# Patient Record
Sex: MALE | Race: WHITE | HISPANIC OR LATINO | Employment: UNEMPLOYED | URBAN - METROPOLITAN AREA
[De-identification: names, ages, dates, MRNs, and addresses within clinical notes are randomized per-mention and may not be internally consistent; named-entity substitution may affect disease eponyms.]

---

## 2018-01-29 ENCOUNTER — APPOINTMENT (OUTPATIENT)
Dept: CT IMAGING | Facility: CLINIC | Age: 48
End: 2018-01-29
Attending: EMERGENCY MEDICINE
Payer: COMMERCIAL

## 2018-01-29 ENCOUNTER — HOSPITAL ENCOUNTER (EMERGENCY)
Facility: CLINIC | Age: 48
Discharge: HOME | End: 2018-01-30
Attending: EMERGENCY MEDICINE | Admitting: EMERGENCY MEDICINE
Payer: COMMERCIAL

## 2018-01-29 DIAGNOSIS — J36 PERITONSILLAR ABSCESS: ICD-10-CM

## 2018-01-29 LAB
ANION GAP SERPL CALCULATED.3IONS-SCNC: 6 MMOL/L (ref 3–14)
BASOPHILS # BLD AUTO: 0 10E9/L (ref 0–0.2)
BASOPHILS NFR BLD AUTO: 0.2 %
BUN SERPL-MCNC: 11 MG/DL (ref 7–30)
CALCIUM SERPL-MCNC: 8.6 MG/DL (ref 8.5–10.1)
CHLORIDE SERPL-SCNC: 103 MMOL/L (ref 94–109)
CO2 SERPL-SCNC: 30 MMOL/L (ref 20–32)
CREAT SERPL-MCNC: 0.66 MG/DL (ref 0.66–1.25)
DIFFERENTIAL METHOD BLD: ABNORMAL
EOSINOPHIL # BLD AUTO: 0.2 10E9/L (ref 0–0.7)
EOSINOPHIL NFR BLD AUTO: 1 %
ERYTHROCYTE [DISTWIDTH] IN BLOOD BY AUTOMATED COUNT: 12.3 % (ref 10–15)
GFR SERPL CREATININE-BSD FRML MDRD: >90 ML/MIN/1.7M2
GLUCOSE SERPL-MCNC: 105 MG/DL (ref 70–99)
HCT VFR BLD AUTO: 45.8 % (ref 40–53)
HGB BLD-MCNC: 15.4 G/DL (ref 13.3–17.7)
IMM GRANULOCYTES # BLD: 0.1 10E9/L (ref 0–0.4)
IMM GRANULOCYTES NFR BLD: 0.6 %
LYMPHOCYTES # BLD AUTO: 1.6 10E9/L (ref 0.8–5.3)
LYMPHOCYTES NFR BLD AUTO: 10.3 %
MCH RBC QN AUTO: 30.9 PG (ref 26.5–33)
MCHC RBC AUTO-ENTMCNC: 33.6 G/DL (ref 31.5–36.5)
MCV RBC AUTO: 92 FL (ref 78–100)
MONOCYTES # BLD AUTO: 1.4 10E9/L (ref 0–1.3)
MONOCYTES NFR BLD AUTO: 9.3 %
NEUTROPHILS # BLD AUTO: 11.9 10E9/L (ref 1.6–8.3)
NEUTROPHILS NFR BLD AUTO: 78.6 %
NRBC # BLD AUTO: 0 10*3/UL
NRBC BLD AUTO-RTO: 0 /100
PLATELET # BLD AUTO: 239 10E9/L (ref 150–450)
POTASSIUM SERPL-SCNC: 3.5 MMOL/L (ref 3.4–5.3)
RBC # BLD AUTO: 4.98 10E12/L (ref 4.4–5.9)
SODIUM SERPL-SCNC: 139 MMOL/L (ref 133–144)
WBC # BLD AUTO: 15.1 10E9/L (ref 4–11)

## 2018-01-29 PROCEDURE — 42700 I&D ABSCESS PERITONSILLAR: CPT | Performed by: EMERGENCY MEDICINE

## 2018-01-29 PROCEDURE — 80048 BASIC METABOLIC PNL TOTAL CA: CPT | Performed by: EMERGENCY MEDICINE

## 2018-01-29 PROCEDURE — 99284 EMERGENCY DEPT VISIT MOD MDM: CPT | Mod: Z6 | Performed by: EMERGENCY MEDICINE

## 2018-01-29 PROCEDURE — 85025 COMPLETE CBC W/AUTO DIFF WBC: CPT | Performed by: EMERGENCY MEDICINE

## 2018-01-29 PROCEDURE — 25000128 H RX IP 250 OP 636: Performed by: EMERGENCY MEDICINE

## 2018-01-29 PROCEDURE — 25000125 ZZHC RX 250: Performed by: EMERGENCY MEDICINE

## 2018-01-29 PROCEDURE — 70491 CT SOFT TISSUE NECK W/DYE: CPT

## 2018-01-29 PROCEDURE — 99284 EMERGENCY DEPT VISIT MOD MDM: CPT | Mod: 25 | Performed by: EMERGENCY MEDICINE

## 2018-01-29 RX ORDER — AMPICILLIN AND SULBACTAM 2; 1 G/1; G/1
3 INJECTION, POWDER, FOR SOLUTION INTRAMUSCULAR; INTRAVENOUS ONCE
Status: COMPLETED | OUTPATIENT
Start: 2018-01-29 | End: 2018-01-30

## 2018-01-29 RX ORDER — LIDOCAINE HYDROCHLORIDE 10 MG/ML
INJECTION, SOLUTION EPIDURAL; INFILTRATION; INTRACAUDAL; PERINEURAL
Status: COMPLETED
Start: 2018-01-29 | End: 2018-01-30

## 2018-01-29 RX ORDER — KETOROLAC TROMETHAMINE 15 MG/ML
15 INJECTION, SOLUTION INTRAMUSCULAR; INTRAVENOUS ONCE
Status: COMPLETED | OUTPATIENT
Start: 2018-01-29 | End: 2018-01-29

## 2018-01-29 RX ORDER — IOPAMIDOL 755 MG/ML
100 INJECTION, SOLUTION INTRAVASCULAR ONCE
Status: COMPLETED | OUTPATIENT
Start: 2018-01-29 | End: 2018-01-29

## 2018-01-29 RX ADMIN — KETOROLAC TROMETHAMINE 15 MG: 15 INJECTION, SOLUTION INTRAMUSCULAR; INTRAVENOUS at 23:09

## 2018-01-29 RX ADMIN — IOPAMIDOL 90 ML: 755 INJECTION, SOLUTION INTRAVENOUS at 22:59

## 2018-01-29 RX ADMIN — SODIUM CHLORIDE 50 ML: 9 INJECTION, SOLUTION INTRAVENOUS at 23:00

## 2018-01-29 ASSESSMENT — ENCOUNTER SYMPTOMS
CHILLS: 0
FEVER: 0
ABDOMINAL PAIN: 0
NECK PAIN: 1
TROUBLE SWALLOWING: 1
ADENOPATHY: 1
FACIAL SWELLING: 1
SHORTNESS OF BREATH: 0

## 2018-01-29 NOTE — ED AVS SNAPSHOT
[facility], [place], Emergency Department    [address]    [facility] [address]    Phone:  [phone]    Fax:  [phone]                                       Michael Stewart   MRN: [id number]    Department:  [facility], Emergency Department   Date of Visit:  1/29/2018           After Visit Summary Signature Page     I have received my discharge instructions, and my questions have been answered. I have discussed any challenges I see with this plan with the nurse or doctor.    ..........................................................................................................................................  Patient/Patient Representative Signature      ..........................................................................................................................................  Patient Representative Print Name and Relationship to Patient    ..................................................               ................................................  Date                                            Time    ..........................................................................................................................................  Reviewed by Signature/Title    ...................................................              ..............................................  Date                                                            Time

## 2018-01-29 NOTE — ED AVS SNAPSHOT
[facility], Emergency Department    [address]    [facility] [place] [address]    Phone:  [phone]    Fax:  [phone]                                       Michael Stewart   MRN: [id number]    Department:  [facility], Emergency Department   Date of Visit:  1/29/2018           Patient Information     Date Of Birth          1970        Your diagnoses for this visit were:     Peritonsillar abscess        You were seen by Son Santa MD.      Follow-up Information     Follow up with [facility], Emergency Department.    Specialty:  EMERGENCY MEDICINE    Why:  As needed, If symptoms worsen    Contact information:    [address] [place] [address] [place]  [facility] [place] [address] [phone]    Additional information:    The [facility] is located in the [facility]. lt is easily accessible from virtually any point in the [facility] area, via [address]        Discharge Instructions         Absceso periamigdalino    El absceso periamigdalino es deepak formación de pus cerca de las amígdalas. Es deepak complicación de la infección bacteriana llamada amigdalitis. El absceso hace que deepak o ambas amígdalas se hinchen; la infección y la hinchazón se pueden extender hasta los tejidos cercanos. Si dichos tejidos se hinchan lo suficiente naa para bloquear la garganta, la afección puede resultar mortal. La situación también es peligrosa si el absceso se revienta y la infección se esparce o es inhalada y pasa a los pulmones. El objetivo es tratar el absceso periamigdalino antes de que empeore y se convierta en deepak amenaza para madison jan.  Signos y síntomas del absceso periamigdalino    Beck dolor de garganta (por lo general en un solo lado)    Hinchazón e inflamación de las amígdalas    Fiebre y escalofríos    Dolor al tragar o dificultad para abrir la boca    Cambios en la voz    Babeo    Hinchazón o hipersensibilidad en las ganglios linfáticos del luke  Diagnóstico del absceso  periamigdalino  Madison médico lo examinará y le revisará la boca y la garganta. Además, le hará preguntas sobre phoebe síntomas y madison historia clínica. También es posible que usted sea sometido a pruebas o procedimientos naa los que se nombran a continuación:    Examen de garganta: Sirve para descubrir infecciones. Se frota un copo de algodón estéril en la parte de atrás de la garganta y luego se envía a un laboratorio para que lo analicen.    Análisis de tutu: Se podrían realizar a fin de evaluar cómo está respondiendo madison cuerpo a la infección.    Ecografía o tomografía computarizada (CT, por phoebe siglas en inglés): Permiten captar imágenes del absceso y ayudan a descartar otros problemas.    Aspiración con aguja: [place] procedimiento permite extraer hailee muestra de pus del absceso con hailee aguja. Luego, dicha muestra se envía a un laboratorio para loni si hay infección. En algunos casos, se mamadou todo el pus del absceso.  Tratamiento del absceso periamigdalino  Se puede tratar el absceso propiamente dicho, juan josé también es necesario tratar la infección que lo causa. Estos son los tratamientos más comunes:    Medicamentos: Para tratar la infección causante del absceso deben darse antibióticos, ya sea por vía oral o intravenosa. Si es necesario, también se pueden suministrar analgésicos.    Drenaje del absceso: Kartik vez sea preciso quitar el pus del absceso. [place] se puede realizar con hailee aguja (aspiración con aguja) o mediante hailee pequeña incisión en el absceso, luego de la cual el pus se drena y se extrae de la garganta y la boca. [place] procedimiento se llama incisión y drenaje.    Tonsilectomía: Es hailee cirugía que permite extraer las amígdalas. Se puede llevar a cabo si el absceso no mejora con los medicamentos o si usted tiene infecciones o abscesos frecuentes en las amígdalas.  Recuperación y seguimiento  Por lo general, el tratamiento de la infección bacteriana soluciona el problema. Hailee vez que la infección desaparece,  usted debería recuperarse por completo. Delia el seguimiento con madison médico según lo indicado, y si aparece otra infección en la garganta, véalo de inmediato.  Date Last Reviewed: 5/7/2014 2000-2017 [employer]. [address] [place] [address], [place] [address]. Todos los derechos reservados. Esta información no pretende sustituir la atención médica profesional. Sólo madison médico puede diagnosticar y tratar un problema de jan.          24 Hour Appointment Hotline       To make an appointment at any [facility], call [phone] ([phone]). If you don't have a family doctor or clinic, we will help you find one. [facility] are conveniently located to serve the needs of you and your family.             Review of your medicines      START taking        Dose / Directions Last dose taken    amoxicillin-clavulanate 875-125 MG per tablet   Commonly known as:  AUGMENTIN   Dose:  1 tablet   Quantity:  14 tablet        Take 1 tablet by mouth 2 times daily for 7 days   Refills:  0          Our records show that you are taking the medicines listed below. If these are incorrect, please call your family doctor or clinic.        Dose / Directions Last dose taken    FLANAX PAIN RELIEF PO   Dose:  550 mg        Take 550 mg by mouth   Refills:  0        TETRACYCLINE HCL PO   Dose:  500 mg        Take 500 mg by mouth   Refills:  0                Prescriptions were sent or printed at these locations (1 Prescription)                   Other Prescriptions                Printed at Department/Unit printer (1 of 1)         amoxicillin-clavulanate (AUGMENTIN) 875-125 MG per tablet                Procedures and tests performed during your visit     Basic metabolic panel    CBC with platelets differential    Gram stain    Soft tissue neck CT w contrast    Wound Culture Aerobic Bacterial      Orders Needing Specimen Collection     None      Pending Results     Date and Time Order Name Status Description    1/30/2018  "0129 Gram stain In process     2018 0129 Wound Culture Aerobic Bacterial In process     2018 2208 Soft tissue neck CT w contrast Preliminary             Pending Culture Results     Date and Time Order Name Status Description    2018 0129 Gram stain In process     2018 0129 Wound Culture Aerobic Bacterial In process             Pending Results Instructions     If you had any lab results that were not finalized at the time of your Discharge, you can call the ED Lab Result RN at [phone]. You will be contacted by this team for any positive Lab results or changes in treatment. The nurses are available 7 days a week from 10A to 6:30P.  You can leave a message 24 hours per day and they will return your call.        Thank you for choosing [facility]       Thank you for choosing [facility] for your care. Our goal is always to provide you with excellent care. Hearing back from our patients is one way we can continue to improve our services. Please take a few minutes to complete the written survey that you may receive in the mail after you visit with us. Thank you!        [facility] Information     [facility] lets you send messages to your doctor, view your test results, renew your prescriptions, schedule appointments and more. To sign up, go to [email or web address] . Click on \"Log in\" on the left side of the screen, which will take you to the Welcome page. Then click on \"Sign up Now\" on the right side of the page.     You will be asked to enter the access code listed below, as well as some personal information. Please follow the directions to create your username and password.     Your access code is: [email or web address]  Expires: 2018  1:34 AM     Your access code will  in 90 days. If you need help or a new code, please call your [facility] clinic or [phone].        Care EveryWhere ID     This is your Care EveryWhere ID. This could be used by other organizations to access your [facility] medical " records  [id number]        Equal Access to Services     NAYE BILLY : Harshad Pedraza, emma kirkpatrick, shasta diaz, prosper campos . So [facility] [phone].    ATENCIÓN: Si habla español, tiene a madison disposición servicios gratuitos de asistencia lingüística. Llame al [phone].    We comply with applicable federal civil rights laws and [place] laws. We do not discriminate on the basis of race, color, national origin, age, disability, sex, sexual orientation, or gender identity.            After Visit Summary       This is your record. Keep this with you and show to your community pharmacist(s) and doctor(s) at your next visit.

## 2018-01-29 NOTE — LETTER
[facility], [place], EMERGENCY DEPARTMENT  [address]  [facility] [place] [address]  [phone]      2018    Michael Stewart  [address]  [facility] [address] [phone] (home)     : 1970      To Whom it may concern:    Michael Stewart was seen in our Emergency Department today, 2018 for an illness.  He should be excused from work through 2018 based on anticipated time for resolution of symptoms; he may return to work on an as tolerated basis following this or sooner should symptoms resolve.      Sincerely,    Son Santa MD

## 2018-01-30 VITALS
WEIGHT: 173.06 LBS | HEART RATE: 105 BPM | DIASTOLIC BLOOD PRESSURE: 92 MMHG | RESPIRATION RATE: 16 BRPM | OXYGEN SATURATION: 95 % | TEMPERATURE: 98.6 F | SYSTOLIC BLOOD PRESSURE: 141 MMHG

## 2018-01-30 LAB
BACTERIA SPEC CULT: NORMAL
GRAM STN SPEC: ABNORMAL
Lab: ABNORMAL
SPECIMEN SOURCE: ABNORMAL
SPECIMEN SOURCE: NORMAL

## 2018-01-30 PROCEDURE — 87070 CULTURE OTHR SPECIMN AEROBIC: CPT | Performed by: OTOLARYNGOLOGY

## 2018-01-30 PROCEDURE — 87205 SMEAR GRAM STAIN: CPT | Performed by: OTOLARYNGOLOGY

## 2018-01-30 PROCEDURE — 25000132 ZZH RX MED GY IP 250 OP 250 PS 637: Performed by: EMERGENCY MEDICINE

## 2018-01-30 PROCEDURE — 87147 CULTURE TYPE IMMUNOLOGIC: CPT | Performed by: OTOLARYNGOLOGY

## 2018-01-30 PROCEDURE — 25000128 H RX IP 250 OP 636: Performed by: EMERGENCY MEDICINE

## 2018-01-30 PROCEDURE — 25000125 ZZHC RX 250

## 2018-01-30 RX ORDER — DEXAMETHASONE SODIUM PHOSPHATE 10 MG/ML
10 INJECTION, SOLUTION INTRAMUSCULAR; INTRAVENOUS ONCE
Status: COMPLETED | OUTPATIENT
Start: 2018-01-30 | End: 2018-01-30

## 2018-01-30 RX ADMIN — AMPICILLIN SODIUM AND SULBACTAM SODIUM 3 G: 2; 1 INJECTION, POWDER, FOR SOLUTION INTRAMUSCULAR; INTRAVENOUS at 00:22

## 2018-01-30 RX ADMIN — DEXAMETHASONE SODIUM PHOSPHATE 10 MG: 10 INJECTION, SOLUTION INTRAMUSCULAR; INTRAVENOUS at 01:36

## 2018-01-30 RX ADMIN — Medication 1 ML: at 00:20

## 2018-01-30 RX ADMIN — LIDOCAINE HYDROCHLORIDE: 10 INJECTION, SOLUTION EPIDURAL; INFILTRATION; INTRACAUDAL; PERINEURAL at 01:38

## 2018-01-30 RX ADMIN — SODIUM CHLORIDE 1000 ML: 9 INJECTION, SOLUTION INTRAVENOUS at 00:20

## 2018-01-30 NOTE — CONSULTS
[facility] Otolaryngology Consultation    Michael Stewart MRN# [id number]   Age: 47 year old YOB: 1970     Date of Admission:  1/29/2018    Reason for consult: Peritonsillar abscess       Requesting physician: Son Santa MD         Chief Concern:   Throat pain       History of Present Illness:   Patient is a Syriac speaking individual.   services were used for the entire visit.    Location: Left  Quality/Character/Symptom: Throat pain  Onset/Duration: 2 weeks ago  Timing: Constant  Severity: Worsening  Modifying Factors: Tried tetracycline and naproxen today with no help  Context: Upper respiratory infection around the time of infection began. No prior episodes, no history of tonsillectomy.  Associated Signs/Symptoms: Difficulty swallowing, fluctuating voice changes, fever, left neck lymph nodes enlarged.      The visit today is for consultation procedure if indicated        Review of Systems:   Constitutional: Patient reports having fever but did not check his temperature.    Ears, Nose, Throat: As above, otherwise negative.    Respiratory: No shortness of breath, wheezing or persistent cough.  No difficulty breathing.  Cardiovascular: No chest pain.   Gastrointestinal: No vomiting or heartburn.   Neurological: No headaches.   Skin: No rash.   Musculoskeletal: No muscle aches.   Blood/Lymphatic: Left neck cervical lymphadenopathy.   Allergic: No seasonal or food allergies.        Past Medical History:   Reviewed, snoring.  Otherwise negative.    Past Surgical History:   Reviewed, hemorrhoid procedure 5 years ago    Current Medications:   Reviewed, none    Medication Allergies:   Reviewed, none    Social History:   Reviewed, former tobacco quit 8 years ago.  One alcoholic beverage per week.  No drug use.  Lives in [place].  Reports having ability to come back if needed.    Family History:   Reviewed, negative for bleeding disorders or anesthesia complications       Physical  Exam:    Constitutional:    .BP (!) 141/92  Pulse 105  Temp 98.6  F (37  C) (Oral)  Resp 16  Wt 78.5 kg (173 lb 1 oz)  SpO2 95%  b.  Normal general appearance  c.  Normal voice and ability to communicate  Head and Face  a.  Normal inspection of head and face  b.  Normal palpation of the face and sinuses  c.  No salivary masses  d.  House Brackman 1/6 facial strength  e.  Normal external ears and nose  Eyes  a.  Normal motility and gaze alignment  Ears  a.Normal external auditory canals and tympanic membrane   Nose  a.  Normal nasal mucosa, septum and turbinates  Mouth  a.  Normal lips, teeth and gums  Oropharynx  a.  Mild swelling of left tonsil and peritonsillar region.  Induration of this region to palpation.  Mild trismus.  Thick base of tongue  Neck  a.  Normal appearance.  Left level 2 lymphadenopathy  b.  Normal thyroid size. No masses appreciated.  Respiratory  a.  Normal respiratory effort.  Quiet respirations.  Cardiovascular  a.  Normal peripheral vascular perfusion  Lymphatic  a.  Left cervical lymph nodes enlarged and tender  Neurologic/Psychiatric  a.  Cranial nerves without focal deficits       Procedure:  Drainage of left peritonsillar abscess.  Please see separate procedure note.      Data Reviewed:  CT images reviewed personally and interpreted:  January 29, 2018 contrast CT soft tissue neck: 2 foci of hypodensity within the left tonsillar/peritonsillar region.  One measures about 1 cm the other about 1.5 cm.  Multiple left upper cervical lymph nodes enlarged.    White blood cell count 15    Medical history obtained through     Case discussed with consulting Dr. Santa.      Impressions:  New:  1.  Left tonsillar and peritonsillar abscess work-up planned  2.  Dysphagia no work-up      Recommendations:   -Peritonsillar abscess drained in emergency department  -Culture sent off from wound  -Prescribe antibiotic with good oral pharyngeal coverage such as Augmentin  -Okay to give Decadron  ×1 in ER  -Patient given business card and asked to follow-up if he desires tonsillectomy.  Quoted 10% risk for recurrence.  -Patient also instructed to return if symptoms worsen or return.  He reports ability and willingness to do so.  -Dysphagia improved after drainage in the emergency department

## 2018-01-30 NOTE — DISCHARGE INSTRUCTIONS
Absceso periamigdalino    El absceso periamigdalino es deepak formación de pus cerca de las amígdalas. Es deepak complicación de la infección bacteriana llamada amigdalitis. El absceso hace que deepak o ambas amígdalas se hinchen; la infección y la hinchazón se pueden extender hasta los tejidos cercanos. Si dichos tejidos se hinchan lo suficiente naa para bloquear la garganta, la afección puede resultar mortal. La situación también es peligrosa si el absceso se revienta y la infección se esparce o es inhalada y pasa a los pulmones. El objetivo es tratar el absceso periamigdalino antes de que empeore y se convierta en deepak amenaza para madison jan.  Signos y síntomas del absceso periamigdalino    Beck dolor de garganta (por lo general en un solo lado)    Hinchazón e inflamación de las amígdalas    Fiebre y escalofríos    Dolor al tragar o dificultad para abrir la boca    Cambios en la voz    Babeo    Hinchazón o hipersensibilidad en las ganglios linfáticos del luke  Diagnóstico del absceso periamigdalino  Madison médico lo examinará y le revisará la boca y la garganta. Además, le hará preguntas sobre phoebe síntomas y madison historia clínica. También es posible que usted sea sometido a pruebas o procedimientos naa los que se nombran a continuación:    Examen de garganta: Sirve para descubrir infecciones. Se frota un copo de algodón estéril en la parte de atrás de la garganta y luego se envía a un laboratorio para que lo analicen.    Análisis de tutu: Se podrían realizar a fin de evaluar cómo está respondiendo madison cuerpo a la infección.    Ecografía o tomografía computarizada (CT, por phoebe siglas en inglés): Permiten captar imágenes del absceso y ayudan a descartar otros problemas.    Aspiración con aguja: [place] procedimiento permite extraer deepak muestra de pus del absceso con deepak aguja. Luego, dicha muestra se envía a un laboratorio para loni si hay infección. En algunos casos, se mamadou todo el pus del absceso.  Tratamiento del absceso  periamigdalino  Se puede tratar el absceso propiamente dicho, juan josé también es necesario tratar la infección que lo causa. Estos son los tratamientos más comunes:    Medicamentos: Para tratar la infección causante del absceso deben darse antibióticos, ya sea por vía oral o intravenosa. Si es necesario, también se pueden suministrar analgésicos.    Drenaje del absceso: Kartik vez sea preciso quitar el pus del absceso. [place] se puede realizar con deepak aguja (aspiración con aguja) o mediante deepak pequeña incisión en el absceso, luego de la cual el pus se drena y se extrae de la garganta y la boca. [place] procedimiento se llama incisión y drenaje.    Tonsilectomía: Es deepak cirugía que permite extraer las amígdalas. Se puede llevar a cabo si el absceso no mejora con los medicamentos o si usted tiene infecciones o abscesos frecuentes en las amígdalas.  Recuperación y seguimiento  Por lo general, el tratamiento de la infección bacteriana soluciona el problema. Deepak vez que la infección desaparece, usted debería recuperarse por completo. Delia el seguimiento con madison médico según lo indicado, y si aparece otra infección en la garganta, véalo de inmediato.  Date Last Reviewed: 5/7/2014 2000-2017 [email or web address]. [address] [place] [address], [place] [address]. Todos los derechos reservados. Esta información no pretende sustituir la atención médica profesional. Sólo madison médico puede diagnosticar y tratar un problema de jan.

## 2018-01-30 NOTE — ED PROVIDER NOTES
History     Chief Complaint   Patient presents with     Lymphadenopathy     Onset 2 weeks ago with pain and swelling in lymph nodes of left neck.     HEIDI Stewart is a 47 year old male without significant past medical history who presents for evaluation of lymphadenopathy.  The patient reports approximately 2 weeks ago he noted swelling in the left side of his neck and associated constant, sharp pain that radiates to the back of his head.  He states that the symptoms have been worsening since onset.  The patient states that coincident with that he developed flulike symptoms including sore throat and congestion, as well as mild cough; however, he states that these flulike symptoms have mostly resolved.  The patient has had some mild difficulty swallowing secondary to the pain associated with his neck swelling; thus he has not been eating as much as usual.  He states also that he feels his voice is somewhat hoarse, and he has had difficulty sleeping due to his symptoms.  The patient otherwise denies any recent fevers or chills.  He states that he has not had any recent dental procedure or any trauma.  The patient states that he is otherwise generally rather healthy no history of these symptoms.    Current Facility-Administered Medications   Medication     ketorolac (TORADOL) injection 15 mg     Current Outpatient Prescriptions   Medication     TETRACYCLINE HCL PO     Naproxen Sodium (FLANAX PAIN RELIEF PO)     History reviewed. No pertinent past medical history.    History reviewed. No pertinent surgical history.    No family history on file.    Social History   Substance Use Topics     Smoking status: Not on file     Smokeless tobacco: Not on file     Alcohol use Not on file     Not on File    I have reviewed the Medications, Allergies, Past Medical and Surgical History, and Social History in the Epic system.    Review of Systems   Constitutional: Negative for chills and fever.   HENT: Positive for facial  swelling (left-sided facial swelling) and trouble swallowing.    Respiratory: Negative for shortness of breath.    Cardiovascular: Negative for chest pain.   Gastrointestinal: Negative for abdominal pain.   Musculoskeletal: Positive for neck pain (left-sided w/ associated swelling).   Hematological: Positive for adenopathy (concern for left-sided cervical lymphadenopathy).   All other systems reviewed and are negative.      Physical Exam   BP: (!) 170/103  Pulse: 105  Heart Rate: 105  Temp: 97.7  F (36.5  C)  Resp: 16  Weight: 78.5 kg (173 lb 1 oz)  SpO2: 98 %      Physical Exam   Constitutional: He is oriented to person, place, and time. He appears distressed.   HENT:   Head: Normocephalic and atraumatic.   Mouth/Throat: Oropharynx is clear and moist.   Eyes: Conjunctivae are normal. Pupils are equal, round, and reactive to light.   Neck: No spinous process tenderness present. No rigidity. Normal range of motion present.       Cardiovascular: Regular rhythm and normal heart sounds.  Tachycardia present.    Pulmonary/Chest: Effort normal. No stridor. Tachypnea noted. No respiratory distress.   Musculoskeletal: He exhibits no edema.   Neurological: He is alert and oriented to person, place, and time. No cranial nerve deficit.   Skin: Skin is warm. Rash noted.       ED Course     ED Course     Procedures             Labs Ordered and Resulted from Time of ED Arrival Up to the Time of Departure from the ED   CBC WITH PLATELETS DIFFERENTIAL - Abnormal; Notable for the following:        Result Value    WBC 15.1 (*)     Absolute Neutrophil 11.9 (*)     Absolute Monocytes 1.4 (*)     All other components within normal limits   BASIC METABOLIC PANEL            Assessments & Plan (with Medical Decision Making)     47-year-old otherwise healthy male arriving today to the emergency [facility] for evaluation of left-sided neck discomfort ×2 weeks.  Upon arrival this patient noted to be alert and currently afebrile.  He speaking in  full sentences but does appear slightly uncomfortable.  The patient is noted to be tachycardic with a current rate of 105.  At this time the patient reports localized pain in the left anterior superior neck.  He has no signs of external fluctuance, erythema, or warmth consistent with cellulitis however we did discuss possibility of deep space infection.  There is no evidence of oral abscess tongue elevation, or drooling.  There is no stridor appreciated however the patient does sound to have mild hypophonia consistent with his own description.  There is no meningismus I do not suspect CNS infection such as meningitis or encephalitis warranting emergent LP.  The patient has a GCS of 15 with no appreciable neurologic deficit this is not consistent with dissection.  There is no signs of external trauma to the neck.  At this time I believe the patient would benefit from CBC, CMP, and CT of the neck to evaluate for potential deep space infection and rule out epiglottitis.    Laboratory studies do confirm a leukocytosis with left shift.  CT scan ultimately does confirm suspected abscess with apparent 2 discrete locations of 1 and 1.5 cm.  Ultimately did discuss this case with ENT surgery with plan for bedside evaluation and aspiration.  The patient is received IV fluids, Toradol, and Unasyn in the emergency department.    Mr. Stewart was evaluated by ENT with local I&D.  The patient was noted to be having improvement of symptoms following this tolerating oral fluids.  Recommendations provided by ENT are for Augmentin ×1 week and Decadron ×1 in the emergency department.  The patient understands indications to call or return emergently and was dismissed home in stable condition.    I have reviewed the nursing notes.    I have reviewed the findings, diagnosis, plan and need for follow up with the patient.    New Prescriptions    No medications on file       Final diagnoses:   Peritonsillar abscess     I, Yemi Castillo am  serving as a trained medical scribe to document services personally performed by Son Santa MD, based on the provider's statements to me.      I, Son Santa MD, was physically present and have reviewed and verified the accuracy of this note documented by Yemi Castillo.    1/29/2018   [facility], [place], EMERGENCY DEPARTMENT     Son Santa MD  01/30/18 0150

## 2018-01-30 NOTE — PROCEDURES
Drainage of peritonsillar abscess (CPT 56114)     Surgeon:   Justin Cadena MD      Diagnosis:   Left Peritonsillar abscess (ICD-10: J36)     Indication:   Patient with clinical findings suggestive of peritonsillar abscess.  Nature of procedure, risks, benefits, alternatives discussed and patient elects to proceed with drainage.     Procedure:   The throat was treated with topical numbing spray.  Local anesthetic is injected into the peritonsillar region.  A spinal needle is used to aspirate the peritonsillar area.  A scalpel was used to incise the anterior tonsillar pillar.  A clamp was used to spread into the peritonsillar space.     Findings:   Pus is drained from the inferior aspect of the left tonsillar pole.  This is cultured.  Patient reports improvement in symptoms after drainage.  Multiple aspirations were made more superiorly, however, no pus was encountered.

## 2018-02-01 ENCOUNTER — TELEPHONE (OUTPATIENT)
Dept: EMERGENCY MEDICINE | Facility: CLINIC | Age: 48
End: 2018-02-01

## 2018-02-01 LAB
BACTERIA SPEC CULT: ABNORMAL
Lab: ABNORMAL
SPECIMEN SOURCE: ABNORMAL

## 2018-02-01 NOTE — TELEPHONE ENCOUNTER
[facility] Emergency Department Lab result notification:    [place] ED lab result protocol used  Abscess     Reason for call  Notify of lab results, assess symptoms,  review ED providers recommendations/discharge instructions (if necessary) and advise per ED lab result f/u protocol    Lab Result  Final Abscess culture report on 2/1/18  [place] ED discharge antibiotic: Amoxicillin-Clavulanate (Augmentin) 875-125 mg PO tablet,  1 tablet by mouth 2 times daily for 7 days  #1. Bacteria, Heavy growth Beta hemolytic Streptococcus group A, Susceptibility testing not routinely done.   Incision and Drainage performed in the [place] ED: Yes  Patient to be notified of result, symptoms's assessed and advised per [place] ED lab result protocol.  Information table from ED Provider visit on 1/29/18-1/30/18  Symptoms reported at ED visit (Chief complaint, HPI) Chief Complaint   Patient presents with     Lymphadenopathy       Onset 2 weeks ago with pain and swelling in lymph nodes of left neck.      HPI  Michael Stewart is a 47 year old male without significant past medical history who presents for evaluation of lymphadenopathy.  The patient reports approximately 2 weeks ago he noted swelling in the left side of his neck and associated constant, sharp pain that radiates to the back of his head.  He states that the symptoms have been worsening since onset.  The patient states that coincident with that he developed flulike symptoms including sore throat and congestion, as well as mild cough; however, he states that these flulike symptoms have mostly resolved.  The patient has had some mild difficulty swallowing secondary to the pain associated with his neck swelling; thus he has not been eating as much as usual.  He states also that he feels his voice is somewhat hoarse, and he has had difficulty sleeping due to his symptoms.  The patient otherwise denies any recent fevers or chills.  He states that he has not had any  recent dental procedure or any trauma.  The patient states that he is otherwise generally rather healthy no history of these symptoms.     ED providers Impression and Plan (applicable information)    47-year-old otherwise healthy male arriving today to the emergency [facility] for evaluation of left-sided neck discomfort ×2 weeks.  Upon arrival this patient noted to be alert and currently afebrile.  He speaking in full sentences but does appear slightly uncomfortable.  The patient is noted to be tachycardic with a current rate of 105.  At this time the patient reports localized pain in the left anterior superior neck.  He has no signs of external fluctuance, erythema, or warmth consistent with cellulitis however we did discuss possibility of deep space infection.  There is no evidence of oral abscess tongue elevation, or drooling.  There is no stridor appreciated however the patient does sound to have mild hypophonia consistent with his own description.  There is no meningismus I do not suspect CNS infection such as meningitis or encephalitis warranting emergent LP.  The patient has a GCS of 15 with no appreciable neurologic deficit this is not consistent with dissection.  There is no signs of external trauma to the neck.  At this time I believe the patient would benefit from CBC, CMP, and CT of the neck to evaluate for potential deep space infection and rule out epiglottitis.     Laboratory studies do confirm a leukocytosis with left shift.  CT scan ultimately does confirm suspected abscess with apparent 2 discrete locations of 1 and 1.5 cm.  Ultimately did discuss this case with ENT surgery with plan for bedside evaluation and aspiration.  The patient is received IV fluids, Toradol, and Unasyn in the emergency department.     Mr. Stewart was evaluated by ENT with local I&D.  The patient was noted to be having improvement of symptoms following this tolerating oral fluids.  Recommendations provided by ENT are for  Augmentin ×1 week and Decadron ×1 in the emergency department.  The patient understands indications to call or return emergently and was dismissed home in stable condition.         New Prescriptions     No medications on file         Final diagnoses:   Peritonsillar abscess       Miscellaneous information Able to speak English     RN Assessment (Patient s current Symptoms), include time called.  [Insert Left message here if message left]  12:45 pm Pt said he is good now. Is taking his antibiotic as prescribe and will finish it.   RN Recommendations/Instructions per [place] ED lab result protocol  Advised to finish full course of antibiotics as prescribed and drink plenty of fluids. And follow up with your PCP as the ED provider recommended.     Please Contact your PCP clinic or return to the Emergency department if your:    Symptoms return.    Symptoms do not improve after 3 days on antibiotic.    Symptoms do not resolve after completing antibiotic.    Symptoms worsen or other concerning symptom's.    PCP follow-up Questions asked: YES       [RN Name]  Opal Glass RN  [place] [facility] RN  Lung Nodule and ED Lab Result F/u RN  Epic pool (ED late result f/u RN): [phone] [id number]  # [phone]

## 2021-01-18 ENCOUNTER — HOSPITAL ENCOUNTER (EMERGENCY)
Facility: CLINIC | Age: 51
Discharge: HOME | End: 2021-01-18
Attending: PSYCHIATRY & NEUROLOGY | Admitting: PSYCHIATRY & NEUROLOGY
Payer: COMMERCIAL

## 2021-01-18 VITALS
DIASTOLIC BLOOD PRESSURE: 96 MMHG | RESPIRATION RATE: 18 BRPM | TEMPERATURE: 98 F | HEART RATE: 100 BPM | SYSTOLIC BLOOD PRESSURE: 174 MMHG | OXYGEN SATURATION: 98 %

## 2021-01-18 DIAGNOSIS — F22 PARANOIA (H): ICD-10-CM

## 2021-01-18 DIAGNOSIS — F15.10 METHAMPHETAMINE ABUSE (H): ICD-10-CM

## 2021-01-18 PROCEDURE — 90791 PSYCH DIAGNOSTIC EVALUATION: CPT

## 2021-01-18 PROCEDURE — 99285 EMERGENCY DEPT VISIT HI MDM: CPT | Mod: 25 | Performed by: PSYCHIATRY & NEUROLOGY

## 2021-01-18 PROCEDURE — 250N000013 HC RX MED GY IP 250 OP 250 PS 637: Performed by: PSYCHIATRY & NEUROLOGY

## 2021-01-18 PROCEDURE — 99284 EMERGENCY DEPT VISIT MOD MDM: CPT | Performed by: PSYCHIATRY & NEUROLOGY

## 2021-01-18 RX ORDER — OLANZAPINE 10 MG/1
10 TABLET, ORALLY DISINTEGRATING ORAL ONCE
Status: COMPLETED | OUTPATIENT
Start: 2021-01-18 | End: 2021-01-18

## 2021-01-18 RX ADMIN — OLANZAPINE 10 MG: 10 TABLET, ORALLY DISINTEGRATING ORAL at 20:05

## 2021-01-18 ASSESSMENT — ENCOUNTER SYMPTOMS
NERVOUS/ANXIOUS: 1
EYES NEGATIVE: 1
GASTROINTESTINAL NEGATIVE: 1
MUSCULOSKELETAL NEGATIVE: 1
CARDIOVASCULAR NEGATIVE: 1
DECREASED CONCENTRATION: 1
CONSTITUTIONAL NEGATIVE: 1
HALLUCINATIONS: 0
RESPIRATORY NEGATIVE: 1
NEUROLOGICAL NEGATIVE: 1
SLEEP DISTURBANCE: 1

## 2021-01-19 ENCOUNTER — HOSPITAL ENCOUNTER (EMERGENCY)
Facility: CLINIC | Age: 51
Discharge: HOME | End: 2021-01-19
Attending: PSYCHIATRY & NEUROLOGY | Admitting: PSYCHIATRY & NEUROLOGY
Payer: COMMERCIAL

## 2021-01-19 VITALS
SYSTOLIC BLOOD PRESSURE: 138 MMHG | DIASTOLIC BLOOD PRESSURE: 87 MMHG | TEMPERATURE: 98.2 F | OXYGEN SATURATION: 99 % | RESPIRATION RATE: 16 BRPM | HEART RATE: 98 BPM

## 2021-01-19 DIAGNOSIS — F15.20 METHAMPHETAMINE DEPENDENCE (H): ICD-10-CM

## 2021-01-19 PROCEDURE — 99285 EMERGENCY DEPT VISIT HI MDM: CPT | Mod: 25 | Performed by: PSYCHIATRY & NEUROLOGY

## 2021-01-19 PROCEDURE — 99284 EMERGENCY DEPT VISIT MOD MDM: CPT | Performed by: PSYCHIATRY & NEUROLOGY

## 2021-01-19 PROCEDURE — 90791 PSYCH DIAGNOSTIC EVALUATION: CPT

## 2021-01-19 ASSESSMENT — ENCOUNTER SYMPTOMS
HALLUCINATIONS: 0
MUSCULOSKELETAL NEGATIVE: 1
EYES NEGATIVE: 1
RESPIRATORY NEGATIVE: 1
CONSTITUTIONAL NEGATIVE: 1
CARDIOVASCULAR NEGATIVE: 1
PSYCHIATRIC NEGATIVE: 1
HYPERACTIVE: 0
NEUROLOGICAL NEGATIVE: 1
GASTROINTESTINAL NEGATIVE: 1

## 2021-01-19 NOTE — ED NOTES
Pt would like doctor or  to call his sister, Dali: [phone] to get collateral info and to discuss plan.
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
## 2021-01-19 NOTE — ED NOTES
Pt given PO med for his paranoid thoughts, after talking to the DEC  and the ED provider.  Pt being monitor for effect.

## 2021-01-19 NOTE — ED NOTES
Patient's family would like a phone call if patient discharges so they can safety plan.    Can call wife Becca: [phone]  Or barak العلي: [phone]

## 2021-01-19 NOTE — DISCHARGE INSTRUCTIONS
Please stop using methamphetamines. It is messing up your brain and thoughts.  Follow-up established care and services

## 2021-01-20 NOTE — DISCHARGE INSTRUCTIONS
I am glad you are getting help for your methamphetamine use. Please follow-up with P-referred outpatient CD evaluation for further treatment recommendations.  Follow-up established care and services.

## 2021-01-20 NOTE — ED NOTES
Pt brought over from the Main ED to [facility].  Pt told of the course of care while in [facility] and that we will get an  for him.  Pt stated understanding.

## 2021-01-20 NOTE — ED PROVIDER NOTES
ED Provider Note  [facility]      History     Chief Complaint   Patient presents with     Addiction Problem     seking assistance with meth addiction     HPI  Michael Stewart is a 50 year old male who is here (again) due to family concern for his meth use. He was seen yesterday and was uninsightful regarding his methamphetamine dependence. Patient did not receive any services as he did not feel he had a problem. Today police told him that he needs to take steps to get into treatment otherwise he would not be able to see his kids or likely keep his job. He decided to come back to get a referral for an outpatient CD evaluation.    Patient appears less anxious and paranoid today regarding wife's welfare. He denies feeling suicidal. He is calm and in emotional and behavioral control.    Please see DEC Crisis Assessment on 1/19/21 in Epic for further details.    PERSONAL MEDICAL HISTORY  No past medical history on file.  PAST SURGICAL HISTORY  No past surgical history on file.  FAMILY HISTORY  No family history on file.  SOCIAL HISTORY  Social History     Tobacco Use     Smoking status: Never Smoker     Smokeless tobacco: Never Used   Substance Use Topics     Alcohol use: Not on file     MEDICATIONS  No current facility-administered medications for this encounter.      Current Outpatient Medications   Medication     Naproxen Sodium (FLANAX PAIN RELIEF PO)     TETRACYCLINE HCL PO     ALLERGIES  No Known Allergies       Review of Systems   Constitutional: Negative.    HENT: Negative.    Eyes: Negative.    Respiratory: Negative.    Cardiovascular: Negative.    Gastrointestinal: Negative.    Genitourinary: Negative.    Musculoskeletal: Negative.    Skin: Negative.    Neurological: Negative.    Psychiatric/Behavioral: Negative.  Negative for hallucinations and suicidal ideas. The patient is not hyperactive.    All other systems reviewed and are negative.        Physical Exam   BP: 138/87  Pulse:  98  Temp: 98.2  F (36.8  C)  Resp: 16  SpO2: 99 %  Physical Exam  Vitals signs and nursing note reviewed.   HENT:      Head: Normocephalic.   Eyes:      Pupils: Pupils are equal, round, and reactive to light.   Neck:      Musculoskeletal: Normal range of motion.   Pulmonary:      Effort: Pulmonary effort is normal.   Musculoskeletal: Normal range of motion.   Neurological:      General: No focal deficit present.      Mental Status: He is alert.   Psychiatric:         Attention and Perception: Attention and perception normal. He does not perceive auditory or visual hallucinations.         Mood and Affect: Mood and affect normal.         Speech: Speech normal.         Behavior: Behavior normal. Behavior is not agitated, aggressive, hyperactive or combative. Behavior is cooperative.         Thought Content: Thought content normal. Thought content is not paranoid or delusional. Thought content does not include homicidal or suicidal ideation.         Cognition and Memory: Cognition and memory normal.         Judgment: Judgment normal.         ED Course      Procedures             No results found for any visits on 01/19/21.  Medications - No data to display     Assessments & Plan (with Medical Decision Making)   Patient with methamphetamine dependence who is motivated to get a CD evaluation as he does not want to lose his job or ability to continue seeing his kids. [facility] made a referral for an outpatient CD evaluation for 1 week from today. Patient was satisfied with the offered service. He can be discharged. He is to follow-up established care and services.    I have reviewed the nursing notes. I have reviewed the findings, diagnosis, plan and need for follow up with the patient.    New Prescriptions    No medications on file       Final diagnoses:   Methamphetamine dependence (H)       --  Sam Jasmine MD  [facility] EMERGENCY DEPARTMENT  1/19/2021     Sam Jasmine MD  01/19/21 5110

## 2023-07-05 ENCOUNTER — HOSPITAL ENCOUNTER (EMERGENCY)
Facility: CLINIC | Age: 53
Discharge: HOME | End: 2023-07-05
Attending: EMERGENCY MEDICINE | Admitting: EMERGENCY MEDICINE
Payer: COMMERCIAL

## 2023-07-05 ENCOUNTER — APPOINTMENT (OUTPATIENT)
Dept: CT IMAGING | Facility: CLINIC | Age: 53
End: 2023-07-05
Attending: EMERGENCY MEDICINE
Payer: COMMERCIAL

## 2023-07-05 VITALS
SYSTOLIC BLOOD PRESSURE: 127 MMHG | OXYGEN SATURATION: 94 % | HEART RATE: 77 BPM | DIASTOLIC BLOOD PRESSURE: 76 MMHG | RESPIRATION RATE: 16 BRPM | TEMPERATURE: 98.2 F

## 2023-07-05 DIAGNOSIS — J02.0 STREP PHARYNGITIS: ICD-10-CM

## 2023-07-05 LAB
ANION GAP SERPL CALCULATED.3IONS-SCNC: 10 MMOL/L (ref 7–15)
BASOPHILS # BLD AUTO: 0 10E3/UL (ref 0–0.2)
BASOPHILS NFR BLD AUTO: 0 %
BUN SERPL-MCNC: 13.4 MG/DL (ref 6–20)
CALCIUM SERPL-MCNC: 9.6 MG/DL (ref 8.6–10)
CHLORIDE SERPL-SCNC: 98 MMOL/L (ref 98–107)
CREAT SERPL-MCNC: 1.16 MG/DL (ref 0.67–1.17)
DEPRECATED HCO3 PLAS-SCNC: 27 MMOL/L (ref 22–29)
DEPRECATED S PYO AG THROAT QL EIA: POSITIVE
EOSINOPHIL # BLD AUTO: 0 10E3/UL (ref 0–0.7)
EOSINOPHIL NFR BLD AUTO: 0 %
ERYTHROCYTE [DISTWIDTH] IN BLOOD BY AUTOMATED COUNT: 12.4 % (ref 10–15)
FLUAV RNA SPEC QL NAA+PROBE: NEGATIVE
FLUBV RNA RESP QL NAA+PROBE: NEGATIVE
GFR SERPL CREATININE-BSD FRML MDRD: 76 ML/MIN/1.73M2
GLUCOSE SERPL-MCNC: 157 MG/DL (ref 70–99)
HCT VFR BLD AUTO: 51.1 % (ref 40–53)
HGB BLD-MCNC: 17.3 G/DL (ref 13.3–17.7)
IMM GRANULOCYTES # BLD: 0 10E3/UL
IMM GRANULOCYTES NFR BLD: 0 %
LYMPHOCYTES # BLD AUTO: 1.6 10E3/UL (ref 0.8–5.3)
LYMPHOCYTES NFR BLD AUTO: 18 %
MCH RBC QN AUTO: 31.2 PG (ref 26.5–33)
MCHC RBC AUTO-ENTMCNC: 33.9 G/DL (ref 31.5–36.5)
MCV RBC AUTO: 92 FL (ref 78–100)
MONOCYTES # BLD AUTO: 0.9 10E3/UL (ref 0–1.3)
MONOCYTES NFR BLD AUTO: 10 %
NEUTROPHILS # BLD AUTO: 6.3 10E3/UL (ref 1.6–8.3)
NEUTROPHILS NFR BLD AUTO: 72 %
NRBC # BLD AUTO: 0 10E3/UL
NRBC BLD AUTO-RTO: 0 /100
PLATELET # BLD AUTO: 254 10E3/UL (ref 150–450)
POTASSIUM SERPL-SCNC: 4 MMOL/L (ref 3.4–5.3)
RBC # BLD AUTO: 5.54 10E6/UL (ref 4.4–5.9)
RSV RNA SPEC NAA+PROBE: NEGATIVE
SARS-COV-2 RNA RESP QL NAA+PROBE: NEGATIVE
SODIUM SERPL-SCNC: 135 MMOL/L (ref 136–145)
WBC # BLD AUTO: 8.9 10E3/UL (ref 4–11)

## 2023-07-05 PROCEDURE — 99285 EMERGENCY DEPT VISIT HI MDM: CPT | Mod: 25 | Performed by: EMERGENCY MEDICINE

## 2023-07-05 PROCEDURE — 87086 URINE CULTURE/COLONY COUNT: CPT | Performed by: EMERGENCY MEDICINE

## 2023-07-05 PROCEDURE — 87880 STREP A ASSAY W/OPTIC: CPT | Performed by: EMERGENCY MEDICINE

## 2023-07-05 PROCEDURE — 70450 CT HEAD/BRAIN W/O DYE: CPT

## 2023-07-05 PROCEDURE — 36415 COLL VENOUS BLD VENIPUNCTURE: CPT | Performed by: EMERGENCY MEDICINE

## 2023-07-05 PROCEDURE — 96360 HYDRATION IV INFUSION INIT: CPT | Performed by: EMERGENCY MEDICINE

## 2023-07-05 PROCEDURE — 96361 HYDRATE IV INFUSION ADD-ON: CPT | Performed by: EMERGENCY MEDICINE

## 2023-07-05 PROCEDURE — 99284 EMERGENCY DEPT VISIT MOD MDM: CPT | Performed by: EMERGENCY MEDICINE

## 2023-07-05 PROCEDURE — 96372 THER/PROPH/DIAG INJ SC/IM: CPT | Performed by: EMERGENCY MEDICINE

## 2023-07-05 PROCEDURE — 250N000013 HC RX MED GY IP 250 OP 250 PS 637: Performed by: EMERGENCY MEDICINE

## 2023-07-05 PROCEDURE — 87637 SARSCOV2&INF A&B&RSV AMP PRB: CPT | Mod: 59 | Performed by: EMERGENCY MEDICINE

## 2023-07-05 PROCEDURE — 258N000003 HC RX IP 258 OP 636: Performed by: EMERGENCY MEDICINE

## 2023-07-05 PROCEDURE — 85025 COMPLETE CBC W/AUTO DIFF WBC: CPT | Performed by: EMERGENCY MEDICINE

## 2023-07-05 PROCEDURE — 250N000011 HC RX IP 250 OP 636: Performed by: EMERGENCY MEDICINE

## 2023-07-05 PROCEDURE — 81001 URINALYSIS AUTO W/SCOPE: CPT | Performed by: EMERGENCY MEDICINE

## 2023-07-05 PROCEDURE — 80048 BASIC METABOLIC PNL TOTAL CA: CPT | Performed by: EMERGENCY MEDICINE

## 2023-07-05 RX ORDER — ACETAMINOPHEN 500 MG
1000 TABLET ORAL ONCE
Status: COMPLETED | OUTPATIENT
Start: 2023-07-05 | End: 2023-07-05

## 2023-07-05 RX ORDER — IBUPROFEN 600 MG/1
600 TABLET, FILM COATED ORAL ONCE
Status: COMPLETED | OUTPATIENT
Start: 2023-07-05 | End: 2023-07-05

## 2023-07-05 RX ADMIN — SODIUM CHLORIDE 1000 ML: 9 INJECTION, SOLUTION INTRAVENOUS at 17:35

## 2023-07-05 RX ADMIN — PENICILLIN G BENZATHINE 1.2 MILLION UNITS: 1200000 INJECTION, SUSPENSION INTRAMUSCULAR at 18:11

## 2023-07-05 RX ADMIN — ACETAMINOPHEN 1000 MG: 500 TABLET ORAL at 14:21

## 2023-07-05 RX ADMIN — IBUPROFEN 600 MG: 600 TABLET, FILM COATED ORAL at 14:21

## 2023-07-05 RX ADMIN — SODIUM CHLORIDE 1000 ML: 9 INJECTION, SOLUTION INTRAVENOUS at 16:09

## 2023-07-05 ASSESSMENT — ACTIVITIES OF DAILY LIVING (ADL)
ADLS_ACUITY_SCORE: 35
ADLS_ACUITY_SCORE: 33

## 2023-07-05 NOTE — DISCHARGE INSTRUCTIONS
You have received an antibiotic for your strep throat.  I suspect you will feel better in 24 hours.  If you get new or worsening symptoms return to the ER.  Take both 1000 mg of Tylenol (2 extra strength tablets) and 600 mg of ibuprofen every 6 hours to help with your headaches, your body aches, and keep your fevers down.  Stay well-hydrated with things like Gatorade, or electrolyte solutions.  If you get new or worsening symptoms return to the ER.

## 2023-07-05 NOTE — ED PROVIDER NOTES
"ED Provider Note  [facility]      History     Chief Complaint   Patient presents with     Flu Symptoms     2 days ago started to have HA, feels body is tired, body aches. Feels dizzy, like he is going to pass out when walking. Head pain is worse when pt touches or moves head.     The history is provided by the patient, a relative and medical records. A  was used ([employer] ).     Michael Stewart is a 52 year old male with history of methamphetamine dependence presenting to the ED due to headache.  Patient states that he developed a right-sided headache 3 days ago which has since become more diffuse.  He states that his head hurts when touching it.  He has never had a headache like this before.  He denies recent head trauma.  He has no associated photosensitivity or neck pain or stiffness.  He mentioned to family yesterday that his phone felt heavier when picking it up, but otherwise denies any numbness or weakness.  He also denies confusion or other neuro deficits.  Family does note that he looks a little off balance while walking.  He also reports associated body aches and fatigue.  He has no appetite.  He took 500 mg of ibuprofen once 2 days ago and 1000 mg Tylenol yesterday without relief.  He denies fever, cough, shortness of breath, rhinorrhea, pharyngitis, nausea, vomiting, abdominal pain, urinary symptoms, constipation, diarrhea, or rash.  He denies recent sick contacts.  Significant other notes that they are concerned that he was \"confused\" but describes it as more tired/less energetic his baseline.  Patient denies any confusion.     Past Medical History  History reviewed. No pertinent past medical history.  History reviewed. No pertinent surgical history.  Naproxen Sodium (FLANAX PAIN RELIEF PO)      No Known Allergies  Family History  History reviewed. No pertinent family history.  Social History   Social History     Tobacco Use     Smoking " status: Never     Smokeless tobacco: Never   Substance Use Topics     Alcohol use: Yes     Comment: rarely     Drug use: Not Currently         A medically appropriate review of systems was performed with pertinent positives and negatives noted in the HPI, and all other systems negative.    Physical Exam   BP: (!) 161/95  Pulse: 111  Temp: 100  F (37.8  C)  Resp: 16  SpO2: 94 %  Physical Exam  General: awake, alert, NAD; no photophobia  Head: normal cephalic  HEENT: pupils equal, conjugate gaze intact posterior oropharynx normal-appearing.  Neck: Supple; no meningismus.  CV: regular rate and rhythm without murmur  Lungs: clear to auscultation  Abd: soft, non-tender, no guarding, no peritoneal signs  EXT: lower extremities without swelling or edema  Neuro: awake, answers questions appropriately. No focal deficits noted; cranial nerves II through XII are intact.  Patient has 5 out of 5 strength in bilateral upper and lower extremities.  Patient is awake alert and answering questions normally.  Normal gait.  Patient has difficulty with tandem gait but bilaterally is not falling consistently to 1 side.  Skin: No rashes or lesions.    ED Course, Procedures, & Data      Procedures          Results for orders placed or performed during the hospital encounter of 07/05/23   Head CT w/o contrast     Status: None    Narrative    EXAM: CT HEAD W/O CONTRAST  LOCATION: [facility]  DATE: 7/5/2023    INDICATION: Headache  COMPARISON: None.  TECHNIQUE: Routine CT Head without IV contrast. Multiplanar reformats. Dose reduction techniques were used.    FINDINGS:  INTRACRANIAL CONTENTS: No intracranial hemorrhage, extraaxial collection, or mass effect.  No CT evidence of acute infarct. Incidental right middle cranial fossa arachnoid cyst. Normal parenchymal attenuation. Normal ventricles and sulci.     VISUALIZED ORBITS/SINUSES/MASTOIDS: No intraorbital abnormality. No significant paranasal  sinus mucosal disease. No middle ear or mastoid effusion.    BONES/SOFT TISSUES: No acute abnormality.      Impression    IMPRESSION:  1.  No acute intracranial process.   Asymptomatic Influenza A/B, RSV, & SARS-CoV2 PCR (COVID-19) Nasopharyngeal     Status: Normal    Specimen: Nasopharyngeal; Swab   Result Value Ref Range    Influenza A PCR Negative Negative    Influenza B PCR Negative Negative    RSV PCR Negative Negative    SARS CoV2 PCR Negative Negative    Narrative    Testing was performed using the Xpert Xpress CoV2/Flu/RSV Assay on the [employer] GeneXpert Instrument. This test should be ordered for the detection of SARS-CoV-2, influenza, and RSV viruses in individuals who meet clinical and/or epidemiological criteria. Test performance is unknown in asymptomatic patients. This test is for in vitro diagnostic use under the FDA EUA for laboratories certified under CLIA to perform high or moderate complexity testing. This test has not been FDA cleared or approved. A negative result does not rule out the presence of PCR inhibitors in the specimen or target RNA in concentration below the limit of detection for the assay. If only one viral target is positive but coinfection with multiple targets is suspected, the sample should be re-tested with another FDA cleared, approved, or authorized test, if coinfection would change clinical management. This test was validated by the [facility]. These laboratories are certified under the Clinical Laboratory Improvement Amendments of 1988 (CLIA-88) as qualified to perform high complexity laboratory testing.   Basic metabolic panel     Status: Abnormal   Result Value Ref Range    Sodium 135 (L) 136 - 145 mmol/L    Potassium 4.0 3.4 - 5.3 mmol/L    Chloride 98 98 - 107 mmol/L    Carbon Dioxide (CO2) 27 22 - 29 mmol/L    Anion Gap 10 7 - 15 mmol/L    Urea Nitrogen 13.4 6.0 - 20.0 mg/dL    Creatinine 1.16 0.67 - 1.17 mg/dL    Calcium 9.6 8.6 - 10.0 mg/dL     Glucose 157 (H) 70 - 99 mg/dL    GFR Estimate 76 >60 mL/min/1.73m2   UA with Microscopic reflex to Culture     Status: Abnormal    Specimen: Urine, Midstream   Result Value Ref Range    Color Urine Light Yellow Colorless, Straw, Light Yellow, Yellow    Appearance Urine Clear Clear    Glucose Urine Negative Negative mg/dL    Bilirubin Urine Negative Negative    Ketones Urine Negative Negative mg/dL    Specific Gravity Urine 1.022 1.003 - 1.035    Blood Urine Negative Negative    pH Urine 5.5 5.0 - 7.0    Protein Albumin Urine Negative Negative mg/dL    Urobilinogen Urine Normal Normal, 2.0 mg/dL    Nitrite Urine Negative Negative    Leukocyte Esterase Urine Negative Negative    Mucus Urine Present (A) None Seen /LPF    RBC Urine 0 <=2 /HPF    WBC Urine 0 <=5 /HPF    Narrative    Urine Culture not indicated   CBC with platelets and differential     Status: None   Result Value Ref Range    WBC Count 8.9 4.0 - 11.0 10e3/uL    RBC Count 5.54 4.40 - 5.90 10e6/uL    Hemoglobin 17.3 13.3 - 17.7 g/dL    Hematocrit 51.1 40.0 - 53.0 %    MCV 92 78 - 100 fL    MCH 31.2 26.5 - 33.0 pg    MCHC 33.9 31.5 - 36.5 g/dL    RDW 12.4 10.0 - 15.0 %    Platelet Count 254 150 - 450 10e3/uL    % Neutrophils 72 %    % Lymphocytes 18 %    % Monocytes 10 %    % Eosinophils 0 %    % Basophils 0 %    % Immature Granulocytes 0 %    NRBCs per 100 WBC 0 <1 /100    Absolute Neutrophils 6.3 1.6 - 8.3 10e3/uL    Absolute Lymphocytes 1.6 0.8 - 5.3 10e3/uL    Absolute Monocytes 0.9 0.0 - 1.3 10e3/uL    Absolute Eosinophils 0.0 0.0 - 0.7 10e3/uL    Absolute Basophils 0.0 0.0 - 0.2 10e3/uL    Absolute Immature Granulocytes 0.0 <=0.4 10e3/uL    Absolute NRBCs 0.0 10e3/uL   Streptococcus A Rapid Screen w/Reflex to PCR     Status: Abnormal    Specimen: Throat; Swab   Result Value Ref Range    Group A Strep antigen Positive (A) Negative   CBC with platelets differential     Status: None    Narrative    The following orders were created for panel order CBC  with platelets differential.  Procedure                               Abnormality         Status                     ---------                               -----------         ------                     CBC with platelets and d...[[id number]]                      Final result                 Please view results for these tests on the individual orders.     Medications   acetaminophen (TYLENOL) tablet 1,000 mg (1,000 mg Oral $Given 7/5/23 1421)   ibuprofen (ADVIL/MOTRIN) tablet 600 mg (600 mg Oral $Given 7/5/23 1421)   0.9% sodium chloride BOLUS (0 mLs Intravenous Stopped 7/5/23 1720)   0.9% sodium chloride BOLUS (0 mLs Intravenous Stopped 7/5/23 1836)   penicillin G benzathine (BICILLIN L-A) injection 1.2 Million Units (1.2 Million Units Intramuscular $Given 7/5/23 1811)     Labs Ordered and Resulted from Time of ED Arrival to Time of ED Departure   BASIC METABOLIC PANEL - Abnormal       Result Value    Sodium 135 (*)     Potassium 4.0      Chloride 98      Carbon Dioxide (CO2) 27      Anion Gap 10      Urea Nitrogen 13.4      Creatinine 1.16      Calcium 9.6      Glucose 157 (*)     GFR Estimate 76     STREPTOCOCCUS A RAPID SCREEN W REFELX TO PCR - Abnormal    Group A Strep antigen Positive (*)    INFLUENZA A/B, RSV, & SARS-COV2 PCR - Normal    Influenza A PCR Negative      Influenza B PCR Negative      RSV PCR Negative      SARS CoV2 PCR Negative     CBC WITH PLATELETS AND DIFFERENTIAL    WBC Count 8.9      RBC Count 5.54      Hemoglobin 17.3      Hematocrit 51.1      MCV 92      MCH 31.2      MCHC 33.9      RDW 12.4      Platelet Count 254      % Neutrophils 72      % Lymphocytes 18      % Monocytes 10      % Eosinophils 0      % Basophils 0      % Immature Granulocytes 0      NRBCs per 100 WBC 0      Absolute Neutrophils 6.3      Absolute Lymphocytes 1.6      Absolute Monocytes 0.9      Absolute Eosinophils 0.0      Absolute Basophils 0.0      Absolute Immature Granulocytes 0.0      Absolute NRBCs 0.0     URINE  CULTURE     Head CT w/o contrast   Final Result   IMPRESSION:   1.  No acute intracranial process.             Critical care was not performed.     Medical Decision Making  The patient's presentation was of moderate complexity (an acute illness with systemic symptoms).    The patient's evaluation involved:  an assessment requiring an independent historian (see separate area of note for details)  review of external note(s) from 3+ sources (see separate area of note for details)  ordering and/or review of 3+ test(s) in this encounter (see separate area of note for details)  review of 3+ test result(s) ordered prior to this encounter (see separate area of note for details)    The patient's management necessitated moderate risk (prescription drug management including medications given in the ED).      Assessment & Plan    Lucy is a 52-year-old male who presents to the emergency department headache, myalgias, arthralgias and feeling diffusely weak and tired.      On exam he does not have a lot of other localizing symptoms such as URI symptoms, abdominal symptoms.  Would consider viral etiology such as influenza, COVID, could also consider things like strep pharyngitis.  On exam patient appears as though he feels generally unwell, though nontoxic-appearing.  Mildly tachycardic.  Normal heart and lung exam.  I have low suspicion for a bacterial meningitis given that this is 3 days of symptoms and is general well appearance.  Additionally he has no photophobia or neck stiffness on exam, normal neurologic exam which makes meningitis less likely.      Initial evaluation will include head CT given new atypical headache though again I suspect this is more secondary to viral illness.  Patient already got Tylenol, ibuprofen while in triage.  We will give IV fluids.    On reassessment patient notes that his headache was almost gone after getting IV fluids.  Given the dizziness he reported in triage, tachycardia, low-grade fever  and reported poor appetite I suspect a lot of his symptoms are secondary to dehydration.  After liter fluid he was able to ambulate and noted that he did not feel lightheaded.      Patient strep was positive, his posterior oropharynx is normal-appearing.  We will treat with Bicillin.  I do suspect headache, body aches, poor appetite are all likely related to strep pharyngitis.     Work-up otherwise unremarkable including normal white count, no left shift, negative influenza RSV and COVID.  Normal electrolytes.    After ER evaluation patient reported he was feeling well.  Headache significantly improved and he was ready for discharge.  He was given follow-up PCP follow-up instructions and ER return precautions.        I have reviewed the nursing notes. I have reviewed the findings, diagnosis, plan and need for follow up with the patient.    Discharge Medication List as of 7/5/2023  6:37 PM          Final diagnoses:   Strep pharyngitis   IJanie, am serving as a trained medical scribe to document services personally performed by Dirk Priest MD, based on the provider's statements to me.     IDirk MD, was physically present and have reviewed and verified the accuracy of this note documented by Janie Garza.      Dirk Priest  [facility] EMERGENCY DEPARTMENT  7/5/2023     Dirk Priest MD  07/06/23 0722

## 2023-07-05 NOTE — ED PROVIDER NOTES
6 PM I received signout from Dr. Priest, pending head CT.   CT head was negative.   Patient treated with Bicillin for positive strep test.  Patient reports feeling improved after initial medication and will be discharged to follow-up with primary care doctor     Jese Hill MD  07/05/23 [id number]

## 2023-07-06 ENCOUNTER — APPOINTMENT (OUTPATIENT)
Dept: MRI IMAGING | Facility: CLINIC | Age: 53
End: 2023-07-06
Attending: EMERGENCY MEDICINE
Payer: COMMERCIAL

## 2023-07-06 ENCOUNTER — APPOINTMENT (OUTPATIENT)
Dept: CT IMAGING | Facility: CLINIC | Age: 53
End: 2023-07-06
Attending: EMERGENCY MEDICINE
Payer: COMMERCIAL

## 2023-07-06 ENCOUNTER — HOSPITAL ENCOUNTER (INPATIENT)
Facility: CLINIC | Age: 53
LOS: 20 days | Discharge: NURSING, REHAB OR LONG-TERM CARE | End: 2023-07-26
Attending: EMERGENCY MEDICINE | Admitting: PSYCHIATRY & NEUROLOGY
Payer: COMMERCIAL

## 2023-07-06 ENCOUNTER — APPOINTMENT (OUTPATIENT)
Dept: GENERAL RADIOLOGY | Facility: CLINIC | Age: 53
End: 2023-07-06
Attending: STUDENT IN AN ORGANIZED HEALTH CARE EDUCATION/TRAINING PROGRAM
Payer: COMMERCIAL

## 2023-07-06 DIAGNOSIS — G93.9 BRAIN LESION: Primary | ICD-10-CM

## 2023-07-06 DIAGNOSIS — Z11.52 ENCOUNTER FOR SCREENING LABORATORY TESTING FOR SEVERE ACUTE RESPIRATORY SYNDROME CORONAVIRUS 2 (SARS-COV-2): ICD-10-CM

## 2023-07-06 DIAGNOSIS — I63.81 THALAMIC STROKE (H): ICD-10-CM

## 2023-07-06 LAB
ALBUMIN UR-MCNC: NEGATIVE MG/DL
AMPHETAMINES UR QL SCN: NORMAL
ANION GAP SERPL CALCULATED.3IONS-SCNC: 11 MMOL/L (ref 7–15)
APPEARANCE UR: CLEAR
APTT PPP: 24 SECONDS (ref 22–38)
ATRIAL RATE - MUSE: 95 BPM
BACTERIA SPT CULT: NORMAL
BARBITURATES UR QL SCN: NORMAL
BASE EXCESS BLDV CALC-SCNC: -0.3 MMOL/L (ref -7.7–1.9)
BASOPHILS # BLD AUTO: 0 10E3/UL (ref 0–0.2)
BASOPHILS NFR BLD AUTO: 1 %
BENZODIAZ UR QL SCN: NORMAL
BILIRUB UR QL STRIP: NEGATIVE
BUN SERPL-MCNC: 13.2 MG/DL (ref 6–20)
BZE UR QL SCN: NORMAL
CALCIUM SERPL-MCNC: 9.2 MG/DL (ref 8.6–10)
CANNABINOIDS UR QL SCN: NORMAL
CHLORIDE SERPL-SCNC: 102 MMOL/L (ref 98–107)
COLOR UR AUTO: ABNORMAL
COLOR UR AUTO: ABNORMAL
COLOR UR AUTO: NORMAL
CREAT SERPL-MCNC: 0.87 MG/DL (ref 0.67–1.17)
DEPRECATED HCO3 PLAS-SCNC: 24 MMOL/L (ref 22–29)
DIASTOLIC BLOOD PRESSURE - MUSE: NORMAL MMHG
EOSINOPHIL # BLD AUTO: 0 10E3/UL (ref 0–0.7)
EOSINOPHIL NFR BLD AUTO: 0 %
ERYTHROCYTE [DISTWIDTH] IN BLOOD BY AUTOMATED COUNT: 12.3 % (ref 10–15)
GFR SERPL CREATININE-BSD FRML MDRD: >90 ML/MIN/1.73M2
GLUCOSE BLDC GLUCOMTR-MCNC: 138 MG/DL (ref 70–99)
GLUCOSE BLDC GLUCOMTR-MCNC: 156 MG/DL (ref 70–99)
GLUCOSE SERPL-MCNC: 143 MG/DL (ref 70–99)
GLUCOSE UR STRIP-MCNC: NEGATIVE MG/DL
GRAM STAIN RESULT: NORMAL
GROUP A STREP BY PCR: NOT DETECTED
HCO3 BLDV-SCNC: 25 MMOL/L (ref 21–28)
HCT VFR BLD AUTO: 49.2 % (ref 40–53)
HGB BLD-MCNC: 16.5 G/DL (ref 13.3–17.7)
HGB UR QL STRIP: NEGATIVE
IMM GRANULOCYTES # BLD: 0 10E3/UL
IMM GRANULOCYTES NFR BLD: 0 %
INR PPP: 0.91 (ref 0.85–1.15)
INTERPRETATION ECG - MUSE: NORMAL
KETONES UR STRIP-MCNC: NEGATIVE MG/DL
LEUKOCYTE ESTERASE UR QL STRIP: NEGATIVE
LYMPHOCYTES # BLD AUTO: 1.4 10E3/UL (ref 0.8–5.3)
LYMPHOCYTES NFR BLD AUTO: 19 %
MCH RBC QN AUTO: 31 PG (ref 26.5–33)
MCHC RBC AUTO-ENTMCNC: 33.5 G/DL (ref 31.5–36.5)
MCV RBC AUTO: 92 FL (ref 78–100)
MONOCYTES # BLD AUTO: 0.8 10E3/UL (ref 0–1.3)
MONOCYTES NFR BLD AUTO: 11 %
MUCOUS THREADS #/AREA URNS LPF: PRESENT /LPF
MUCOUS THREADS #/AREA URNS LPF: PRESENT /LPF
NEUTROPHILS # BLD AUTO: 5.2 10E3/UL (ref 1.6–8.3)
NEUTROPHILS NFR BLD AUTO: 69 %
NITRATE UR QL: NEGATIVE
NRBC # BLD AUTO: 0 10E3/UL
NRBC BLD AUTO-RTO: 0 /100
O2/TOTAL GAS SETTING VFR VENT: 2 %
OPIATES UR QL SCN: NORMAL
OXYHGB MFR BLDV: 89 % (ref 70–75)
P AXIS - MUSE: 53 DEGREES
PCO2 BLDV: 44 MM HG (ref 40–50)
PH BLDV: 7.37 [PH] (ref 7.32–7.43)
PH UR STRIP: 5.5 [PH] (ref 5–7)
PH UR STRIP: 6 [PH] (ref 5–7)
PH UR STRIP: 7 [PH] (ref 5–7)
PLATELET # BLD AUTO: 241 10E3/UL (ref 150–450)
PO2 BLDV: 59 MM HG (ref 25–47)
POTASSIUM SERPL-SCNC: 4.2 MMOL/L (ref 3.4–5.3)
PR INTERVAL - MUSE: 148 MS
QRS DURATION - MUSE: 84 MS
QT - MUSE: 330 MS
QTC - MUSE: 414 MS
R AXIS - MUSE: 73 DEGREES
RBC # BLD AUTO: 5.33 10E6/UL (ref 4.4–5.9)
RBC URINE: 0 /HPF
RBC URINE: 0 /HPF
RBC URINE: <1 /HPF
SARS-COV-2 RNA RESP QL NAA+PROBE: NEGATIVE
SODIUM SERPL-SCNC: 137 MMOL/L (ref 136–145)
SP GR UR STRIP: 1 (ref 1–1.03)
SP GR UR STRIP: 1.02 (ref 1–1.03)
SP GR UR STRIP: 1.03 (ref 1–1.03)
SQUAMOUS EPITHELIAL: <1 /HPF
SQUAMOUS EPITHELIAL: <1 /HPF
SYSTOLIC BLOOD PRESSURE - MUSE: NORMAL MMHG
T AXIS - MUSE: 44 DEGREES
TROPONIN T SERPL HS-MCNC: 6 NG/L
TROPONIN T SERPL HS-MCNC: 7 NG/L
TROPONIN T SERPL HS-MCNC: 8 NG/L
UROBILINOGEN UR STRIP-MCNC: NORMAL MG/DL
VENTRICULAR RATE- MUSE: 95 BPM
WBC # BLD AUTO: 7.5 10E3/UL (ref 4–11)
WBC URINE: 0 /HPF
WBC URINE: 0 /HPF
WBC URINE: <1 /HPF

## 2023-07-06 PROCEDURE — 82310 ASSAY OF CALCIUM: CPT | Performed by: EMERGENCY MEDICINE

## 2023-07-06 PROCEDURE — 87651 STREP A DNA AMP PROBE: CPT | Performed by: STUDENT IN AN ORGANIZED HEALTH CARE EDUCATION/TRAINING PROGRAM

## 2023-07-06 PROCEDURE — 70546 MR ANGIOGRAPH HEAD W/O&W/DYE: CPT

## 2023-07-06 PROCEDURE — 70450 CT HEAD/BRAIN W/O DYE: CPT

## 2023-07-06 PROCEDURE — 200N000002 HC R&B ICU UMMC

## 2023-07-06 PROCEDURE — 255N000002 HC RX 255 OP 636: Mod: JZ | Performed by: EMERGENCY MEDICINE

## 2023-07-06 PROCEDURE — 93010 ELECTROCARDIOGRAM REPORT: CPT | Performed by: EMERGENCY MEDICINE

## 2023-07-06 PROCEDURE — 96375 TX/PRO/DX INJ NEW DRUG ADDON: CPT

## 2023-07-06 PROCEDURE — 85610 PROTHROMBIN TIME: CPT | Performed by: EMERGENCY MEDICINE

## 2023-07-06 PROCEDURE — 85004 AUTOMATED DIFF WBC COUNT: CPT | Performed by: EMERGENCY MEDICINE

## 2023-07-06 PROCEDURE — 87635 SARS-COV-2 COVID-19 AMP PRB: CPT | Performed by: EMERGENCY MEDICINE

## 2023-07-06 PROCEDURE — 5A09357 ASSISTANCE WITH RESPIRATORY VENTILATION, LESS THAN 24 CONSECUTIVE HOURS, CONTINUOUS POSITIVE AIRWAY PRESSURE: ICD-10-PCS | Performed by: PSYCHIATRY & NEUROLOGY

## 2023-07-06 PROCEDURE — 81001 URINALYSIS AUTO W/SCOPE: CPT | Performed by: STUDENT IN AN ORGANIZED HEALTH CARE EDUCATION/TRAINING PROGRAM

## 2023-07-06 PROCEDURE — 87040 BLOOD CULTURE FOR BACTERIA: CPT | Performed by: PSYCHIATRY & NEUROLOGY

## 2023-07-06 PROCEDURE — 250N000011 HC RX IP 250 OP 636: Mod: JZ | Performed by: EMERGENCY MEDICINE

## 2023-07-06 PROCEDURE — A9585 GADOBUTROL INJECTION: HCPCS | Mod: JZ | Performed by: EMERGENCY MEDICINE

## 2023-07-06 PROCEDURE — 250N000013 HC RX MED GY IP 250 OP 250 PS 637: Performed by: EMERGENCY MEDICINE

## 2023-07-06 PROCEDURE — 84484 ASSAY OF TROPONIN QUANT: CPT | Performed by: PSYCHIATRY & NEUROLOGY

## 2023-07-06 PROCEDURE — 250N000009 HC RX 250: Performed by: EMERGENCY MEDICINE

## 2023-07-06 PROCEDURE — 70545 MR ANGIOGRAPHY HEAD W/DYE: CPT

## 2023-07-06 PROCEDURE — 82962 GLUCOSE BLOOD TEST: CPT

## 2023-07-06 PROCEDURE — 250N000009 HC RX 250: Performed by: STUDENT IN AN ORGANIZED HEALTH CARE EDUCATION/TRAINING PROGRAM

## 2023-07-06 PROCEDURE — 84484 ASSAY OF TROPONIN QUANT: CPT | Performed by: STUDENT IN AN ORGANIZED HEALTH CARE EDUCATION/TRAINING PROGRAM

## 2023-07-06 PROCEDURE — 85730 THROMBOPLASTIN TIME PARTIAL: CPT | Performed by: EMERGENCY MEDICINE

## 2023-07-06 PROCEDURE — 71045 X-RAY EXAM CHEST 1 VIEW: CPT

## 2023-07-06 PROCEDURE — 36415 COLL VENOUS BLD VENIPUNCTURE: CPT | Performed by: STUDENT IN AN ORGANIZED HEALTH CARE EDUCATION/TRAINING PROGRAM

## 2023-07-06 PROCEDURE — 81001 URINALYSIS AUTO W/SCOPE: CPT | Performed by: EMERGENCY MEDICINE

## 2023-07-06 PROCEDURE — 96361 HYDRATE IV INFUSION ADD-ON: CPT

## 2023-07-06 PROCEDURE — 96374 THER/PROPH/DIAG INJ IV PUSH: CPT

## 2023-07-06 PROCEDURE — 93005 ELECTROCARDIOGRAM TRACING: CPT

## 2023-07-06 PROCEDURE — 70496 CT ANGIOGRAPHY HEAD: CPT

## 2023-07-06 PROCEDURE — 258N000003 HC RX IP 258 OP 636: Performed by: EMERGENCY MEDICINE

## 2023-07-06 PROCEDURE — 70498 CT ANGIOGRAPHY NECK: CPT

## 2023-07-06 PROCEDURE — 999N000157 HC STATISTIC RCP TIME EA 10 MIN

## 2023-07-06 PROCEDURE — 99291 CRITICAL CARE FIRST HOUR: CPT | Mod: 25

## 2023-07-06 PROCEDURE — 258N000003 HC RX IP 258 OP 636: Performed by: STUDENT IN AN ORGANIZED HEALTH CARE EDUCATION/TRAINING PROGRAM

## 2023-07-06 PROCEDURE — 87070 CULTURE OTHR SPECIMN AEROBIC: CPT | Performed by: STUDENT IN AN ORGANIZED HEALTH CARE EDUCATION/TRAINING PROGRAM

## 2023-07-06 PROCEDURE — 70551 MRI BRAIN STEM W/O DYE: CPT

## 2023-07-06 PROCEDURE — 0042T CT HEAD PERFUSION W CONTRAST: CPT

## 2023-07-06 PROCEDURE — 71045 X-RAY EXAM CHEST 1 VIEW: CPT | Mod: 26 | Performed by: RADIOLOGY

## 2023-07-06 PROCEDURE — 99291 CRITICAL CARE FIRST HOUR: CPT | Mod: 25 | Performed by: EMERGENCY MEDICINE

## 2023-07-06 PROCEDURE — 80307 DRUG TEST PRSMV CHEM ANLYZR: CPT | Performed by: EMERGENCY MEDICINE

## 2023-07-06 PROCEDURE — 84484 ASSAY OF TROPONIN QUANT: CPT | Performed by: EMERGENCY MEDICINE

## 2023-07-06 PROCEDURE — 250N000011 HC RX IP 250 OP 636: Performed by: STUDENT IN AN ORGANIZED HEALTH CARE EDUCATION/TRAINING PROGRAM

## 2023-07-06 PROCEDURE — 82805 BLOOD GASES W/O2 SATURATION: CPT | Performed by: STUDENT IN AN ORGANIZED HEALTH CARE EDUCATION/TRAINING PROGRAM

## 2023-07-06 PROCEDURE — 36415 COLL VENOUS BLD VENIPUNCTURE: CPT | Performed by: PSYCHIATRY & NEUROLOGY

## 2023-07-06 PROCEDURE — 36415 COLL VENOUS BLD VENIPUNCTURE: CPT | Performed by: EMERGENCY MEDICINE

## 2023-07-06 RX ORDER — GADOBUTROL 604.72 MG/ML
0.1 INJECTION INTRAVENOUS ONCE
Status: COMPLETED | OUTPATIENT
Start: 2023-07-06 | End: 2023-07-06

## 2023-07-06 RX ORDER — KETOROLAC TROMETHAMINE 30 MG/ML
30 INJECTION, SOLUTION INTRAMUSCULAR; INTRAVENOUS ONCE
Status: COMPLETED | OUTPATIENT
Start: 2023-07-06 | End: 2023-07-06

## 2023-07-06 RX ORDER — LABETALOL HYDROCHLORIDE 5 MG/ML
10-20 INJECTION, SOLUTION INTRAVENOUS EVERY 10 MIN PRN
Status: DISCONTINUED | OUTPATIENT
Start: 2023-07-06 | End: 2023-07-15

## 2023-07-06 RX ORDER — ASPIRIN 325 MG
325 TABLET ORAL ONCE
Status: COMPLETED | OUTPATIENT
Start: 2023-07-06 | End: 2023-07-06

## 2023-07-06 RX ORDER — ONDANSETRON 2 MG/ML
4 INJECTION INTRAMUSCULAR; INTRAVENOUS EVERY 6 HOURS PRN
Status: DISCONTINUED | OUTPATIENT
Start: 2023-07-06 | End: 2023-07-26 | Stop reason: HOSPADM

## 2023-07-06 RX ORDER — LABETALOL HYDROCHLORIDE 5 MG/ML
10-20 INJECTION, SOLUTION INTRAVENOUS EVERY 10 MIN PRN
Status: DISCONTINUED | OUTPATIENT
Start: 2023-07-06 | End: 2023-07-06

## 2023-07-06 RX ORDER — SODIUM CHLORIDE 9 MG/ML
INJECTION, SOLUTION INTRAVENOUS CONTINUOUS PRN
Status: DISCONTINUED | OUTPATIENT
Start: 2023-07-06 | End: 2023-07-14

## 2023-07-06 RX ORDER — IOPAMIDOL 755 MG/ML
100 INJECTION, SOLUTION INTRAVASCULAR ONCE
Status: COMPLETED | OUTPATIENT
Start: 2023-07-06 | End: 2023-07-06

## 2023-07-06 RX ORDER — SODIUM CHLORIDE, SODIUM GLUCONATE, SODIUM ACETATE, POTASSIUM CHLORIDE AND MAGNESIUM CHLORIDE 526; 502; 368; 37; 30 MG/100ML; MG/100ML; MG/100ML; MG/100ML; MG/100ML
INJECTION, SOLUTION INTRAVENOUS CONTINUOUS
Status: DISCONTINUED | OUTPATIENT
Start: 2023-07-06 | End: 2023-07-10

## 2023-07-06 RX ORDER — POLYETHYLENE GLYCOL 3350 17 G/17G
17 POWDER, FOR SOLUTION ORAL DAILY PRN
Status: DISCONTINUED | OUTPATIENT
Start: 2023-07-06 | End: 2023-07-10

## 2023-07-06 RX ORDER — HYDRALAZINE HYDROCHLORIDE 20 MG/ML
10-20 INJECTION INTRAMUSCULAR; INTRAVENOUS
Status: DISCONTINUED | OUTPATIENT
Start: 2023-07-06 | End: 2023-07-06

## 2023-07-06 RX ORDER — LIDOCAINE 40 MG/G
CREAM TOPICAL
Status: DISCONTINUED | OUTPATIENT
Start: 2023-07-06 | End: 2023-07-08

## 2023-07-06 RX ORDER — HYDRALAZINE HYDROCHLORIDE 20 MG/ML
10-20 INJECTION INTRAMUSCULAR; INTRAVENOUS
Status: DISCONTINUED | OUTPATIENT
Start: 2023-07-06 | End: 2023-07-15

## 2023-07-06 RX ORDER — METOCLOPRAMIDE HYDROCHLORIDE 5 MG/ML
5 INJECTION INTRAMUSCULAR; INTRAVENOUS ONCE
Status: COMPLETED | OUTPATIENT
Start: 2023-07-06 | End: 2023-07-06

## 2023-07-06 RX ORDER — IOPAMIDOL 755 MG/ML
50 INJECTION, SOLUTION INTRAVASCULAR ONCE
Status: COMPLETED | OUTPATIENT
Start: 2023-07-06 | End: 2023-07-06

## 2023-07-06 RX ORDER — ACETAMINOPHEN 650 MG/1
650 SUPPOSITORY RECTAL EVERY 4 HOURS PRN
Status: DISCONTINUED | OUTPATIENT
Start: 2023-07-06 | End: 2023-07-12

## 2023-07-06 RX ORDER — AMOXICILLIN 250 MG
1 CAPSULE ORAL 2 TIMES DAILY PRN
Status: DISCONTINUED | OUTPATIENT
Start: 2023-07-06 | End: 2023-07-10

## 2023-07-06 RX ORDER — ONDANSETRON 4 MG/1
4 TABLET, ORALLY DISINTEGRATING ORAL EVERY 6 HOURS PRN
Status: DISCONTINUED | OUTPATIENT
Start: 2023-07-06 | End: 2023-07-26 | Stop reason: HOSPADM

## 2023-07-06 RX ORDER — ENOXAPARIN SODIUM 100 MG/ML
40 INJECTION SUBCUTANEOUS EVERY 24 HOURS
Status: DISCONTINUED | OUTPATIENT
Start: 2023-07-06 | End: 2023-07-26 | Stop reason: HOSPADM

## 2023-07-06 RX ORDER — ASPIRIN 81 MG/1
81 TABLET, CHEWABLE ORAL DAILY
Status: DISCONTINUED | OUTPATIENT
Start: 2023-07-07 | End: 2023-07-24

## 2023-07-06 RX ORDER — PENICILLIN G 3000000 [IU]/50ML
3 INJECTION, SOLUTION INTRAVENOUS EVERY 4 HOURS
Status: DISCONTINUED | OUTPATIENT
Start: 2023-07-06 | End: 2023-07-07

## 2023-07-06 RX ADMIN — LABETALOL HYDROCHLORIDE 20 MG: 5 INJECTION, SOLUTION INTRAVENOUS at 21:43

## 2023-07-06 RX ADMIN — THIAMINE HYDROCHLORIDE 500 MG: 100 INJECTION, SOLUTION INTRAMUSCULAR; INTRAVENOUS at 22:51

## 2023-07-06 RX ADMIN — SODIUM CHLORIDE 100 ML: 9 INJECTION, SOLUTION INTRAVENOUS at 10:51

## 2023-07-06 RX ADMIN — SODIUM CHLORIDE, SODIUM GLUCONATE, SODIUM ACETATE, POTASSIUM CHLORIDE AND MAGNESIUM CHLORIDE: 526; 502; 368; 37; 30 INJECTION, SOLUTION INTRAVENOUS at 22:51

## 2023-07-06 RX ADMIN — METOCLOPRAMIDE HYDROCHLORIDE 5 MG: 5 INJECTION INTRAMUSCULAR; INTRAVENOUS at 11:33

## 2023-07-06 RX ADMIN — GADOBUTROL 7.94 ML: 604.72 INJECTION INTRAVENOUS at 14:43

## 2023-07-06 RX ADMIN — KETOROLAC TROMETHAMINE 30 MG: 30 INJECTION, SOLUTION INTRAMUSCULAR; INTRAVENOUS at 11:33

## 2023-07-06 RX ADMIN — IOPAMIDOL 75 ML: 755 INJECTION, SOLUTION INTRAVENOUS at 10:51

## 2023-07-06 RX ADMIN — LABETALOL HYDROCHLORIDE 20 MG: 5 INJECTION, SOLUTION INTRAVENOUS at 20:34

## 2023-07-06 RX ADMIN — ASPIRIN 325 MG ORAL TABLET 325 MG: 325 PILL ORAL at 13:40

## 2023-07-06 RX ADMIN — SODIUM CHLORIDE 1000 ML: 9 INJECTION, SOLUTION INTRAVENOUS at 11:32

## 2023-07-06 RX ADMIN — IOPAMIDOL 125 ML: 755 INJECTION, SOLUTION INTRAVENOUS at 11:00

## 2023-07-06 ASSESSMENT — ACTIVITIES OF DAILY LIVING (ADL)
ADLS_ACUITY_SCORE: 35
ADLS_ACUITY_SCORE: 39
ADLS_ACUITY_SCORE: 39

## 2023-07-06 NOTE — ED PROVIDER NOTES
"ED Provider Note  [facility]      History     Chief Complaint   Patient presents with     Stroke Symptoms     Weakness, dizziness and headache since last night     HPI  Michael Stewart is a 52 year old male who presents to the emergency department with difficulty ambulating.  The exact time of symptom onset is not clear.  With the assistance of his family members, patient states that at approximately midnight last night, he had difficulty ambulating to the bathroom.  When his family checked on him this morning they felt that he was not able to ambulate well and brought him to the emergency department.  States he has some diplopia as well as weakness of his right arm and leg.  The patient was seen here yesterday for 3-day history of right-sided headache and sore throat.  He was ultimately found to have strep pharyngitis.  He was treated with LA Bicillin.  He had a Noncontrast head CT yesterday which was unremarkable.  His influenza and COVID testing was also unremarkable.  Patient states he was feeling better last evening.  Today, he complains of diffuse myalgias.    Past Medical History  No past medical history on file.  No past surgical history on file.  Naproxen Sodium (FLANAX PAIN RELIEF PO)      No Known Allergies  Family History  No family history on file.  Social History   Social History     Tobacco Use     Smoking status: Never     Smokeless tobacco: Never   Substance Use Topics     Alcohol use: Yes     Comment: rarely     Drug use: Not Currently         A medically appropriate review of systems was performed with pertinent positives and negatives noted in the HPI, and all other systems negative.    Physical Exam   BP: (!) 164/105  Pulse: 95  Temp: 98.1  F (36.7  C)  Resp: 16  Height: 170.2 cm (5' 7\")  Weight: 79.4 kg (175 lb)  SpO2: 95 %  Physical Exam  Vitals and nursing note reviewed.   Constitutional:       General: He is not in acute distress.     Appearance: Normal " appearance. He is not diaphoretic.   HENT:      Head: Normocephalic and atraumatic.      Nose: Nose normal.      Mouth/Throat:      Mouth: Mucous membranes are moist.   Eyes:      General: No scleral icterus.     Pupils: Pupils are equal, round, and reactive to light.   Cardiovascular:      Rate and Rhythm: Normal rate and regular rhythm.      Pulses: Normal pulses.      Heart sounds: Normal heart sounds.   Pulmonary:      Effort: Pulmonary effort is normal. No respiratory distress.      Breath sounds: Normal breath sounds.   Abdominal:      General: Bowel sounds are normal.      Palpations: Abdomen is soft.      Tenderness: There is no abdominal tenderness.   Musculoskeletal:         General: No tenderness. Normal range of motion.   Skin:     General: Skin is warm and dry.      Capillary Refill: Capillary refill takes less than 2 seconds.      Findings: No rash.   Neurological:      Mental Status: He is alert.      Motor: Weakness present.      Coordination: Coordination abnormal.      Gait: Gait abnormal.           ED Course, Procedures, & Data      Procedures             EKG Interpretation:      Interpreted by CARLO BENEDICT MD, MD  Time reviewed:1105   Symptoms at time of EKG: stroke   Rhythm: normal sinus   Rate: normal  Axis: NORMAL  Ectopy: none  Conduction: normal  ST Segments/ T Waves: No ST-T wave changes  Q Waves: none  Comparison to prior: No old EKG available    Clinical Impression: normal EKG  ED Course Selections: The patient has stroke symptoms:         ED Stroke specific documentation           NIHSS PDF     Patient last known well time: Before 0000  ED Provider first to bedside at: 1029  CT Results received at: 1105    Thrombolytics:   Not given due to:   - minor/isolated/quickly resolving symptoms and out of window    If treating with thrombolytics: Ensure SBP<180 and DBP<105 prior to treatment with thrombolytics.  Administering thrombolytics after treatment with IV labetalol, hydralazine, or  nicardipine is reasonable once BP control is established.    Endovascular Retrieval:  Not initiated due to absence of proximal vessel occlusion    National [employer] Stroke Scale (Baseline)  Time Performed: 1030     Score    Level of consciousness: (0)   Alert, keenly responsive    LOC questions: (0)   Answers both questions correctly    LOC commands: (0)   Performs both tasks correctly    Best gaze: (0)   No gaze palsy    Visual: (0)   No visual loss    Facial palsy: (0)   Normal symmetrical movements    Motor arm (left): (0)   No drift    Motor arm (right): (1)   Drift    Motor leg (left): (0)   No drift    Motor leg (right): (1)   Drift    Limb ataxia: (2)   Present in two limbs    Sensory: (0)   Normal- no sensory loss    Best language: (0)   Normal- no aphasia    Dysarthria: (0)   Normal    Extinction and inattention: (0)   No abnormality        Total Score:  4        Stroke Mimics were considered (including migraine headache, seizure disorder, hypoglycemia (or hyperglycemia), head or spinal trauma, CNS infection, Toxin ingestion and shock state (e.g. sepsis) .       Results for orders placed or performed during the hospital encounter of 07/06/23   CT Head w/o Contrast     Status: None    Narrative    CT OF THE HEAD WITHOUT CONTRAST 7/6/2023 10:48 AM     COMPARISON: None.    HISTORY: Altered mental status.    TECHNIQUE: Axial CT images of the head from the skull base to the  vertex were acquired without IV contrast.    FINDINGS: The ventricles and basal cisterns are within normal limits  in configuration. There is no midline shift. There are no extra-axial  fluid collections. Gray-white differentiation is well maintained.    No intracranial hemorrhage, mass or recent infarct.    The visualized paranasal sinuses are well-aerated. There is no  mastoiditis. There are no fractures of the visualized bones.      Impression    IMPRESSION: Normal head CT.      Radiation dose for this scan was reduced using  automated exposure  control, adjustment of the mA and/or kV according to patient size, or  iterative reconstruction technique    AZIZA DENNY MD         SYSTEM ID:  [id number]   CTA Head Neck with Contrast     Status: None    Narrative    CT ANGIOGRAM OF THE HEAD AND NECK WITHOUT AND WITH CONTRAST July 6, 2023 10:52 AM     HISTORY: Altered mental status.    TECHNIQUE: Precontrast localizing scans were followed by CT  angiography with an injection of 75mL Isovue 370 nonionic intravenous  contrast material with scans through the head and neck. Images were  transferred to a separate 3-D workstation where multiplanar  reformations and 3-D images were created. Estimates of carotid  stenoses are made relative to the distal internal carotid artery  diameters except as noted. Radiation dose for this scan was reduced  using automated exposure control, adjustment of the mA and/or kV  according to patient size, or iterative reconstruction technique.     COMPARISON: None.    FINDINGS:   Neck CTA: The bilateral common carotid, bilateral cervical internal  carotid and bilateral vertebral arteries are patent without stenosis.     Head CTA: The basilar, bilateral intracranial distal internal carotid,  bilateral anterior cerebral, bilateral middle cerebral and bilateral  posterior cerebral arteries are patent and unremarkable.      Impression    IMPRESSION: Normal neck and head CTA.      AZIZA DENNY MD         SYSTEM ID:  [id number]   CT Head Perfusion w Contrast - For Tier 2 Stroke     Status: None    Narrative    CT BRAIN PERFUSION July 6, 2023 11:04 AM    HISTORY: Code Stroke to evaluate for potential thrombolysis and  thrombectomy. Evaluate mismatch between penumbra and core infarct.     TECHNIQUE: Time sequential axial CT images of the head were acquired  during the administration of intravenous contrast (50 mL isovue 370).  CTA images of the [place] as well as color perfusion maps of  the brain were created from  this time sequential axial source data.  Radiation dose for this scan was reduced using automated exposure  control, adjustment of the mA and/or kV according to patient size, or  iterative reconstruction technique.    COMPARISON: None.    FINDINGS: There are no focal or regional perfusion defects in the  brain.      Impression    IMPRESSION: Normal CT perfusion of the brain.      AZIZA DENNY MD         SYSTEM ID:  [id number]   MR Brain w/o Contrast     Status: None    Narrative    MRI OF THE BRAIN WITHOUT CONTRAST July 6, 2023 12:49 PM     HISTORY: Acute neuro deficit, stroke suspected.     TECHNIQUE: Sagittal T1-weighted, axial T2-weighted, axial FLAIR, and  axial diffusion-weighted MR images of the brain were acquired without  intravenous contrast.    COMPARISON: Head CT same day.    FINDINGS: There are globular areas of restricted diffusion in the  thalami bilaterally (left greater than right) consistent with recent  ischemic infarcts. The left-sided infarcts also appear to intersect  the posterior limb of the left internal capsule which corresponds with  the patient's recent symptoms of right-sided weakness. Given both  thalami are affected, normal variant artery of Percheron vasculature  may be present. No other recent infarct.    The ventricles and basal cisterns are within normal limits in  configuration. There is no midline shift. There are no extra-axial  fluid collections. There is no evidence for acute intracranial  hemorrhage. Gray-white differentiation is well maintained.    There is no sinusitis or mastoiditis.      Impression    IMPRESSION:  1. Bilateral thalamic infarcts (left greater than right) with likely  involvement of the posterior limb of the left internal capsule which  corresponds with the patient's recent symptoms of right-sided weakness  and incoordination.  2. Otherwise, normal brain MRI.    AZIZA DENNY MD         SYSTEM ID:  [id number]   MRV Brain wo & w Contrast     Status: None  (Preliminary result)    Narrative    MR VENOGRAM OF THE HEAD WITHOUT AND WITH CONTRAST  7/6/2023 3:01 PM     HISTORY: Bilateral thalamic stroke.    TECHNIQUE: 2D TOF and 2D phase contrast MR venogram without contrast  material. 3D TOF MR venogram with 7.9 mL Gadavist.    COMPARISON: None.      Impression    IMPRESSION: Motion-limited exam. No evidence of dural venous sinus  thrombosis. The superior sagittal sinus, internal cerebral veins,  basal veins of Yoavny, vein of Prudencio, straight sinus, torcula,  transverse sinuses, sigmoid sinuses, and jugular bulbs are patent. No  evidence of dural venous sinus or deep vein thrombosis. Right  transverse sinus is dominant. Prominent vein versus development of  venous anomaly associated with the left thalamus considered to be a  normal variant.    IMPRESSION: No evidence of dural venous sinus or deep vein thrombosis.   Basic metabolic panel     Status: Abnormal   Result Value Ref Range    Sodium 137 136 - 145 mmol/L    Potassium 4.2 3.4 - 5.3 mmol/L    Chloride 102 98 - 107 mmol/L    Carbon Dioxide (CO2) 24 22 - 29 mmol/L    Anion Gap 11 7 - 15 mmol/L    Urea Nitrogen 13.2 6.0 - 20.0 mg/dL    Creatinine 0.87 0.67 - 1.17 mg/dL    Calcium 9.2 8.6 - 10.0 mg/dL    Glucose 143 (H) 70 - 99 mg/dL    GFR Estimate >90 >60 mL/min/1.73m2   INR     Status: Normal   Result Value Ref Range    INR 0.91 0.85 - 1.15   Partial thromboplastin time     Status: Normal   Result Value Ref Range    aPTT 24 22 - 38 Seconds   Troponin T, High Sensitivity     Status: Normal   Result Value Ref Range    Troponin T, High Sensitivity 8 <=22 ng/L   CBC with platelets and differential     Status: None   Result Value Ref Range    WBC Count 7.5 4.0 - 11.0 10e3/uL    RBC Count 5.33 4.40 - 5.90 10e6/uL    Hemoglobin 16.5 13.3 - 17.7 g/dL    Hematocrit 49.2 40.0 - 53.0 %    MCV 92 78 - 100 fL    MCH 31.0 26.5 - 33.0 pg    MCHC 33.5 31.5 - 36.5 g/dL    RDW 12.3 10.0 - 15.0 %    Platelet Count 241 150 - 450  10e3/uL    % Neutrophils 69 %    % Lymphocytes 19 %    % Monocytes 11 %    % Eosinophils 0 %    % Basophils 1 %    % Immature Granulocytes 0 %    NRBCs per 100 WBC 0 <1 /100    Absolute Neutrophils 5.2 1.6 - 8.3 10e3/uL    Absolute Lymphocytes 1.4 0.8 - 5.3 10e3/uL    Absolute Monocytes 0.8 0.0 - 1.3 10e3/uL    Absolute Eosinophils 0.0 0.0 - 0.7 10e3/uL    Absolute Basophils 0.0 0.0 - 0.2 10e3/uL    Absolute Immature Granulocytes 0.0 <=0.4 10e3/uL    Absolute NRBCs 0.0 10e3/uL   Glucose by meter     Status: Abnormal   Result Value Ref Range    GLUCOSE BY METER POCT 156 (H) 70 - 99 mg/dL   UA with Microscopic reflex to Culture     Status: Normal    Specimen: Urine, Midstream   Result Value Ref Range    Color Urine Straw Colorless, Straw, Light Yellow, Yellow    Appearance Urine Clear Clear    Glucose Urine Negative Negative mg/dL    Bilirubin Urine Negative Negative    Ketones Urine Negative Negative mg/dL    Specific Gravity Urine 1.005 1.003 - 1.035    Blood Urine Negative Negative    pH Urine 7.0 5.0 - 7.0    Protein Albumin Urine Negative Negative mg/dL    Urobilinogen Urine Normal Normal, 2.0 mg/dL    Nitrite Urine Negative Negative    Leukocyte Esterase Urine Negative Negative    RBC Urine 0 <=2 /HPF    WBC Urine 0 <=5 /HPF    Squamous Epithelials Urine <1 <=1 /HPF    Narrative    Urine Culture not indicated   Drug abuse screen 1 urine (ED)     Status: Normal   Result Value Ref Range    Amphetamines Urine Screen Negative Screen Negative    Barbituates Urine Screen Negative Screen Negative    Benzodiazepine Urine Screen Negative Screen Negative    Cannabinoids Urine Screen Negative Screen Negative    Cocaine Urine Screen Negative Screen Negative    Opiates Urine Screen Negative Screen Negative   CBC with Platelets & Differential     Status: None    Narrative    The following orders were created for panel order CBC with Platelets & Differential.  Procedure                               Abnormality         Status                      ---------                               -----------         ------                     CBC with platelets and d...[[id number]]                      Final result                 Please view results for these tests on the individual orders.   Urine Drugs of Abuse Screen     Status: Normal    Narrative    The following orders were created for panel order Urine Drugs of Abuse Screen.  Procedure                               Abnormality         Status                     ---------                               -----------         ------                     Drug abuse screen 1 urin...[[id number]]  Normal              Final result                 Please view results for these tests on the individual orders.                         Results for orders placed or performed during the hospital encounter of 07/06/23   CT Head w/o Contrast     Status: None    Narrative    CT OF THE HEAD WITHOUT CONTRAST 7/6/2023 10:48 AM     COMPARISON: None.    HISTORY: Altered mental status.    TECHNIQUE: Axial CT images of the head from the skull base to the  vertex were acquired without IV contrast.    FINDINGS: The ventricles and basal cisterns are within normal limits  in configuration. There is no midline shift. There are no extra-axial  fluid collections. Gray-white differentiation is well maintained.    No intracranial hemorrhage, mass or recent infarct.    The visualized paranasal sinuses are well-aerated. There is no  mastoiditis. There are no fractures of the visualized bones.      Impression    IMPRESSION: Normal head CT.      Radiation dose for this scan was reduced using automated exposure  control, adjustment of the mA and/or kV according to patient size, or  iterative reconstruction technique    AZIZA DENNY MD         SYSTEM ID:  [id number]   CTA Head Neck with Contrast     Status: None    Narrative    CT ANGIOGRAM OF THE HEAD AND NECK WITHOUT AND WITH CONTRAST July 6, 2023 10:52 AM     HISTORY: Altered mental  status.    TECHNIQUE: Precontrast localizing scans were followed by CT  angiography with an injection of 75mL Isovue 370 nonionic intravenous  contrast material with scans through the head and neck. Images were  transferred to a separate 3-D workstation where multiplanar  reformations and 3-D images were created. Estimates of carotid  stenoses are made relative to the distal internal carotid artery  diameters except as noted. Radiation dose for this scan was reduced  using automated exposure control, adjustment of the mA and/or kV  according to patient size, or iterative reconstruction technique.     COMPARISON: None.    FINDINGS:   Neck CTA: The bilateral common carotid, bilateral cervical internal  carotid and bilateral vertebral arteries are patent without stenosis.     Head CTA: The basilar, bilateral intracranial distal internal carotid,  bilateral anterior cerebral, bilateral middle cerebral and bilateral  posterior cerebral arteries are patent and unremarkable.      Impression    IMPRESSION: Normal neck and head CTA.      AZIZA DENNY MD         SYSTEM ID:  [id number]   CT Head Perfusion w Contrast - For Tier 2 Stroke     Status: None    Narrative    CT BRAIN PERFUSION July 6, 2023 11:04 AM    HISTORY: Code Stroke to evaluate for potential thrombolysis and  thrombectomy. Evaluate mismatch between penumbra and core infarct.     TECHNIQUE: Time sequential axial CT images of the head were acquired  during the administration of intravenous contrast (50 mL isovue 370).  CTA images of the [place] as well as color perfusion maps of  the brain were created from this time sequential axial source data.  Radiation dose for this scan was reduced using automated exposure  control, adjustment of the mA and/or kV according to patient size, or  iterative reconstruction technique.    COMPARISON: None.    FINDINGS: There are no focal or regional perfusion defects in the  brain.      Impression    IMPRESSION: Normal  CT perfusion of the brain.      AZIZA DENNY MD         SYSTEM ID:  [id number]   MR Brain w/o Contrast     Status: None    Narrative    MRI OF THE BRAIN WITHOUT CONTRAST July 6, 2023 12:49 PM     HISTORY: Acute neuro deficit, stroke suspected.     TECHNIQUE: Sagittal T1-weighted, axial T2-weighted, axial FLAIR, and  axial diffusion-weighted MR images of the brain were acquired without  intravenous contrast.    COMPARISON: Head CT same day.    FINDINGS: There are globular areas of restricted diffusion in the  thalami bilaterally (left greater than right) consistent with recent  ischemic infarcts. The left-sided infarcts also appear to intersect  the posterior limb of the left internal capsule which corresponds with  the patient's recent symptoms of right-sided weakness. Given both  thalami are affected, normal variant artery of [place]on vasculature  may be present. No other recent infarct.    The ventricles and basal cisterns are within normal limits in  configuration. There is no midline shift. There are no extra-axial  fluid collections. There is no evidence for acute intracranial  hemorrhage. Gray-white differentiation is well maintained.    There is no sinusitis or mastoiditis.      Impression    IMPRESSION:  1. Bilateral thalamic infarcts (left greater than right) with likely  involvement of the posterior limb of the left internal capsule which  corresponds with the patient's recent symptoms of right-sided weakness  and incoordination.  2. Otherwise, normal brain MRI.    AZIZA DENNY MD         SYSTEM ID:  [id number]   MRV Brain wo & w Contrast     Status: None (Preliminary result)    Narrative    MR VENOGRAM OF THE HEAD WITHOUT AND WITH CONTRAST  7/6/2023 3:01 PM     HISTORY: Bilateral thalamic stroke.    TECHNIQUE: 2D TOF and 2D phase contrast MR venogram without contrast  material. 3D TOF MR venogram with 7.9 mL Gadavist.    COMPARISON: None.      Impression    IMPRESSION: Motion-limited exam. No evidence  of dural venous sinus  thrombosis. The superior sagittal sinus, internal cerebral veins,  basal veins of [place]ov[place], vein of Prudencio, straight sinus, torcula,  transverse sinuses, sigmoid sinuses, and jugular bulbs are patent. No  evidence of dural venous sinus or deep vein thrombosis. Right  transverse sinus is dominant. Prominent vein versus development of  venous anomaly associated with the left thalamus considered to be a  normal variant.    IMPRESSION: No evidence of dural venous sinus or deep vein thrombosis.   Basic metabolic panel     Status: Abnormal   Result Value Ref Range    Sodium 137 136 - 145 mmol/L    Potassium 4.2 3.4 - 5.3 mmol/L    Chloride 102 98 - 107 mmol/L    Carbon Dioxide (CO2) 24 22 - 29 mmol/L    Anion Gap 11 7 - 15 mmol/L    Urea Nitrogen 13.2 6.0 - 20.0 mg/dL    Creatinine 0.87 0.67 - 1.17 mg/dL    Calcium 9.2 8.6 - 10.0 mg/dL    Glucose 143 (H) 70 - 99 mg/dL    GFR Estimate >90 >60 mL/min/1.73m2   INR     Status: Normal   Result Value Ref Range    INR 0.91 0.85 - 1.15   Partial thromboplastin time     Status: Normal   Result Value Ref Range    aPTT 24 22 - 38 Seconds   Troponin T, High Sensitivity     Status: Normal   Result Value Ref Range    Troponin T, High Sensitivity 8 <=22 ng/L   CBC with platelets and differential     Status: None   Result Value Ref Range    WBC Count 7.5 4.0 - 11.0 10e3/uL    RBC Count 5.33 4.40 - 5.90 10e6/uL    Hemoglobin 16.5 13.3 - 17.7 g/dL    Hematocrit 49.2 40.0 - 53.0 %    MCV 92 78 - 100 fL    MCH 31.0 26.5 - 33.0 pg    MCHC 33.5 31.5 - 36.5 g/dL    RDW 12.3 10.0 - 15.0 %    Platelet Count 241 150 - 450 10e3/uL    % Neutrophils 69 %    % Lymphocytes 19 %    % Monocytes 11 %    % Eosinophils 0 %    % Basophils 1 %    % Immature Granulocytes 0 %    NRBCs per 100 WBC 0 <1 /100    Absolute Neutrophils 5.2 1.6 - 8.3 10e3/uL    Absolute Lymphocytes 1.4 0.8 - 5.3 10e3/uL    Absolute Monocytes 0.8 0.0 - 1.3 10e3/uL    Absolute Eosinophils 0.0 0.0 - 0.7 10e3/uL     Absolute Basophils 0.0 0.0 - 0.2 10e3/uL    Absolute Immature Granulocytes 0.0 <=0.4 10e3/uL    Absolute NRBCs 0.0 10e3/uL   Glucose by meter     Status: Abnormal   Result Value Ref Range    GLUCOSE BY METER POCT 156 (H) 70 - 99 mg/dL   UA with Microscopic reflex to Culture     Status: Normal    Specimen: Urine, Midstream   Result Value Ref Range    Color Urine Straw Colorless, Straw, Light Yellow, Yellow    Appearance Urine Clear Clear    Glucose Urine Negative Negative mg/dL    Bilirubin Urine Negative Negative    Ketones Urine Negative Negative mg/dL    Specific Gravity Urine 1.005 1.003 - 1.035    Blood Urine Negative Negative    pH Urine 7.0 5.0 - 7.0    Protein Albumin Urine Negative Negative mg/dL    Urobilinogen Urine Normal Normal, 2.0 mg/dL    Nitrite Urine Negative Negative    Leukocyte Esterase Urine Negative Negative    RBC Urine 0 <=2 /HPF    WBC Urine 0 <=5 /HPF    Squamous Epithelials Urine <1 <=1 /HPF    Narrative    Urine Culture not indicated   Drug abuse screen 1 urine (ED)     Status: Normal   Result Value Ref Range    Amphetamines Urine Screen Negative Screen Negative    Barbituates Urine Screen Negative Screen Negative    Benzodiazepine Urine Screen Negative Screen Negative    Cannabinoids Urine Screen Negative Screen Negative    Cocaine Urine Screen Negative Screen Negative    Opiates Urine Screen Negative Screen Negative   CBC with Platelets & Differential     Status: None    Narrative    The following orders were created for panel order CBC with Platelets & Differential.  Procedure                               Abnormality         Status                     ---------                               -----------         ------                     CBC with platelets and d...[[id number]]                      Final result                 Please view results for these tests on the individual orders.   Urine Drugs of Abuse Screen     Status: Normal    Narrative    The following orders were created  for panel order Urine Drugs of Abuse Screen.  Procedure                               Abnormality         Status                     ---------                               -----------         ------                     Drug abuse screen 1 urin...[[id number]]  Normal              Final result                 Please view results for these tests on the individual orders.     Medications   sodium chloride 0.9 % bag 500mL for CT scan flush use (100 mLs Intravenous $Given 7/6/23 1051)   iopamidol (ISOVUE-370) solution 100 mL (75 mLs Intravenous $Given 7/6/23 1051)   iopamidol (ISOVUE-370) solution 50 mL (125 mLs Intravenous $Given 7/6/23 1100)   0.9% sodium chloride BOLUS (0 mLs Intravenous Stopped 7/6/23 1238)   ketorolac (TORADOL) injection 30 mg (30 mg Intravenous $Given 7/6/23 1133)   metoclopramide (REGLAN) injection 5 mg (5 mg Intravenous $Given 7/6/23 1133)   aspirin (ASA) tablet 325 mg (325 mg Oral $Given 7/6/23 1340)   gadobutrol (GADAVIST) injection 7.94 mL (7.94 mLs Intravenous $Given 7/6/23 1443)     Labs Ordered and Resulted from Time of ED Arrival to Time of ED Departure   BASIC METABOLIC PANEL - Abnormal       Result Value    Sodium 137      Potassium 4.2      Chloride 102      Carbon Dioxide (CO2) 24      Anion Gap 11      Urea Nitrogen 13.2      Creatinine 0.87      Calcium 9.2      Glucose 143 (*)     GFR Estimate >90     GLUCOSE BY METER - Abnormal    GLUCOSE BY METER POCT 156 (*)    INR - Normal    INR 0.91     PARTIAL THROMBOPLASTIN TIME - Normal    aPTT 24     TROPONIN T, HIGH SENSITIVITY - Normal    Troponin T, High Sensitivity 8     ROUTINE UA WITH MICROSCOPIC REFLEX TO CULTURE - Normal    Color Urine Straw      Appearance Urine Clear      Glucose Urine Negative      Bilirubin Urine Negative      Ketones Urine Negative      Specific Gravity Urine 1.005      Blood Urine Negative      pH Urine 7.0      Protein Albumin Urine Negative      Urobilinogen Urine Normal      Nitrite Urine Negative       Leukocyte Esterase Urine Negative      RBC Urine 0      WBC Urine 0      Squamous Epithelials Urine <1     DRUG ABUSE SCREEN 1 URINE (ED) - Normal    Amphetamines Urine Screen Negative      Barbituates Urine Screen Negative      Benzodiazepine Urine Screen Negative      Cannabinoids Urine Screen Negative      Cocaine Urine Screen Negative      Opiates Urine Screen Negative     CBC WITH PLATELETS AND DIFFERENTIAL    WBC Count 7.5      RBC Count 5.33      Hemoglobin 16.5      Hematocrit 49.2      MCV 92      MCH 31.0      MCHC 33.5      RDW 12.3      Platelet Count 241      % Neutrophils 69      % Lymphocytes 19      % Monocytes 11      % Eosinophils 0      % Basophils 1      % Immature Granulocytes 0      NRBCs per 100 WBC 0      Absolute Neutrophils 5.2      Absolute Lymphocytes 1.4      Absolute Monocytes 0.8      Absolute Eosinophils 0.0      Absolute Basophils 0.0      Absolute Immature Granulocytes 0.0      Absolute NRBCs 0.0     GLUCOSE MONITOR NURSING POCT   COVID-19 VIRUS (CORONAVIRUS) BY PCR   ISTAT CREATININE POCT     MRV Brain wo & w Contrast   Preliminary Result   IMPRESSION: Motion-limited exam. No evidence of dural venous sinus   thrombosis. The superior sagittal sinus, internal cerebral veins,   basal veins of Yovany, vein of Prudencio, straight sinus, torcula,   transverse sinuses, sigmoid sinuses, and jugular bulbs are patent. No   evidence of dural venous sinus or deep vein thrombosis. Right   transverse sinus is dominant. Prominent vein versus development of   venous anomaly associated with the left thalamus considered to be a   normal variant.      IMPRESSION: No evidence of dural venous sinus or deep vein thrombosis.      MR Brain w/o Contrast   Final Result   IMPRESSION:   1. Bilateral thalamic infarcts (left greater than right) with likely   involvement of the posterior limb of the left internal capsule which   corresponds with the patient's recent symptoms of right-sided weakness   and  incoordination.   2. Otherwise, normal brain MRI.      AZIZA DENNY MD            SYSTEM ID:  [id number]      CT Head Perfusion w Contrast - For Tier 2 Stroke   Final Result   IMPRESSION: Normal CT perfusion of the brain.         AZIZA DENNY MD            SYSTEM ID:  [id number]      CTA Head Neck with Contrast   Final Result   IMPRESSION: Normal neck and head CTA.         AZIZA DENNY MD            SYSTEM ID:  [id number]      CT Head w/o Contrast   Final Result   IMPRESSION: Normal head CT.         Radiation dose for this scan was reduced using automated exposure   control, adjustment of the mA and/or kV according to patient size, or   iterative reconstruction technique      AZIZA DENNY MD            SYSTEM ID:  [id number]         Repeat neurologic examination unchanged after MRI of brain performed    Critical care was performed.   Critical Care Addendum  My initial assessment, based on my review of nursing observations, focused history and physical exam, established a high suspicion that Michael Stewart has ischemic or hemorrhagic stroke, which requires immediate intervention, and therefore he is critically ill.     After the initial assessment, the care team initiated multiple lab tests, initiated IV fluid administration, initiated medication therapy with ASA and consulted with stroke Neurology to provide stabilization care. Due to the critical nature of this patient, I reassessed vital signs, mental status and neurologic status multiple times prior to his disposition.     Time also spent performing documentation, discussion with family to obtain medical information for decision making, reviewing test results, discussion with consultants and coordination of care.     Critical care time (excluding teaching time and procedures): 45 minutes.     Assessment & Plan    52 year old male to the emergency department with difficulty ambulating.  The patient has had several days of headache and sore throat.  He was  diagnosed with strep pharyngitis yesterday.  He was feeling better last night but apparently around midnight, had difficulty ambulating to the bathroom.  Feeling members found him with difficulty ambulating this morning and brought him to the emergency department.  Was initially some confusion about symptom duration due to a miscommunication and language barrier so tier 1 stroke code was activated.  The patient underwent CT/CTA which was unremarkable.  Perfusion studies were also performed due to duration of symptoms after discussion with stroke neurology and that 2 was unrevealing.  MRI brain ultimately revealed bilateral thalamic stroke, left greater than right.  MRV brain subsequently requested by stroke neurology.  That does not reveal any venous thrombosis.  The patient was given aspirin.  He will be admitted to stroke neurology for further evaluation and management.  His neurologic examination remained unchanged while in the emergency department.  He did have some mildly elevated blood pressures but no readings that required any kind of intervention.    I have reviewed the nursing notes. I have reviewed the findings, diagnosis, plan and need for follow up with the patient.    New Prescriptions    No medications on file       Final diagnoses:   Thalamic stroke (H)     Chart documentation was completed with Dragon voice-recognition software. Even though reviewed, this chart may still contain some grammatical, spelling, and word errors.     Ty Foley Md  [facility] EMERGENCY DEPARTMENT  7/6/2023     Ty Foley MD  07/06/23 4390

## 2023-07-06 NOTE — LETTER
[facility] [address]  [address] [address]  Phone: [phone]    July 10, 2023        Michael Stewart  [address]  [place] [address]          To whom it may concern:    RE: Michael Stewart is currently admitted at [facility]. He is currently in critical condition and will be unable to return to work for the foreseeable future.        Sincerely,      Raymond Breen MD  Neurology Resident  [facility]

## 2023-07-06 NOTE — CONSULTS
"[facility]    Stroke Telephone Note    I was called by Ty Foley Md on 07/06/23 regarding patient Michael Stewart. The patient is a 52 year old male who presented with right sided drift and gait disturbances since midnight. Time of onset is not very clear but he did notice difficulty ambulating when he woke up around 12 AM. Exam remarkable for RUE/RLE drift and ataxia with difficulty transferring out of wheelchair.    BP (!) 164/105   Pulse 95   Temp 98.1  F (36.7  C) (Oral)   Resp 16   Ht 1.702 m (5' 7\")   Wt 79.4 kg (175 lb)   SpO2 95%   BMI 27.41 kg/m       Stroke Code Data (for stroke code without tele)  Stroke code activated 07/06/23   1038   Stroke provider first response  07/06/23   1041            Last known normal 07/05/23        Unknown   Time of discovery   (or onset of symptoms) 07/06/23   0000   Head CT read by Stroke Neuro Dr/Provider 07/06/23   1049   Was stroke code de-escalated? Yes 07/06/23 1109          Imaging Findings  CT head: No acute findings  CTA head/neck: No LVO, dissection or hemodynamically significant stenosis    Intravenous Thrombolysis  Not given due to:   - unclear or unfavorable risk-benefit profile for extended window thrombolysis beyond the conventional 4.5 hour time window    Endovascular Treatment  Not initiated due to absence of proximal vessel occlusion    Impression  Acute onset right sided weakness - will rule out acute ischemic stroke    Recommendations   STAT MRI Brain wwo contrast    My recommendations are based on the information provided over the phone by Michael Stewart's in-person providers. They are not intended to replace the clinical judgment of his in-person providers. I was not requested to personally see or examine the patient at this time.    Montrell Borrego MD       Department of Neurology       Securely message with the Vocera Web Console (learn more here)   To page me or covering " "stroke neurology team member, click here: AMCOM  Choose \"On Call\" tab at top, then select \"NEUROLOGY/ALL SITES\" from middle drop-down box, press Enter, then look for \"stroke\" or \"telestroke\" for your site.        "

## 2023-07-07 ENCOUNTER — APPOINTMENT (OUTPATIENT)
Dept: CARDIOLOGY | Facility: CLINIC | Age: 53
End: 2023-07-07
Attending: STUDENT IN AN ORGANIZED HEALTH CARE EDUCATION/TRAINING PROGRAM
Payer: COMMERCIAL

## 2023-07-07 ENCOUNTER — APPOINTMENT (OUTPATIENT)
Dept: NEUROLOGY | Facility: CLINIC | Age: 53
End: 2023-07-07
Attending: STUDENT IN AN ORGANIZED HEALTH CARE EDUCATION/TRAINING PROGRAM
Payer: COMMERCIAL

## 2023-07-07 ENCOUNTER — APPOINTMENT (OUTPATIENT)
Dept: CT IMAGING | Facility: CLINIC | Age: 53
End: 2023-07-07
Attending: STUDENT IN AN ORGANIZED HEALTH CARE EDUCATION/TRAINING PROGRAM
Payer: COMMERCIAL

## 2023-07-07 ENCOUNTER — ANESTHESIA EVENT (OUTPATIENT)
Dept: INTENSIVE CARE | Facility: CLINIC | Age: 53
End: 2023-07-07
Payer: COMMERCIAL

## 2023-07-07 ENCOUNTER — APPOINTMENT (OUTPATIENT)
Dept: INTERPRETER SERVICES | Facility: CLINIC | Age: 53
End: 2023-07-07
Payer: COMMERCIAL

## 2023-07-07 ENCOUNTER — APPOINTMENT (OUTPATIENT)
Dept: GENERAL RADIOLOGY | Facility: CLINIC | Age: 53
End: 2023-07-07
Payer: COMMERCIAL

## 2023-07-07 ENCOUNTER — ANESTHESIA (OUTPATIENT)
Dept: INTENSIVE CARE | Facility: CLINIC | Age: 53
End: 2023-07-07
Payer: COMMERCIAL

## 2023-07-07 ENCOUNTER — APPOINTMENT (OUTPATIENT)
Dept: CT IMAGING | Facility: CLINIC | Age: 53
End: 2023-07-07
Attending: NURSE PRACTITIONER
Payer: COMMERCIAL

## 2023-07-07 ENCOUNTER — APPOINTMENT (OUTPATIENT)
Dept: GENERAL RADIOLOGY | Facility: CLINIC | Age: 53
End: 2023-07-07
Attending: NURSE PRACTITIONER
Payer: COMMERCIAL

## 2023-07-07 VITALS — HEART RATE: 82 BPM

## 2023-07-07 LAB
ALBUMIN SERPL BCG-MCNC: 4.6 G/DL (ref 3.5–5.2)
ALLEN'S TEST: ABNORMAL
ALLEN'S TEST: NO
ALP SERPL-CCNC: 68 U/L (ref 40–129)
ALT SERPL W P-5'-P-CCNC: 22 U/L (ref 0–70)
ANION GAP SERPL CALCULATED.3IONS-SCNC: 15 MMOL/L (ref 7–15)
APPEARANCE CSF: CLEAR
AST SERPL W P-5'-P-CCNC: 18 U/L (ref 0–45)
BACTERIA UR CULT: NORMAL
BASE EXCESS BLDA CALC-SCNC: -0.2 MMOL/L (ref -9–1.8)
BASE EXCESS BLDA CALC-SCNC: -1.1 MMOL/L (ref -9–1.8)
BASE EXCESS BLDV CALC-SCNC: -0.7 MMOL/L (ref -7.7–1.9)
BILIRUB DIRECT SERPL-MCNC: <0.2 MG/DL (ref 0–0.3)
BILIRUB SERPL-MCNC: 0.5 MG/DL
BUN SERPL-MCNC: 14.1 MG/DL (ref 6–20)
C GATTII+NEOFOR DNA CSF QL NAA+NON-PROBE: NEGATIVE
CALCIUM SERPL-MCNC: 9.1 MG/DL (ref 8.6–10)
CHLORIDE SERPL-SCNC: 100 MMOL/L (ref 98–107)
CHOLEST SERPL-MCNC: 278 MG/DL
CMV DNA CSF QL NAA+NON-PROBE: NEGATIVE
COLOR CSF: COLORLESS
CREAT SERPL-MCNC: 0.8 MG/DL (ref 0.67–1.17)
CRP SERPL-MCNC: <3 MG/L
DEPRECATED HCO3 PLAS-SCNC: 22 MMOL/L (ref 22–29)
E COLI K1 AG CSF QL: NEGATIVE
ERYTHROCYTE [DISTWIDTH] IN BLOOD BY AUTOMATED COUNT: 12.7 % (ref 10–15)
EV RNA SPEC QL NAA+PROBE: NEGATIVE
GFR SERPL CREATININE-BSD FRML MDRD: >90 ML/MIN/1.73M2
GLUCOSE BLDC GLUCOMTR-MCNC: 121 MG/DL (ref 70–99)
GLUCOSE CSF-MCNC: 80 MG/DL (ref 40–70)
GLUCOSE SERPL-MCNC: 146 MG/DL (ref 70–99)
GP B STREP DNA CSF QL NAA+NON-PROBE: NEGATIVE
HAEM INFLU DNA CSF QL NAA+NON-PROBE: NEGATIVE
HBA1C MFR BLD: 6.1 %
HCO3 BLD-SCNC: 23 MMOL/L (ref 21–28)
HCO3 BLD-SCNC: 27 MMOL/L (ref 21–28)
HCO3 BLDV-SCNC: 26 MMOL/L (ref 21–28)
HCT VFR BLD AUTO: 48.4 % (ref 40–53)
HDLC SERPL-MCNC: 51 MG/DL
HGB BLD-MCNC: 15.5 G/DL (ref 13.3–17.7)
HHV6 DNA CSF QL NAA+NON-PROBE: NEGATIVE
HOLD SPECIMEN: NORMAL
HSV1 DNA CSF QL NAA+NON-PROBE: NEGATIVE
HSV1 DNA CSF QL NAA+PROBE: NOT DETECTED
HSV2 DNA CSF QL NAA+NON-PROBE: NEGATIVE
HSV2 DNA CSF QL NAA+PROBE: NOT DETECTED
L MONOCYTOG DNA CSF QL NAA+NON-PROBE: NEGATIVE
LDLC SERPL CALC-MCNC: 186 MG/DL
LVEF ECHO: NORMAL
LYMPH ABN NFR CSF MANUAL: 84 %
MAGNESIUM SERPL-MCNC: 2.3 MG/DL (ref 1.7–2.3)
MCH RBC QN AUTO: 29.9 PG (ref 26.5–33)
MCHC RBC AUTO-ENTMCNC: 32 G/DL (ref 31.5–36.5)
MCV RBC AUTO: 93 FL (ref 78–100)
MONOS+MACROS NFR CSF MANUAL: 15 %
N MEN DNA CSF QL NAA+NON-PROBE: NEGATIVE
NEUTROPHILS NFR CSF MANUAL: 1 %
NONHDLC SERPL-MCNC: 227 MG/DL
O2/TOTAL GAS SETTING VFR VENT: 35 %
O2/TOTAL GAS SETTING VFR VENT: 40 %
O2/TOTAL GAS SETTING VFR VENT: 50 %
OXYHGB MFR BLD: 98 % (ref 92–100)
OXYHGB MFR BLDV: 97 % (ref 70–75)
PARECHOVIRUS A RNA CSF QL NAA+NON-PROBE: NEGATIVE
PCO2 BLD: 37 MM HG (ref 35–45)
PCO2 BLD: 50 MM HG (ref 35–45)
PCO2 BLDV: 46 MM HG (ref 40–50)
PH BLD: 7.34 [PH] (ref 7.35–7.45)
PH BLD: 7.4 [PH] (ref 7.35–7.45)
PH BLDV: 7.35 [PH] (ref 7.32–7.43)
PHOSPHATE SERPL-MCNC: 4.2 MG/DL (ref 2.5–4.5)
PLATELET # BLD AUTO: 245 10E3/UL (ref 150–450)
PO2 BLD: 134 MM HG (ref 80–105)
PO2 BLD: 154 MM HG (ref 80–105)
PO2 BLDV: 117 MM HG (ref 25–47)
POTASSIUM SERPL-SCNC: 4.2 MMOL/L (ref 3.4–5.3)
PROCALCITONIN SERPL IA-MCNC: 0.08 NG/ML
PROCALCITONIN SERPL IA-MCNC: 0.08 NG/ML
PROT CSF-MCNC: 57 MG/DL (ref 15–45)
PROT SERPL-MCNC: 7.6 G/DL (ref 6.4–8.3)
RBC # BLD AUTO: 5.18 10E6/UL (ref 4.4–5.9)
RBC # CSF MANUAL: 48 /UL (ref 0–2)
S PNEUM DNA CSF QL NAA+NON-PROBE: NEGATIVE
SODIUM SERPL-SCNC: 137 MMOL/L (ref 136–145)
TRIGL SERPL-MCNC: 203 MG/DL
TSH SERPL DL<=0.005 MIU/L-ACNC: 2.13 UIU/ML (ref 0.3–4.2)
TUBE # CSF: 4
VZV DNA CSF QL NAA+NON-PROBE: NEGATIVE
WBC # BLD AUTO: 9.6 10E3/UL (ref 4–11)
WBC # CSF MANUAL: 33 /UL (ref 0–5)

## 2023-07-07 PROCEDURE — 82805 BLOOD GASES W/O2 SATURATION: CPT | Performed by: NURSE PRACTITIONER

## 2023-07-07 PROCEDURE — 250N000009 HC RX 250: Performed by: STUDENT IN AN ORGANIZED HEALTH CARE EDUCATION/TRAINING PROGRAM

## 2023-07-07 PROCEDURE — 83036 HEMOGLOBIN GLYCOSYLATED A1C: CPT | Performed by: STUDENT IN AN ORGANIZED HEALTH CARE EDUCATION/TRAINING PROGRAM

## 2023-07-07 PROCEDURE — 71275 CT ANGIOGRAPHY CHEST: CPT | Mod: 26 | Performed by: RADIOLOGY

## 2023-07-07 PROCEDURE — 99291 CRITICAL CARE FIRST HOUR: CPT | Mod: 25 | Performed by: PSYCHIATRY & NEUROLOGY

## 2023-07-07 PROCEDURE — 74018 RADEX ABDOMEN 1 VIEW: CPT | Mod: 26 | Performed by: RADIOLOGY

## 2023-07-07 PROCEDURE — 272N000010 HC KIT CATH ARTERIAL EXT SUPPLY

## 2023-07-07 PROCEDURE — 82945 GLUCOSE OTHER FLUID: CPT | Performed by: STUDENT IN AN ORGANIZED HEALTH CARE EDUCATION/TRAINING PROGRAM

## 2023-07-07 PROCEDURE — 82805 BLOOD GASES W/O2 SATURATION: CPT | Performed by: STUDENT IN AN ORGANIZED HEALTH CARE EDUCATION/TRAINING PROGRAM

## 2023-07-07 PROCEDURE — 999N000015 HC STATISTIC ARTERIAL MONITORING DAILY

## 2023-07-07 PROCEDURE — 999N000065 XR ABDOMEN PORT 1 VIEW

## 2023-07-07 PROCEDURE — 87483 CNS DNA AMP PROBE TYPE 12-25: CPT | Performed by: STUDENT IN AN ORGANIZED HEALTH CARE EDUCATION/TRAINING PROGRAM

## 2023-07-07 PROCEDURE — 94002 VENT MGMT INPAT INIT DAY: CPT

## 2023-07-07 PROCEDURE — 94660 CPAP INITIATION&MGMT: CPT

## 2023-07-07 PROCEDURE — 99152 MOD SED SAME PHYS/QHP 5/>YRS: CPT | Performed by: PSYCHIATRY & NEUROLOGY

## 2023-07-07 PROCEDURE — 250N000013 HC RX MED GY IP 250 OP 250 PS 637: Performed by: STUDENT IN AN ORGANIZED HEALTH CARE EDUCATION/TRAINING PROGRAM

## 2023-07-07 PROCEDURE — 87070 CULTURE OTHR SPECIMN AEROBIC: CPT | Performed by: STUDENT IN AN ORGANIZED HEALTH CARE EDUCATION/TRAINING PROGRAM

## 2023-07-07 PROCEDURE — 999N000185 HC STATISTIC TRANSPORT TIME EA 15 MIN

## 2023-07-07 PROCEDURE — 250N000011 HC RX IP 250 OP 636

## 2023-07-07 PROCEDURE — 99207 EEG VIDEO 2-12 HRS UNMONITORED: CPT | Performed by: PSYCHIATRY & NEUROLOGY

## 2023-07-07 PROCEDURE — 93306 TTE W/DOPPLER COMPLETE: CPT | Mod: 26 | Performed by: STUDENT IN AN ORGANIZED HEALTH CARE EDUCATION/TRAINING PROGRAM

## 2023-07-07 PROCEDURE — 84443 ASSAY THYROID STIM HORMONE: CPT | Performed by: STUDENT IN AN ORGANIZED HEALTH CARE EDUCATION/TRAINING PROGRAM

## 2023-07-07 PROCEDURE — 999N000248 HC STATISTIC IV INSERT WITH US BY RN

## 2023-07-07 PROCEDURE — 999N000208 ECHOCARDIOGRAM COMPLETE

## 2023-07-07 PROCEDURE — 70450 CT HEAD/BRAIN W/O DYE: CPT

## 2023-07-07 PROCEDURE — 258N000003 HC RX IP 258 OP 636: Performed by: STUDENT IN AN ORGANIZED HEALTH CARE EDUCATION/TRAINING PROGRAM

## 2023-07-07 PROCEDURE — 86140 C-REACTIVE PROTEIN: CPT | Performed by: NURSE PRACTITIONER

## 2023-07-07 PROCEDURE — 36415 COLL VENOUS BLD VENIPUNCTURE: CPT | Performed by: STUDENT IN AN ORGANIZED HEALTH CARE EDUCATION/TRAINING PROGRAM

## 2023-07-07 PROCEDURE — 89050 BODY FLUID CELL COUNT: CPT | Performed by: STUDENT IN AN ORGANIZED HEALTH CARE EDUCATION/TRAINING PROGRAM

## 2023-07-07 PROCEDURE — 87075 CULTR BACTERIA EXCEPT BLOOD: CPT | Performed by: STUDENT IN AN ORGANIZED HEALTH CARE EDUCATION/TRAINING PROGRAM

## 2023-07-07 PROCEDURE — 200N000002 HC R&B ICU UMMC

## 2023-07-07 PROCEDURE — 83735 ASSAY OF MAGNESIUM: CPT | Performed by: STUDENT IN AN ORGANIZED HEALTH CARE EDUCATION/TRAINING PROGRAM

## 2023-07-07 PROCEDURE — 82248 BILIRUBIN DIRECT: CPT | Performed by: STUDENT IN AN ORGANIZED HEALTH CARE EDUCATION/TRAINING PROGRAM

## 2023-07-07 PROCEDURE — 95718 EEG PHYS/QHP 2-12 HR W/VEEG: CPT | Performed by: PSYCHIATRY & NEUROLOGY

## 2023-07-07 PROCEDURE — 71275 CT ANGIOGRAPHY CHEST: CPT

## 2023-07-07 PROCEDURE — 80053 COMPREHEN METABOLIC PANEL: CPT | Performed by: STUDENT IN AN ORGANIZED HEALTH CARE EDUCATION/TRAINING PROGRAM

## 2023-07-07 PROCEDURE — 258N000003 HC RX IP 258 OP 636

## 2023-07-07 PROCEDURE — 84100 ASSAY OF PHOSPHORUS: CPT | Performed by: STUDENT IN AN ORGANIZED HEALTH CARE EDUCATION/TRAINING PROGRAM

## 2023-07-07 PROCEDURE — 71045 X-RAY EXAM CHEST 1 VIEW: CPT | Mod: 26 | Performed by: RADIOLOGY

## 2023-07-07 PROCEDURE — 999N000065 XR CHEST PORT 1 VIEW

## 2023-07-07 PROCEDURE — 999N000127 HC STATISTIC PERIPHERAL IV START W US GUIDANCE

## 2023-07-07 PROCEDURE — 250N000011 HC RX IP 250 OP 636: Performed by: STUDENT IN AN ORGANIZED HEALTH CARE EDUCATION/TRAINING PROGRAM

## 2023-07-07 PROCEDURE — 36620 INSERTION CATHETER ARTERY: CPT | Performed by: NURSE PRACTITIONER

## 2023-07-07 PROCEDURE — 62270 DX LMBR SPI PNXR: CPT | Performed by: PSYCHIATRY & NEUROLOGY

## 2023-07-07 PROCEDURE — 70450 CT HEAD/BRAIN W/O DYE: CPT | Mod: 26 | Performed by: RADIOLOGY

## 2023-07-07 PROCEDURE — 87205 SMEAR GRAM STAIN: CPT | Performed by: STUDENT IN AN ORGANIZED HEALTH CARE EDUCATION/TRAINING PROGRAM

## 2023-07-07 PROCEDURE — 99152 MOD SED SAME PHYS/QHP 5/>YRS: CPT | Performed by: NURSE PRACTITIONER

## 2023-07-07 PROCEDURE — 999N000157 HC STATISTIC RCP TIME EA 10 MIN

## 2023-07-07 PROCEDURE — 250N000011 HC RX IP 250 OP 636: Performed by: PSYCHIATRY & NEUROLOGY

## 2023-07-07 PROCEDURE — 82803 BLOOD GASES ANY COMBINATION: CPT

## 2023-07-07 PROCEDURE — 87899 AGENT NOS ASSAY W/OPTIC: CPT | Performed by: STUDENT IN AN ORGANIZED HEALTH CARE EDUCATION/TRAINING PROGRAM

## 2023-07-07 PROCEDURE — 87102 FUNGUS ISOLATION CULTURE: CPT | Performed by: STUDENT IN AN ORGANIZED HEALTH CARE EDUCATION/TRAINING PROGRAM

## 2023-07-07 PROCEDURE — 80061 LIPID PANEL: CPT | Performed by: STUDENT IN AN ORGANIZED HEALTH CARE EDUCATION/TRAINING PROGRAM

## 2023-07-07 PROCEDURE — 84145 PROCALCITONIN (PCT): CPT | Performed by: NURSE PRACTITIONER

## 2023-07-07 PROCEDURE — 009U3ZX DRAINAGE OF SPINAL CANAL, PERCUTANEOUS APPROACH, DIAGNOSTIC: ICD-10-PCS | Performed by: PSYCHIATRY & NEUROLOGY

## 2023-07-07 PROCEDURE — 99292 CRITICAL CARE ADDL 30 MIN: CPT | Mod: 25 | Performed by: PSYCHIATRY & NEUROLOGY

## 2023-07-07 PROCEDURE — 95711 VEEG 2-12 HR UNMONITORED: CPT

## 2023-07-07 PROCEDURE — 84157 ASSAY OF PROTEIN OTHER: CPT | Performed by: STUDENT IN AN ORGANIZED HEALTH CARE EDUCATION/TRAINING PROGRAM

## 2023-07-07 PROCEDURE — 5A1955Z RESPIRATORY VENTILATION, GREATER THAN 96 CONSECUTIVE HOURS: ICD-10-PCS | Performed by: PSYCHIATRY & NEUROLOGY

## 2023-07-07 PROCEDURE — 93306 TTE W/DOPPLER COMPLETE: CPT

## 2023-07-07 PROCEDURE — 86592 SYPHILIS TEST NON-TREP QUAL: CPT | Performed by: STUDENT IN AN ORGANIZED HEALTH CARE EDUCATION/TRAINING PROGRAM

## 2023-07-07 PROCEDURE — 36600 WITHDRAWAL OF ARTERIAL BLOOD: CPT

## 2023-07-07 PROCEDURE — 85027 COMPLETE CBC AUTOMATED: CPT | Performed by: STUDENT IN AN ORGANIZED HEALTH CARE EDUCATION/TRAINING PROGRAM

## 2023-07-07 PROCEDURE — 82310 ASSAY OF CALCIUM: CPT | Performed by: STUDENT IN AN ORGANIZED HEALTH CARE EDUCATION/TRAINING PROGRAM

## 2023-07-07 PROCEDURE — 250N000009 HC RX 250: Performed by: NURSE PRACTITIONER

## 2023-07-07 PROCEDURE — 370N000003 HC ANESTHESIA WARD SERVICE: Performed by: STUDENT IN AN ORGANIZED HEALTH CARE EDUCATION/TRAINING PROGRAM

## 2023-07-07 PROCEDURE — 87529 HSV DNA AMP PROBE: CPT | Performed by: STUDENT IN AN ORGANIZED HEALTH CARE EDUCATION/TRAINING PROGRAM

## 2023-07-07 PROCEDURE — 250N000013 HC RX MED GY IP 250 OP 250 PS 637: Performed by: NURSE PRACTITIONER

## 2023-07-07 PROCEDURE — 36415 COLL VENOUS BLD VENIPUNCTURE: CPT | Performed by: NURSE PRACTITIONER

## 2023-07-07 RX ORDER — PROPOFOL 10 MG/ML
INJECTION, EMULSION INTRAVENOUS
Status: COMPLETED
Start: 2023-07-07 | End: 2023-07-07

## 2023-07-07 RX ORDER — ACETAMINOPHEN 325 MG/1
650 TABLET ORAL EVERY 6 HOURS PRN
Status: DISCONTINUED | OUTPATIENT
Start: 2023-07-07 | End: 2023-07-12

## 2023-07-07 RX ORDER — IOPAMIDOL 755 MG/ML
70 INJECTION, SOLUTION INTRAVASCULAR ONCE
Status: COMPLETED | OUTPATIENT
Start: 2023-07-07 | End: 2023-07-07

## 2023-07-07 RX ORDER — FENTANYL CITRATE 50 UG/ML
25-50 INJECTION, SOLUTION INTRAMUSCULAR; INTRAVENOUS EVERY 30 MIN PRN
Status: DISCONTINUED | OUTPATIENT
Start: 2023-07-07 | End: 2023-07-09

## 2023-07-07 RX ORDER — FENTANYL CITRATE 50 UG/ML
50 INJECTION, SOLUTION INTRAMUSCULAR; INTRAVENOUS ONCE
Status: COMPLETED | OUTPATIENT
Start: 2023-07-07 | End: 2023-07-07

## 2023-07-07 RX ORDER — FENTANYL CITRATE 50 UG/ML
INJECTION, SOLUTION INTRAMUSCULAR; INTRAVENOUS
Status: COMPLETED
Start: 2023-07-07 | End: 2023-07-07

## 2023-07-07 RX ORDER — NALOXONE HYDROCHLORIDE 0.4 MG/ML
0.2 INJECTION, SOLUTION INTRAMUSCULAR; INTRAVENOUS; SUBCUTANEOUS
Status: DISCONTINUED | OUTPATIENT
Start: 2023-07-07 | End: 2023-07-26 | Stop reason: HOSPADM

## 2023-07-07 RX ORDER — NALOXONE HYDROCHLORIDE 0.4 MG/ML
0.4 INJECTION, SOLUTION INTRAMUSCULAR; INTRAVENOUS; SUBCUTANEOUS
Status: DISCONTINUED | OUTPATIENT
Start: 2023-07-07 | End: 2023-07-26 | Stop reason: HOSPADM

## 2023-07-07 RX ORDER — ROSUVASTATIN CALCIUM 20 MG/1
20 TABLET, COATED ORAL DAILY
Status: DISCONTINUED | OUTPATIENT
Start: 2023-07-07 | End: 2023-07-26 | Stop reason: HOSPADM

## 2023-07-07 RX ORDER — PROPOFOL 10 MG/ML
INJECTION, EMULSION INTRAVENOUS
Status: COMPLETED | OUTPATIENT
Start: 2023-07-07 | End: 2023-07-07

## 2023-07-07 RX ORDER — VANCOMYCIN HYDROCHLORIDE 1 G/200ML
1000 INJECTION, SOLUTION INTRAVENOUS EVERY 12 HOURS
Status: DISCONTINUED | OUTPATIENT
Start: 2023-07-07 | End: 2023-07-07 | Stop reason: DRUGHIGH

## 2023-07-07 RX ORDER — PROPOFOL 10 MG/ML
5-75 INJECTION, EMULSION INTRAVENOUS CONTINUOUS
Status: DISCONTINUED | OUTPATIENT
Start: 2023-07-07 | End: 2023-07-08

## 2023-07-07 RX ORDER — HYDROXYZINE HYDROCHLORIDE 50 MG/ML
50 INJECTION, SOLUTION INTRAMUSCULAR ONCE
Status: COMPLETED | OUTPATIENT
Start: 2023-07-07 | End: 2023-07-07

## 2023-07-07 RX ORDER — DEXMEDETOMIDINE HYDROCHLORIDE 4 UG/ML
.1-1.2 INJECTION, SOLUTION INTRAVENOUS CONTINUOUS
Status: DISCONTINUED | OUTPATIENT
Start: 2023-07-07 | End: 2023-07-08

## 2023-07-07 RX ORDER — SODIUM CHLORIDE, SODIUM GLUCONATE, SODIUM ACETATE, POTASSIUM CHLORIDE AND MAGNESIUM CHLORIDE 526; 502; 368; 37; 30 MG/100ML; MG/100ML; MG/100ML; MG/100ML; MG/100ML
1000 INJECTION, SOLUTION INTRAVENOUS ONCE
Status: COMPLETED | OUTPATIENT
Start: 2023-07-07 | End: 2023-07-07

## 2023-07-07 RX ORDER — FENTANYL CITRATE 50 UG/ML
25 INJECTION, SOLUTION INTRAMUSCULAR; INTRAVENOUS ONCE
Status: COMPLETED | OUTPATIENT
Start: 2023-07-07 | End: 2023-07-07

## 2023-07-07 RX ORDER — AMPICILLIN 2 G/1
2 INJECTION, POWDER, FOR SOLUTION INTRAVENOUS EVERY 4 HOURS
Status: DISCONTINUED | OUTPATIENT
Start: 2023-07-07 | End: 2023-07-07

## 2023-07-07 RX ORDER — CEFTRIAXONE 2 G/1
2 INJECTION, POWDER, FOR SOLUTION INTRAMUSCULAR; INTRAVENOUS EVERY 12 HOURS
Status: DISCONTINUED | OUTPATIENT
Start: 2023-07-07 | End: 2023-07-08

## 2023-07-07 RX ADMIN — CEFTRIAXONE SODIUM 2 G: 2 INJECTION, POWDER, FOR SOLUTION INTRAMUSCULAR; INTRAVENOUS at 13:55

## 2023-07-07 RX ADMIN — PROPOFOL 40 MCG/KG/MIN: 10 INJECTION, EMULSION INTRAVENOUS at 16:58

## 2023-07-07 RX ADMIN — DEXMEDETOMIDINE HYDROCHLORIDE 0.2 MCG/KG/HR: 400 INJECTION INTRAVENOUS at 02:34

## 2023-07-07 RX ADMIN — Medication 100 MG: at 15:20

## 2023-07-07 RX ADMIN — FENTANYL CITRATE 25 MCG: 50 INJECTION, SOLUTION INTRAMUSCULAR; INTRAVENOUS at 16:55

## 2023-07-07 RX ADMIN — PROPOFOL 100 MG: 10 INJECTION, EMULSION INTRAVENOUS at 15:20

## 2023-07-07 RX ADMIN — CEFTRIAXONE SODIUM 2 G: 2 INJECTION, POWDER, FOR SOLUTION INTRAMUSCULAR; INTRAVENOUS at 01:40

## 2023-07-07 RX ADMIN — THIAMINE HYDROCHLORIDE 500 MG: 100 INJECTION, SOLUTION INTRAMUSCULAR; INTRAVENOUS at 21:24

## 2023-07-07 RX ADMIN — ACYCLOVIR SODIUM 800 MG: 50 INJECTION, SOLUTION INTRAVENOUS at 09:41

## 2023-07-07 RX ADMIN — FENTANYL CITRATE 50 MCG: 50 INJECTION, SOLUTION INTRAMUSCULAR; INTRAVENOUS at 15:56

## 2023-07-07 RX ADMIN — FAMOTIDINE 20 MG: 10 INJECTION, SOLUTION INTRAVENOUS at 09:03

## 2023-07-07 RX ADMIN — FAMOTIDINE 20 MG: 10 INJECTION, SOLUTION INTRAVENOUS at 21:25

## 2023-07-07 RX ADMIN — ACYCLOVIR SODIUM 800 MG: 50 INJECTION, SOLUTION INTRAVENOUS at 17:33

## 2023-07-07 RX ADMIN — SODIUM CHLORIDE, SODIUM GLUCONATE, SODIUM ACETATE, POTASSIUM CHLORIDE AND MAGNESIUM CHLORIDE: 526; 502; 368; 37; 30 INJECTION, SOLUTION INTRAVENOUS at 13:54

## 2023-07-07 RX ADMIN — THIAMINE HYDROCHLORIDE 500 MG: 100 INJECTION, SOLUTION INTRAMUSCULAR; INTRAVENOUS at 15:03

## 2023-07-07 RX ADMIN — VANCOMYCIN HYDROCHLORIDE 2000 MG: 1 INJECTION, POWDER, LYOPHILIZED, FOR SOLUTION INTRAVENOUS at 03:00

## 2023-07-07 RX ADMIN — IOPAMIDOL 70 ML: 755 INJECTION, SOLUTION INTRAVENOUS at 14:42

## 2023-07-07 RX ADMIN — ACYCLOVIR SODIUM 800 MG: 50 INJECTION, SOLUTION INTRAVENOUS at 01:43

## 2023-07-07 RX ADMIN — ROSUVASTATIN CALCIUM 20 MG: 20 TABLET, FILM COATED ORAL at 23:08

## 2023-07-07 RX ADMIN — AMPICILLIN 2 G: 2 INJECTION, POWDER, FOR SOLUTION INTRAMUSCULAR; INTRAVENOUS at 05:11

## 2023-07-07 RX ADMIN — AMPICILLIN 2 G: 2 INJECTION, POWDER, FOR SOLUTION INTRAMUSCULAR; INTRAVENOUS at 02:27

## 2023-07-07 RX ADMIN — LABETALOL HYDROCHLORIDE 20 MG: 5 INJECTION, SOLUTION INTRAVENOUS at 00:15

## 2023-07-07 RX ADMIN — ACETAMINOPHEN 650 MG: 325 TABLET, FILM COATED ORAL at 23:08

## 2023-07-07 RX ADMIN — ENOXAPARIN SODIUM 40 MG: 40 INJECTION SUBCUTANEOUS at 21:43

## 2023-07-07 RX ADMIN — SODIUM CHLORIDE, SODIUM GLUCONATE, SODIUM ACETATE, POTASSIUM CHLORIDE AND MAGNESIUM CHLORIDE 1000 ML: 526; 502; 368; 37; 30 INJECTION, SOLUTION INTRAVENOUS at 16:13

## 2023-07-07 RX ADMIN — HYDROXYZINE HYDROCHLORIDE 50 MG: 50 INJECTION, SOLUTION INTRAMUSCULAR at 02:53

## 2023-07-07 RX ADMIN — THIAMINE HYDROCHLORIDE 500 MG: 100 INJECTION, SOLUTION INTRAMUSCULAR; INTRAVENOUS at 09:07

## 2023-07-07 ASSESSMENT — ACTIVITIES OF DAILY LIVING (ADL)
DEPENDENT_IADLS:: INDEPENDENT
ADLS_ACUITY_SCORE: 43

## 2023-07-07 NOTE — PLAN OF CARE
Major Shift Events: Pt arrived to unit around 1920. CT scan completed. LP completed at 0645.    Around 0200, pt became agitated and confused. Mitts and restraints ordered. 50 mg Hydroxyzine given and precedex started. Pt became obtunded around 0400. Precedex stopped. MD notified. Around 0530, pt began waking up.    Neuro:  Neuro upon arrival: Lethargic Ox4. Garbled speech. R sided facial droop and drift. Following commands and moving all extremities purposefully. Pupils dysconjugate.     Current neuro exam: Not following commands. Pupils dysconjugate/reactive. Nystagmus present. R sided facial droop. Withdrawing to pain. Spontaneously moves upper extremities localizing.     CV: SR/ST. SBP goal < 200 DBP goal <110. 20 mg labetalol given x3. Tmax 102.3.   Resp: Irregular breathing. Grunting/snoring/labored. Diminished lung sounds. BiPAP AVAPS 40%.   GI: NPO. No BM.   : Primofit in place w/ adequate UO.    Plan: MRI scheduled for later today. Continue to assess neuro status q2h. Monitor respiratory status, VS, and labs. Notify team of any changes. For vital signs and complete assessments, please see documentation flowsheets.

## 2023-07-07 NOTE — CONSULTS
Care Management Initial Consult    General Information  Assessment completed with: Melina Rosales  Type of CM/SW Visit: Initial Assessment    Primary Care Provider verified and updated as needed:  (pt dtr stated pt has PCP but doesn't know the name.)   Readmission within the last 30 days: no previous admission in last 30 days         Advance Care Planning: Advance Care Planning Reviewed: no concerns identified       Communication Assessment  Patient's communication style: spoken language (non-English)      Cognitive  Cognitive/Neuro/Behavioral: .WDL except  Level of Consciousness: sedated  Arousal Level: arouses to vigorous stimulation  Orientation: other (see comments) (UT A)  Mood/Behavior: other (see comments) (ADAM)  Best Language:  (ADAM)  Speech: ADAM (unable to assess)    Living Environment:   People in home: spouse, child(delfino), adult, child(delfino), dependent     Current living Arrangements: apartment      Able to return to prior arrangements:  (TBD)    Family/Social Support:  Care provided by: self  Provides care for: no one  Marital Status:   Wife, Children          Description of Support System: Supportive, Involved      Current Resources:   Patient receiving home care services: No     Community Resources: None  Equipment currently used at home:    Supplies currently used at home: None    Employment/Financial:  Employment Status: employed full-time        Financial Concerns: No concerns identified   Referral to Financial Worker: No     Lifestyle & Psychosocial Needs:  Social Determinants of Health     Tobacco Use: Low Risk  (7/5/2023)    Patient History      Smoking Tobacco Use: Never      Smokeless Tobacco Use: Never      Passive Exposure: Not on file   Alcohol Use: Not on file   Financial Resource Strain: Not on file   Food Insecurity: Not on file   Transportation Needs: Not on file   Physical Activity: Not on file   Stress: Not on file   Social Connections: Not on file   Intimate Partner Violence:  Not on file   Depression: Not on file   Housing Stability: Not on file     Functional Status:  Prior to admission patient needed assistance:   Dependent ADLs:: Independent  Dependent IADLs:: Independent    Mental Health Status:  Mental Health Status: No Current Concerns       Chemical Dependency Status:  Chemical Dependency Status: No Current Concerns       Additional Information:  RNCC visited pt, spouse and Childrens to introduce RNCC role and complete initial assessment.  Pt was not able to communicate and assessment completed with pt family.  Pt lives with spouse and Childrens.  Pt has 6 kids ( the oldest 27 and youngest 7).  Pt older dtrMelina is the main contact.  Pt was ind. with all cares, mobility, driving and working prior hospitalization.  Pt dtr stated pt has PCP but they don't know the name of the provider.  RNCC informed pt family that RNCC/SW will cont to follow the plan of care and assist with any care coordination assistance need.  Pt family agreed.       Arely Cheatham RN, PHN, BSN  4A and 4E/ ICU  Care Coordinator  Phone: [phone]  Pager: [phone]    To contact the weekend RNCC  [facility] [place] (0800 - 1630) Saturday and Sunday   Units: 4A, 4C, 4E, 5A and 5B- Pager [phone]   Units: 6A, 6B- Pager [phone]   Unit : 6C, 6D- pager [phone]   Units: 7A, 7B, 7C, 7D, and 5C- Pager [phone]

## 2023-07-07 NOTE — H&P
Neurocritical Care History & Physical    Reason for critical care admission: Bilateral Thalamic Strokes  Admitting Team: VISHAL  Date of Service:  07/06/2023  Date of Admission:  7/6/2023  Hospital Day: 1    Assessment/Plan  Michael Stewart is a 52 year old male w/ PMHx past methamphetamine abuse and tobacco use who presents on 7/6/2023 as a transfer from [facility] after the discovery of bilateral thalamic strokes as well as Strep A pharyngitis. No acute interventions were performed. However, upon presentation to [facility], he was noted to have increased somnolence, bilateral ophthalmoplegia w/ nystagmus, and atypical breathing raising concern for alternate etiologies beyond his known strokes    Neuro  #Restricted Diffusion of the Bilateral Thalamic Infarcts with additional involvement of the Posterior Limb of the Left Internal Capsule of unclear etiology  The patient initially presented to the ED on 7/5 with 3d of new-onset HA and body aches that was at the time attributed to Strep A pharyngitis as his CTH had returned unremarkable. However, on 7/6, he then developed difficulty ambulating with R hemibody weakness and diplopia with MRI Brain revealing restricted diffusion of the bilateral thalami (L>R) which were labeled as strokes. However, acute bilateral thalamic strokes is an atypical stroke pattern, linda in a patient who does not appear to be a florid vasculopath from his history and imaging and who had a negative CT Perfusion and MRV Head. Could consider an artery of Percheron in this case, but this would typically affect the pulvinar nuclei which is not seen on imaging. In addition, the shape of the infarcts appears abnormally circular and does not have a very strong DWI signal despite the timing of his symptoms suggesting an acute process. FLAIR signal also does not appear to be present, which would be seen infarcts > 6hrs old. Given these abnormalities and the patient's ongoing fever with the  patient's ocular findings, there is a concern for possible meningitis vs ADEM vs elevated ICP. As such, further work-up is needed  -Neurochecks every 2 hrs  -SBP goal < 200 mmHg and DBP < 110 mmHg   - PRN Hydralazine/Labetalol  -ASA 81mg daily   - May need UT ASA 300mg if fails AM swallow screen and stroke is still suspected  -Statin: Held pending lipid panel and further work-up  -IV Ceftriaxone 2g Q12h + IV Vancomycin + IV Ampicillin 2g Q4h + IV Acyclovir 800mg Q8h  -IV Thiamine Wernicke's Encephalopathy protocol  -MRI Brain w/ and w/o contrast  -Plan for LP, re-attempt bedside vs IR   - Opening Pressure   - Cells, Protein, Glucose, Cx, Meningitis/Encephalitis Panel, HSV 1/2  -TTE  -HgbA1c, Lipid Panel  -HOB > 30   -PT/OT/SLP    #Hx Methamphetamine Use  Family reports he is no longer using this substance    #Analgesics & sedation  RASS goal:  -Precedex gtt prn  -PO/UT Tylenol 650mg Q4h prn    CV  #HTN  -Cardiac monitoring  -SBP goal < 200 mmHg and DBP < 110 mmHg   - PRN Hydralazine/Labetalol    Resp  #Central Sleep Apnea vs Cheyne-Real Breathing  On arrival to [facility], patient was exhibiting an atypical breathing pattern even while awake akin to that seen in sleep apnea or potentially Cheyne-Real. VBG taken on arrival failed to exhibit CO2 retention but given recurrent apnea's and the patient's noted somnolence, placed on CPAP. Potentially could stem from thalamic lesions, though would also be concerned for brainstem dysfunction. Cheyne-Real is also seen in heart failure patients. Of note, per family history, patient also likely has had undiagnosed NELSON in the past, though never this prominent and never while awake  -Oxygen/vent: CPAP  -Continuous pulse ox  -Maintain O2 saturations greater than 92%  -Repeat VBG   -CXR  -TTE    #Hx Tobacco Use  Patient quite a number of years ago    GI  #MARIA T    Diet: NPO   Last BM: PTA  GI prophylaxis: IV Famotidine 20mg BID  Bowel regimen: PRN senna-docusate and  "Miralax    Renal/  #MARIA T  -Daily BMP  -IV fluids: Plasmalyte 125 ml/hr  -Electrolyte replacement protocol    Endo  #MARIA T  -Hgb A1c, TSH  -Monitor glucose levels    Heme  #MARIA T  -Daily CBC  -Hgb goal >7, plt goal >50k  -Transfuse to meet Hgb and plt goals    ID  #Strep A Pharyngitis  Patient's rapid Strep A screen returned positive on 7/5, after which he was treated with 1x dose of IV Penicillin G. However, the patient continues to fever during this admission. Though no leukocytosis was noted. Flu/RSV/COVID testing negative on adission  -Daily CBC  -Follow temperature curve  -Repeat Strep A screen, Resp Cx, UA, Blood Cx, CXR  -CSF Testing as above    ICU Checklist  Lines/tubes/drains: PIV x3  FEN: NPO; Plasmalyte 125ml/hr  PPX: DVT - SCDs, SQ Lovenox 40mg daily; GI - Famotidine.  Code: FULL  Dispo: ICU - NCC    Clinically Significant Risk Factors Present on Admission                       # Overweight: Estimated body mass index is 28.35 kg/m  as calculated from the following:    Height as of this encounter: 1.702 m (5' 7\").    Weight as of this encounter: 82.1 kg (181 lb).          The patient was seen and discussed with the NCC attending, Dr. Iqbal    History of Present Illness:  Michael Stewart is a 52 year old male w/ PMHx past methamphetamine abuse and tobacco use who presents on 7/6/2023 as a transfer from [facility] after the discovery of bilateral thalamic strokes as well as Strep A pharyngitis. No acute interventions were performed. History is taken from his family due to the patient's somnolence and dysarthria    The patient is typically quite a healthy person and is rarely seen by physicians. He was in his normal state of health until about 3d ago when he developed a severe R-sided HA. He does not typically have headaches and so this was abnormal for him. The HA failed to dissipate even with the use of OTC medications and was becoming more global with tenderness even to the touch. As such, he presented " himself to the ED on 7/5/2023. Work-up at that time included basic labs and a CTH. The CTH was unremarkable but his a throat swab was positive for Strep A. No leukocytosis was seen. The patient was diagnosed with Strep A pharyngitis and given a 1x dose of IV Penicillin G before being discharged home    However, the patient returned to the [facility] ED the next day (7/6) with continuation of his prior symptoms and now a new report of difficulty ambulating. Around midnight, the patient attempted to use the restroom, but required help from his family to walk to the bathroom. His family checked on him again in the morning and still felt his gait was off. He was also complaining of some weakness in his R arm/leg and diplopia. While in the ED, basic labs were again performed and revealed no leukocytosis or metabolic derangement. His UA, a repeat COVID screen, and UDS were also unremarkable. The patient also completed a repeat CTH as well as CTA Head/Neck and CTH Perfusion which returned normal. An MRI Brain w/o contrast was then performed, which revealed diffusion restriction in the bilateral thalami (L>R) w/ involvement of the L posterior limb of the internal capsule. Designated as strokes, the patient also completed an MRV Head (negative for thrombosis) and was transferred to [facility] for further cares and stroke work-up    On arrival to [facility], the patient was febrile (102.3) but hemodynamically stable though notably hypertensive (/135) on 2L NC. He appeared somnolent but was able to maintain wakefulness throughout the exam, only requiring a few repeat stimulations and answered questions/followed commands well. However, his breathing pattern was quite abnormal, with recurrent decrescendo to a brief apneic spell and then crescendo breathing concerning for sleep apnea or Cheyne-Real. This occurred even while the patient was awake and performing other aspects of the exam. His family members  "affirmed that while he does have an issue with snoring during sleep, this breathing is new today. The remainder of his exam was also notable for dysconjugate gaze and ophthalmoplegia with difficulty on bilateral abduction (R > L) and a possible CN4 palsy in the L eye alongside rotary nystagmus. The patient had a very subtle R lower facial droop, moderate dysarthria, subtle drift in the RUE/RLE    No Known Allergies    Current Medications:    [START ON 7/7/2023] aspirin  81 mg Oral or Feeding Tube Daily     enoxaparin ANTICOAGULANT  40 mg Subcutaneous Q24H     penicillin G potassium  3 Million Units Intravenous Q4H     sodium chloride (PF)  3 mL Intracatheter Q8H       PRN Medications:  acetaminophen, labetalol **OR** hydrALAZINE, lidocaine 4%, lidocaine (buffered or not buffered), - MEDICATION INSTRUCTIONS -, - MEDICATION INSTRUCTIONS -, ondansetron **OR** ondansetron, polyethylene glycol, senna-docusate, sodium chloride (PF), sodium chloride    Infusions:    - MEDICATION INSTRUCTIONS -       - MEDICATION INSTRUCTIONS -       sodium chloride         Physical Examination:  Vitals: BP (!) 163/113   Pulse 96   Temp (!) 102.3  F (39.1  C) (Axillary)   Resp 20   Ht 1.702 m (5' 7\")   Wt 82.1 kg (181 lb)   SpO2 96%   BMI 28.35 kg/m    General: Adult male patient, lying in bed, critically-ill  HEENT: Normocephalic/atraumatic, no icertus. Full ROM of neck w/o meningismus  Cardiac: Appears well-perfused  Pulm: Exhibits crescendo-decrescendo breathing with brief episodes of apnea even while awake. Overall breathing is also gutteral sounding with snorting from nose  Abdomen: Soft, non-tender. Obese  Extremities: Warm, well-perfused  Skin: No rash or lesion  Neuro:  Mental status: Somnolent but wakes easily to voice and light touch. Requires repeat stimulation to maintain wakefulness throughout exam. Oriented to name, place, and situation. Is able to follow basic directions without issue. Naming is intact. Does not appear " aphasic. No neglect noted  Cranial nerves: PERRL, dysconjugate gaze. At midline, patient's L eye is deviated slightly inward/upward. Concern for bilateral CN6 palsies (R>L) and possible L eye CN4 palsy. Rotary nystagmus noted. Difficulty with horizontal saccades. Facial sensation intact. Very subtle R lower facial droop. Hearing intact to conversation. Moderate dysarthria. Tongue is midline  Motor: Normal bulk and tone. No abnormal movements. Minor drift in RUE/RLE.   Sensory: Intact to light touch in all extremities w/o extinction  Coordination: FNF and HS intact bilaterally  Reflexes: Down-going toes bilaterally, no clonus  Gait: Deferred    NIHSS  Interval     1a. Level of Consciousness 1-->Not alert, but arousable by minor stimulation to obey, answer, or respond   1b. LOC Questions 0-->Answers both questions correctly   1c. LOC Commands 0-->Performs both tasks correctly   2.   Best Gaze 1-->Partial gaze palsy, gaze is abnormal in one or both eyes, but forced deviation or total gaze paresis is not present   3.   Visual 0-->No visual loss   4.   Facial Palsy 0-->Normal symmetrical movements   5a. Motor Arm, Left 0-->No drift, limb holds 90 (or 45) degrees for full 10 secs   5b. Motor Arm, Right 1-->Drift, limb holds 90 (or 45) degrees, but drifts down before full 10 secs, does not hit bed or other support   6a. Motor Leg, Left 0-->No drift, leg holds 30 degree position for full 5 secs   6b. Motor Leg, right 2-->Some effort against gravity, leg falls to bed by 5 secs, but has some effort against gravity   7.   Limb Ataxia 1-->Present in one limb   8.   Sensory 0-->Normal, no sensory loss   9.   Best Language 0-->No aphasia, normal   10. Dysarthria 0-->Normal   11. Extinction and Inattention  0-->No abnormality   Total 6 (07/06/23 1331)       Labs and Imaging:  All relevant imaging and laboratory values personally reviewed.

## 2023-07-07 NOTE — PROGRESS NOTES
Neurocritical Care Progress Note    Reason for critical care admission:   Admitting Team: Neurocritical care  Date of Service:  07/07/2023  Date of Admission:  7/6/2023  Hospital Day: 2    Assessment/Plan  Michael Stewart is a 52 year old male w/ PMHx past methamphetamine abuse and tobacco use who presents on 7/6/2023 as a transfer from [facility] after the discovery of bilateral thalamic strokes as well as Strep A pharyngitis. No acute interventions were performed. However, upon presentation to [facility], he was noted to have increased somnolence, bilateral ophthalmoplegia w/ nystagmus, and atypical breathing raising concern for alternate etiologies beyond stroke. Diagnostic possibilities include post-streptococcal acute demyelinating encephalomyelitis, other autoimmune processes, or viral meningitis.       Neuro  #Restricted Diffusion of the Bilateral Thalami with additional involvement of the Posterior Limb of the Left Internal Capsule of unclear etiology  The patient initially presented to the ED on 7/5 with 3d of new-onset HA and body aches that was at the time attributed to Strep A pharyngitis as his CTH had returned unremarkable. However, on 7/6, he then developed difficulty ambulating with R hemibody weakness and diplopia with MRI Brain revealing restricted diffusion of the bilateral thalami (L>R) which were labeled as strokes. However, acute bilateral thalamic strokes is an atypical stroke pattern, linda in a patient who does not appear to be a florid vasculopath from his history and imaging and who had a negative CT Perfusion and MRV Head. Could consider an artery of Percheron in this case, but this would typically affect the pulvinar nuclei which is not seen on imaging. In addition, the shape of the infarcts appears abnormally circular and does not have a very strong DWI signal despite the timing of his symptoms suggesting an acute process. FLAIR signal also does not appear to be present, which  would be seen infarcts > 6hrs old. Given these abnormalities and the patient's ongoing fever with the patient's ocular findings, there is a concern for possible meningitis vs ADEM vs elevated ICP. As such, further work-up is needed.   -Neurochecks every 2 hrs  -SBP goal < 200 mmHg and DBP < 110 mmHg             - PRN Hydralazine/Labetalol  -ASA 81mg daily via NG tube  -Rosuvastatin 20 mg started 7/7  -IV Ceftriaxone 2g Q12h + IV Acyclovir 800mg Q8h. Ampicillin and vancomycin removed from antibiotic regimen on 7/7 due to low clinical suspicion for bacterial infection.   -IV Thiamine Wernicke's Encephalopathy protocol  -MRI Brain w/ and w/o contrast. Patient intubated on 7/7 due to respiratory compromise and to facilitate MRI.   -LP conducted on 7/7             - Opening Pressure: 36 cm H2O             - 33 nucleated cells, protein of 57, glucose of 80, Meningitis/Encephalitis Panel negative, HSV 1/2 PCR negative  -TTE showed normal LVEF but presence of right-to-left shunt on bubble studfy.   -LDL elevated at 186  -HOB > 30   -PT/OT/SLP     #Hx Methamphetamine Use  Family reports he is no longer using this substance     #Analgesics & sedation  RASS goal:  -Precedex gtt prn  -PO/TN Tylenol 650mg Q4h prn     CV  #HTN  -Cardiac monitoring  -SBP goal < 200 mmHg and DBP < 110 mmHg             - PRN Hydralazine/Labetalol     Resp  #Acute hypoxic respiratory failure requiring mechanical ventilation   -Intubated and mechanically ventilated       #Hx Tobacco Use  Patient quite a number of years ago    GI  #MARIA T     Diet: NPO   Last BM: PTA  GI prophylaxis: IV Famotidine 20mg BID  Bowel regimen: PRN senna-docusate and Miralax     Renal/  #MARIA T  -Daily BMP  -IV fluids: Plasmalyte 125 ml/hr  -Electrolyte replacement protocol     Endo  #MARIA T  -Hgb A1c, TSH  -Monitor glucose levels     Heme  #MARIA T  -Daily CBC  -Hgb goal >7, plt goal >50k  -Transfuse to meet Hgb and plt goals     ID  #Strep A Pharyngitis  Patient's rapid Strep A  "screen returned positive on , after which he was treated with 1x dose of IV Penicillin G. However, the patient continues to fever during this admission. Though no leukocytosis was noted. Flu/RSV/COVID testing negative on adission  -Daily CBC  -Follow temperature curve  -Repeat Strep A screen, Resp Cx, UA, Blood Cx, CXR  -Covering Group A strep with ceftriaxone     ICU Checklist  Lines/tubes/drains: PIV x3  FEN: NPO; Plasmalyte 125ml/hr  PPX: DVT - SCDs, SQ Lovenox 40mg daily; GI - Famotidine.  Code: FULL  Dispo: ICU - NCC    Clinically Significant Risk Factors Present on Admission                    # Non-Invasive mechanical ventilation: current O2 Device: BiPAP/CPAP  # Acute hypoxic respiratory failure: continue supplemental O2 as needed     # Overweight: Estimated body mass index is 28.35 kg/m  as calculated from the following:    Height as of this encounter: 1.702 m (5' 7\").    Weight as of this encounter: 82.1 kg (181 lb).         # Coma: based on GCS score of <8  # Non-Invasive mechanical ventilation: current O2 Device: BiPAP/CPAP  # Acute hypoxic respiratory failure: continue supplemental O2 as needed       The patient was seen and discussed with the NCC attending, Dr. Iqbal.    Raymond Breen MD  PGY-2, Neurology  NCC Service     24 Hour Vital Signs Summary:  Temp: 100.1  F (37.8  C) Temp  Min: 99.5  F (37.5  C)  Max: 102.3  F (39.1  C)  Resp: 26 Resp  Min: 6  Max: 48  SpO2: 98 % SpO2  Min: 89 %  Max: 99 %  Pulse: 86 Pulse  Min: 81  Max: 110    No data recorded  BP: 97/65 Systolic (24hrs), Av , Min:90 , Max:190   Diastolic (24hrs), Av, Min:46, Max:137      Respiratory monitoring:   FiO2 (%): 40 %  Resp: 26      I/O last 3 completed shifts:  In: 1398.65 [I.V.:398.65; IV Piggyback:1000]  Out: 520 [Urine:520]    Current Medications:    sodium chloride 0.9%  1,000 mL Intravenous Once     acyclovir  10 mg/kg (Dosing Weight) Intravenous Q8H     [Held by provider] aspirin  81 mg Oral or Feeding Tube " "Daily     cefTRIAXone  2 g Intravenous Q12H     enoxaparin ANTICOAGULANT  40 mg Subcutaneous Q24H     famotidine  20 mg Intravenous Q12H     rosuvastatin  20 mg Oral or Feeding Tube Daily     sodium chloride (PF)  3 mL Intracatheter Q8H     thiamine  500 mg Intravenous TID    Followed by     [START ON 7/9/2023] thiamine  250 mg Intravenous Daily    Followed by     [START ON 7/14/2023] thiamine  100 mg Oral Daily       PRN Medications:  acetaminophen, labetalol **OR** hydrALAZINE, lidocaine 4%, lidocaine (buffered or not buffered), - MEDICATION INSTRUCTIONS -, - MEDICATION INSTRUCTIONS -, ondansetron **OR** ondansetron, polyethylene glycol, senna-docusate, sodium chloride (PF), sodium chloride    Infusions:    dexmedetomidine 0.1 mcg/kg/hr (07/07/23 1100)     - MEDICATION INSTRUCTIONS -       - MEDICATION INSTRUCTIONS -       Plasma-Lyte A 125 mL/hr at 07/07/23 0500     sodium chloride         No Known Allergies    Physical Examination:  Vitals: BP 97/65   Pulse 86   Temp 100.1  F (37.8  C) (Axillary)   Resp 26   Ht 1.702 m (5' 7\")   Wt 82.1 kg (181 lb)   SpO2 98%   BMI 28.35 kg/m    General: Adult male patient, lying in bed, critically-ill  HEENT: Normocephalic, atraumatic, no icterus, oral cavity/oropharynx pink and moist  Cardiac: RRR, s1/s2 auscultated without murmur  Pulm: CTAB, unlabored, expansion symmetric, no retractions or use of accessory muscles  Abdomen: Soft, non-distended, bowel sounds present  Extremities: Warm, no edema, distal pulses +2, well perfused  Skin: No rash or lesion  Psych: Calm and cooperative  Neuro:  Mental status: Patient not responsive to verbal or noxious stimuli.   Cranial nerves: Face symmetric. Patient's L eye is deviated slightly inward/upward. Concern for bilateral CN6 palsies (R>L) and possible L eye CN4 palsy. Pupils equal and reactive.    Motor: Normal bulk and tone. No abnormal movements. Does not follow commands.  Sensory: Winces in response to noxious stimuli. "   Coordination: Deferred.  Gait: ADAM, deferred.      Labs and Imaging:    All relevant imaging and laboratory values personally reviewed.

## 2023-07-07 NOTE — PROGRESS NOTES
Pt intubated for impending failure.  Pt evelyn intubation without problems.  Pt has 7.5 oral tube 23cm at the lips.  BBS clear and diminished t/o.  ABG and CXR pending.  Pt placed on CMV 18, Vt 500, FiO2 .5, PEEP +5.

## 2023-07-07 NOTE — PHARMACY-CONSULT NOTE
Pharmacy Consult to evaluate for medication related stroke core measures    Michael Stewart, 52 year old male admitted for ischemic stroke on 7/6/2023.    Thrombolytic was not given because of Time from onset contraindications    VTE Prophylaxis SCDs /PCDs placed on 7/7/23, as appropriate prior to end of hospital day 2.    Antithrombotic: aspirin started on 7/7/23, as appropriate by end of hospital day 2. Continue antithrombotic therapy on discharge to meet quality measures, unless contraindicated.    Anticoagulation if history of A-fib/flutter: Patient does not have history of A-fib/flutter - anticoagulation not required for medication related stroke core measures.     LDL Cholesterol Calculated   Date Value Ref Range Status   07/07/2023 186 (H) <=100 mg/dL Final       Patient currently receiving Crestor (rosuvastatin) continue statin on discharge to meet quality measures, unless contraindicated.    Recommendations: None at this time    Clyde Jaquez [facility] 7/7/2023 10:06 AM

## 2023-07-07 NOTE — ANESTHESIA PROCEDURE NOTES
Airway       Patient location during procedure: ICU       Procedure Start/Stop Times: 7/7/2023 3:20 PM and 7/7/2023 3:35 PM  Staff -        Anesthesiologist:  Faviola Martin MD       Resident/Fellow: Howard Chapa MD       Performed By: resident  Consent for Airway        Urgency: emergent       Consent: The procedure was performed in an emergent situation.  Report Obtained from Primary Care Team       History regarding most recent potassium obtained: Yes       History regarding presence/absence of renal failure obtained:Yes       History regarding stroke/CVA obtained:Yes       History regarding presence/absence of NM disorder: YesIndications and Patient Condition       Indications for airway management: airway protection, altered level of consciousness and respiratory insufficiency       Mallampati: Not Assessed     Induction type:RSI       Mask difficulty assessment: 0 - not attempted (Patient on BIPAP)    Final Airway Details       Final airway type: endotracheal airway       Successful airway: ETT - single  Endotracheal Airway Details        ETT size (mm): 7.5       Cuffed: yes       Successful intubation technique: video laryngoscopy       VL Blade Size: MAC 4       Grade View of Cords: 1       Adjucts: stylet       Position: Center       Measured from: gums/teeth       Secured at (cm): 23       Bite block used: None    Post intubation assessment        Placement verified by: capnometry and equal breath sounds        Number of attempts at approach: 1       Number of other approaches attempted: 0       Secured with: commercial tube pink       Ease of procedure: easy       Dentition: Intact and Unchanged    Medication(s) Administered   propofol (DIPRIVAN) injection 10 mg/mL vial - Intravenous   100 mg - 7/7/2023 3:20:00 PM  succinylcholine (ANECTINE) 20 mg/mL injection - Intravenous   100 mg - 7/7/2023 3:20:00 PM  Medication Administration Time: 7/7/2023 3:20 PM

## 2023-07-07 NOTE — PLAN OF CARE
Admitted/transferred from: [facility] [place] [facility]  Reason for admission/transfer: Further evaluation of stroke  2 RN skin assessment: completed by Cassie ALBRIGHT And Juaquin CAMPBELL  Result of skin assessment and interventions/actions: Small scab on upper back  Height, weight, drug calc weight: Done  Patient belongings (see Flowsheet)  MDRO education added to care planYes  ?

## 2023-07-07 NOTE — PLAN OF CARE
SLP: Order received for swallow evaluation per MD. Per discussion with RN/team, pt on continuous BiPAP and not consistently following commands. Will re-schedule swallow evaluation for 7/8 as appropriate.

## 2023-07-07 NOTE — PROCEDURES
[facility]    Lumbar puncture    Date/Time: 7/7/2023 9:00 AM    Performed by: Kavon Iqbal MD  Authorized by: Kavon Iqbal MD  Indications: evaluation for infection and evaluation for altered mental status  Preparation: Patient was prepped and draped in the usual sterile fashion.      UNIVERSAL PROTOCOL   Site Marked: Yes  Prior Images Obtained and Reviewed:  NA  Required items: Required blood products, implants, devices and special equipment available    Patient identity confirmed:  Arm band, provided demographic data and hospital-assigned identification number  Patient was reevaluated immediately before administering moderate or deep sedation or anesthesia  Confirmation Checklist:  Patient's identity using two indicators, relevant allergies, procedure was appropriate and matched the consent or emergent situation and correct equipment/implants were available  Time out: Immediately prior to the procedure a time out was called    Universal Protocol: the Joint Commission Universal Protocol was followed    Preparation: Patient was prepped and draped in usual sterile fashion    ESBL (mL):  1     ANESTHESIA    Anesthesia:  Local infiltration  Local Anesthetic:  Lidocaine 1% without epinephrine      SEDATION  Patient Sedated: Yes    Sedation Type:  Moderate (conscious) sedation  : dexmedtomidine.  Vital signs: Vital signs monitored during sedation      PROCEDURE DETAILS  Lumbar space: L3-L4 interspace  Patient's position: left lateral decubitus  Needle gauge: 20  Needle type: spinal needle - Quincke tip  Needle length: 3.5 in  Number of attempts: 2  Opening pressure: 36 cm H2O  Closing pressure: 17 cm H2O  Fluid appearance: blood-tinged then clearing  Tubes of fluid: 4  Total volume: 25 ml  Post-procedure: site cleaned and adhesive bandage applied      PROCEDURE    Patient Tolerance:  Patient tolerated the procedure well with no immediate  complications  Length of time physician/provider present for 1:1 monitoring during sedation: 20   The procedure was performed under conscious sedation for 20 minutes from 0600 to 0620 AM. Dexmedetomidine 0.5 mcg/min, Intravenous and Not applicable were used. Heart rate, blood pressure, respiration, oxygen saturation, and patient responses were monitored throughout the procedure by staff and remained stable throughout the procedure.   Comments: The procedure was performed under conscious sedation for 20 minutes from 0600 to 0620 AM. Dexmedetomidine 0.5 mcg/min, Intravenous and Not applicable were used. Heart rate, blood pressure, respiration, oxygen saturation, and patient responses were monitored throughout the procedure by staff and remained stable throughout the procedure.

## 2023-07-07 NOTE — PROCEDURES
[facility]    Arterial line placement    Date/Time: 7/7/2023 4:37 PM    Performed by: Gerson Stanley NP  Authorized by: Gerson Stanley NP      UNIVERSAL PROTOCOL   Site Marked: Yes  Prior Images Obtained and Reviewed:  Yes  Required items: Required blood products, implants, devices and special equipment available    Patient identity confirmed:  Arm band and hospital-assigned identification number  Patient was reevaluated immediately before administering moderate or deep sedation or anesthesia  Confirmation Checklist:  Patient's identity using two indicators and procedure was appropriate and matched the consent or emergent situation  Time out: Immediately prior to the procedure a time out was called    Universal Protocol: the Joint Commission Universal Protocol was followed    Preparation: Patient was prepped and draped in usual sterile fashion    ESBL (mL):  1  Indication: multiple ABGs hemodynamic monitoring  Location: right radial      SEDATION  Patient Sedated: Yes    Sedation:  Fentanyl, propofol and see MAR for details  Vital signs: Vital signs monitored during sedation      PROCEDURE DETAILS  Vlad's Test Normal?: Vlad's test not abnormal  Needle Gauge:  20  Seldinger technique: Seldinger technique used    Number of Attempts:  1  Post-procedure:  Line sutured and dressing applied  CMS: normal and unchanged    PROCEDURE  Describe Procedure: The procedure was performed under conscious sedation for 15 mins from 2051-3050. Fentanyl 50 mcg, Intravenous and propofol, Intravenous were used. Heart rate, blood pressure, respiration, oxygen saturation, and patient responses were monitored throughout the procedure by staff and remained stable throughout the procedure.     Patient Tolerance:  Patient tolerated the procedure well with no immediate complications  Length of time physician/provider present for 1:1 monitoring during sedation: 15   Emergent procedure  given hypotension

## 2023-07-08 ENCOUNTER — APPOINTMENT (OUTPATIENT)
Dept: MRI IMAGING | Facility: CLINIC | Age: 53
End: 2023-07-08
Attending: STUDENT IN AN ORGANIZED HEALTH CARE EDUCATION/TRAINING PROGRAM
Payer: COMMERCIAL

## 2023-07-08 ENCOUNTER — APPOINTMENT (OUTPATIENT)
Dept: CT IMAGING | Facility: CLINIC | Age: 53
End: 2023-07-08
Attending: STUDENT IN AN ORGANIZED HEALTH CARE EDUCATION/TRAINING PROGRAM
Payer: COMMERCIAL

## 2023-07-08 ENCOUNTER — APPOINTMENT (OUTPATIENT)
Dept: NEUROLOGY | Facility: CLINIC | Age: 53
End: 2023-07-08
Attending: STUDENT IN AN ORGANIZED HEALTH CARE EDUCATION/TRAINING PROGRAM
Payer: COMMERCIAL

## 2023-07-08 LAB
ANION GAP SERPL CALCULATED.3IONS-SCNC: 12 MMOL/L (ref 7–15)
ANION GAP SERPL CALCULATED.3IONS-SCNC: 14 MMOL/L (ref 7–15)
APPEARANCE CSF: CLEAR
BUN SERPL-MCNC: 25.7 MG/DL (ref 6–20)
BUN SERPL-MCNC: 26.6 MG/DL (ref 6–20)
CALCIUM SERPL-MCNC: 8.4 MG/DL (ref 8.6–10)
CALCIUM SERPL-MCNC: 8.5 MG/DL (ref 8.6–10)
CHLORIDE SERPL-SCNC: 108 MMOL/L (ref 98–107)
CHLORIDE SERPL-SCNC: 113 MMOL/L (ref 98–107)
COLOR CSF: COLORLESS
CREAT SERPL-MCNC: 2.2 MG/DL (ref 0.67–1.17)
CREAT SERPL-MCNC: 2.27 MG/DL (ref 0.67–1.17)
CREAT UR-MCNC: 60.1 MG/DL
CRYPTOC AG SPEC QL: NEGATIVE
DEPRECATED HCO3 PLAS-SCNC: 21 MMOL/L (ref 22–29)
DEPRECATED HCO3 PLAS-SCNC: 22 MMOL/L (ref 22–29)
ERYTHROCYTE [DISTWIDTH] IN BLOOD BY AUTOMATED COUNT: 12.8 % (ref 10–15)
FRACT EXCRET NA UR+SERPL-RTO: 1.5 %
GFR SERPL CREATININE-BSD FRML MDRD: 34 ML/MIN/1.73M2
GFR SERPL CREATININE-BSD FRML MDRD: 35 ML/MIN/1.73M2
GLUCOSE BLDC GLUCOMTR-MCNC: 103 MG/DL (ref 70–99)
GLUCOSE CSF-MCNC: 64 MG/DL (ref 40–70)
GLUCOSE SERPL-MCNC: 111 MG/DL (ref 70–99)
GLUCOSE SERPL-MCNC: 129 MG/DL (ref 70–99)
HCT VFR BLD AUTO: 42.5 % (ref 40–53)
HGB BLD-MCNC: 14.2 G/DL (ref 13.3–17.7)
LYMPH ABN NFR CSF MANUAL: 82 %
MAGNESIUM SERPL-MCNC: 2.7 MG/DL (ref 1.7–2.3)
MCH RBC QN AUTO: 31.1 PG (ref 26.5–33)
MCHC RBC AUTO-ENTMCNC: 33.4 G/DL (ref 31.5–36.5)
MCV RBC AUTO: 93 FL (ref 78–100)
MONOS+MACROS NFR CSF MANUAL: 18 %
NEUTROPHILS NFR CSF MANUAL: NORMAL %
OSMOLALITY SERPL: 307 MMOL/KG (ref 275–295)
OSMOLALITY UR: 396 MMOL/KG (ref 100–1200)
PATH INTERP SPEC-IMP: NORMAL
PHOSPHATE SERPL-MCNC: 4.2 MG/DL (ref 2.5–4.5)
PLATELET # BLD AUTO: 216 10E3/UL (ref 150–450)
POTASSIUM SERPL-SCNC: 3.8 MMOL/L (ref 3.4–5.3)
POTASSIUM SERPL-SCNC: 4 MMOL/L (ref 3.4–5.3)
PROT CSF-MCNC: 42.2 MG/DL (ref 15–45)
RBC # BLD AUTO: 4.57 10E6/UL (ref 4.4–5.9)
RBC # CSF MANUAL: 9 /UL (ref 0–2)
SODIUM SERPL-SCNC: 143 MMOL/L (ref 136–145)
SODIUM SERPL-SCNC: 147 MMOL/L (ref 136–145)
SODIUM UR-SCNC: 55 MMOL/L
SP GR UR STRIP: 1.02 (ref 1–1.03)
TUBE # CSF: 4
VDRL CSF QL: NON REACTIVE
WBC # BLD AUTO: 10.4 10E3/UL (ref 4–11)
WBC # CSF MANUAL: 63 /UL (ref 0–5)

## 2023-07-08 PROCEDURE — 81003 URINALYSIS AUTO W/O SCOPE: CPT | Performed by: NURSE PRACTITIONER

## 2023-07-08 PROCEDURE — 250N000011 HC RX IP 250 OP 636: Mod: JZ | Performed by: PSYCHIATRY & NEUROLOGY

## 2023-07-08 PROCEDURE — 999N000185 HC STATISTIC TRANSPORT TIME EA 15 MIN

## 2023-07-08 PROCEDURE — 255N000002 HC RX 255 OP 636: Mod: JZ | Performed by: PSYCHIATRY & NEUROLOGY

## 2023-07-08 PROCEDURE — 80048 BASIC METABOLIC PNL TOTAL CA: CPT | Performed by: NURSE PRACTITIONER

## 2023-07-08 PROCEDURE — 87798 DETECT AGENT NOS DNA AMP: CPT | Performed by: NURSE PRACTITIONER

## 2023-07-08 PROCEDURE — 70553 MRI BRAIN STEM W/O & W/DYE: CPT | Mod: 26 | Performed by: RADIOLOGY

## 2023-07-08 PROCEDURE — 95714 VEEG EA 12-26 HR UNMNTR: CPT

## 2023-07-08 PROCEDURE — 250N000011 HC RX IP 250 OP 636: Performed by: STUDENT IN AN ORGANIZED HEALTH CARE EDUCATION/TRAINING PROGRAM

## 2023-07-08 PROCEDURE — 82945 GLUCOSE OTHER FLUID: CPT

## 2023-07-08 PROCEDURE — 84300 ASSAY OF URINE SODIUM: CPT | Performed by: STUDENT IN AN ORGANIZED HEALTH CARE EDUCATION/TRAINING PROGRAM

## 2023-07-08 PROCEDURE — 999N000157 HC STATISTIC RCP TIME EA 10 MIN

## 2023-07-08 PROCEDURE — 250N000011 HC RX IP 250 OP 636

## 2023-07-08 PROCEDURE — A9585 GADOBUTROL INJECTION: HCPCS | Mod: JZ | Performed by: PSYCHIATRY & NEUROLOGY

## 2023-07-08 PROCEDURE — 85027 COMPLETE CBC AUTOMATED: CPT | Performed by: STUDENT IN AN ORGANIZED HEALTH CARE EDUCATION/TRAINING PROGRAM

## 2023-07-08 PROCEDURE — 70450 CT HEAD/BRAIN W/O DYE: CPT

## 2023-07-08 PROCEDURE — 88108 CYTOPATH CONCENTRATE TECH: CPT | Mod: TC

## 2023-07-08 PROCEDURE — 95720 EEG PHY/QHP EA INCR W/VEEG: CPT | Performed by: PSYCHIATRY & NEUROLOGY

## 2023-07-08 PROCEDURE — 999N000015 HC STATISTIC ARTERIAL MONITORING DAILY

## 2023-07-08 PROCEDURE — 80048 BASIC METABOLIC PNL TOTAL CA: CPT | Performed by: STUDENT IN AN ORGANIZED HEALTH CARE EDUCATION/TRAINING PROGRAM

## 2023-07-08 PROCEDURE — 86635 COCCIDIOIDES ANTIBODY: CPT | Performed by: NURSE PRACTITIONER

## 2023-07-08 PROCEDURE — 83916 OLIGOCLONAL BANDS: CPT

## 2023-07-08 PROCEDURE — 70553 MRI BRAIN STEM W/O & W/DYE: CPT

## 2023-07-08 PROCEDURE — 83930 ASSAY OF BLOOD OSMOLALITY: CPT | Performed by: NURSE PRACTITIONER

## 2023-07-08 PROCEDURE — 70450 CT HEAD/BRAIN W/O DYE: CPT | Mod: 26 | Performed by: RADIOLOGY

## 2023-07-08 PROCEDURE — 84999 UNLISTED CHEMISTRY PROCEDURE: CPT | Performed by: NURSE PRACTITIONER

## 2023-07-08 PROCEDURE — 87476 LYME DIS DNA AMP PROBE: CPT | Performed by: NURSE PRACTITIONER

## 2023-07-08 PROCEDURE — 87205 SMEAR GRAM STAIN: CPT

## 2023-07-08 PROCEDURE — 83935 ASSAY OF URINE OSMOLALITY: CPT | Performed by: NURSE PRACTITIONER

## 2023-07-08 PROCEDURE — 87015 SPECIMEN INFECT AGNT CONCNTJ: CPT

## 2023-07-08 PROCEDURE — 62270 DX LMBR SPI PNXR: CPT | Performed by: PSYCHIATRY & NEUROLOGY

## 2023-07-08 PROCEDURE — 250N000013 HC RX MED GY IP 250 OP 250 PS 637: Performed by: NURSE PRACTITIONER

## 2023-07-08 PROCEDURE — 86789 WEST NILE VIRUS ANTIBODY: CPT | Performed by: NURSE PRACTITIONER

## 2023-07-08 PROCEDURE — 99291 CRITICAL CARE FIRST HOUR: CPT | Mod: 25 | Performed by: PSYCHIATRY & NEUROLOGY

## 2023-07-08 PROCEDURE — 009U3ZX DRAINAGE OF SPINAL CANAL, PERCUTANEOUS APPROACH, DIAGNOSTIC: ICD-10-PCS | Performed by: PSYCHIATRY & NEUROLOGY

## 2023-07-08 PROCEDURE — 87205 SMEAR GRAM STAIN: CPT | Performed by: NURSE PRACTITIONER

## 2023-07-08 PROCEDURE — 87102 FUNGUS ISOLATION CULTURE: CPT | Performed by: NURSE PRACTITIONER

## 2023-07-08 PROCEDURE — 250N000009 HC RX 250: Performed by: STUDENT IN AN ORGANIZED HEALTH CARE EDUCATION/TRAINING PROGRAM

## 2023-07-08 PROCEDURE — 88108 CYTOPATH CONCENTRATE TECH: CPT | Mod: 26 | Performed by: PATHOLOGY

## 2023-07-08 PROCEDURE — 83735 ASSAY OF MAGNESIUM: CPT | Performed by: STUDENT IN AN ORGANIZED HEALTH CARE EDUCATION/TRAINING PROGRAM

## 2023-07-08 PROCEDURE — 89050 BODY FLUID CELL COUNT: CPT

## 2023-07-08 PROCEDURE — 86788 WEST NILE VIRUS AB IGM: CPT | Performed by: NURSE PRACTITIONER

## 2023-07-08 PROCEDURE — 94003 VENT MGMT INPAT SUBQ DAY: CPT

## 2023-07-08 PROCEDURE — 258N000003 HC RX IP 258 OP 636: Performed by: STUDENT IN AN ORGANIZED HEALTH CARE EDUCATION/TRAINING PROGRAM

## 2023-07-08 PROCEDURE — 86617 LYME DISEASE ANTIBODY: CPT | Performed by: NURSE PRACTITIONER

## 2023-07-08 PROCEDURE — 84157 ASSAY OF PROTEIN OTHER: CPT

## 2023-07-08 PROCEDURE — 200N000002 HC R&B ICU UMMC

## 2023-07-08 PROCEDURE — 84100 ASSAY OF PHOSPHORUS: CPT | Performed by: STUDENT IN AN ORGANIZED HEALTH CARE EDUCATION/TRAINING PROGRAM

## 2023-07-08 PROCEDURE — 250N000013 HC RX MED GY IP 250 OP 250 PS 637: Performed by: STUDENT IN AN ORGANIZED HEALTH CARE EDUCATION/TRAINING PROGRAM

## 2023-07-08 RX ORDER — GADOBUTROL 604.72 MG/ML
0.1 INJECTION INTRAVENOUS ONCE
Status: COMPLETED | OUTPATIENT
Start: 2023-07-08 | End: 2023-07-08

## 2023-07-08 RX ADMIN — GADOBUTROL 8.2 ML: 604.72 INJECTION INTRAVENOUS at 17:08

## 2023-07-08 RX ADMIN — LABETALOL HYDROCHLORIDE 10 MG: 5 INJECTION, SOLUTION INTRAVENOUS at 22:30

## 2023-07-08 RX ADMIN — SODIUM CHLORIDE, SODIUM GLUCONATE, SODIUM ACETATE, POTASSIUM CHLORIDE AND MAGNESIUM CHLORIDE: 526; 502; 368; 37; 30 INJECTION, SOLUTION INTRAVENOUS at 15:39

## 2023-07-08 RX ADMIN — HYDRALAZINE HYDROCHLORIDE 20 MG: 20 INJECTION INTRAMUSCULAR; INTRAVENOUS at 23:09

## 2023-07-08 RX ADMIN — SODIUM CHLORIDE, SODIUM GLUCONATE, SODIUM ACETATE, POTASSIUM CHLORIDE AND MAGNESIUM CHLORIDE: 526; 502; 368; 37; 30 INJECTION, SOLUTION INTRAVENOUS at 05:09

## 2023-07-08 RX ADMIN — ACYCLOVIR SODIUM 800 MG: 50 INJECTION, SOLUTION INTRAVENOUS at 01:36

## 2023-07-08 RX ADMIN — THIAMINE HYDROCHLORIDE 500 MG: 100 INJECTION, SOLUTION INTRAMUSCULAR; INTRAVENOUS at 07:41

## 2023-07-08 RX ADMIN — CEFTRIAXONE SODIUM 2 G: 2 INJECTION, POWDER, FOR SOLUTION INTRAMUSCULAR; INTRAVENOUS at 01:43

## 2023-07-08 RX ADMIN — THIAMINE HYDROCHLORIDE 500 MG: 100 INJECTION, SOLUTION INTRAMUSCULAR; INTRAVENOUS at 15:43

## 2023-07-08 RX ADMIN — SODIUM CHLORIDE, SODIUM GLUCONATE, SODIUM ACETATE, POTASSIUM CHLORIDE AND MAGNESIUM CHLORIDE: 526; 502; 368; 37; 30 INJECTION, SOLUTION INTRAVENOUS at 22:32

## 2023-07-08 RX ADMIN — LABETALOL HYDROCHLORIDE 20 MG: 5 INJECTION, SOLUTION INTRAVENOUS at 22:41

## 2023-07-08 RX ADMIN — ROSUVASTATIN CALCIUM 20 MG: 20 TABLET, FILM COATED ORAL at 07:41

## 2023-07-08 RX ADMIN — ACETAMINOPHEN 650 MG: 325 TABLET, FILM COATED ORAL at 13:28

## 2023-07-08 RX ADMIN — FAMOTIDINE 20 MG: 10 INJECTION, SOLUTION INTRAVENOUS at 20:57

## 2023-07-08 RX ADMIN — NICARDIPINE HYDROCHLORIDE 2.5 MG/HR: 0.2 INJECTION, SOLUTION INTRAVENOUS at 23:30

## 2023-07-08 ASSESSMENT — ACTIVITIES OF DAILY LIVING (ADL)
ADLS_ACUITY_SCORE: 43
ADLS_ACUITY_SCORE: 47
ADLS_ACUITY_SCORE: 43
ADLS_ACUITY_SCORE: 47
ADLS_ACUITY_SCORE: 43
ADLS_ACUITY_SCORE: 47
ADLS_ACUITY_SCORE: 43
ADLS_ACUITY_SCORE: 43
ADLS_ACUITY_SCORE: 47
ADLS_ACUITY_SCORE: 43

## 2023-07-08 NOTE — PLAN OF CARE
Shift Summary: EEG on. Prop turned off - pt does not open eyes, deviated down gaze, PERRL, UE flexing LE weakly flex/withdraw. Moving UE more spontaneously. CTb done to rule out hydro. NSR with SBP <200. ETT 23@ teeth CMV 18/500 p5 40%. Large amount of oral secretions moderate inline. Primafit with good UO. NGT clamped. R rad william. Plasmalyte @125.     Plan: MRI when able

## 2023-07-08 NOTE — PROGRESS NOTES
VEEG monitoring preliminary results:    VEEG has been running for over one hour.  EEG showed severe generalized slowing with superimposed alpha activities under sedation.  Findings are consistent with severe diffuse encephalopathy.  No epileptiform activities, no clinical or electrographic seizures were observed.    Lorie Cates MD  Neurology

## 2023-07-08 NOTE — PROCEDURES
[facility]    Lumbar puncture    Date/Time: 7/8/2023 2:00 PM    Performed by: Raymond Breen MD  Authorized by: Raymond Breen MD  Indications: evaluation for infection and evaluation for altered mental status  Preparation: Patient was prepped and draped in the usual sterile fashion.      UNIVERSAL PROTOCOL   Site Marked: Yes  Prior Images Obtained and Reviewed:  Yes  Required items: Required blood products, implants, devices and special equipment available    Patient identity confirmed:  Arm band  NA - No sedation, light sedation, or local anesthesia  Confirmation Checklist:  Relevant allergies, procedure was appropriate and matched the consent or emergent situation and correct equipment/implants were available  Universal Protocol: the Joint Commission Universal Protocol was followed    Preparation: Patient was prepped and draped in usual sterile fashion    ESBL (mL):  0    SEDATION    Patient Sedated: No      PROCEDURE DETAILS  Lumbar space: L4-L5 interspace  Patient's position: left lateral decubitus  Opening pressure: 16 cm H2O  Closing pressure: 13 cm H2O  Fluid appearance: clear  Tubes of fluid: 4  Total volume: 15 ml  Post-procedure: adhesive bandage applied      PROCEDURE    Patient Tolerance:  Patient tolerated the procedure well with no immediate complications  Length of time physician/provider present for 1:1 monitoring during sedation: 0      Lumbar puncture was supervised by Dr. Kavon Iqbal, who was present for all key portions of the procedure.     Signed,    Raymond Breen MD  PGY-2, Neurology

## 2023-07-08 NOTE — PLAN OF CARE
D/I: Pt only postures with extremities, pupils reactive. Sats stable on current vent settings. Tmax 101.2 ax, blood pressure within parameters. Lumbar puncture done in room. Pt just returned from head MRI. No BM. Mcdaniel placed this am with 1200cc of urine returned.   A: Pt neuro status not improving. Test done. Results pending.   P: Continue with neuro exams, and monitoring VS. Update MD with concerns.

## 2023-07-08 NOTE — CARE PLAN
Speech Language Therapy Discharge Summary    Reason for therapy discharge:    Change in medical status.    Progress towards therapy goal(s). See goals on Care Plan in [facility] electronic health record for goal details.  Goals not met.  Barriers to achieving goals:   Pt intubated before swallow evaluation could be completed.    Therapy recommendation(s):    Please re-consult s/p extubation as appropriate.

## 2023-07-08 NOTE — PROGRESS NOTES
Neurocritical Care Progress Note    Reason for critical care admission:   Admitting Team: Neurocritical care  Date of Service:  07/08/2023  Date of Admission:  7/6/2023  Hospital Day: 3    Assessment/Plan  Michael Stewart is a 52 year old male w/ PMHx past methamphetamine abuse and tobacco use who presents on 7/6/2023 as a transfer from [facility] after the discovery of bilateral thalamic strokes as well as Strep A pharyngitis. No acute interventions were performed. However, upon presentation [facility], he was noted to have increased somnolence, bilateral ophthalmoplegia w/ nystagmus, and atypical breathing raising concern for alternate etiologies beyond stroke. Diagnostic possibilities include post-streptococcal acute demyelinating encephalomyelitis, other autoimmune processes, or viral meningitis.       Neuro  #Restricted Diffusion of the Bilateral Thalami with additional involvement of the Posterior Limb of the Left Internal Capsule of unclear etiology  The patient initially presented to the ED on 7/5 with 3d of new-onset HA and body aches that was at the time attributed to Strep A pharyngitis as his CTH had returned unremarkable. However, on 7/6, he then developed difficulty ambulating with R hemibody weakness and diplopia with MRI Brain revealing restricted diffusion of the bilateral thalami (L>R) which were labeled as strokes. However, acute bilateral thalamic strokes is an atypical stroke pattern, linda in a patient who does not appear to be a florid vasculopath from his history and imaging and who had a negative CT Perfusion and MRV Head. Could consider an artery of Percheron in this case, but this would typically affect the pulvinar nuclei which is not seen on imaging. In addition, the shape of the infarcts appears abnormally circular and does not have a very strong DWI signal despite the timing of his symptoms suggesting an acute process. FLAIR signal also does not appear to be present, which  would be seen infarcts > 6hrs old. Given these abnormalities and the patient's ongoing fever with the patient's ocular findings, there is a concern for possible meningitis vs ADEM vs elevated ICP. -Neurochecks every 2 hrs  -SBP goal < 200 mmHg and DBP < 110 mmHg             - PRN Hydralazine/Labetalol  -ASA 81mg daily via NG tube  -Rosuvastatin 20 mg started 7/7  -Ceftriaxone and acyclovir stopped on 7/8, due to strep A pharyngitis being fully treated and negative HSV result.   -IV Thiamine Wernicke's Encephalopathy protocol  -MRI Brain w/ and w/o contrast. Patient intubated on 7/7 due to respiratory compromise and to facilitate MRI.   -LP conducted on 7/7             - Opening Pressure: 36 cm H2O             - 33 nucleated cells, protein of 57, glucose of 80, Meningitis/Encephalitis Panel negative, HSV 1/2 PCR negative  -Plan for repeat LP on 7//8.   -TTE showed normal LVEF but presence of right-to-left shunt on bubble study.   -LDL elevated at 186  -HOB > 30   -PT/OT/SLP     #Hx Methamphetamine Use  Family reports he is no longer using this substance     #Analgesics & sedation  RASS goal:  -Precedex gtt prn  -PO/IA Tylenol 650mg Q4h prn     CV  #HTN  -Cardiac monitoring  -SBP goal < 200 mmHg and DBP < 110 mmHg             - PRN Hydralazine/Labetalol     Resp  #Acute hypoxic respiratory failure requiring mechanical ventilation   -Intubated and mechanically ventilated       #Hx Tobacco Use  Patient quite a number of years ago    #Pulmonary nodules  -fungal sputum cultures ordered    GI  #MARIA T     Diet: NPO   Last BM: PTA  GI prophylaxis: IV Famotidine 20mg BID  Bowel regimen: PRN senna-docusate and Miralax     Renal/  #Acute kidney injury  Cr increased to 2.27 on 7/8. Unclear etiology. Possibly post-renal given significant amount of retained urine after Mcdaniel was placed on 7/8. FeNa 1/5%.   -Daily BMP  -IV fluids: Plasmalyte 125 ml/hr  -Electrolyte replacement protocol     Endo  #MARIA T  -Hgb A1c, TSH  -Monitor  "glucose levels     Heme  #MARIA T  -Daily CBC  -Hgb goal >7, plt goal >50k  -Transfuse to meet Hgb and plt goals     ID  #Strep A Pharyngitis  Patient's rapid Strep A screen returned positive on , after which he was treated with 1x dose of IV Penicillin G. However, the patient continues to fever during this admission. Though no leukocytosis was noted. Flu/RSV/COVID testing negative on adission  -Daily CBC  -Follow temperature curve  -Fully treated for Group A strep with penicillin G and ceftriaxone    ICU Checklist  Lines/tubes/drains: PIV x3  FEN: NPO; Plasmalyte 125ml/hr  PPX: DVT - SCDs, SQ Lovenox 40mg daily; GI - Famotidine.  Code: FULL  Dispo: ICU - NCC    Clinically Significant Risk Factors         # Hypernatremia: Highest Na = 147 mmol/L in last 2 days, will monitor as appropriate  # Hypocalcemia: Lowest Ca = 8.4 mg/dL in last 2 days, will monitor and replace as appropriate        # Acute Kidney Injury, unspecified: based on a >150% or 0.3 mg/dL increase in last creatinine compared to past 90 day average, will monitor renal function         # Overweight: Estimated body mass index is 28.35 kg/m  as calculated from the following:    Height as of this encounter: 1.702 m (5' 7\").    Weight as of this encounter: 82.1 kg (181 lb)., PRESENT ON ADMISSION        # Non-Invasive mechanical ventilation: current O2 Device: BiPAP/CPAP  # Acute hypoxic respiratory failure: continue supplemental O2 as needed       The patient was seen and discussed with the NCC attending, Dr. Iqbal.    Raymond Breen MD  PGY-2, Neurology  N[facility] Service     24 Hour Vital Signs Summary:  Temp: 99.6  F (37.6  C) Temp  Min: 99.6  F (37.6  C)  Max: 102.3  F (39.1  C)  Resp: 18 Resp  Min: 7  Max: 32  SpO2: 94 % SpO2  Min: 94 %  Max: 98 %  Pulse: 85 Pulse  Min: 79  Max: 107    No data recorded  BP: 133/77 Systolic (24hrs), Av , Min:126 , Max:145   Diastolic (24hrs), Av, Min:65, Max:83      Respiratory monitoring:   Vent Mode: CMV/AC  " "(Continuous Mandatory Ventilation/ Assist Control)  FiO2 (%): 30 %  Resp Rate (Set): 18 breaths/min  Tidal Volume (Set, mL): 500 mL  PEEP (cm H2O): 5 cmH2O  Resp: 18      I/O last 3 completed shifts:  In: 4299.56 [I.V.:4299.56]  Out: 1450 [Urine:1450]    Current Medications:    [Held by provider] aspirin  81 mg Oral or Feeding Tube Daily     [Held by provider] enoxaparin ANTICOAGULANT  40 mg Subcutaneous Q24H     famotidine  20 mg Intravenous Q24H     rosuvastatin  20 mg Oral or Feeding Tube Daily     sodium chloride (PF)  3 mL Intracatheter Q8H     thiamine  500 mg Intravenous TID    Followed by     [START ON 7/9/2023] thiamine  250 mg Intravenous Daily    Followed by     [START ON 7/14/2023] thiamine  100 mg Oral Daily       PRN Medications:  acetaminophen, acetaminophen, fentaNYL, labetalol **OR** hydrALAZINE, naloxone **OR** naloxone **OR** naloxone **OR** naloxone, ondansetron **OR** ondansetron, polyethylene glycol, senna-docusate, sodium chloride (PF), sodium chloride    Infusions:    Plasma-Lyte A 125 mL/hr at 07/08/23 0700     sodium chloride         No Known Allergies    Physical Examination:  Vitals: /77   Pulse 85   Temp 99.6  F (37.6  C) (Axillary)   Resp 18   Ht 1.702 m (5' 7\")   Wt 82.1 kg (181 lb)   SpO2 94%   BMI 28.35 kg/m    General: Adult male patient, lying in bed, critically-ill  HEENT: Normocephalic, atraumatic, no icterus, oral cavity/oropharynx pink and moist  Cardiac: RRR  Pulm: Intubated.   Abdomen: Soft, non-distended, bowel sounds present  Extremities: Warm, no edema, well perfused  Skin: No rash or lesion  Psych: Calm and cooperative  Neuro:  Mental status: Patient not responsive to verbal or noxious stimuli.   Cranial nerves: Face symmetric. Patient's L eye is deviated slightly inward/upward. Concern for bilateral CN6 palsies (R>L) and possible L eye CN4 palsy. Pupils equal and reactive.    Motor: Normal bulk and tone. No abnormal movements. Does not follow " commands.  Sensory: Winces in response to noxious stimuli.   Coordination: Deferred.  Gait: ADAM, deferred.      Labs and Imaging:    All relevant imaging and laboratory values personally reviewed.

## 2023-07-09 ENCOUNTER — APPOINTMENT (OUTPATIENT)
Dept: GENERAL RADIOLOGY | Facility: CLINIC | Age: 53
End: 2023-07-09
Attending: PSYCHIATRY & NEUROLOGY
Payer: COMMERCIAL

## 2023-07-09 ENCOUNTER — APPOINTMENT (OUTPATIENT)
Dept: NEUROLOGY | Facility: CLINIC | Age: 53
End: 2023-07-09
Attending: STUDENT IN AN ORGANIZED HEALTH CARE EDUCATION/TRAINING PROGRAM
Payer: COMMERCIAL

## 2023-07-09 LAB
ALLEN'S TEST: ABNORMAL
ANION GAP SERPL CALCULATED.3IONS-SCNC: 13 MMOL/L (ref 7–15)
BASE EXCESS BLDA CALC-SCNC: 1.5 MMOL/L (ref -9–1.8)
BASE EXCESS BLDA CALC-SCNC: 2 MMOL/L (ref -9–1.8)
BASE EXCESS BLDA CALC-SCNC: 2.4 MMOL/L (ref -9–1.8)
BUN SERPL-MCNC: 23.3 MG/DL (ref 6–20)
CALCIUM SERPL-MCNC: 8.4 MG/DL (ref 8.6–10)
CHLORIDE SERPL-SCNC: 115 MMOL/L (ref 98–107)
CREAT SERPL-MCNC: 1.68 MG/DL (ref 0.67–1.17)
DEPRECATED HCO3 PLAS-SCNC: 21 MMOL/L (ref 22–29)
ERYTHROCYTE [DISTWIDTH] IN BLOOD BY AUTOMATED COUNT: 13.1 % (ref 10–15)
GFR SERPL CREATININE-BSD FRML MDRD: 49 ML/MIN/1.73M2
GLUCOSE SERPL-MCNC: 117 MG/DL (ref 70–99)
HCO3 BLD-SCNC: 24 MMOL/L (ref 21–28)
HCT VFR BLD AUTO: 40.2 % (ref 40–53)
HGB BLD-MCNC: 13 G/DL (ref 13.3–17.7)
Lab: NORMAL
Lab: NORMAL
MAGNESIUM SERPL-MCNC: 2.9 MG/DL (ref 1.7–2.3)
MCH RBC QN AUTO: 30.1 PG (ref 26.5–33)
MCHC RBC AUTO-ENTMCNC: 32.3 G/DL (ref 31.5–36.5)
MCV RBC AUTO: 93 FL (ref 78–100)
O2/TOTAL GAS SETTING VFR VENT: 30 %
O2/TOTAL GAS SETTING VFR VENT: 40 %
O2/TOTAL GAS SETTING VFR VENT: 40 %
OXYHGB MFR BLD: 94 % (ref 92–100)
OXYHGB MFR BLD: 96 % (ref 92–100)
PCO2 BLD: 28 MM HG (ref 35–45)
PCO2 BLD: 28 MM HG (ref 35–45)
PCO2 BLD: 32 MM HG (ref 35–45)
PERFORMING LABORATORY: NORMAL
PERFORMING LABORATORY: NORMAL
PH BLD: 7.49 [PH] (ref 7.35–7.45)
PH BLD: 7.53 [PH] (ref 7.35–7.45)
PH BLD: 7.54 [PH] (ref 7.35–7.45)
PHOSPHATE SERPL-MCNC: 2.9 MG/DL (ref 2.5–4.5)
PLATELET # BLD AUTO: 219 10E3/UL (ref 150–450)
PO2 BLD: 71 MM HG (ref 80–105)
PO2 BLD: 91 MM HG (ref 80–105)
PO2 BLD: 99 MM HG (ref 80–105)
POTASSIUM SERPL-SCNC: 3.6 MMOL/L (ref 3.4–5.3)
RBC # BLD AUTO: 4.32 10E6/UL (ref 4.4–5.9)
SODIUM SERPL-SCNC: 149 MMOL/L (ref 136–145)
SPECIMEN STATUS: NORMAL
SPECIMEN STATUS: NORMAL
TEST NAME: NORMAL
TEST NAME: NORMAL
WBC # BLD AUTO: 8.5 10E3/UL (ref 4–11)

## 2023-07-09 PROCEDURE — 82805 BLOOD GASES W/O2 SATURATION: CPT | Performed by: NURSE PRACTITIONER

## 2023-07-09 PROCEDURE — 94003 VENT MGMT INPAT SUBQ DAY: CPT

## 2023-07-09 PROCEDURE — 84100 ASSAY OF PHOSPHORUS: CPT | Performed by: STUDENT IN AN ORGANIZED HEALTH CARE EDUCATION/TRAINING PROGRAM

## 2023-07-09 PROCEDURE — 82805 BLOOD GASES W/O2 SATURATION: CPT | Performed by: PSYCHIATRY & NEUROLOGY

## 2023-07-09 PROCEDURE — 71045 X-RAY EXAM CHEST 1 VIEW: CPT

## 2023-07-09 PROCEDURE — 200N000002 HC R&B ICU UMMC

## 2023-07-09 PROCEDURE — 82803 BLOOD GASES ANY COMBINATION: CPT

## 2023-07-09 PROCEDURE — 82310 ASSAY OF CALCIUM: CPT | Performed by: STUDENT IN AN ORGANIZED HEALTH CARE EDUCATION/TRAINING PROGRAM

## 2023-07-09 PROCEDURE — 99292 CRITICAL CARE ADDL 30 MIN: CPT | Mod: FS | Performed by: NURSE PRACTITIONER

## 2023-07-09 PROCEDURE — 95718 EEG PHYS/QHP 2-12 HR W/VEEG: CPT | Performed by: PSYCHIATRY & NEUROLOGY

## 2023-07-09 PROCEDURE — 250N000011 HC RX IP 250 OP 636: Performed by: PSYCHIATRY & NEUROLOGY

## 2023-07-09 PROCEDURE — 85027 COMPLETE CBC AUTOMATED: CPT | Performed by: STUDENT IN AN ORGANIZED HEALTH CARE EDUCATION/TRAINING PROGRAM

## 2023-07-09 PROCEDURE — 258N000003 HC RX IP 258 OP 636: Performed by: STUDENT IN AN ORGANIZED HEALTH CARE EDUCATION/TRAINING PROGRAM

## 2023-07-09 PROCEDURE — 83735 ASSAY OF MAGNESIUM: CPT | Performed by: STUDENT IN AN ORGANIZED HEALTH CARE EDUCATION/TRAINING PROGRAM

## 2023-07-09 PROCEDURE — 250N000013 HC RX MED GY IP 250 OP 250 PS 637: Performed by: NURSE PRACTITIONER

## 2023-07-09 PROCEDURE — 250N000013 HC RX MED GY IP 250 OP 250 PS 637: Performed by: PSYCHIATRY & NEUROLOGY

## 2023-07-09 PROCEDURE — 250N000013 HC RX MED GY IP 250 OP 250 PS 637: Performed by: STUDENT IN AN ORGANIZED HEALTH CARE EDUCATION/TRAINING PROGRAM

## 2023-07-09 PROCEDURE — 87389 HIV-1 AG W/HIV-1&-2 AB AG IA: CPT | Performed by: PSYCHIATRY & NEUROLOGY

## 2023-07-09 PROCEDURE — 71045 X-RAY EXAM CHEST 1 VIEW: CPT | Mod: 26 | Performed by: RADIOLOGY

## 2023-07-09 PROCEDURE — 95711 VEEG 2-12 HR UNMONITORED: CPT

## 2023-07-09 PROCEDURE — 99291 CRITICAL CARE FIRST HOUR: CPT | Mod: FS | Performed by: NURSE PRACTITIONER

## 2023-07-09 PROCEDURE — 250N000011 HC RX IP 250 OP 636: Performed by: STUDENT IN AN ORGANIZED HEALTH CARE EDUCATION/TRAINING PROGRAM

## 2023-07-09 PROCEDURE — 999N000157 HC STATISTIC RCP TIME EA 10 MIN

## 2023-07-09 PROCEDURE — 250N000009 HC RX 250: Performed by: STUDENT IN AN ORGANIZED HEALTH CARE EDUCATION/TRAINING PROGRAM

## 2023-07-09 RX ORDER — GUAIFENESIN 600 MG/1
15 TABLET, EXTENDED RELEASE ORAL DAILY
Status: DISCONTINUED | OUTPATIENT
Start: 2023-07-09 | End: 2023-07-26 | Stop reason: HOSPADM

## 2023-07-09 RX ORDER — FAMOTIDINE 20 MG/1
20 TABLET, FILM COATED ORAL 2 TIMES DAILY
Status: DISCONTINUED | OUTPATIENT
Start: 2023-07-09 | End: 2023-07-12

## 2023-07-09 RX ORDER — POTASSIUM CHLORIDE 1.5 G/1.58G
20 POWDER, FOR SOLUTION ORAL ONCE
Status: COMPLETED | OUTPATIENT
Start: 2023-07-09 | End: 2023-07-09

## 2023-07-09 RX ORDER — FENTANYL CITRATE 50 UG/ML
50-100 INJECTION, SOLUTION INTRAMUSCULAR; INTRAVENOUS EVERY 30 MIN PRN
Status: DISCONTINUED | OUTPATIENT
Start: 2023-07-09 | End: 2023-07-10

## 2023-07-09 RX ADMIN — ACETAMINOPHEN 650 MG: 325 TABLET, FILM COATED ORAL at 16:56

## 2023-07-09 RX ADMIN — ASPIRIN 81 MG CHEWABLE TABLET 81 MG: 81 TABLET CHEWABLE at 07:39

## 2023-07-09 RX ADMIN — ROSUVASTATIN CALCIUM 20 MG: 20 TABLET, FILM COATED ORAL at 07:39

## 2023-07-09 RX ADMIN — THIAMINE HYDROCHLORIDE 250 MG: 100 INJECTION, SOLUTION INTRAMUSCULAR; INTRAVENOUS at 07:48

## 2023-07-09 RX ADMIN — FENTANYL CITRATE 100 MCG: 50 INJECTION, SOLUTION INTRAMUSCULAR; INTRAVENOUS at 20:23

## 2023-07-09 RX ADMIN — SODIUM CHLORIDE, SODIUM GLUCONATE, SODIUM ACETATE, POTASSIUM CHLORIDE AND MAGNESIUM CHLORIDE: 526; 502; 368; 37; 30 INJECTION, SOLUTION INTRAVENOUS at 14:08

## 2023-07-09 RX ADMIN — ENOXAPARIN SODIUM 40 MG: 40 INJECTION SUBCUTANEOUS at 11:24

## 2023-07-09 RX ADMIN — SODIUM CHLORIDE, SODIUM GLUCONATE, SODIUM ACETATE, POTASSIUM CHLORIDE AND MAGNESIUM CHLORIDE: 526; 502; 368; 37; 30 INJECTION, SOLUTION INTRAVENOUS at 06:02

## 2023-07-09 RX ADMIN — ACETAMINOPHEN 650 MG: 325 TABLET, FILM COATED ORAL at 07:58

## 2023-07-09 RX ADMIN — FAMOTIDINE 20 MG: 20 TABLET, FILM COATED ORAL at 07:39

## 2023-07-09 RX ADMIN — FENTANYL CITRATE 100 MCG: 50 INJECTION, SOLUTION INTRAMUSCULAR; INTRAVENOUS at 11:57

## 2023-07-09 RX ADMIN — FAMOTIDINE 20 MG: 20 TABLET, FILM COATED ORAL at 20:29

## 2023-07-09 RX ADMIN — Medication 15 ML: at 11:24

## 2023-07-09 RX ADMIN — POTASSIUM CHLORIDE 20 MEQ: 1.5 POWDER, FOR SOLUTION ORAL at 05:53

## 2023-07-09 ASSESSMENT — ACTIVITIES OF DAILY LIVING (ADL)
ADLS_ACUITY_SCORE: 47

## 2023-07-09 NOTE — PROGRESS NOTES
Neurocritical Care Progress Note    Reason for critical care admission:   Admitting Team: Neurocritical care  Date of Service:  07/09/2023  Date of Admission:  7/6/2023  Hospital Day: 4    Assessment/Plan  Michael Stewart is a 52 year old male w/ PMHx past methamphetamine abuse and tobacco use who presents on 7/6/2023 as a transfer from [facility] after the discovery of bilateral thalamic strokes as well as Strep A pharyngitis. No acute interventions were performed. However, upon presentation to [facility], he was noted to have increased somnolence, bilateral ophthalmoplegia w/ nystagmus, and atypical breathing raising concern for alternate etiologies beyond stroke. Diagnostic possibilities include post-streptococcal acute demyelinating encephalomyelitis, other autoimmune processes, or viral meningitis.      Last 24hrs: hypertensive overnight     Neuro  #acute ischemic stroke of bilateral thalami with involvement of posterior limb and left internal capsule, unclear etiology   -Neurochecks every 2 hrs  -SBP goal < 200 mmHg and DBP < 110 mmHg  -ASA 81mg daily   -IV thiamine Wernicke's encephalopathy protocol  -MRI Brain w/ and w/o contrast performed 07/08, no additional enhancing areas   -LP performed 07/08, opening Pressure: 16 cm H2O, 63 nucleated cells, protein of 42, glucose of 64, additional labs ordered on CSF  -HOB > 30   -PT/OT/SLP  -vEEG, diffuse encephalopathy, severe generalized slowing > discontinue today      #Analgesics & sedation  RASS goal: 0 to -1   -tylenol as needed  -fentanyl as needed     CV  #hypertension  #hyperlipidemia  -Cardiac monitoring  -BP goal noted above   -PRN Hydralazine/Labetalol, can order clevidipine if needed  -Rosuvastatin 20 mg     Resp  #Acute hypoxic respiratory failure requiring mechanical ventilation   #Hx Tobacco Use  #Pulmonary nodules  Oxygen/vent: CMV 18/500/5/30%  -Continuous pulse ox  -Maintain O2 saturations greater than 92%  -fungal sputum cultures  ordered    GI  #MARIA T  Diet: TF working towards goal,  mL q 2 hrs   Last BM: PTA  GI prophylaxis: IV Famotidine 20mg BID  Bowel regimen: PRN senna-docusate and Miralax     Renal/  #Acute kidney injury  -Daily BMP  -IV fluids: Plasmalyte 125 mL/hr  -Electrolyte replacement protocols     Endo  #DM2  #overweight  -Hgb A1c 6.1  -Monitor glucose levels  -TSH 2.13     Heme  #relative anemia   -Daily CBC  -Hgb goal >7, plt goal >50k  -Transfuse to meet Hgb and plt goals     ID  #febrile TMax 38.4 C  -Daily CBC  -Follow temperature curve  -Fully treated for Group A strep with penicillin G and ceftriaxone  -add on HIV test    ICU Checklist  Lines/tubes/drains: PIVs, arterial line, ETT, OGT, urinary catheter  FEN: NPO; Plasmalyte 125 mL/hr  PPX: DVT - SCDs, Lovenox 40mg daily; GI - Famotidine  Code: FULL CODE  Dispo: ICU - NCC    TIME SPENT ON THIS ENCOUNTER   I spent 53 minutes of critical care time on the unit/floor managing the care of Michael Stewart excluding time performing procedures. Upon evaluation, this patient had a high probability of imminent or life-threatening deterioration due to acute ischemic stroke, acute hypoxic respiratory failure, which required my direct attention, intervention, and personal management. Greater than 50% of my time was spent at the bedside counseling the patient and/or coordinating care including chart review, history, exam, documentation, and further activities per this note. I have personally reviewed the following data/imaging over the past 24 hours.     The patient was seen and discussed with the NCC attending, Dr. Kavon Iqbal.    CORRINA Pool, CNP  Neurocritical Care  *[phone]  Text Page     24 Hour Vital Signs Summary:  Temp: 100.4  F (38  C) Temp  Min: 99.6  F (37.6  C)  Max: 101.2  F (38.4  C)  Resp: 17 Resp  Min: 12  Max: 29  SpO2: 97 % SpO2  Min: 93 %  Max: 100 %  Pulse: 90 Pulse  Min: 72  Max: 112    No data recorded    No data recorded.  No data  "recorded.    Respiratory monitoring:   Vent Mode: CMV/AC  (Continuous Mandatory Ventilation/ Assist Control)  FiO2 (%): (S) 40 %  Resp Rate (Set): 18 breaths/min  Tidal Volume (Set, mL): 500 mL  PEEP (cm H2O): 5 cmH2O  Resp: 17    I/O last 3 completed shifts:  In: 3111.66 [I.V.:3111.66]  Out: 5250 [Urine:5250]    Current Medications:    aspirin  81 mg Oral or Feeding Tube Daily     enoxaparin ANTICOAGULANT  40 mg Subcutaneous Q24H     famotidine  20 mg Oral or Feeding Tube BID     rosuvastatin  20 mg Oral or Feeding Tube Daily     sodium chloride (PF)  3 mL Intracatheter Q8H     thiamine  250 mg Intravenous Daily    Followed by     [START ON 7/14/2023] thiamine  100 mg Oral Daily     PRN Medications:  acetaminophen, acetaminophen, fentaNYL, labetalol **OR** hydrALAZINE, naloxone **OR** naloxone **OR** naloxone **OR** naloxone, ondansetron **OR** ondansetron, polyethylene glycol, senna-docusate, sodium chloride (PF), sodium chloride    Infusions:    niCARdipine Stopped (07/09/23 0123)     Plasma-Lyte A 125 mL/hr at 07/09/23 0602     sodium chloride       No Known Allergies    Physical Examination:  Vitals: /77   Pulse 90   Temp 100.4  F (38  C) (Axillary)   Resp 17   Ht 1.702 m (5' 7\")   Wt 82.1 kg (181 lb)   SpO2 97%   BMI 28.35 kg/m    General: Adult male patient, lying in bed, critically-ill  HEENT: Normocephalic, atraumatic, no icterus, oral cavity/oropharynx pink and moist  Cardiac: RRR per bedside monitor   Pulm: unlabored, expansion symmetric, no retractions or use of accessory muscles, synchronous with mechanical ventilator  Abdomen: Soft, non-distended, bowel sounds present  Extremities: Warm, no edema, well perfused  Skin: No rash or lesion  Psych: unable to assess  Neuro:  Mental status: largely unresponsive, unable to assess mentation or language   Cranial nerves: PERRL, face symmetric, tongue midline  Motor: Normal bulk and tone. No abnormal movements. Does not follow commands.  Sensory: " unresponsive to nox stim x 4  Coordination: deferred.  Gait: ADAM, deferred.    Labs and Imaging:    Results for orders placed or performed during the hospital encounter of 07/06/23 (from the past 24 hour(s))   Basic metabolic panel   Result Value Ref Range    Sodium 147 (H) 136 - 145 mmol/L    Potassium 3.8 3.4 - 5.3 mmol/L    Chloride 113 (H) 98 - 107 mmol/L    Carbon Dioxide (CO2) 22 22 - 29 mmol/L    Anion Gap 12 7 - 15 mmol/L    Urea Nitrogen 26.6 (H) 6.0 - 20.0 mg/dL    Creatinine 2.20 (H) 0.67 - 1.17 mg/dL    Calcium 8.4 (L) 8.6 - 10.0 mg/dL    Glucose 111 (H) 70 - 99 mg/dL    GFR Estimate 35 (L) >60 mL/min/1.73m2   Lumbar puncture    Narrative    Raymond Breen MD     7/8/2023  6:35 PM  [facility]    Lumbar puncture    Date/Time: 7/8/2023 2:00 PM    Performed by: Raymond Breen MD  Authorized by: Raymond Breen MD  Indications: evaluation for infection   and evaluation for altered mental status  Preparation: Patient was prepped and draped in the usual sterile fashion.      UNIVERSAL PROTOCOL   Site Marked: Yes  Prior Images Obtained and Reviewed:  Yes  Required items: Required blood products, implants, devices and special   equipment available    Patient identity confirmed:  Arm band  NA - No sedation, light sedation, or local anesthesia  Confirmation Checklist:  Relevant allergies, procedure was appropriate and   matched the consent or emergent situation and correct equipment/implants   were available  Universal Protocol: the Joint Commission Universal Protocol was followed    Preparation: Patient was prepped and draped in usual sterile fashion    ESBL (mL):  0    SEDATION    Patient Sedated: No      PROCEDURE DETAILS  Lumbar space: L4-L5 interspace  Patient's position: left lateral decubitus  Opening pressure: 16 cm H2O  Closing pressure: 13 cm H2O  Fluid appearance: clear  Tubes of fluid: 4  Total volume: 15 ml  Post-procedure: adhesive bandage  applied      PROCEDURE    Patient Tolerance:  Patient tolerated the procedure well with no immediate   complications  Length of time physician/provider present for 1:1 monitoring during   sedation: 0   Glucose by meter   Result Value Ref Range    GLUCOSE BY METER POCT 103 (H) 70 - 99 mg/dL   Osmolality   Result Value Ref Range    Osmolality Blood 307 (H) 275 - 295 mmol/kg    Narrative    Greater than 385 mmol/kg relates to stupor in hyperglycemia   Greater than 400 mmol/kg can relate to seizures   Greater than 420 mmol/kg can be lethal    Serum Osmalar Gap:   Normal <10   Larger suggest unmeasured substances present in serum (ethanol, methanol, isopropanol, mannitol, ethylene glycol).   Osmolality, random urine   Result Value Ref Range    Osmolality Urine 396 100 - 1,200 mmol/kg    Narrative    Reference Ranges depend on patient's hydration status and renal function.   Neonates:  mmol/kg   2 years and older, random specimens: 100-1200 mmol/kg; Greater than 850 mmol/kg after 12 hour fluid restriction  Urine/serum osmolality ratio: 2 years and older: 1.0-3.0; 3.0-4.7 after 12 hour fluid restriction   Specific gravity urine   Result Value Ref Range    Specific Gravity Urine 1.017 1.003 - 1.035   Glucose CSF:   Result Value Ref Range    Glucose CSF 64 40 - 70 mg/dL    Narrative    CSF glucose concentrations are about 60 percent of normal plasma glucose.   Protein total CSF:   Result Value Ref Range    Protein total CSF 42.2 15.0 - 45.0 mg/dL   Cerebrospinal fluid Aerobic Bacterial Culture Routine with Gram Stain    Specimen: Lumbar Puncture; Cerebrospinal fluid   Result Value Ref Range    Gram Stain Result No organisms seen     Gram Stain Result 3+ WBC seen     Narrative    Gram Stain quantification of host cells and microbiological organisms was done on a cytocentrifuged preparation.     CSF Cell Count with Differential:    Narrative    The following orders were created for panel order CSF Cell Count with  Differential:.  Procedure                               Abnormality         Status                     ---------                               -----------         ------                     Cell Count CSF[[id number]]               Abnormal            Final result               Differential CSF[[id number]]                                 Final result                 Please view results for these tests on the individual orders.   Oligoclonal banding    Narrative    The following orders were created for panel order Oligoclonal banding.  Procedure                               Abnormality         Status                     ---------                               -----------         ------                     Oligoclonal Banding:[[id number]]                             In process                 Serum Collection ([facility]...[[id number]]                      In process                   Please view results for these tests on the individual orders.   Cell Count CSF   Result Value Ref Range    Tube Number 4     Color Colorless Colorless    Clarity Clear Clear    Total Nucleated Cells 63 (H) 0 - 5 /uL    RBC Count 9 (H) 0 - 2 /uL   Differential CSF   Result Value Ref Range    % Neutrophils      % Lymphocytes 82 %    % Monocytes/Macrophages 18 %    Narrative    No reference ranges have been established. This result should be interpreted in the context of the patient's clinical condition and compared to simultaneous measurement in the patient's blood.   Respiratory Aerobic Bacterial Culture with Gram Stain    Specimen: Endotracheal; Sputum   Result Value Ref Range    Gram Stain Result >25 PMNs/low power field     Gram Stain Result No organisms seen    MR Brain w/o & w Contrast    Narrative    Brain MRI without and with contrast    History: Concern for meningitis.    Comparison: 7/6/2023    Technique: Axial FLAIR,  T1-weighted, and susceptibility images were  obtained without intravenous contrast. Following  intravenous  gadolinium-based contrast administration, axial T2-weighted,  diffusion, FLAIR, and axial and coronal T1-weighted images were  obtained.     Contrast: 8.2 cc gadavist    Findings:   Redemonstrated are areas of restricted diffusion and T2 hyperintensity  in the thalami bilaterally, left greater than right, unchanged from  prior. No new areas of restricted diffusion.    There is no mass effect, midline shift, or evidence of intracranial  hemorrhage.  The ventricles are not enlarged out of proportion to the  cerebral sulci. The gray-white matter differentiation of the cerebral  hemispheres is preserved. No areas of microhemorrhage on  susceptibility weighted imaging. No focal areas of increased T2 signal  suggests edema. Small arachnoid cyst anterior to the right temporal  lobe.     Postcontrast images demonstrate no abnormal intracranial parenchymal  or meningeal enhancement. Focal T2 signal along a gyrus in the left  temporal lobe does not demonstrate enhancement on postcontrast imaging  (series 6 image 9 and series 16 image 9). No enhancement is  appreciated in this area on subtraction imaging. Focal area of  apparent enhancement in the left temporal lobe on series 15 image 60  is a vessel within a sulci demonstrated on sagittal view.     The major vascular flow-voids appear patent. Orbits are unremarkable.  Mild mucosal thickening in the paranasal sinuses. Mastoid air cells  are clear..      Impression    Impression:  1. No definite evidence of meningitis.   2. Unchanged bilateral thalamic infarctions.    I have personally reviewed the examination and initial interpretation  and I agree with the findings.    TISHA MORALES MD         SYSTEM ID:  [id number]   CBC with platelets   Result Value Ref Range    WBC Count 8.5 4.0 - 11.0 10e3/uL    RBC Count 4.32 (L) 4.40 - 5.90 10e6/uL    Hemoglobin 13.0 (L) 13.3 - 17.7 g/dL    Hematocrit 40.2 40.0 - 53.0 %    MCV 93 78 - 100 fL    MCH 30.1 26.5 - 33.0 pg     MCHC 32.3 31.5 - 36.5 g/dL    RDW 13.1 10.0 - 15.0 %    Platelet Count 219 150 - 450 10e3/uL   Basic metabolic panel   Result Value Ref Range    Sodium 149 (H) 136 - 145 mmol/L    Potassium 3.6 3.4 - 5.3 mmol/L    Chloride 115 (H) 98 - 107 mmol/L    Carbon Dioxide (CO2) 21 (L) 22 - 29 mmol/L    Anion Gap 13 7 - 15 mmol/L    Urea Nitrogen 23.3 (H) 6.0 - 20.0 mg/dL    Creatinine 1.68 (H) 0.67 - 1.17 mg/dL    Calcium 8.4 (L) 8.6 - 10.0 mg/dL    Glucose 117 (H) 70 - 99 mg/dL    GFR Estimate 49 (L) >60 mL/min/1.73m2   Magnesium   Result Value Ref Range    Magnesium 2.9 (H) 1.7 - 2.3 mg/dL   Phosphorus   Result Value Ref Range    Phosphorus 2.9 2.5 - 4.5 mg/dL     All relevant imaging and laboratory values personally reviewed.

## 2023-07-09 NOTE — PLAN OF CARE
D/I: No change in neuro status, pupils reactive, not withdrawing (see flow sheets). Vent settings remain unchanged except rate was decreased to 12, sats 94% on 40% FiO2. Blood pressure and heart rate within parameters. Tmax 101.6 ax, tylenol given prn. Tube feeds started at 15cc/hr. Increase every 8hrs by 10cc/hr. Good UO, urine slightly pink.   A: No change in neuro status. Pt continues to have fevers of unknown origin.   P: Continue to monitor neuro status. Wait for CSF test to come back. Update MD with concerns.

## 2023-07-09 NOTE — PLAN OF CARE
Goal Outcome Evaluation:      Plan of Care Reviewed With:  (Pt busy with staff members when attempting to see.)    Overall Patient Progress:  (initial assessment)Overall Patient Progress:  (initial assessment)    Outcome Evaluation: Rec scheduled bowel regimen - Last stool was PTA.  Consider checking the following labs: Folic acid, niacin, pyridoxine (vit B6), thiamine (although already being supplemented), and methylmalonic acid  3. Monitor lytes (Phos, Mg++, and K+) for refeeding syndrome. Lytes WNL when checked 7/9. Urinary ketones were negative 7/6. If lytes trend low, aggressively replace. Ensure the lyte order set/s is/are implemented and select the option for high replacement (currently ordered). Do not advance TF unless K+ is = or > 3, Mg++ is = or > 1.5, and Phos is = or > 1.9.   Adjust free water flushes via feeding tube as needed, pending fluid status. Diet order per SLP when appropriate. Rec liberalized diet, as able, and use of oral supplement (such as Ensure Enlive) BID. Ordered TFs 7/9.

## 2023-07-09 NOTE — PROGRESS NOTES
"CLINICAL NUTRITION SERVICES - ASSESSMENT NOTE     Nutrition Prescription    RECOMMENDATIONS FOR MDs/PROVIDERS TO ORDER:  1. Scheduled bowel regimen. Last stool was PTA.  2. Consider checking the following labs: Folic acid, niacin, pyridoxine (vit B6), thiamine (although already being supplemented), and methylmalonic acid  3. Monitor lytes (Phos, Mg++, and K+) for refeeding syndrome. Lytes WNL when checked 7/9. Urinary ketones were negative 7/6. If lytes trend low, aggressively replace. Continue the lyte order set/s with option for high replacement (currently ordered). Do not advance TF unless K+ is = or > 3, Mg++ is = or > 1.5, and Phos is = or > 1.9.   4. Adjust free water flushes via feeding tube as needed, pending fluid status.     Malnutrition Status:    Unable to determine due to pt busy with staff members when attempting to see.     Recommendations already ordered by Registered Dietitian (RD):  TF regimen  Multivitamin/minerals  HOB 30 degrees     Future/Additional Recommendations:  1. Diet order per SLP when appropriate. Rec liberalized diet, as able, and use of oral supplement (such as Ensure Enlive) BID.     2. Monitor need to adjust IVFs  3. Monitor BG control. Hgb A1c of 6.1. BG was 117 (7/9).    4. Continue IV Thiamine Wernicke's Encephalopathy protocol  5. Monitor need to check a metabolic cart study.      REASON FOR ASSESSMENT  Michael Stewart is a/an 52 year old male assessed by the dietitian for Admission Nutrition Risk Screen pending and Provider Order - Registered Dietitian to Assess and Order TF per Medical Nutrition Therapy Protocol    NUTRITION/ADDITIONAL HISTORY  Pt not known to this service PTA.     Per H & P, \"PMHx past methamphetamine abuse and tobacco use who presents on 7/6/2023 as a transfer from [facility] after the discovery of bilateral thalamic strokes as well as Strep A pharyngitis. No acute interventions were performed. However, upon presentation to [facility], he was noted " "to have increased somnolence, bilateral ophthalmoplegia w/ nystagmus, and atypical breathing raising concern for alternate etiologies beyond his known strokes. Hx Methamphetamine Use. Family reports he is no longer using this substance. Pt was ind. with all cares, mobility, driving and working prior hospitalization.\"    Per care team member, \"Pt was ind. with all cares, mobility, driving and working prior hospitalization.\"      Unable to obtain nutrition hx. Pt busy with staff members when attempting to see.     CURRENT NUTRITION ORDERS  Diet: NPO since 7/6.      Nutrition Support:   -Feeding Tube Access: NGT placed by RN, AXR confirmation 7/7   -Free water flushes: Every 2 hrs, 100 mL  -No TFs ordered at this time    LABS  Labs reviewed    MEDICATIONS  Medications reviewed  Rx: Noting, on IV Thiamine Wernicke's Encephalopathy protocol. On Plasma-Lyte A and nicardipine gtt.     ANTHROPOMETRICS  Height: 170.2 cm (5' 7\")  Most Recent Weight: 82.1 kg (181 lb)    IBW: 67.3 kg   BMI: Overweight BMI 25-29.9  Weight History: 78.5 kg (1/29/18), 79.4 kg (7/6/23) - No significant/severe wt loss noted, but lack of wt history in EHR. Pt-reported wt of 79.5 kg 5/20/2019.   Dosing Weight: 79 kg (based on lowest wt so far this admission of 79.4 kg on 7/6)    ASSESSED NUTRITION NEEDS (for inpatient hospital stay)  Estimated Energy Needs: 6819-2973 kcals/day (25 - 30 kcals/kg)  Justification: Maintenance needs  Estimated Protein Needs:  grams protein/day (1.2 - 1.5 grams of pro/kg)  Justification: Increased needs pending renal function  Estimated Fluid Needs: 8768-8567 mL/day (25 - 30 mL/kg)   Justification: Maintenance    PHYSICAL FINDINGS/OTHER FINDINGS  See malnutrition section below.  Resp: Intubated 7/7. Pt 40% FiO2  GI: Having zero stools daily on average. Last Bowel Movement: PTA. Has a prn senna and miralax order.   Neuro: Per RN 7/7, \"Lethargic Ox4. Garbled speech\"  WOC: Not consulted at this time    MALNUTRITION  % " "Intake: Unable to assess. Pt busy with staff members when attempting to see.   % Weight Loss: None noted, but lack of complete wt hx.   Subcutaneous Fat Loss: Unable to assess. Pt busy with staff members when attempting to see.   Muscle Loss: Unable to assess. Pt busy with staff members when attempting to see. Per provider, \"Normal bulk and tone.\"  Fluid Accumulation/Edema: None noted  Malnutrition Diagnosis: Unable to determine due to pt busy with staff members when attempting to see.     NUTRITION DIAGNOSIS  Inadequate protein-energy intake related to NPO, intubated as evidenced by NPO x 3-4 days      INTERVENTIONS  Implementation  Nutrition Education: Will be provided if education needs arise.  Enteral Nutrition: TFs via NGT. Ordered Osmolite 1.5 (concentrated, maintenance TF formula, or equivalent) and ordered Prosource TF 20 modulars, 1 pkt daily. Initiate TFs at 15 mL/hr, advancing rate by 10 mL Q 8 hrs (or per provider discretion, pending hemodynamic stability) to goal rate of 60 mL/hr and Prosource TF 20 modulars, 1 pkt daily. Osmolite 1.5 Baldev (or equivalent) @ goal of  60ml/hr  (1440ml/day) + Prosource TF 20 modulars, 1 pkt daily, provides: 2240 kcals, 110 g PRO, 1097 ml free H20, 293 g CHO, and 0 g fiber daily.Ensure K+/Mg++/Phos labs are ordered daily until TFs advance to goal infusion to evaluate for sx of refeeding with nutrition received. If lytes trend low, aggressively replace lytes. Do not advance TF greater unless K+ is = or > 3, Mg++ is = or > 1.5, and Phos is = or > 1.9.  Feeding tube flush: Will continue with the previous order from provider  Multivitamin/mineral supplement therapy: Ordered multivitamin with minerals to help ensure micronutrient needs are met.   Ordered HOB 30 degrees.     Goals  Total avg nutritional intake to meet a minimum of 25 kcal/kg and 1.2 g PRO/kg daily (per dosing wt 79 kg).     Monitoring/Evaluation  Progress toward goals will be monitored and evaluated per protocol.  "      Nutrition will continue to follow.      Neuro ICU RD pager: [phone]     Yasmin Casarez, MS, RD, LD, CNSC   Saturday/Sunday Pgr: [phone]

## 2023-07-09 NOTE — PLAN OF CARE
Shift Summary: EEG on. does not open eyes, deviated down gaze, PERRL, UE flexing L>R, LE weakly flex/withdraw. NSR-ST with SBP goal <180, pt sbp started jumping to 200-230, was on cardene for a few hours after getting PRNs. ETT 23@ teeth CMV 18/500 p5 40%. Large amount of oral secretions, moderate tan thick inline. Mcdaniel with pink output. NGT clamped. R rad william. Plasmalyte @125.      Plan: serial neuro exams

## 2023-07-10 ENCOUNTER — APPOINTMENT (OUTPATIENT)
Dept: GENERAL RADIOLOGY | Facility: CLINIC | Age: 53
End: 2023-07-10
Attending: NURSE PRACTITIONER
Payer: COMMERCIAL

## 2023-07-10 LAB
ALBUMIN UR-MCNC: NEGATIVE MG/DL
ALLEN'S TEST: ABNORMAL
ANION GAP SERPL CALCULATED.3IONS-SCNC: 11 MMOL/L (ref 7–15)
APPEARANCE UR: CLEAR
BACTERIA SPT CULT: NORMAL
BASE EXCESS BLDA CALC-SCNC: 1.1 MMOL/L (ref -9–1.8)
BILIRUB UR QL STRIP: NEGATIVE
BUN SERPL-MCNC: 26.2 MG/DL (ref 6–20)
CALCIUM SERPL-MCNC: 8.3 MG/DL (ref 8.6–10)
CHLORIDE SERPL-SCNC: 117 MMOL/L (ref 98–107)
COLOR UR AUTO: ABNORMAL
CREAT SERPL-MCNC: 1.2 MG/DL (ref 0.67–1.17)
DEPRECATED HCO3 PLAS-SCNC: 21 MMOL/L (ref 22–29)
ERYTHROCYTE [DISTWIDTH] IN BLOOD BY AUTOMATED COUNT: 13.5 % (ref 10–15)
GFR SERPL CREATININE-BSD FRML MDRD: 73 ML/MIN/1.73M2
GLUCOSE SERPL-MCNC: 146 MG/DL (ref 70–99)
GLUCOSE UR STRIP-MCNC: NEGATIVE MG/DL
GRAM STAIN RESULT: NORMAL
GRAM STAIN RESULT: NORMAL
HCO3 BLD-SCNC: 25 MMOL/L (ref 21–28)
HCT VFR BLD AUTO: 37.8 % (ref 40–53)
HGB BLD-MCNC: 12 G/DL (ref 13.3–17.7)
HGB UR QL STRIP: ABNORMAL
HIV 1+2 AB+HIV1 P24 AG SERPL QL IA: NONREACTIVE
KETONES UR STRIP-MCNC: NEGATIVE MG/DL
LEUKOCYTE ESTERASE UR QL STRIP: ABNORMAL
MAGNESIUM SERPL-MCNC: 2.9 MG/DL (ref 1.7–2.3)
MCH RBC QN AUTO: 30.4 PG (ref 26.5–33)
MCHC RBC AUTO-ENTMCNC: 31.7 G/DL (ref 31.5–36.5)
MCV RBC AUTO: 96 FL (ref 78–100)
MUCOUS THREADS #/AREA URNS LPF: PRESENT /LPF
NITRATE UR QL: NEGATIVE
O2/TOTAL GAS SETTING VFR VENT: 40 %
PATH REPORT.COMMENTS IMP SPEC: ABNORMAL
PATH REPORT.COMMENTS IMP SPEC: ABNORMAL
PATH REPORT.COMMENTS IMP SPEC: YES
PATH REPORT.FINAL DX SPEC: ABNORMAL
PATH REPORT.GROSS SPEC: ABNORMAL
PATH REPORT.RELEVANT HX SPEC: ABNORMAL
PCO2 BLD: 34 MM HG (ref 35–45)
PH BLD: 7.46 [PH] (ref 7.35–7.45)
PH UR STRIP: 8 [PH] (ref 5–7)
PHOSPHATE SERPL-MCNC: 3 MG/DL (ref 2.5–4.5)
PLATELET # BLD AUTO: 210 10E3/UL (ref 150–450)
PO2 BLD: 116 MM HG (ref 80–105)
POTASSIUM SERPL-SCNC: 3.9 MMOL/L (ref 3.4–5.3)
RBC # BLD AUTO: 3.95 10E6/UL (ref 4.4–5.9)
RBC URINE: 75 /HPF
SODIUM SERPL-SCNC: 149 MMOL/L (ref 136–145)
SP GR UR STRIP: 1.02 (ref 1–1.03)
UROBILINOGEN UR STRIP-MCNC: NORMAL MG/DL
WBC # BLD AUTO: 8.8 10E3/UL (ref 4–11)
WBC URINE: 54 /HPF

## 2023-07-10 PROCEDURE — 999N000157 HC STATISTIC RCP TIME EA 10 MIN

## 2023-07-10 PROCEDURE — 83735 ASSAY OF MAGNESIUM: CPT | Performed by: STUDENT IN AN ORGANIZED HEALTH CARE EDUCATION/TRAINING PROGRAM

## 2023-07-10 PROCEDURE — 250N000013 HC RX MED GY IP 250 OP 250 PS 637: Performed by: PSYCHIATRY & NEUROLOGY

## 2023-07-10 PROCEDURE — 87086 URINE CULTURE/COLONY COUNT: CPT

## 2023-07-10 PROCEDURE — 82803 BLOOD GASES ANY COMBINATION: CPT

## 2023-07-10 PROCEDURE — 250N000011 HC RX IP 250 OP 636: Performed by: STUDENT IN AN ORGANIZED HEALTH CARE EDUCATION/TRAINING PROGRAM

## 2023-07-10 PROCEDURE — 250N000009 HC RX 250: Performed by: STUDENT IN AN ORGANIZED HEALTH CARE EDUCATION/TRAINING PROGRAM

## 2023-07-10 PROCEDURE — 85027 COMPLETE CBC AUTOMATED: CPT | Performed by: STUDENT IN AN ORGANIZED HEALTH CARE EDUCATION/TRAINING PROGRAM

## 2023-07-10 PROCEDURE — 80048 BASIC METABOLIC PNL TOTAL CA: CPT | Performed by: STUDENT IN AN ORGANIZED HEALTH CARE EDUCATION/TRAINING PROGRAM

## 2023-07-10 PROCEDURE — 200N000002 HC R&B ICU UMMC

## 2023-07-10 PROCEDURE — 87040 BLOOD CULTURE FOR BACTERIA: CPT | Performed by: PSYCHIATRY & NEUROLOGY

## 2023-07-10 PROCEDURE — 94003 VENT MGMT INPAT SUBQ DAY: CPT

## 2023-07-10 PROCEDURE — 250N000011 HC RX IP 250 OP 636: Performed by: NURSE PRACTITIONER

## 2023-07-10 PROCEDURE — 71045 X-RAY EXAM CHEST 1 VIEW: CPT

## 2023-07-10 PROCEDURE — 250N000013 HC RX MED GY IP 250 OP 250 PS 637: Performed by: STUDENT IN AN ORGANIZED HEALTH CARE EDUCATION/TRAINING PROGRAM

## 2023-07-10 PROCEDURE — 250N000013 HC RX MED GY IP 250 OP 250 PS 637: Performed by: NURSE PRACTITIONER

## 2023-07-10 PROCEDURE — 99291 CRITICAL CARE FIRST HOUR: CPT | Mod: GC | Performed by: PSYCHIATRY & NEUROLOGY

## 2023-07-10 PROCEDURE — 250N000011 HC RX IP 250 OP 636: Performed by: PSYCHIATRY & NEUROLOGY

## 2023-07-10 PROCEDURE — 71045 X-RAY EXAM CHEST 1 VIEW: CPT | Mod: 26 | Performed by: RADIOLOGY

## 2023-07-10 PROCEDURE — 84100 ASSAY OF PHOSPHORUS: CPT | Performed by: STUDENT IN AN ORGANIZED HEALTH CARE EDUCATION/TRAINING PROGRAM

## 2023-07-10 PROCEDURE — 81001 URINALYSIS AUTO W/SCOPE: CPT

## 2023-07-10 PROCEDURE — 258N000003 HC RX IP 258 OP 636: Performed by: STUDENT IN AN ORGANIZED HEALTH CARE EDUCATION/TRAINING PROGRAM

## 2023-07-10 PROCEDURE — 36415 COLL VENOUS BLD VENIPUNCTURE: CPT | Performed by: PSYCHIATRY & NEUROLOGY

## 2023-07-10 RX ORDER — FENTANYL CITRATE 50 UG/ML
50-100 INJECTION, SOLUTION INTRAMUSCULAR; INTRAVENOUS EVERY 30 MIN PRN
Status: DISCONTINUED | OUTPATIENT
Start: 2023-07-10 | End: 2023-07-10

## 2023-07-10 RX ORDER — AMOXICILLIN 250 MG
1-2 CAPSULE ORAL 2 TIMES DAILY
Status: DISCONTINUED | OUTPATIENT
Start: 2023-07-10 | End: 2023-07-12

## 2023-07-10 RX ORDER — DOXAZOSIN 1 MG/1
1 TABLET ORAL EVERY EVENING
Status: DISCONTINUED | OUTPATIENT
Start: 2023-07-10 | End: 2023-07-12

## 2023-07-10 RX ORDER — IBUPROFEN 200 MG
200 TABLET ORAL EVERY 8 HOURS PRN
Status: ON HOLD | COMMUNITY
End: 2023-07-26

## 2023-07-10 RX ORDER — POLYETHYLENE GLYCOL 3350 17 G/17G
17 POWDER, FOR SOLUTION ORAL DAILY
Status: DISCONTINUED | OUTPATIENT
Start: 2023-07-10 | End: 2023-07-12

## 2023-07-10 RX ORDER — ACETAMINOPHEN 325 MG/1
325-650 TABLET ORAL EVERY 8 HOURS PRN
COMMUNITY
End: 2024-01-11

## 2023-07-10 RX ADMIN — HYDRALAZINE HYDROCHLORIDE 20 MG: 20 INJECTION INTRAMUSCULAR; INTRAVENOUS at 08:31

## 2023-07-10 RX ADMIN — ACETAMINOPHEN 650 MG: 325 TABLET, FILM COATED ORAL at 08:10

## 2023-07-10 RX ADMIN — ACETAMINOPHEN 650 MG: 325 TABLET, FILM COATED ORAL at 18:27

## 2023-07-10 RX ADMIN — FENTANYL CITRATE 100 MCG: 50 INJECTION, SOLUTION INTRAMUSCULAR; INTRAVENOUS at 00:00

## 2023-07-10 RX ADMIN — DOXAZOSIN 1 MG: 1 TABLET ORAL at 18:28

## 2023-07-10 RX ADMIN — FENTANYL CITRATE 100 MCG: 50 INJECTION, SOLUTION INTRAMUSCULAR; INTRAVENOUS at 02:02

## 2023-07-10 RX ADMIN — Medication 25 MCG/HR: at 11:06

## 2023-07-10 RX ADMIN — Medication 15 ML: at 08:08

## 2023-07-10 RX ADMIN — SENNOSIDES AND DOCUSATE SODIUM 2 TABLET: 50; 8.6 TABLET ORAL at 11:06

## 2023-07-10 RX ADMIN — SODIUM CHLORIDE, SODIUM GLUCONATE, SODIUM ACETATE, POTASSIUM CHLORIDE AND MAGNESIUM CHLORIDE: 526; 502; 368; 37; 30 INJECTION, SOLUTION INTRAVENOUS at 06:15

## 2023-07-10 RX ADMIN — ROSUVASTATIN CALCIUM 20 MG: 20 TABLET, FILM COATED ORAL at 08:10

## 2023-07-10 RX ADMIN — FAMOTIDINE 20 MG: 20 TABLET, FILM COATED ORAL at 08:09

## 2023-07-10 RX ADMIN — SENNOSIDES AND DOCUSATE SODIUM 2 TABLET: 50; 8.6 TABLET ORAL at 19:57

## 2023-07-10 RX ADMIN — LABETALOL HYDROCHLORIDE 10 MG: 5 INJECTION, SOLUTION INTRAVENOUS at 06:50

## 2023-07-10 RX ADMIN — THIAMINE HYDROCHLORIDE 250 MG: 100 INJECTION, SOLUTION INTRAMUSCULAR; INTRAVENOUS at 08:35

## 2023-07-10 RX ADMIN — POLYETHYLENE GLYCOL 3350 17 G: 17 POWDER, FOR SOLUTION ORAL at 11:06

## 2023-07-10 RX ADMIN — ASPIRIN 81 MG CHEWABLE TABLET 81 MG: 81 TABLET CHEWABLE at 08:10

## 2023-07-10 RX ADMIN — FAMOTIDINE 20 MG: 20 TABLET, FILM COATED ORAL at 19:56

## 2023-07-10 RX ADMIN — FENTANYL CITRATE 100 MCG: 50 INJECTION, SOLUTION INTRAMUSCULAR; INTRAVENOUS at 08:35

## 2023-07-10 RX ADMIN — ENOXAPARIN SODIUM 40 MG: 40 INJECTION SUBCUTANEOUS at 10:01

## 2023-07-10 RX ADMIN — ACETAMINOPHEN 650 MG: 325 TABLET, FILM COATED ORAL at 13:36

## 2023-07-10 ASSESSMENT — ACTIVITIES OF DAILY LIVING (ADL)
ADLS_ACUITY_SCORE: 47

## 2023-07-10 NOTE — PHARMACY-ADMISSION MEDICATION HISTORY
Pharmacy Intern Admission Medication History    Admission medication history is complete. The information provided in this note is only as accurate as the sources available at the time of the update.    Medication reconciliation/reorder completed by provider prior to medication history? Yes    Information Source(s): Family member and CareEverywhere/SureScripts via phone    Pertinent Information: Spoke with patient's daughter who was knowledgeable about the patient's medications. Updated preferred pharmacy ([facility]).    Changes made to PTA medication list:    Added: per patient's daughter  o Acetaminophen  o Ibuprofen    Deleted: None    Changed: per patient's daughter  o Naproxen: added frequency      Allergies reviewed with patient and updates made in EHR: yes    Medication History Completed By: Nilsa Rivera 7/10/2023 5:00 PM    Prior to Admission medications    Medication Sig Last Dose Taking? Auth Provider Long Term End Date   acetaminophen (TYLENOL) 325 MG tablet Take 325-650 mg by mouth every 8 hours as needed for mild pain 7/5/2023 at PM Yes Unknown, Entered By History     ibuprofen (ADVIL/MOTRIN) 200 MG tablet Take 200 mg by mouth every 8 hours as needed for pain 7/5/2023 at PM Yes Unknown, Entered By History     Naproxen Sodium (FLANAX PAIN RELIEF PO) Take 550 mg by mouth 3 times daily as needed (pain) Past Month Yes Reported, Patient

## 2023-07-10 NOTE — PROGRESS NOTES
Neurocritical Care Progress Note    Reason for critical care admission:   Admitting Team: Neurocritical care  Date of Service:  07/10/2023  Date of Admission:  7/6/2023  Hospital Day: 5    Assessment/Plan  Michael Stewart is a 52 year old male w/ PMHx past methamphetamine abuse and tobacco use who presents on 7/6/2023 as a transfer from [facility] after the discovery of bilateral thalamic strokes as well as Strep A pharyngitis. No acute interventions were performed. However, upon presentation to [facility], he was noted to have increased somnolence, bilateral ophthalmoplegia w/ nystagmus, and atypical breathing raising concern for alternate etiologies beyond stroke. Diagnostic possibilities include post-streptococcal acute demyelinating encephalomyelitis, other autoimmune processes, or viral meningitis.       Last 24hrs: Patient was febrile overnight and had cultures collected. Equivocal UA.     Neuro  #acute ischemic stroke of bilateral thalami with involvement of posterior limb and left internal capsule, unclear etiology   -Neurochecks every 2 hrs; Assess pupils Q4h.  -SBP goal < 200 mmHg and DBP < 110 mmHg  -ASA 81mg daily   -IV thiamine Wernicke's encephalopathy protocol  -MRI Brain w/ and w/o contrast performed 07/08, no additional enhancing areas   -LP performed 07/08, opening Pressure: 16 cm H2O, 63 nucleated cells, protein of 42, glucose of 64, additional labs ordered on CSF   -Cytology abnormal for LP on 7/8. Will consider follow-up studies.   -HOB > 30   -PT/OT/SLP  -vEEG, diffuse encephalopathy, severe generalized slowing; now discontinued      #Analgesics & sedation  RASS goal: 0 to -1   -tylenol as needed  -fentanyl as needed  -Added Fentanyl infusion w/boluses on 7/10     CV  #hypertension  #hyperlipidemia  -Cardiac monitoring  -BP goal noted above   -PRN Hydralazine/Labetalol, can order clevidipine if needed  -Rosuvastatin 20 mg     Resp  #Acute hypoxic respiratory failure requiring mechanical  ventilation   #Hx Tobacco Use  #Pulmonary nodules  Oxygen/vent: CMV 18/500/5/30%  -Continuous pulse ox  -Maintain O2 saturations greater than 92%  -fungal sputum cultures ordered  -CXR on 7/10 revealed improved consolidation in LL lobe    GI  #MARIA T  Diet: TF working towards goal,  mL q 2 hrs   Last BM: PTA  GI prophylaxis: IV Famotidine 20mg BID  Bowel regimen: PRN senna-docusate and Miralax     Renal/  #Acute kidney injury  -Daily BMP  -IV fluids: Plasmalyte discontinued on 7/10.  -Electrolyte replacement protocols  -Increase  Q1h.  -Doxazosin 2 mg daily.      Endo  #DM2  #overweight  -Hgb A1c 6.1  -Monitor glucose levels  -TSH 2.13     Heme  #relative anemia   -Daily CBC  -Hgb goal >7, plt goal >50k  -Transfuse to meet Hgb and plt goals     ID  #febrile TMax 38.4 C  -Daily CBC  -Follow temperature curve  -Fully treated for Group A strep with penicillin G and ceftriaxone  -add on HIV test    ICU Checklist  Lines/tubes/drains: PIVs, arterial line, ETT, OGT, urinary catheter  FEN: NPO; Plasmalyte 125 mL/hr  PPX: DVT - SCDs, Lovenox 40mg daily; GI - Famotidine  Code: FULL CODE  Dispo: ICU - NCC    The patient was seen and discussed with the NCC attending, Dr. Kavon Iqbal.    Raymond Breen MD  PGY-2, Neurology  Neurocritical Care  *[id number]     24 Hour Vital Signs Summary:  Temp: 99.3  F (37.4  C) Temp  Min: 99.2  F (37.3  C)  Max: 101.6  F (38.7  C)  Resp: 15 Resp  Min: 13  Max: 23  SpO2: 96 % SpO2  Min: 90 %  Max: 100 %  Pulse: 73 Pulse  Min: 68  Max: 92    No data recorded    No data recorded.  No data recorded.    Respiratory monitoring:   Vent Mode: CMV/AC  (Continuous Mandatory Ventilation/ Assist Control)  FiO2 (%): 30 %  Resp Rate (Set): 12 breaths/min  Tidal Volume (Set, mL): 500 mL  PEEP (cm H2O): 5 cmH2O  Resp: 15    I/O last 3 completed shifts:  In: 3930 [I.V.:3000; NG/GT:610]  Out: 2250 [Urine:2250]    Current Medications:    aspirin  81 mg Oral or Feeding Tube Daily     enoxaparin  "ANTICOAGULANT  40 mg Subcutaneous Q24H     famotidine  20 mg Oral or Feeding Tube BID     multivitamins w/minerals  15 mL Per Feeding Tube Daily     protein modular  1 packet Per Feeding Tube Daily     rosuvastatin  20 mg Oral or Feeding Tube Daily     sodium chloride (PF)  3 mL Intracatheter Q8H     thiamine  250 mg Intravenous Daily    Followed by     [START ON 7/14/2023] thiamine  100 mg Oral Daily     PRN Medications:  acetaminophen, acetaminophen, fentaNYL, labetalol **OR** hydrALAZINE, naloxone **OR** naloxone **OR** naloxone **OR** naloxone, ondansetron **OR** ondansetron, polyethylene glycol, senna-docusate, sodium chloride (PF), sodium chloride    Infusions:    Plasma-Lyte A 125 mL/hr at 07/10/23 0900     sodium chloride       No Known Allergies    Physical Examination:  Vitals: /77   Pulse 73   Temp 99.3  F (37.4  C) (Axillary)   Resp 15   Ht 1.702 m (5' 7\")   Wt 82.1 kg (181 lb)   SpO2 96%   BMI 28.35 kg/m    General: Adult male patient, lying in bed, critically-ill  HEENT: Normocephalic, atraumatic, no icterus, oral cavity/oropharynx pink and moist  Cardiac: RRR per bedside monitor   Pulm: unlabored, expansion symmetric, no retractions or use of accessory muscles, synchronous with mechanical ventilator  Abdomen: Soft, non-distended, bowel sounds present  Extremities: Warm, no edema, well perfused  Skin: No rash or lesion  Psych: unable to assess  Neuro:  Mental status: largely unresponsive, unable to assess mentation or language   Cranial nerves: PERRL, face symmetric, tongue midline  Motor: Normal bulk and tone. No abnormal movements. Does not follow commands.  Sensory: unresponsive to nox stim x 4  Coordination: deferred.  Gait: ADAM, deferred.    Labs and Imaging:    Results for orders placed or performed during the hospital encounter of 07/06/23 (from the past 24 hour(s))   Blood gas arterial with oxyhemoglobin   Result Value Ref Range    pH Arterial 7.54 (H) 7.35 - 7.45    pCO2 Arterial " 28 (L) 35 - 45 mm Hg    pO2 Arterial 71 (L) 80 - 105 mm Hg    Bicarbonate Arterial 24 21 - 28 mmol/L    Oxyhemoglobin Arterial 94 92 - 100 %    Base Excess/Deficit (+/-) 2.4 (H) -9.0 - 1.8 mmol/L    FIO2 30     Vlad's Test Artline    XR Chest Port 1 View    Narrative    Portable chest    INDICATION: Desaturations     COMPARISON: 2023    FINDINGS: Normal size and shape of heart. Slightly prominent increased  retrocardiac density is noted which may indicate developing  atelectasis or perhaps even consolidation. Endotracheal tube tip  approximately 4.3 cm above the rocío. NG/OG tube tip and sidehole in  the stomach.      Impression    IMPRESSION: Further increase in left retrocardiac opacification which  may indicate worsening atelectasis or infection. Stable support  devices.    I have personally reviewed the examination and initial interpretation  and I agree with the findings.    AG HACKETT MD         SYSTEM ID:  [id number]   EEG Video 2-12 HRS Ummonitored    Narrative    EEG Video 2-12 HRS Ummonitored Result    VIDEO EEG DATE: 2023  VIDEO EEG [id number]  VIDEO EEG DAY#: 3  VIDEO EEG SOURCE FILE DURATION: 05 hours and 45 minutes    PATIENT INFORMATION: Michael Stewart is a 52 year old year old male with   PMHx past methamphetamine abuse and tobacco use who presents on 2023   as a transfer from [facility] after the discovery of bilateral   thalamic strokes as well as Strep A pharyngitis. No acute interventions   were performed. However, upon presentation to [facility], he was noted   to have increased somnolence, bilateral ophthalmoplegia w/ nystagmus, and   atypical breathing raising concern for alternate etiologies beyond stroke.   Diagnostic possibilities include post-streptococcal acute demyelinating   encephalomyelitis, other autoimmune processes, or viral meningitis.  EEG   is being done to rule out subclinical seizures .      MEDICATIONS:   Thiamine      TECHNICAL SUMMARY: This is a  video-EEG monitoring study. EEG was recorded   from 23 scalp electrodes placed according to the 10-20 international   system. Additional electrodes were utilized for referencing, artifact   detection, and recording from other cerebral regions. Qualified   technicians attached EEG electrodes, set up the study, monitored and   reviewed EEG recordings, and disconnected EEG electrodes when appropriate.   Video was continuously recorded. Video was reviewed for clinical   correlation and to assist with EEG interpretations.      BACKGROUND ACTIVITIES: During this EEG recording, there is severe   generalized slowing of the background activities throughout the recording   with superimposed fast activities activities. No posterior dominant rhythm   was observed. Sleep stages were recorded with poorly formed sleep   architectures. Hyperventilation and photic stimulation were not performed.     EEG is probably reactive to external stimuli.     INTERICTAL ACTIVITIES: No epileptiform activities were observed during   this recording.     ICTAL ACTIVITIES: There are no clinical or electrographic seizures during   this recording.     Summary of Day # 3 of VEEG Monitoring:  This is an abnormal EEG due to the   presence of severe generalized slowing of the background activities.   Findings are consistent with severe diffuse encephalopathy of which the   etiology is non-specific.  No epileptiform activities or seizures were   observed.    Summary of 3 Days of VEEG Monitoring:  Abnormal EEG due to the presence of   severe generalized slowing of the background activities. Findings are   consistent with severe diffuse encephalopathy of which the etiology is   non-specific.  No epileptiform activities or seizures were observed.      Lorie Cates MD  EPILEPSY STAFF    HIV Antigen Antibody Combo Cascade   Result Value Ref Range    HIV Antigen Antibody Combo Nonreactive Nonreactive   Blood gas arterial with oxyhemoglobin   Result Value Ref  Range    pH Arterial 7.53 (H) 7.35 - 7.45    pCO2 Arterial 28 (L) 35 - 45 mm Hg    pO2 Arterial 91 80 - 105 mm Hg    Bicarbonate Arterial 24 21 - 28 mmol/L    Oxyhemoglobin Arterial 96 92 - 100 %    Base Excess/Deficit (+/-) 2.0 (H) -9.0 - 1.8 mmol/L    FIO2 40     Vlad's Test Artline    Blood gas arterial   Result Value Ref Range    pH Arterial 7.49 (H) 7.35 - 7.45    pCO2 Arterial 32 (L) 35 - 45 mm Hg    pO2 Arterial 99 80 - 105 mm Hg    FIO2 40     Bicarbonate Arterial 24 21 - 28 mmol/L    Base Excess/Deficit (+/-) 1.5 -9.0 - 1.8 mmol/L    Lvad's Test Artline    CBC with platelets   Result Value Ref Range    WBC Count 8.8 4.0 - 11.0 10e3/uL    RBC Count 3.95 (L) 4.40 - 5.90 10e6/uL    Hemoglobin 12.0 (L) 13.3 - 17.7 g/dL    Hematocrit 37.8 (L) 40.0 - 53.0 %    MCV 96 78 - 100 fL    MCH 30.4 26.5 - 33.0 pg    MCHC 31.7 31.5 - 36.5 g/dL    RDW 13.5 10.0 - 15.0 %    Platelet Count 210 150 - 450 10e3/uL   Basic metabolic panel   Result Value Ref Range    Sodium 149 (H) 136 - 145 mmol/L    Potassium 3.9 3.4 - 5.3 mmol/L    Chloride 117 (H) 98 - 107 mmol/L    Carbon Dioxide (CO2) 21 (L) 22 - 29 mmol/L    Anion Gap 11 7 - 15 mmol/L    Urea Nitrogen 26.2 (H) 6.0 - 20.0 mg/dL    Creatinine 1.20 (H) 0.67 - 1.17 mg/dL    Calcium 8.3 (L) 8.6 - 10.0 mg/dL    Glucose 146 (H) 70 - 99 mg/dL    GFR Estimate 73 >60 mL/min/1.73m2   Magnesium   Result Value Ref Range    Magnesium 2.9 (H) 1.7 - 2.3 mg/dL   Phosphorus   Result Value Ref Range    Phosphorus 3.0 2.5 - 4.5 mg/dL   Blood gas arterial   Result Value Ref Range    pH Arterial 7.46 (H) 7.35 - 7.45    pCO2 Arterial 34 (L) 35 - 45 mm Hg    pO2 Arterial 116 (H) 80 - 105 mm Hg    FIO2 40     Bicarbonate Arterial 25 21 - 28 mmol/L    Base Excess/Deficit (+/-) 1.1 -9.0 - 1.8 mmol/L    Vlad's Test Artline    UA with Microscopic reflex to Culture    Specimen: Urine, Mcdaniel Catheter   Result Value Ref Range    Color Urine Light Yellow Colorless, Straw, Light Yellow, Yellow     Appearance Urine Clear Clear    Glucose Urine Negative Negative mg/dL    Bilirubin Urine Negative Negative    Ketones Urine Negative Negative mg/dL    Specific Gravity Urine 1.016 1.003 - 1.035    Blood Urine Large (A) Negative    pH Urine 8.0 (H) 5.0 - 7.0    Protein Albumin Urine Negative Negative mg/dL    Urobilinogen Urine Normal Normal, 2.0 mg/dL    Nitrite Urine Negative Negative    Leukocyte Esterase Urine Moderate (A) Negative    Mucus Urine Present (A) None Seen /LPF    RBC Urine 75 (H) <=2 /HPF    WBC Urine 54 (H) <=5 /HPF    Narrative    Urine Culture ordered based on laboratory criteria     All relevant imaging and laboratory values personally reviewed.

## 2023-07-10 NOTE — PLAN OF CARE
Shift Summary: does not open eyes, PERRL, L side intermittently has weak flexion, otherwise flaccid x4. NSR with SBP goal <180. pt sbp jumps to 200s with tachypnea, given fent with improvement. ETT 26 @ teeth CMV 12/500 p5 30%. Large amount of oral secretions, moderate tan thick inline. Mcdaniel with pink improving to gisele output. NGT with TF @ 35. R rad william. Plasmalyte @125.      Plan: serial neuro exams

## 2023-07-10 NOTE — PLAN OF CARE
Major Shift Events: Pt does not open eyes. PERRL, Q2 neuros and Q4 pupillometry. Flexion noted x4 twice, with slightly stronger flexion on L upper. Lowers wax and wane between flexion to pain and flaccidity. NSP with HR in 60-80s. SBP goal <200. Fentanyl gtt started at 25mcg/hr, increased to 50mcg/hr with clinician boluses given after moving via lift to chair and back to bed. Tmax 100.4, PRN Tylenol administered.  ETT 26 cm at teeth CMV 12/500/5/30%. Large around of oral and nasal secretions, thick and tan inline. Mcdaniel in place with adequate output. NGT with TF increased to 45mL/hr and FWF increased to 100mL Q1. R rad a-line. NS at 100.     Plan: Serial neuro exams.     Kayla Benavidez RN on 7/10/2023 at 5:32 PM

## 2023-07-11 LAB
ANION GAP SERPL CALCULATED.3IONS-SCNC: 10 MMOL/L (ref 7–15)
APPEARANCE CSF: CLEAR
B BURGDOR IGG CSF QL IB: NEGATIVE
B BURGDOR IGM CSF QL IB: NEGATIVE
BACTERIA BLD CULT: NO GROWTH
BACTERIA BLD CULT: NO GROWTH
BACTERIA UR CULT: NO GROWTH
BUN SERPL-MCNC: 19.3 MG/DL (ref 6–20)
CALCIUM SERPL-MCNC: 8.3 MG/DL (ref 8.6–10)
CHLORIDE SERPL-SCNC: 114 MMOL/L (ref 98–107)
COLOR CSF: COLORLESS
CREAT SERPL-MCNC: 0.76 MG/DL (ref 0.67–1.17)
DEPRECATED HCO3 PLAS-SCNC: 20 MMOL/L (ref 22–29)
ERYTHROCYTE [DISTWIDTH] IN BLOOD BY AUTOMATED COUNT: 13.2 % (ref 10–15)
GFR SERPL CREATININE-BSD FRML MDRD: >90 ML/MIN/1.73M2
GLUCOSE BLDC GLUCOMTR-MCNC: 123 MG/DL (ref 70–99)
GLUCOSE BLDC GLUCOMTR-MCNC: 152 MG/DL (ref 70–99)
GLUCOSE CSF-MCNC: 68 MG/DL (ref 40–70)
GLUCOSE SERPL-MCNC: 142 MG/DL (ref 70–99)
HCT VFR BLD AUTO: 37.2 % (ref 40–53)
HGB BLD-MCNC: 11.9 G/DL (ref 13.3–17.7)
LYMPH ABN NFR CSF MANUAL: 93 %
MAGNESIUM SERPL-MCNC: 2.3 MG/DL (ref 1.7–2.3)
MCH RBC QN AUTO: 30.7 PG (ref 26.5–33)
MCHC RBC AUTO-ENTMCNC: 32 G/DL (ref 31.5–36.5)
MCV RBC AUTO: 96 FL (ref 78–100)
MONOS+MACROS NFR CSF MANUAL: 7 %
NEUTROPHILS NFR CSF MANUAL: NORMAL %
PHOSPHATE SERPL-MCNC: 3.8 MG/DL (ref 2.5–4.5)
PLATELET # BLD AUTO: 208 10E3/UL (ref 150–450)
POTASSIUM SERPL-SCNC: 3.9 MMOL/L (ref 3.4–5.3)
PROT CSF-MCNC: 38.2 MG/DL (ref 15–45)
RBC # BLD AUTO: 3.88 10E6/UL (ref 4.4–5.9)
RBC # CSF MANUAL: 4 /UL (ref 0–2)
SODIUM SERPL-SCNC: 140 MMOL/L (ref 136–145)
SODIUM SERPL-SCNC: 144 MMOL/L (ref 136–145)
TUBE # CSF: 4
WBC # BLD AUTO: 7.7 10E3/UL (ref 4–11)
WBC # CSF MANUAL: 92 /UL (ref 0–5)
WNV IGG CSF IA-ACNC: 0.03 IV
WNV IGM CSF IA-ACNC: 0 IV

## 2023-07-11 PROCEDURE — 250N000013 HC RX MED GY IP 250 OP 250 PS 637: Performed by: STUDENT IN AN ORGANIZED HEALTH CARE EDUCATION/TRAINING PROGRAM

## 2023-07-11 PROCEDURE — 200N000002 HC R&B ICU UMMC

## 2023-07-11 PROCEDURE — 62270 DX LMBR SPI PNXR: CPT | Performed by: PSYCHIATRY & NEUROLOGY

## 2023-07-11 PROCEDURE — 82945 GLUCOSE OTHER FLUID: CPT

## 2023-07-11 PROCEDURE — 999N000157 HC STATISTIC RCP TIME EA 10 MIN

## 2023-07-11 PROCEDURE — 80048 BASIC METABOLIC PNL TOTAL CA: CPT | Performed by: STUDENT IN AN ORGANIZED HEALTH CARE EDUCATION/TRAINING PROGRAM

## 2023-07-11 PROCEDURE — 999N000253 HC STATISTIC WEANING TRIALS

## 2023-07-11 PROCEDURE — 89050 BODY FLUID CELL COUNT: CPT

## 2023-07-11 PROCEDURE — 88185 FLOWCYTOMETRY/TC ADD-ON: CPT

## 2023-07-11 PROCEDURE — 84157 ASSAY OF PROTEIN OTHER: CPT

## 2023-07-11 PROCEDURE — 99233 SBSQ HOSP IP/OBS HIGH 50: CPT | Mod: 25 | Performed by: PSYCHIATRY & NEUROLOGY

## 2023-07-11 PROCEDURE — 258N000003 HC RX IP 258 OP 636: Performed by: STUDENT IN AN ORGANIZED HEALTH CARE EDUCATION/TRAINING PROGRAM

## 2023-07-11 PROCEDURE — 250N000011 HC RX IP 250 OP 636: Performed by: STUDENT IN AN ORGANIZED HEALTH CARE EDUCATION/TRAINING PROGRAM

## 2023-07-11 PROCEDURE — 87205 SMEAR GRAM STAIN: CPT

## 2023-07-11 PROCEDURE — 88188 FLOWCYTOMETRY/READ 9-15: CPT | Performed by: STUDENT IN AN ORGANIZED HEALTH CARE EDUCATION/TRAINING PROGRAM

## 2023-07-11 PROCEDURE — 86255 FLUORESCENT ANTIBODY SCREEN: CPT

## 2023-07-11 PROCEDURE — 94003 VENT MGMT INPAT SUBQ DAY: CPT

## 2023-07-11 PROCEDURE — 250N000013 HC RX MED GY IP 250 OP 250 PS 637: Performed by: NURSE PRACTITIONER

## 2023-07-11 PROCEDURE — 85014 HEMATOCRIT: CPT | Performed by: STUDENT IN AN ORGANIZED HEALTH CARE EDUCATION/TRAINING PROGRAM

## 2023-07-11 PROCEDURE — 83735 ASSAY OF MAGNESIUM: CPT | Performed by: STUDENT IN AN ORGANIZED HEALTH CARE EDUCATION/TRAINING PROGRAM

## 2023-07-11 PROCEDURE — 009U3ZX DRAINAGE OF SPINAL CANAL, PERCUTANEOUS APPROACH, DIAGNOSTIC: ICD-10-PCS | Performed by: PSYCHIATRY & NEUROLOGY

## 2023-07-11 PROCEDURE — 84295 ASSAY OF SERUM SODIUM: CPT

## 2023-07-11 PROCEDURE — 250N000011 HC RX IP 250 OP 636: Performed by: NURSE PRACTITIONER

## 2023-07-11 PROCEDURE — 87077 CULTURE AEROBIC IDENTIFY: CPT

## 2023-07-11 PROCEDURE — 250N000013 HC RX MED GY IP 250 OP 250 PS 637: Performed by: PSYCHIATRY & NEUROLOGY

## 2023-07-11 PROCEDURE — 87015 SPECIMEN INFECT AGNT CONCNTJ: CPT

## 2023-07-11 PROCEDURE — 84100 ASSAY OF PHOSPHORUS: CPT | Performed by: STUDENT IN AN ORGANIZED HEALTH CARE EDUCATION/TRAINING PROGRAM

## 2023-07-11 PROCEDURE — 86341 ISLET CELL ANTIBODY: CPT

## 2023-07-11 RX ADMIN — Medication 15 ML: at 07:37

## 2023-07-11 RX ADMIN — LABETALOL HYDROCHLORIDE 10 MG: 5 INJECTION, SOLUTION INTRAVENOUS at 02:48

## 2023-07-11 RX ADMIN — ENOXAPARIN SODIUM 40 MG: 40 INJECTION SUBCUTANEOUS at 12:15

## 2023-07-11 RX ADMIN — FAMOTIDINE 20 MG: 20 TABLET, FILM COATED ORAL at 19:40

## 2023-07-11 RX ADMIN — Medication 100 MCG/HR: at 18:32

## 2023-07-11 RX ADMIN — ROSUVASTATIN CALCIUM 20 MG: 20 TABLET, FILM COATED ORAL at 07:37

## 2023-07-11 RX ADMIN — SENNOSIDES AND DOCUSATE SODIUM 2 TABLET: 50; 8.6 TABLET ORAL at 19:40

## 2023-07-11 RX ADMIN — THIAMINE HYDROCHLORIDE 250 MG: 100 INJECTION, SOLUTION INTRAMUSCULAR; INTRAVENOUS at 07:37

## 2023-07-11 RX ADMIN — FAMOTIDINE 20 MG: 20 TABLET, FILM COATED ORAL at 07:37

## 2023-07-11 RX ADMIN — POLYETHYLENE GLYCOL 3350 17 G: 17 POWDER, FOR SOLUTION ORAL at 07:37

## 2023-07-11 RX ADMIN — SENNOSIDES AND DOCUSATE SODIUM 2 TABLET: 50; 8.6 TABLET ORAL at 07:37

## 2023-07-11 RX ADMIN — DOXAZOSIN 1 MG: 1 TABLET ORAL at 19:40

## 2023-07-11 RX ADMIN — SODIUM CHLORIDE: 9 INJECTION, SOLUTION INTRAVENOUS at 04:54

## 2023-07-11 RX ADMIN — ASPIRIN 81 MG CHEWABLE TABLET 81 MG: 81 TABLET CHEWABLE at 07:37

## 2023-07-11 ASSESSMENT — ACTIVITIES OF DAILY LIVING (ADL)
ADLS_ACUITY_SCORE: 47

## 2023-07-11 NOTE — PLAN OF CARE
Major Shift Events:    Q 2 neuro checks continued. Neuro status remains unchanged. Pupils reactive and equal in size. Does not move extremities nor react to pain. Int low grade temp. SBP remained <200. SR w/ rare PVCs. Tolerated PS 5/5 at 30% for most the day. Secretions are yellow/thick/creamy. Tolerating TF through NG. AUO in dorantes. No BM this shift, BS active. Na+ recheck 140 at 1600.         Plan: Continue with POC, notify primary team with any changes in pt condition.     For vital signs and complete assessments, please see documentation flowsheets.

## 2023-07-11 NOTE — PROCEDURES
[facility]    Lumbar puncture    Date/Time: 7/11/2023 11:20 AM    Performed by: Kavon Iqbal MD  Authorized by: Kavon Iqbal MD  Indications: evaluation for altered mental status and evaluation for infection  Preparation: Patient was prepped and draped in the usual sterile fashion.      UNIVERSAL PROTOCOL   Site Marked: Yes  Prior Images Obtained and Reviewed:  NA  Required items: Required blood products, implants, devices and special equipment available    Patient identity confirmed:  Arm band, hospital-assigned identification number, provided demographic data and anonymous protocol, patient vented/unresponsive  Patient was reevaluated immediately before administering moderate or deep sedation or anesthesia  Confirmation Checklist:  Patient's identity using two indicators, relevant allergies, procedure was appropriate and matched the consent or emergent situation and correct equipment/implants were available  Time out: Immediately prior to the procedure a time out was called    Universal Protocol: the Joint Commission Universal Protocol was followed    Preparation: Patient was prepped and draped in usual sterile fashion    ESBL (mL):  0    SEDATION    Patient Sedated: No      PROCEDURE DETAILS  Lumbar space: L4-L5 interspace  Patient's position: left lateral decubitus  Needle gauge: 20  Needle type: spinal needle - Quincke tip  Needle length: 3.5 in  Number of attempts: 1  Fluid appearance: clear  Tubes of fluid: 4  Total volume: 17 ml  Post-procedure: site cleaned, pressure dressing applied and adhesive bandage applied      PROCEDURE    Patient Tolerance:  Patient tolerated the procedure well with no immediate complications  Length of time physician/provider present for 1:1 monitoring during sedation: 0

## 2023-07-11 NOTE — PLAN OF CARE
Major Shift Events: Patient neuro status grossly unchanged. Not opening eyes, PERRL, LUE/LLE intermittently displayed flexion, otherwise extremities had no response to stimuli. NSR, labetalol given x1 for SBP >200. A-febrile overnight. Patient remains intubated; CMV 30% / 500 / 12 / 5. NG in place, TF at goal of 60 ml/hr, 100 ml q1 hr FWF. Mcdaniel in place w/ adequate UO.     Plan: Continue neuro exams. Awaiting test results. Notify team of any acute changes or concerns.    For vital signs and complete assessments, please see documentation flowsheets.     Goal Outcome Evaluation:      Plan of Care Reviewed With: patient    Overall Patient Progress: no change

## 2023-07-12 LAB
ALB CSF/SERPL: 7.8 RATIO
ALBUMIN CSF-MCNC: 24 MG/DL
ALBUMIN SERPL-MCNC: 3074 MG/DL
ANION GAP SERPL CALCULATED.3IONS-SCNC: 12 MMOL/L (ref 7–15)
B BURGDOR DNA SPEC QL NAA+PROBE: NOT DETECTED
BACTERIA CSF CULT: NO GROWTH
BUN SERPL-MCNC: 19.2 MG/DL (ref 6–20)
CALCIUM SERPL-MCNC: 9.1 MG/DL (ref 8.6–10)
CHLORIDE SERPL-SCNC: 106 MMOL/L (ref 98–107)
CREAT SERPL-MCNC: 0.76 MG/DL (ref 0.67–1.17)
DEPRECATED HCO3 PLAS-SCNC: 22 MMOL/L (ref 22–29)
ERYTHROCYTE [DISTWIDTH] IN BLOOD BY AUTOMATED COUNT: 12.4 % (ref 10–15)
GFR SERPL CREATININE-BSD FRML MDRD: >90 ML/MIN/1.73M2
GLUCOSE SERPL-MCNC: 146 MG/DL (ref 70–99)
GRAM STAIN RESULT: NORMAL
GRAM STAIN RESULT: NORMAL
HCT VFR BLD AUTO: 40.2 % (ref 40–53)
HGB BLD-MCNC: 12.9 G/DL (ref 13.3–17.7)
IGG CSF-MCNC: 3.6 MG/DL
IGG SERPL-MCNC: 738 MG/DL
IGG SYNTH RATE SER+CSF CALC-MRATE: 2.5 MG/D
IGG/ALB CLEAR SER+CSF-RTO: 0.62 RATIO
IGG/ALB CSF: 0.15 RATIO
MAGNESIUM SERPL-MCNC: 2 MG/DL (ref 1.7–2.3)
MCH RBC QN AUTO: 29.9 PG (ref 26.5–33)
MCHC RBC AUTO-ENTMCNC: 32.1 G/DL (ref 31.5–36.5)
MCV RBC AUTO: 93 FL (ref 78–100)
MISCELLANEOUS TEST 1 (ARUP): NORMAL
OLIGOCLONAL BANDS CSF ELPH-IMP: ABNORMAL
OLIGOCLONAL BANDS CSF ELPH-IMP: POSITIVE
OLIGOCLONAL BANDS CSF IEF: 3 BANDS
PATH REPORT.COMMENTS IMP SPEC: NORMAL
PATH REPORT.FINAL DX SPEC: NORMAL
PATH REPORT.MICROSCOPIC SPEC OTHER STN: NORMAL
PATH REPORT.RELEVANT HX SPEC: NORMAL
PHOSPHATE SERPL-MCNC: 4 MG/DL (ref 2.5–4.5)
PLATELET # BLD AUTO: 251 10E3/UL (ref 150–450)
POTASSIUM SERPL-SCNC: 4 MMOL/L (ref 3.4–5.3)
RBC # BLD AUTO: 4.31 10E6/UL (ref 4.4–5.9)
SODIUM SERPL-SCNC: 140 MMOL/L (ref 136–145)
T3FREE SERPL-MCNC: 2.1 PG/ML (ref 2–4.4)
T4 FREE SERPL-MCNC: 1.05 NG/DL (ref 0.9–1.7)
TSH SERPL DL<=0.005 MIU/L-ACNC: 3.98 UIU/ML (ref 0.3–4.2)
WBC # BLD AUTO: 8.3 10E3/UL (ref 4–11)
WNV RNA SPEC QL NAA+PROBE: NOT DETECTED

## 2023-07-12 PROCEDURE — 84100 ASSAY OF PHOSPHORUS: CPT | Performed by: STUDENT IN AN ORGANIZED HEALTH CARE EDUCATION/TRAINING PROGRAM

## 2023-07-12 PROCEDURE — 250N000013 HC RX MED GY IP 250 OP 250 PS 637

## 2023-07-12 PROCEDURE — 82310 ASSAY OF CALCIUM: CPT | Performed by: STUDENT IN AN ORGANIZED HEALTH CARE EDUCATION/TRAINING PROGRAM

## 2023-07-12 PROCEDURE — 84439 ASSAY OF FREE THYROXINE: CPT

## 2023-07-12 PROCEDURE — 250N000011 HC RX IP 250 OP 636: Mod: JZ

## 2023-07-12 PROCEDURE — 250N000013 HC RX MED GY IP 250 OP 250 PS 637: Performed by: STUDENT IN AN ORGANIZED HEALTH CARE EDUCATION/TRAINING PROGRAM

## 2023-07-12 PROCEDURE — 85027 COMPLETE CBC AUTOMATED: CPT | Performed by: STUDENT IN AN ORGANIZED HEALTH CARE EDUCATION/TRAINING PROGRAM

## 2023-07-12 PROCEDURE — 86376 MICROSOMAL ANTIBODY EACH: CPT

## 2023-07-12 PROCEDURE — 250N000013 HC RX MED GY IP 250 OP 250 PS 637: Performed by: NURSE PRACTITIONER

## 2023-07-12 PROCEDURE — 258N000003 HC RX IP 258 OP 636: Performed by: STUDENT IN AN ORGANIZED HEALTH CARE EDUCATION/TRAINING PROGRAM

## 2023-07-12 PROCEDURE — 250N000011 HC RX IP 250 OP 636: Mod: JZ | Performed by: STUDENT IN AN ORGANIZED HEALTH CARE EDUCATION/TRAINING PROGRAM

## 2023-07-12 PROCEDURE — 250N000011 HC RX IP 250 OP 636: Performed by: NURSE PRACTITIONER

## 2023-07-12 PROCEDURE — 999N000157 HC STATISTIC RCP TIME EA 10 MIN

## 2023-07-12 PROCEDURE — 94003 VENT MGMT INPAT SUBQ DAY: CPT

## 2023-07-12 PROCEDURE — 258N000003 HC RX IP 258 OP 636

## 2023-07-12 PROCEDURE — 250N000009 HC RX 250: Performed by: NURSE PRACTITIONER

## 2023-07-12 PROCEDURE — 250N000013 HC RX MED GY IP 250 OP 250 PS 637: Performed by: PSYCHIATRY & NEUROLOGY

## 2023-07-12 PROCEDURE — 83735 ASSAY OF MAGNESIUM: CPT | Performed by: STUDENT IN AN ORGANIZED HEALTH CARE EDUCATION/TRAINING PROGRAM

## 2023-07-12 PROCEDURE — 84481 FREE ASSAY (FT-3): CPT

## 2023-07-12 PROCEDURE — 84443 ASSAY THYROID STIM HORMONE: CPT

## 2023-07-12 PROCEDURE — 86800 THYROGLOBULIN ANTIBODY: CPT

## 2023-07-12 PROCEDURE — 99291 CRITICAL CARE FIRST HOUR: CPT | Mod: GC | Performed by: PSYCHIATRY & NEUROLOGY

## 2023-07-12 PROCEDURE — 200N000002 HC R&B ICU UMMC

## 2023-07-12 RX ORDER — ACETAMINOPHEN 325 MG/10.15ML
650 LIQUID ORAL EVERY 4 HOURS PRN
Status: DISCONTINUED | OUTPATIENT
Start: 2023-07-12 | End: 2023-07-13

## 2023-07-12 RX ORDER — AMOXICILLIN 250 MG
2 CAPSULE ORAL 2 TIMES DAILY
Status: DISCONTINUED | OUTPATIENT
Start: 2023-07-12 | End: 2023-07-15

## 2023-07-12 RX ORDER — POLYETHYLENE GLYCOL 3350 17 G/17G
17 POWDER, FOR SOLUTION ORAL 2 TIMES DAILY
Status: DISCONTINUED | OUTPATIENT
Start: 2023-07-12 | End: 2023-07-15

## 2023-07-12 RX ORDER — FAMOTIDINE 40 MG/5ML
20 POWDER, FOR SUSPENSION ORAL 2 TIMES DAILY
Status: DISCONTINUED | OUTPATIENT
Start: 2023-07-12 | End: 2023-07-13

## 2023-07-12 RX ORDER — MAGNESIUM OXIDE 400 MG/1
400 TABLET ORAL EVERY 4 HOURS
Status: COMPLETED | OUTPATIENT
Start: 2023-07-12 | End: 2023-07-12

## 2023-07-12 RX ADMIN — Medication 75 MCG/HR: at 18:29

## 2023-07-12 RX ADMIN — MAGNESIUM OXIDE TAB 400 MG (241.3 MG ELEMENTAL MG) 400 MG: 400 (241.3 MG) TAB at 10:33

## 2023-07-12 RX ADMIN — FAMOTIDINE 20 MG: 40 POWDER, FOR SUSPENSION ORAL at 20:34

## 2023-07-12 RX ADMIN — THIAMINE HYDROCHLORIDE 250 MG: 100 INJECTION, SOLUTION INTRAMUSCULAR; INTRAVENOUS at 08:43

## 2023-07-12 RX ADMIN — POLYETHYLENE GLYCOL 3350 17 G: 17 POWDER, FOR SOLUTION ORAL at 20:34

## 2023-07-12 RX ADMIN — ACETAMINOPHEN 650 MG: 325 SOLUTION ORAL at 20:34

## 2023-07-12 RX ADMIN — SODIUM CHLORIDE 1000 MG: 9 INJECTION, SOLUTION INTRAVENOUS at 15:07

## 2023-07-12 RX ADMIN — ENOXAPARIN SODIUM 40 MG: 40 INJECTION SUBCUTANEOUS at 10:33

## 2023-07-12 RX ADMIN — HYDRALAZINE HYDROCHLORIDE 20 MG: 20 INJECTION INTRAMUSCULAR; INTRAVENOUS at 17:31

## 2023-07-12 RX ADMIN — DOXAZOSIN 1 MG: 4 TABLET ORAL at 20:34

## 2023-07-12 RX ADMIN — FAMOTIDINE 20 MG: 20 TABLET, FILM COATED ORAL at 08:38

## 2023-07-12 RX ADMIN — MIDAZOLAM 2 MG: 1 INJECTION, SOLUTION INTRAMUSCULAR; INTRAVENOUS at 10:33

## 2023-07-12 RX ADMIN — ROSUVASTATIN CALCIUM 20 MG: 20 TABLET, FILM COATED ORAL at 08:38

## 2023-07-12 RX ADMIN — POLYETHYLENE GLYCOL 3350 17 G: 17 POWDER, FOR SOLUTION ORAL at 08:38

## 2023-07-12 RX ADMIN — SENNOSIDES AND DOCUSATE SODIUM 2 TABLET: 50; 8.6 TABLET ORAL at 20:34

## 2023-07-12 RX ADMIN — SENNOSIDES AND DOCUSATE SODIUM 2 TABLET: 50; 8.6 TABLET ORAL at 08:43

## 2023-07-12 RX ADMIN — MAGNESIUM OXIDE TAB 400 MG (241.3 MG ELEMENTAL MG) 400 MG: 400 (241.3 MG) TAB at 05:53

## 2023-07-12 RX ADMIN — ASPIRIN 81 MG CHEWABLE TABLET 81 MG: 81 TABLET CHEWABLE at 08:38

## 2023-07-12 RX ADMIN — Medication 226 ML: at 04:26

## 2023-07-12 RX ADMIN — MAGNESIUM HYDROXIDE 30 ML: 400 SUSPENSION ORAL at 10:46

## 2023-07-12 RX ADMIN — Medication 15 ML: at 08:38

## 2023-07-12 ASSESSMENT — ACTIVITIES OF DAILY LIVING (ADL)
ADLS_ACUITY_SCORE: 47

## 2023-07-12 ASSESSMENT — VISUAL ACUITY
OU: NOT TESTABLE
OU: NOT TESTABLE

## 2023-07-12 NOTE — PLAN OF CARE
Neuro:  Neuro status unchanged. Patient not opening eyes, PERRL. No movement of extremities nor reaction to painful stimuli.   Cardiac: NSR, SBP <200 without intervention. Tmax overnight 100.1 F  Respiratory: Patient intubated; tolerating PS 5/5 30%. Patient continuing to bite down on ETT. Difficult to suction. Fentanyl gtt at maximum dose.  GI/: NG in place, TF at goal of 60 ml/hr w/ standard FWF. Mcdaniel remains in place with adequate UO. Tap water enema given x1 with no results. Enema compound given x1 with small BM.  Skin: See flowsheets for assessment.  LDA: R rad A line, PIV x3.    Plan: Continue POC. Notify team of any acute changes or concerns.    For vital signs and complete assessments, please see documentation flowsheets.     Goal Outcome Evaluation:      Plan of Care Reviewed With: patient    Overall Patient Progress: no change

## 2023-07-12 NOTE — PLAN OF CARE
Major Shift Events:    Fentanyl weaned to 75 with goal rate of 50mcg/hr per NCC. Pupils equal, round, reactive. No response to stimuli, weak cough with stimulation. Hypertensive to the 190's systolic, hydralazine PRN given x1. T-max 99.5 F. P/S 5/5 30% and tolerating. Bowel meds given with only 1 small BM. Adequate UOP via dorantes. First dose of IV steroids given today.     Plan: Continue IV steroids and with serial neuro exams.   For vital signs and complete assessments, please see documentation flowsheets.       Goal Outcome Evaluation:      Plan of Care Reviewed With: patient, family    Overall Patient Progress: no changeOverall Patient Progress: no change

## 2023-07-12 NOTE — PROGRESS NOTES
Neurocritical Care Progress Note    Reason for critical care admission:   Admitting Team: Neurocritical care  Date of Service:  07/11/2023  Date of Admission:  7/6/2023  Hospital Day: 6    Assessment/Plan  Michael Stewart is a 52 year old male w/ PMHx past methamphetamine abuse and tobacco use who presents on 7/6/2023 as a transfer from [facility] after the discovery of bilateral thalamic strokes as well as Strep A pharyngitis. No acute interventions were performed. However, upon presentation to [facility], he was noted to have increased somnolence, bilateral ophthalmoplegia w/ nystagmus, and atypical breathing raising concern for alternate etiologies beyond stroke. Diagnostic possibilities include post-streptococcal acute demyelinating encephalomyelitis, other autoimmune processes, or viral meningitis.       Last 24hrs: Patient was febrile overnight and had cultures collected. Equivocal UA.     Neuro  #acute ischemic stroke of bilateral thalami with involvement of posterior limb and left internal capsule, unclear etiology   -Neurochecks every 2 hrs; Assess pupils Q4h.  -SBP goal < 200 mmHg and DBP < 110 mmHg  -ASA 81mg daily   -IV thiamine Wernicke's encephalopathy protocol  -MRI Brain w/ and w/o contrast performed 07/08, no additional enhancing areas   -LP performed 07/08, opening Pressure: 16 cm H2O, 63 nucleated cells, protein of 42, glucose of 64, additional labs ordered on CSF   -Cytology abnormal for LP on 7/8.  On repeat LP on 7/11, with the primary intent being to achieve fresh cells to low flow cytometry to be conducted.   -So far during hospital course, patient CSF has not been consistent with results that would be expected in acute ischemic stroke.  Rather, CSF suggesting underlying subacute to chronic inflammatory or viral encephalitic process.  -HOB > 30   -PT/OT/SLP  -vEEG, diffuse encephalopathy, severe generalized slowing; now discontinued      #Analgesics & sedation  RASS goal: 0 to -1    -tylenol as needed  -fentanyl as needed  -Added Fentanyl infusion w/boluses on 7/10     CV  #hypertension  #hyperlipidemia  -Cardiac monitoring  -BP goal 200  -PRN Hydralazine/Labetalol, can order clevidipine if needed  -Rosuvastatin 20 mg     Resp  #Acute hypoxic respiratory failure requiring mechanical ventilation   #Hx Tobacco Use  #Pulmonary nodules  Oxygen/vent: CMV 18/500/5/30%  -Continuous pulse ox  -Maintain O2 saturations greater than 92%  -fungal sputum cultures ordered  -CXR on 7/10 revealed improved consolidation in LL lobe    GI  #MARIA T  Diet: TF,  mL q 1 hrs   Last BM: PTA  GI prophylaxis: IV Famotidine 20mg BID  Bowel regimen: PRN senna-docusate and Miralax     Renal/  #Acute kidney injury  -Daily BMP  -IV fluids: Plasmalyte discontinued on 7/10.  -Electrolyte replacement protocols  -Increase  Q1h.  -Doxazosin 2 mg daily.      Endo  #DM2  #overweight  -Hgb A1c 6.1  -Monitor glucose levels  -TSH 2.13     Heme  #relative anemia   -Daily CBC  -Hgb goal >7, plt goal >50k  -Transfuse to meet Hgb and plt goals     ID  #febrile TMax 38.4 C  -Daily CBC  -Follow temperature curve  -Fully treated for Group A strep with penicillin G and ceftriaxone  -add on HIV test.  Negative.    ICU Checklist  Lines/tubes/drains: PIVs, arterial line, ETT, OGT, urinary catheter  FEN: NPO; Plasmalyte 125 mL/hr  PPX: DVT - SCDs, Lovenox 40mg daily; GI - Famotidine  Code: FULL CODE  Dispo: ICU - NCC    The patient was seen and discussed with the NCC attending, Dr. Kavon Iqbal.    Raymond Breen MD  PGY-2, Neurology  Neurocritical Care  *[phone]     24 Hour Vital Signs Summary:  Temp: 100  F (37.8  C) Temp  Min: 97.7  F (36.5  C)  Max: 100  F (37.8  C)  Resp: 22 Resp  Min: 12  Max: 27  SpO2: 95 % SpO2  Min: 93 %  Max: 100 %  Pulse: 92 Pulse  Min: 62  Max: 92    No data recorded  BP: (!) 174/77 Systolic (24hrs), Av , Min:174 , Max:174   Diastolic (24hrs), Av, Min:77, Max:77    Respiratory monitoring:  "  Vent Mode: CPAP/PS  (Continuous positive airway pressure with Pressure Support)  FiO2 (%): 30 %  Resp Rate (Set): 12 breaths/min  Tidal Volume (Set, mL): 500 mL  PEEP (cm H2O): 5 cmH2O  Pressure Support (cm H2O): 5 cmH2O  Resp: 22    I/O last 3 completed shifts:  In: 5874.72 [I.V.:1974.72; NG/GT:2595]  Out: 3365 [Urine:3365]    Current Medications:    aspirin  81 mg Oral or Feeding Tube Daily     doxazosin  1 mg Oral or Feeding Tube QPM     enoxaparin ANTICOAGULANT  40 mg Subcutaneous Q24H     famotidine  20 mg Oral or Feeding Tube BID     multivitamins w/minerals  15 mL Per Feeding Tube Daily     polyethylene glycol  17 g Oral or Feeding Tube Daily     protein modular  1 packet Per Feeding Tube Daily     rosuvastatin  20 mg Oral or Feeding Tube Daily     senna-docusate  1-2 tablet Oral or Feeding Tube BID     sodium chloride (PF)  3 mL Intracatheter Q8H     thiamine  250 mg Intravenous Daily    Followed by     [START ON 7/14/2023] thiamine  100 mg Oral Daily     PRN Medications:  acetaminophen, acetaminophen, fentaNYL, labetalol **OR** hydrALAZINE, naloxone **OR** naloxone **OR** naloxone **OR** naloxone, ondansetron **OR** ondansetron, sodium chloride (PF), sodium chloride    Infusions:    fentaNYL 100 mcg/hr (07/11/23 2100)     sodium chloride Stopped (07/11/23 2000)     No Known Allergies    Physical Examination:  Vitals: BP (!) 174/77   Pulse 92   Temp 100  F (37.8  C) (Axillary)   Resp 22   Ht 1.702 m (5' 7\")   Wt 84.9 kg (187 lb 2.7 oz)   SpO2 95%   BMI 29.32 kg/m    General: Adult male patient, lying in bed, critically-ill  HEENT: Normocephalic, atraumatic, no icterus, oral cavity/oropharynx pink and moist  Cardiac: RRR per bedside monitor   Pulm: unlabored, expansion symmetric, no retractions or use of accessory muscles, synchronous with mechanical ventilator  Abdomen: Soft, non-distended, bowel sounds present  Extremities: Warm, no edema, well perfused  Skin: No rash or lesion  Psych: unable to " assess  Neuro:  Mental status: largely unresponsive, unable to assess mentation or language   Cranial nerves: PERRL, face symmetric, tongue midline  Motor: Normal bulk and tone. No abnormal movements. Does not follow commands.  Sensory: unresponsive to nox stim x 4  Coordination: deferred.  Gait: ADAM, deferred.    Labs and Imaging:    Results for orders placed or performed during the hospital encounter of 07/06/23 (from the past 24 hour(s))   CBC with platelets   Result Value Ref Range    WBC Count 7.7 4.0 - 11.0 10e3/uL    RBC Count 3.88 (L) 4.40 - 5.90 10e6/uL    Hemoglobin 11.9 (L) 13.3 - 17.7 g/dL    Hematocrit 37.2 (L) 40.0 - 53.0 %    MCV 96 78 - 100 fL    MCH 30.7 26.5 - 33.0 pg    MCHC 32.0 31.5 - 36.5 g/dL    RDW 13.2 10.0 - 15.0 %    Platelet Count 208 150 - 450 10e3/uL   Basic metabolic panel   Result Value Ref Range    Sodium 144 136 - 145 mmol/L    Potassium 3.9 3.4 - 5.3 mmol/L    Chloride 114 (H) 98 - 107 mmol/L    Carbon Dioxide (CO2) 20 (L) 22 - 29 mmol/L    Anion Gap 10 7 - 15 mmol/L    Urea Nitrogen 19.3 6.0 - 20.0 mg/dL    Creatinine 0.76 0.67 - 1.17 mg/dL    Calcium 8.3 (L) 8.6 - 10.0 mg/dL    Glucose 142 (H) 70 - 99 mg/dL    GFR Estimate >90 >60 mL/min/1.73m2   Magnesium   Result Value Ref Range    Magnesium 2.3 1.7 - 2.3 mg/dL   Phosphorus   Result Value Ref Range    Phosphorus 3.8 2.5 - 4.5 mg/dL   Glucose by meter   Result Value Ref Range    GLUCOSE BY METER POCT 123 (H) 70 - 99 mg/dL   Glucose CSF:   Result Value Ref Range    Glucose CSF 68 40 - 70 mg/dL    Narrative    CSF glucose concentrations are about 60 percent of normal plasma glucose.   Protein total CSF:   Result Value Ref Range    Protein total CSF 38.2 15.0 - 45.0 mg/dL   Cerebrospinal fluid Aerobic Bacterial Culture Routine    Specimen: Spine; Cerebrospinal fluid   Result Value Ref Range    Gram Stain Result No organisms seen     Gram Stain Result 4+ WBC seen     Narrative    Gram Stain quantification of host cells and  microbiological organisms was done on a cytocentrifuged preparation.     CSF Cell Count with Differential:    Narrative    The following orders were created for panel order CSF Cell Count with Differential:.  Procedure                               Abnormality         Status                     ---------                               -----------         ------                     Cell Count CSF[[id number]]               Abnormal            Final result               Differential CSF[[id number]]                                 Final result                 Please view results for these tests on the individual orders.   Cell Count CSF   Result Value Ref Range    Tube Number 4     Color Colorless Colorless    Clarity Clear Clear    Total Nucleated Cells 92 (H) 0 - 5 /uL    RBC Count 4 (H) 0 - 2 /uL   Differential CSF   Result Value Ref Range    % Neutrophils      % Lymphocytes 93 %    % Monocytes/Macrophages 7 %    Narrative    No reference ranges have been established. This result should be interpreted in the context of the patient's clinical condition and compared to simultaneous measurement in the patient's blood.   Lumbar puncture    Narrative    Kavon Iqbal MD     7/11/2023  2:56 PM  [facility]    Lumbar puncture    Date/Time: 7/11/2023 11:20 AM    Performed by: Kavon Iqbal MD  Authorized by: Kavon Iqbal MD  Indications: evaluation for   altered mental status and evaluation for infection  Preparation: Patient was prepped and draped in the usual sterile fashion.      UNIVERSAL PROTOCOL   Site Marked: Yes  Prior Images Obtained and Reviewed:  NA  Required items: Required blood products, implants, devices and special   equipment available    Patient identity confirmed:  Arm band, hospital-assigned identification   number, provided demographic data and anonymous protocol, patient   vented/unresponsive  Patient was reevaluated immediately  before administering moderate or deep   sedation or anesthesia  Confirmation Checklist:  Patient's identity using two indicators, relevant   allergies, procedure was appropriate and matched the consent or emergent   situation and correct equipment/implants were available  Time out: Immediately prior to the procedure a time out was called    Universal Protocol: the Joint Commission Universal Protocol was followed    Preparation: Patient was prepped and draped in usual sterile fashion    ESBL (mL):  0    SEDATION    Patient Sedated: No      PROCEDURE DETAILS  Lumbar space: L4-L5 interspace  Patient's position: left lateral decubitus  Needle gauge: 20  Needle type: spinal needle - Quincke tip  Needle length: 3.5 in  Number of attempts: 1  Fluid appearance: clear  Tubes of fluid: 4  Total volume: 17 ml  Post-procedure: site cleaned, pressure dressing applied and adhesive   bandage applied      PROCEDURE    Patient Tolerance:  Patient tolerated the procedure well with no immediate   complications  Length of time physician/provider present for 1:1 monitoring during   sedation: 0   Sodium   Result Value Ref Range    Sodium 140 136 - 145 mmol/L   Glucose by meter   Result Value Ref Range    GLUCOSE BY METER POCT 152 (H) 70 - 99 mg/dL     All relevant imaging and laboratory values personally reviewed.

## 2023-07-13 ENCOUNTER — APPOINTMENT (OUTPATIENT)
Dept: OCCUPATIONAL THERAPY | Facility: CLINIC | Age: 53
End: 2023-07-13
Attending: STUDENT IN AN ORGANIZED HEALTH CARE EDUCATION/TRAINING PROGRAM
Payer: COMMERCIAL

## 2023-07-13 LAB
ANION GAP SERPL CALCULATED.3IONS-SCNC: 15 MMOL/L (ref 7–15)
BACTERIA CSF CULT: NO GROWTH
BUN SERPL-MCNC: 22 MG/DL (ref 6–20)
CALCIUM SERPL-MCNC: 9.5 MG/DL (ref 8.6–10)
CHLORIDE SERPL-SCNC: 104 MMOL/L (ref 98–107)
CREAT SERPL-MCNC: 0.66 MG/DL (ref 0.67–1.17)
DEPRECATED HCO3 PLAS-SCNC: 22 MMOL/L (ref 22–29)
ERYTHROCYTE [DISTWIDTH] IN BLOOD BY AUTOMATED COUNT: 12.3 % (ref 10–15)
GFR SERPL CREATININE-BSD FRML MDRD: >90 ML/MIN/1.73M2
GLUCOSE SERPL-MCNC: 186 MG/DL (ref 70–99)
GRAM STAIN RESULT: NORMAL
GRAM STAIN RESULT: NORMAL
HCT VFR BLD AUTO: 42.9 % (ref 40–53)
HGB BLD-MCNC: 14.5 G/DL (ref 13.3–17.7)
MAGNESIUM SERPL-MCNC: 2.3 MG/DL (ref 1.7–2.3)
MCH RBC QN AUTO: 30.9 PG (ref 26.5–33)
MCHC RBC AUTO-ENTMCNC: 33.8 G/DL (ref 31.5–36.5)
MCV RBC AUTO: 91 FL (ref 78–100)
PHOSPHATE SERPL-MCNC: 4.3 MG/DL (ref 2.5–4.5)
PLATELET # BLD AUTO: 299 10E3/UL (ref 150–450)
POTASSIUM SERPL-SCNC: 4.5 MMOL/L (ref 3.4–5.3)
RBC # BLD AUTO: 4.7 10E6/UL (ref 4.4–5.9)
SODIUM SERPL-SCNC: 141 MMOL/L (ref 136–145)
THYROGLOB AB SERPL IA-ACNC: <20 IU/ML
THYROPEROXIDASE AB SERPL-ACNC: <10 IU/ML
WBC # BLD AUTO: 9.7 10E3/UL (ref 4–11)

## 2023-07-13 PROCEDURE — 97166 OT EVAL MOD COMPLEX 45 MIN: CPT | Mod: GO

## 2023-07-13 PROCEDURE — 258N000003 HC RX IP 258 OP 636

## 2023-07-13 PROCEDURE — 250N000013 HC RX MED GY IP 250 OP 250 PS 637

## 2023-07-13 PROCEDURE — 250N000013 HC RX MED GY IP 250 OP 250 PS 637: Performed by: STUDENT IN AN ORGANIZED HEALTH CARE EDUCATION/TRAINING PROGRAM

## 2023-07-13 PROCEDURE — 999N000157 HC STATISTIC RCP TIME EA 10 MIN

## 2023-07-13 PROCEDURE — 94003 VENT MGMT INPAT SUBQ DAY: CPT

## 2023-07-13 PROCEDURE — 82310 ASSAY OF CALCIUM: CPT | Performed by: STUDENT IN AN ORGANIZED HEALTH CARE EDUCATION/TRAINING PROGRAM

## 2023-07-13 PROCEDURE — 250N000013 HC RX MED GY IP 250 OP 250 PS 637: Performed by: PSYCHIATRY & NEUROLOGY

## 2023-07-13 PROCEDURE — 250N000011 HC RX IP 250 OP 636: Mod: JZ

## 2023-07-13 PROCEDURE — 250N000013 HC RX MED GY IP 250 OP 250 PS 637: Performed by: NURSE PRACTITIONER

## 2023-07-13 PROCEDURE — 200N000002 HC R&B ICU UMMC

## 2023-07-13 PROCEDURE — 0152U NFCT DS DNA UNTRGT NGNRJ SEQ: CPT

## 2023-07-13 PROCEDURE — 250N000011 HC RX IP 250 OP 636: Mod: JZ | Performed by: STUDENT IN AN ORGANIZED HEALTH CARE EDUCATION/TRAINING PROGRAM

## 2023-07-13 PROCEDURE — 250N000009 HC RX 250

## 2023-07-13 PROCEDURE — 99291 CRITICAL CARE FIRST HOUR: CPT | Mod: GC | Performed by: PSYCHIATRY & NEUROLOGY

## 2023-07-13 PROCEDURE — 84100 ASSAY OF PHOSPHORUS: CPT | Performed by: STUDENT IN AN ORGANIZED HEALTH CARE EDUCATION/TRAINING PROGRAM

## 2023-07-13 PROCEDURE — 999N000015 HC STATISTIC ARTERIAL MONITORING DAILY

## 2023-07-13 PROCEDURE — 85014 HEMATOCRIT: CPT | Performed by: STUDENT IN AN ORGANIZED HEALTH CARE EDUCATION/TRAINING PROGRAM

## 2023-07-13 PROCEDURE — 258N000003 HC RX IP 258 OP 636: Performed by: STUDENT IN AN ORGANIZED HEALTH CARE EDUCATION/TRAINING PROGRAM

## 2023-07-13 PROCEDURE — 97530 THERAPEUTIC ACTIVITIES: CPT | Mod: GO

## 2023-07-13 PROCEDURE — 97110 THERAPEUTIC EXERCISES: CPT | Mod: GO

## 2023-07-13 PROCEDURE — 83735 ASSAY OF MAGNESIUM: CPT | Performed by: STUDENT IN AN ORGANIZED HEALTH CARE EDUCATION/TRAINING PROGRAM

## 2023-07-13 RX ORDER — FAMOTIDINE 40 MG/5ML
20 POWDER, FOR SUSPENSION ORAL 2 TIMES DAILY
Status: DISCONTINUED | OUTPATIENT
Start: 2023-07-13 | End: 2023-07-17

## 2023-07-13 RX ORDER — ACETAMINOPHEN 325 MG/10.15ML
650 LIQUID ORAL EVERY 4 HOURS PRN
Status: DISCONTINUED | OUTPATIENT
Start: 2023-07-13 | End: 2023-07-16

## 2023-07-13 RX ADMIN — LABETALOL HYDROCHLORIDE 20 MG: 5 INJECTION, SOLUTION INTRAVENOUS at 02:54

## 2023-07-13 RX ADMIN — SODIUM CHLORIDE 1000 MG: 9 INJECTION, SOLUTION INTRAVENOUS at 08:58

## 2023-07-13 RX ADMIN — ENOXAPARIN SODIUM 40 MG: 40 INJECTION SUBCUTANEOUS at 10:10

## 2023-07-13 RX ADMIN — POLYETHYLENE GLYCOL 3350 17 G: 17 POWDER, FOR SOLUTION ORAL at 10:10

## 2023-07-13 RX ADMIN — FAMOTIDINE 20 MG: 40 POWDER, FOR SUSPENSION ORAL at 20:42

## 2023-07-13 RX ADMIN — THIAMINE HYDROCHLORIDE 250 MG: 100 INJECTION, SOLUTION INTRAMUSCULAR; INTRAVENOUS at 07:27

## 2023-07-13 RX ADMIN — SENNOSIDES AND DOCUSATE SODIUM 2 TABLET: 50; 8.6 TABLET ORAL at 10:09

## 2023-07-13 RX ADMIN — Medication 1 MG: at 20:42

## 2023-07-13 RX ADMIN — ROSUVASTATIN CALCIUM 20 MG: 20 TABLET, FILM COATED ORAL at 07:48

## 2023-07-13 RX ADMIN — ASPIRIN 81 MG CHEWABLE TABLET 81 MG: 81 TABLET CHEWABLE at 07:48

## 2023-07-13 RX ADMIN — SENNOSIDES AND DOCUSATE SODIUM 2 TABLET: 50; 8.6 TABLET ORAL at 20:42

## 2023-07-13 RX ADMIN — FAMOTIDINE 20 MG: 40 POWDER, FOR SUSPENSION ORAL at 07:49

## 2023-07-13 RX ADMIN — POLYETHYLENE GLYCOL 3350 17 G: 17 POWDER, FOR SOLUTION ORAL at 20:42

## 2023-07-13 RX ADMIN — Medication 15 ML: at 07:48

## 2023-07-13 RX ADMIN — LABETALOL HYDROCHLORIDE 20 MG: 5 INJECTION, SOLUTION INTRAVENOUS at 01:10

## 2023-07-13 ASSESSMENT — ACTIVITIES OF DAILY LIVING (ADL)
ADLS_ACUITY_SCORE: 47
PREVIOUS_RESPONSIBILITIES: MEAL PREP;HOUSEKEEPING;LAUNDRY;SHOPPING;YARDWORK;MEDICATION MANAGEMENT;FINANCES;DRIVING;WORK
ADLS_ACUITY_SCORE: 47

## 2023-07-13 NOTE — PLAN OF CARE
Major Shift Events:     Fentanyl @ 75. Pupils equal, round, reactive. Neuro exam unchanged. No response to stimuli, weak cough with stimulation. Hypertensive to the 190's systolic, Labetalol PRN given x2. Afebrile overnight. P/S 5/5 30% and tolerating. Bowel meds given, multiple watery stools overnight. Adequate UOP via dorantes.     Plan: Continue plan of care. Notify team with any changes.  For vital signs and complete assessments, please see documentation flowsheets.

## 2023-07-13 NOTE — PLAN OF CARE
Major Shift Events:  Fentanyl weaned to 50mcg/hr. No contraction or response to stimuli in all 4 extremities. Does have cough with inline suctioning. Tolerated time up in chair. BPs stable without need for intervention. Afebrile. P/S 5/5 30%. Bowel meds given with only small smears. Mcdaniel in place and continues to have good UOP. TF at goal via NG with standard FWF. Second dose of IV steroids administered.     Spinal fluid cultures resulted and positive for staph epidermis per lab. NCC notified.    Plan: Continue with POC  For vital signs and complete assessments, please see documentation flowsheets.       Goal Outcome Evaluation:      Plan of Care Reviewed With: family    Overall Patient Progress: no changeOverall Patient Progress: no change

## 2023-07-13 NOTE — PROGRESS NOTES
07/13/23 1128   Appointment Info   Signing Clinician's Name / Credentials (OT) Mini Mansfield, OTR/L   Living Environment   People in Home friend(s)   Current Living Arrangements other (see comments)  (townhome)   Home Accessibility stairs within home   Number of Stairs, Within Home, Primary other (see comments)  (1-2 flights per daughter)   Stair Railings, Within Home, Primary other (see comments)  (unknown)   Transportation Anticipated car, drives self;family or friend will provide   Living Environment Comments PLOF obtained from daughter. Pt lives with friend in a townhome. Lives in upper level, 1-2 flights up per daughter.   Self-Care   Usual Activity Tolerance excellent   Current Activity Tolerance poor   Equipment Currently Used at Home none   Fall history within last six months no   Activity/Exercise/Self-Care Comment Per daughter, pt was IND at baseline w/ ADL/IADL and functional mobility. No falls.   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 No acute interventions were performed. However, upon presentation to [facility], he was noted to have increased somnolence, bilateral ophthalmoplegia w/ nystagmus, and atypical breathing raising concern for alternate etiologies beyond stroke.  Diagnostic possibilities include post-streptococcal acute demyelinating encephalomyelitis, other autoimmune processes, or viral meningitis.   Existing Precautions/Restrictions fall   General Observations and Info Activity: Up w/ A   Cognitive Status Examination   Orientation Status not oriented to person, place or time   Cognitive Status Comments RASS -4   Visual Perception   Visual Impairment/Limitations WFL   Impact of Vision Impairment on Function (Vision) Per daughter   Sensory   Sensory Comments No response to stimuli   Pain Assessment   Patient Currently in Pain No   Posture   Posture Comments total A for postural control   Range of Motion Comprehensive   General Range of Motion bilateral upper extremity ROM WFL   Strength Comprehensive (MMT)   Comment, General Manual Muscle Testing (MMT) Assessment Generalized deconditioning   Muscle Tone Assessment   Muscle Tone Quick Adds No deficits were identified   Coordination   Upper Extremity Coordination No deficits were identified   Bed Mobility   Bed Mobility rolling left;rolling right   Rolling Left [place] (Bed Mobility) dependent (less than 25% patient effort);2 person assist   Rolling Right [place] (Bed Mobility) dependent (less than 25% patient effort);2 person assist   Transfers   Transfers bed-chair transfer   Transfer Skill: Bed to Chair/Chair to Bed   Bed-Chair [place] (Transfers) dependent (less than 25% patient effort);2 person assist   Assistive Device (Bed-Chair Transfers) other (see comments)  (OH lift)   Activities of Daily Living   BADL Assessment/Intervention bathing;upper body dressing;lower body dressing;grooming;feeding;toileting   Bathing Assessment/Intervention   [place] Level (Bathing) dependent (less than 25% patient effort)   Comment, (Bathing) Per clinical judgment   Upper Body Dressing Assessment/Training   Comment, (Upper Body Dressing) Per clinical judgment   [place] Level (Upper Body Dressing) dependent (less  than 25% patient effort)   Lower Body Dressing Assessment/Training   Comment, (Lower Body Dressing) Per clinical judgment   [place] Level (Lower Body Dressing) dependent (less than 25% patient effort)   Grooming Assessment/Training   [place] Level (Grooming) dependent (less than 25% patient effort)   Comment, (Grooming) Per clinical judgment   Eating/Self Feeding   [place] Level (Feeding) dependent (less than 25% patient effort)   Comment, (Feeding) Per clinical judgment   Toileting   Comment, (Toileting) Per clinical judgment   [place] Level (Toileting) dependent (less than 25% patient effort)   Clinical Impression   Criteria for Skilled Therapeutic Interventions Met (OT) Yes, treatment indicated   OT Diagnosis Decreased ADL/IADL I   OT Problem List-Impairments impacting ADL problems related to;activity tolerance impaired;cognition;inability to direct their own care;mobility;strength;postural control   Assessment of Occupational Performance 5 or more Performance Deficits   Identified Performance Deficits Dressing, bathing, toileting, g/h, home mgmt, functional mobility/transfer   Planned Therapy Interventions (OT) ADL retraining;IADL retraining;cognition;bed mobility training;fine motor coordination training;strengthening;transfer training;home program guidelines;progressive activity/exercise;risk factor education   Clinical Decision Making Complexity (OT) moderate complexity   Anticipated Equipment Needs Upon Discharge (OT) other (see comments)   Risk & Benefits of therapy have been explained evaluation/treatment results reviewed;care plan/treatment goals reviewed;risks/benefits reviewed;current/potential barriers reviewed;participants voiced agreement with care plan;participants included;patient   Clinical Impression Comments Pt will benefit from skilled OT services to progress IND w/ ADLs/IADLs and facilitate return to PLOF   OT Total Evaluation Time   OT Eval, Moderate Complexity Minutes  (07599) 8   OT Goals   Therapy Frequency (OT) 2 times/wk   OT Predicted Duration/Target Date for Goal Attainment 09/01/23   OT Goals Hygiene/Grooming;Upper Body Dressing;Lower Body Dressing;Upper Body Bathing;Lower Body Bathing   OT: Hygiene/Grooming minimal assist;using adaptive equipment   OT: Upper Body Dressing Minimal assist;using adaptive equipment   OT: Lower Body Dressing Minimal assist;using adaptive equipment;including set-up/clothing retrieval   OT: Upper Body Bathing Minimal assist;using adaptive equipment;within precautions   OT: Lower Body Bathing Moderate assist;using adaptive equipment   Interventions   Interventions Quick Adds Therapeutic Activity;Therapeutic Procedures/Exercise   Therapeutic Procedures/Exercise   Therapeutic Procedure: strength, endurance, ROM, flexibillity minutes (99061) 8   Therapeutic Activities   Therapeutic Activity Minutes (00460) 15   OT Discharge Planning   OT Plan Command following, PROM for joint integrity, up in chair   OT Discharge Recommendation (DC Rec) Transitional Care Facility;LTACH-pending meets medical criteria   OT Rationale for DC Rec Pt presents significantly below baseline, limited by weakness, deconditioning and cognitive status. No command following or purposeful participation in session this date. Requires total A for all ADLS/functional mobility. Anticipate pt will require TCU vs LTACH pending medical course. Will monitor and update recs as inidicated.   OT Brief overview of current status Total A/OH lift   Total Session Time   Timed Code Treatment Minutes 23   Total Session Time (sum of timed and untimed services) 31

## 2023-07-13 NOTE — PROGRESS NOTES
Neurocritical Care Progress Note    Reason for critical care admission:   Admitting Team: Neurocritical care  Date of Service:  07/13/2023  Date of Admission:  7/6/2023  Hospital Day: 8    Assessment/Plan  Michael Stewart is a 52 year old male w/ PMHx past methamphetamine abuse and tobacco use who presents on 7/6/2023 as a transfer from [facility] after the discovery of bilateral thalamic strokes as well as Strep A pharyngitis. No acute interventions were performed. However, upon presentation to [facility], he was noted to have increased somnolence, bilateral ophthalmoplegia w/ nystagmus, and atypical breathing raising concern for alternate etiologies beyond stroke. Diagnostic possibilities include post-streptococcal acute demyelinating encephalomyelitis, other autoimmune processes, or viral meningitis.       Last 24hrs: No acute events.    Neuro  #acute ischemic stroke of bilateral thalami with involvement of posterior limb and left internal capsule, unclear etiology   -Neurochecks every 2 hrs; Assess pupils Q4h.  -SBP goal < 200 mmHg and DBP < 110 mmHg  -ASA 81mg daily   -IV thiamine Wernicke's encephalopathy protocol  -MRI Brain w/ and w/o contrast performed 07/08, no additional enhancing areas   -LP performed 07/08, opening Pressure: 16 cm H2O, 63 nucleated cells, protein of 42, glucose of 64, additional labs ordered on CSF   -Cytology abnormal for LP on 7/8.  On repeat LP on 7/11, with the primary intent being to achieve fresh cells to low flow cytometry to be conducted.   -So far during hospital course, patient CSF has not been consistent with results that would be expected in acute ischemic stroke.  Rather, CSF suggesting underlying subacute to chronic inflammatory or viral encephalitic process.  -Oligoclonal bands returned positive on 7/12.   -Initiated trial of 1g methylprednisolone daily for 7 days to cover for possible autoimmune or inflammatory process that would be consistent with workup thus  far.   -HOB > 30   -PT/OT/SLP  -vEEG, diffuse encephalopathy, severe generalized slowing; now discontinued      #Analgesics & sedation  RASS goal: 0 to -1   -tylenol as needed  -fentanyl as needed  -Added Fentanyl infusion w/boluses on 7/10     CV  #hypertension  #hyperlipidemia  -Cardiac monitoring  -BP goal 200  -PRN Hydralazine/Labetalol, can order clevidipine if needed  -Rosuvastatin 20 mg     Resp  #Acute hypoxic respiratory failure requiring mechanical ventilation   #Hx Tobacco Use  #Pulmonary nodules  Oxygen/vent: CMV 18/500/5/30%  -Continuous pulse ox  -Maintain O2 saturations greater than 92%  -fungal sputum cultures ordered  -CXR on 7/10 revealed improved consolidation in LL lobe    GI  #MARIA T  Diet: TF,  mL q 1 hrs   Last BM: PTA  GI prophylaxis: IV Famotidine 20mg BID  Bowel regimen: scheduled senna-docusate and Miralax and prn milk of magnesia      Renal/  #Acute kidney injury, resolved  -Daily BMP  -IV fluids: Plasmalyte discontinued on 7/10.  -Electrolyte replacement protocols  -Increase  Q1h.  -Doxazosin 2 mg daily.      Endo  #DM2  #overweight  -Hgb A1c 6.1  -Monitor glucose levels  -TSH 2.13     Heme  #relative anemia   -Daily CBC  -Hgb goal >7, plt goal >50k  -Transfuse to meet Hgb and plt goals     ID  #febrile TMax 38.4 C  -Daily CBC  -Follow temperature curve  -Fully treated for Group A strep with penicillin G and ceftriaxone  -add on HIV test.  Negative.    ICU Checklist  Lines/tubes/drains: PIVs, arterial line, ETT, OGT, urinary catheter  FEN: NPO; Plasmalyte 125 mL/hr  PPX: DVT - SCDs, Lovenox 40mg daily; GI - Famotidine  Code: FULL CODE  Dispo: ICU - NCC    The patient was seen and discussed with the NCC attending, Dr. Kavon Iqbal.    Raymond Breen MD  PGY-2, Neurology  Neurocritical Care  *[phone]     24 Hour Vital Signs Summary:  Temp: 98.9  F (37.2  C) Temp  Min: 97.1  F (36.2  C)  Max: 99.5  F (37.5  C)  Resp: 19 Resp  Min: 13  Max: 25  SpO2: 94 % SpO2  Min: 92 %   "Max: 100 %  Pulse: 99 Pulse  Min: 73  Max: 111    No data recorded   No data recorded.  No data recorded.    Respiratory monitoring:   Vent Mode: CPAP/PS  (Continuous positive airway pressure with Pressure Support)  FiO2 (%): 30 %  PEEP (cm H2O): 5 cmH2O  Pressure Support (cm H2O): 5 cmH2O  Resp: 19    I/O last 3 completed shifts:  In: 2571.73 [I.V.:686.73; NG/GT:445]  Out: 3160 [Urine:3160]    Current Medications:    aspirin  81 mg Oral or Feeding Tube Daily     doxazosin  1 mg Oral Daily     enoxaparin ANTICOAGULANT  40 mg Subcutaneous Q24H     famotidine  20 mg Oral BID     magnesium hydroxide  30 mL Oral Daily     methylPREDNISolone  1,000 mg Intravenous Daily     multivitamins w/minerals  15 mL Per Feeding Tube Daily     polyethylene glycol  17 g Oral or Feeding Tube BID     protein modular  1 packet Per Feeding Tube Daily     rosuvastatin  20 mg Oral or Feeding Tube Daily     senna-docusate  2 tablet Oral or Feeding Tube BID     sodium chloride (PF)  3 mL Intracatheter Q8H     [START ON 7/14/2023] thiamine  100 mg Oral Daily     PRN Medications:  acetaminophen, fentaNYL, labetalol **OR** hydrALAZINE, naloxone **OR** naloxone **OR** naloxone **OR** naloxone, ondansetron **OR** ondansetron, sodium chloride (PF), sodium chloride    Infusions:    fentaNYL 50 mcg/hr (07/13/23 0758)     sodium chloride Stopped (07/11/23 2000)     No Known Allergies    Physical Examination:  Vitals: BP (!) 161/102 (BP Location: Left arm)   Pulse 99   Temp 98.9  F (37.2  C) (Axillary)   Resp 19   Ht 1.702 m (5' 7\")   Wt 83.9 kg (184 lb 15.5 oz)   SpO2 94%   BMI 28.97 kg/m    General: Adult male patient, lying in bed, critically-ill  HEENT: Normocephalic, atraumatic, no icterus, oral cavity/oropharynx pink and moist  Cardiac: RRR per bedside monitor   Pulm: unlabored, expansion symmetric, no retractions or use of accessory muscles, synchronous with mechanical ventilator  Abdomen: Soft, non-distended, bowel sounds " present  Extremities: Warm, no edema, well perfused  Skin: No rash or lesion  Psych: unable to assess  Neuro:  Mental status: largely unresponsive, unable to assess mentation or language   Cranial nerves: PERRL, face symmetric, tongue midline  Motor: Normal bulk and tone. No abnormal movements. Does not follow commands.  Sensory: unresponsive to nox stim x 4  Coordination: deferred.  Gait: ADAM, deferred.    Labs and Imaging:    Results for orders placed or performed during the hospital encounter of 07/06/23 (from the past 24 hour(s))   CBC with platelets   Result Value Ref Range    WBC Count 9.7 4.0 - 11.0 10e3/uL    RBC Count 4.70 4.40 - 5.90 10e6/uL    Hemoglobin 14.5 13.3 - 17.7 g/dL    Hematocrit 42.9 40.0 - 53.0 %    MCV 91 78 - 100 fL    MCH 30.9 26.5 - 33.0 pg    MCHC 33.8 31.5 - 36.5 g/dL    RDW 12.3 10.0 - 15.0 %    Platelet Count 299 150 - 450 10e3/uL   Basic metabolic panel   Result Value Ref Range    Sodium 141 136 - 145 mmol/L    Potassium 4.5 3.4 - 5.3 mmol/L    Chloride 104 98 - 107 mmol/L    Carbon Dioxide (CO2) 22 22 - 29 mmol/L    Anion Gap 15 7 - 15 mmol/L    Urea Nitrogen 22.0 (H) 6.0 - 20.0 mg/dL    Creatinine 0.66 (L) 0.67 - 1.17 mg/dL    Calcium 9.5 8.6 - 10.0 mg/dL    Glucose 186 (H) 70 - 99 mg/dL    GFR Estimate >90 >60 mL/min/1.73m2   Magnesium   Result Value Ref Range    Magnesium 2.3 1.7 - 2.3 mg/dL   Phosphorus   Result Value Ref Range    Phosphorus 4.3 2.5 - 4.5 mg/dL     All relevant imaging and laboratory values personally reviewed.

## 2023-07-13 NOTE — PROGRESS NOTES
Neurocritical Care Progress Note    Reason for critical care admission:   Admitting Team: Neurocritical care  Date of Service:  07/12/2023  Date of Admission:  7/6/2023  Hospital Day: 7    Assessment/Plan  Michael Stewart is a 52 year old male w/ PMHx past methamphetamine abuse and tobacco use who presents on 7/6/2023 as a transfer from [facility] after the discovery of bilateral thalamic strokes as well as Strep A pharyngitis. No acute interventions were performed. However, upon presentation to [facility], he was noted to have increased somnolence, bilateral ophthalmoplegia w/ nystagmus, and atypical breathing raising concern for alternate etiologies beyond stroke. Diagnostic possibilities include post-streptococcal acute demyelinating encephalomyelitis, other autoimmune processes, or viral meningitis.       Last 24hrs: No acute events.     Neuro  #acute ischemic stroke of bilateral thalami with involvement of posterior limb and left internal capsule, unclear etiology   -Neurochecks every 2 hrs; Assess pupils Q4h.  -SBP goal < 200 mmHg and DBP < 110 mmHg  -ASA 81mg daily   -IV thiamine Wernicke's encephalopathy protocol  -MRI Brain w/ and w/o contrast performed 07/08, no additional enhancing areas   -LP performed 07/08, opening Pressure: 16 cm H2O, 63 nucleated cells, protein of 42, glucose of 64, additional labs ordered on CSF   -Cytology abnormal for LP on 7/8.  On repeat LP on 7/11, with the primary intent being to achieve fresh cells to low flow cytometry to be conducted.   -So far during hospital course, patient CSF has not been consistent with results that would be expected in acute ischemic stroke.  Rather, CSF suggesting underlying subacute to chronic inflammatory or viral encephalitic process.  -Oligoclonal bands returned positive on 7/12.   -Will initiate trial of 1g methylprednisolone daily for 7 days to cover for possible autoimmune or inflammatory process that would be consistent with workup  thus far.   -HOB > 30   -PT/OT/SLP  -vEEG, diffuse encephalopathy, severe generalized slowing; now discontinued      #Analgesics & sedation  RASS goal: 0 to -1   -tylenol as needed  -fentanyl as needed  -Added Fentanyl infusion w/boluses on 7/10     CV  #hypertension  #hyperlipidemia  -Cardiac monitoring  -BP goal 200  -PRN Hydralazine/Labetalol, can order clevidipine if needed  -Rosuvastatin 20 mg     Resp  #Acute hypoxic respiratory failure requiring mechanical ventilation   #Hx Tobacco Use  #Pulmonary nodules  Oxygen/vent: CMV 18/500/5/30%  -Continuous pulse ox  -Maintain O2 saturations greater than 92%  -fungal sputum cultures ordered  -CXR on 7/10 revealed improved consolidation in LL lobe    GI  #MARIA T  Diet: TF,  mL q 1 hrs   Last BM: PTA  GI prophylaxis: IV Famotidine 20mg BID  Bowel regimen: PRN senna-docusate and Miralax     Renal/  #Acute kidney injury  -Daily BMP  -IV fluids: Plasmalyte discontinued on 7/10.  -Electrolyte replacement protocols  -Increase  Q1h.  -Doxazosin 2 mg daily.      Endo  #DM2  #overweight  -Hgb A1c 6.1  -Monitor glucose levels  -TSH 2.13     Heme  #relative anemia   -Daily CBC  -Hgb goal >7, plt goal >50k  -Transfuse to meet Hgb and plt goals     ID  #febrile TMax 38.4 C  -Daily CBC  -Follow temperature curve  -Fully treated for Group A strep with penicillin G and ceftriaxone  -add on HIV test.  Negative.    ICU Checklist  Lines/tubes/drains: PIVs, arterial line, ETT, OGT, urinary catheter  FEN: NPO; Plasmalyte 125 mL/hr  PPX: DVT - SCDs, Lovenox 40mg daily; GI - Famotidine  Code: FULL CODE  Dispo: ICU - NCC    The patient was seen and discussed with the NCC attending, Dr. Kavon Iqbal.    Raymond Breen MD  PGY-2, Neurology  Neurocritical Care  *[phone]     24 Hour Vital Signs Summary:  Temp: 98.7  F (37.1  C) Temp  Min: 98.7  F (37.1  C)  Max: 100.1  F (37.8  C)  Resp: 18 Resp  Min: 13  Max: 25  SpO2: 94 % SpO2  Min: 93 %  Max: 100 %  Pulse: 98 Pulse  Min: 73   "Max: 98    No data recorded  BP: (!) 161/102 Systolic (24hrs), Av , Min:161 , Max:161   Diastolic (24hrs), Av, Min:102, Max:102    Respiratory monitoring:   Vent Mode: CPAP/PS  (Continuous positive airway pressure with Pressure Support)  FiO2 (%): 30 %  Resp Rate (Set): 12 breaths/min  Tidal Volume (Set, mL): 500 mL  PEEP (cm H2O): 5 cmH2O  Pressure Support (cm H2O): 5 cmH2O  Resp: 18    I/O last 3 completed shifts:  In: 3273 [I.V.:928; NG/GT:905]  Out: 3775 [Urine:3775]    Current Medications:    aspirin  81 mg Oral or Feeding Tube Daily     doxazosin  1 mg Oral Daily     enoxaparin ANTICOAGULANT  40 mg Subcutaneous Q24H     famotidine  20 mg Oral BID     magnesium hydroxide  30 mL Oral Daily     methylPREDNISolone  1,000 mg Intravenous Daily     multivitamins w/minerals  15 mL Per Feeding Tube Daily     polyethylene glycol  17 g Oral or Feeding Tube BID     protein modular  1 packet Per Feeding Tube Daily     rosuvastatin  20 mg Oral or Feeding Tube Daily     senna-docusate  2 tablet Oral or Feeding Tube BID     sodium chloride (PF)  3 mL Intracatheter Q8H     thiamine  250 mg Intravenous Daily    Followed by     [START ON 2023] thiamine  100 mg Oral Daily     PRN Medications:  acetaminophen, fentaNYL, labetalol **OR** hydrALAZINE, naloxone **OR** naloxone **OR** naloxone **OR** naloxone, ondansetron **OR** ondansetron, sodium chloride (PF), sodium chloride    Infusions:    fentaNYL 75 mcg/hr (23 182)     sodium chloride Stopped (23)     No Known Allergies    Physical Examination:  Vitals: BP (!) 161/102 (BP Location: Left arm)   Pulse 98   Temp 98.7  F (37.1  C) (Axillary)   Resp 18   Ht 1.702 m (5' 7\")   Wt 84.4 kg (186 lb 1.1 oz)   SpO2 94%   BMI 29.14 kg/m    General: Adult male patient, lying in bed, critically-ill  HEENT: Normocephalic, atraumatic, no icterus, oral cavity/oropharynx pink and moist  Cardiac: RRR per bedside monitor   Pulm: unlabored, expansion " symmetric, no retractions or use of accessory muscles, synchronous with mechanical ventilator  Abdomen: Soft, non-distended, bowel sounds present  Extremities: Warm, no edema, well perfused  Skin: No rash or lesion  Psych: unable to assess  Neuro:  Mental status: largely unresponsive, unable to assess mentation or language   Cranial nerves: PERRL, face symmetric, tongue midline  Motor: Normal bulk and tone. No abnormal movements. Does not follow commands.  Sensory: unresponsive to nox stim x 4  Coordination: deferred.  Gait: ADAM, deferred.    Labs and Imaging:    Results for orders placed or performed during the hospital encounter of 07/06/23 (from the past 24 hour(s))   CBC with platelets   Result Value Ref Range    WBC Count 8.3 4.0 - 11.0 10e3/uL    RBC Count 4.31 (L) 4.40 - 5.90 10e6/uL    Hemoglobin 12.9 (L) 13.3 - 17.7 g/dL    Hematocrit 40.2 40.0 - 53.0 %    MCV 93 78 - 100 fL    MCH 29.9 26.5 - 33.0 pg    MCHC 32.1 31.5 - 36.5 g/dL    RDW 12.4 10.0 - 15.0 %    Platelet Count 251 150 - 450 10e3/uL   Basic metabolic panel   Result Value Ref Range    Sodium 140 136 - 145 mmol/L    Potassium 4.0 3.4 - 5.3 mmol/L    Chloride 106 98 - 107 mmol/L    Carbon Dioxide (CO2) 22 22 - 29 mmol/L    Anion Gap 12 7 - 15 mmol/L    Urea Nitrogen 19.2 6.0 - 20.0 mg/dL    Creatinine 0.76 0.67 - 1.17 mg/dL    Calcium 9.1 8.6 - 10.0 mg/dL    Glucose 146 (H) 70 - 99 mg/dL    GFR Estimate >90 >60 mL/min/1.73m2   Magnesium   Result Value Ref Range    Magnesium 2.0 1.7 - 2.3 mg/dL   Phosphorus   Result Value Ref Range    Phosphorus 4.0 2.5 - 4.5 mg/dL   TSH   Result Value Ref Range    TSH 3.98 0.30 - 4.20 uIU/mL   T4 free   Result Value Ref Range    Free T4 1.05 0.90 - 1.70 ng/dL   T3 Free   Result Value Ref Range    T3 Free 2.1 2.0 - 4.4 pg/mL     All relevant imaging and laboratory values personally reviewed.

## 2023-07-14 ENCOUNTER — APPOINTMENT (OUTPATIENT)
Dept: GENERAL RADIOLOGY | Facility: CLINIC | Age: 53
End: 2023-07-14
Payer: COMMERCIAL

## 2023-07-14 ENCOUNTER — APPOINTMENT (OUTPATIENT)
Dept: MRI IMAGING | Facility: CLINIC | Age: 53
End: 2023-07-14
Attending: NURSE PRACTITIONER
Payer: COMMERCIAL

## 2023-07-14 LAB
ALBUMIN SERPL BCG-MCNC: 3.9 G/DL (ref 3.5–5.2)
ALLEN'S TEST: ABNORMAL
ALP SERPL-CCNC: 87 U/L (ref 40–129)
ALT SERPL W P-5'-P-CCNC: 61 U/L (ref 0–70)
ANION GAP SERPL CALCULATED.3IONS-SCNC: 14 MMOL/L (ref 7–15)
ANION GAP SERPL CALCULATED.3IONS-SCNC: 14 MMOL/L (ref 7–15)
AST SERPL W P-5'-P-CCNC: 28 U/L (ref 0–45)
BASE EXCESS BLDA CALC-SCNC: 1.8 MMOL/L (ref -9–1.8)
BILIRUB SERPL-MCNC: 0.2 MG/DL
BUN SERPL-MCNC: 35.5 MG/DL (ref 6–20)
BUN SERPL-MCNC: 40 MG/DL (ref 6–20)
CALCIUM SERPL-MCNC: 9.1 MG/DL (ref 8.6–10)
CALCIUM SERPL-MCNC: 9.2 MG/DL (ref 8.6–10)
CHLORIDE SERPL-SCNC: 110 MMOL/L (ref 98–107)
CHLORIDE SERPL-SCNC: 112 MMOL/L (ref 98–107)
CREAT SERPL-MCNC: 0.74 MG/DL (ref 0.67–1.17)
CREAT SERPL-MCNC: 0.79 MG/DL (ref 0.67–1.17)
DEPRECATED HCO3 PLAS-SCNC: 20 MMOL/L (ref 22–29)
DEPRECATED HCO3 PLAS-SCNC: 22 MMOL/L (ref 22–29)
ERYTHROCYTE [DISTWIDTH] IN BLOOD BY AUTOMATED COUNT: 12.5 % (ref 10–15)
ERYTHROCYTE [DISTWIDTH] IN BLOOD BY AUTOMATED COUNT: 12.6 % (ref 10–15)
GFR SERPL CREATININE-BSD FRML MDRD: >90 ML/MIN/1.73M2
GFR SERPL CREATININE-BSD FRML MDRD: >90 ML/MIN/1.73M2
GLUCOSE BLDC GLUCOMTR-MCNC: 155 MG/DL (ref 70–99)
GLUCOSE BLDC GLUCOMTR-MCNC: 201 MG/DL (ref 70–99)
GLUCOSE BLDC GLUCOMTR-MCNC: 206 MG/DL (ref 70–99)
GLUCOSE BLDC GLUCOMTR-MCNC: 227 MG/DL (ref 70–99)
GLUCOSE BLDC GLUCOMTR-MCNC: 269 MG/DL (ref 70–99)
GLUCOSE BLDC GLUCOMTR-MCNC: 321 MG/DL (ref 70–99)
GLUCOSE SERPL-MCNC: 342 MG/DL (ref 70–99)
GLUCOSE SERPL-MCNC: 352 MG/DL (ref 70–99)
HCO3 BLD-SCNC: 25 MMOL/L (ref 21–28)
HCT VFR BLD AUTO: 41 % (ref 40–53)
HCT VFR BLD AUTO: 41.5 % (ref 40–53)
HGB BLD-MCNC: 13.3 G/DL (ref 13.3–17.7)
HGB BLD-MCNC: 13.3 G/DL (ref 13.3–17.7)
MAGNESIUM SERPL-MCNC: 2.5 MG/DL (ref 1.7–2.3)
MCH RBC QN AUTO: 30.2 PG (ref 26.5–33)
MCH RBC QN AUTO: 30.3 PG (ref 26.5–33)
MCHC RBC AUTO-ENTMCNC: 32 G/DL (ref 31.5–36.5)
MCHC RBC AUTO-ENTMCNC: 32.4 G/DL (ref 31.5–36.5)
MCV RBC AUTO: 93 FL (ref 78–100)
MCV RBC AUTO: 95 FL (ref 78–100)
O2/TOTAL GAS SETTING VFR VENT: 50 %
PCO2 BLD: 35 MM HG (ref 35–45)
PH BLD: 7.47 [PH] (ref 7.35–7.45)
PHOSPHATE SERPL-MCNC: 2.4 MG/DL (ref 2.5–4.5)
PLATELET # BLD AUTO: 367 10E3/UL (ref 150–450)
PLATELET # BLD AUTO: 395 10E3/UL (ref 150–450)
PO2 BLD: 80 MM HG (ref 80–105)
POTASSIUM SERPL-SCNC: 4.5 MMOL/L (ref 3.4–5.3)
POTASSIUM SERPL-SCNC: 4.8 MMOL/L (ref 3.4–5.3)
PROT SERPL-MCNC: 6.7 G/DL (ref 6.4–8.3)
RBC # BLD AUTO: 4.39 10E6/UL (ref 4.4–5.9)
RBC # BLD AUTO: 4.41 10E6/UL (ref 4.4–5.9)
SODIUM SERPL-SCNC: 144 MMOL/L (ref 136–145)
SODIUM SERPL-SCNC: 148 MMOL/L (ref 136–145)
WBC # BLD AUTO: 11.1 10E3/UL (ref 4–11)
WBC # BLD AUTO: 15.2 10E3/UL (ref 4–11)

## 2023-07-14 PROCEDURE — 250N000013 HC RX MED GY IP 250 OP 250 PS 637

## 2023-07-14 PROCEDURE — 83735 ASSAY OF MAGNESIUM: CPT | Performed by: STUDENT IN AN ORGANIZED HEALTH CARE EDUCATION/TRAINING PROGRAM

## 2023-07-14 PROCEDURE — 250N000011 HC RX IP 250 OP 636: Performed by: STUDENT IN AN ORGANIZED HEALTH CARE EDUCATION/TRAINING PROGRAM

## 2023-07-14 PROCEDURE — 99291 CRITICAL CARE FIRST HOUR: CPT | Mod: GC | Performed by: PSYCHIATRY & NEUROLOGY

## 2023-07-14 PROCEDURE — 87205 SMEAR GRAM STAIN: CPT

## 2023-07-14 PROCEDURE — 84100 ASSAY OF PHOSPHORUS: CPT | Performed by: STUDENT IN AN ORGANIZED HEALTH CARE EDUCATION/TRAINING PROGRAM

## 2023-07-14 PROCEDURE — 250N000011 HC RX IP 250 OP 636: Performed by: NURSE PRACTITIONER

## 2023-07-14 PROCEDURE — 258N000003 HC RX IP 258 OP 636

## 2023-07-14 PROCEDURE — 200N000002 HC R&B ICU UMMC

## 2023-07-14 PROCEDURE — 999N000185 HC STATISTIC TRANSPORT TIME EA 15 MIN

## 2023-07-14 PROCEDURE — 250N000009 HC RX 250: Performed by: PSYCHIATRY & NEUROLOGY

## 2023-07-14 PROCEDURE — 85027 COMPLETE CBC AUTOMATED: CPT | Performed by: STUDENT IN AN ORGANIZED HEALTH CARE EDUCATION/TRAINING PROGRAM

## 2023-07-14 PROCEDURE — 250N000013 HC RX MED GY IP 250 OP 250 PS 637: Performed by: NURSE PRACTITIONER

## 2023-07-14 PROCEDURE — 250N000012 HC RX MED GY IP 250 OP 636 PS 637

## 2023-07-14 PROCEDURE — 70551 MRI BRAIN STEM W/O DYE: CPT

## 2023-07-14 PROCEDURE — 71045 X-RAY EXAM CHEST 1 VIEW: CPT | Mod: 26 | Performed by: RADIOLOGY

## 2023-07-14 PROCEDURE — 71045 X-RAY EXAM CHEST 1 VIEW: CPT

## 2023-07-14 PROCEDURE — 85027 COMPLETE CBC AUTOMATED: CPT | Performed by: PSYCHIATRY & NEUROLOGY

## 2023-07-14 PROCEDURE — 250N000011 HC RX IP 250 OP 636

## 2023-07-14 PROCEDURE — 999N000157 HC STATISTIC RCP TIME EA 10 MIN

## 2023-07-14 PROCEDURE — 250N000013 HC RX MED GY IP 250 OP 250 PS 637: Performed by: STUDENT IN AN ORGANIZED HEALTH CARE EDUCATION/TRAINING PROGRAM

## 2023-07-14 PROCEDURE — 82803 BLOOD GASES ANY COMBINATION: CPT

## 2023-07-14 PROCEDURE — 999N000253 HC STATISTIC WEANING TRIALS

## 2023-07-14 PROCEDURE — 87077 CULTURE AEROBIC IDENTIFY: CPT

## 2023-07-14 PROCEDURE — 80053 COMPREHEN METABOLIC PANEL: CPT | Performed by: STUDENT IN AN ORGANIZED HEALTH CARE EDUCATION/TRAINING PROGRAM

## 2023-07-14 PROCEDURE — 258N000003 HC RX IP 258 OP 636: Performed by: PSYCHIATRY & NEUROLOGY

## 2023-07-14 PROCEDURE — 250N000009 HC RX 250

## 2023-07-14 PROCEDURE — 250N000013 HC RX MED GY IP 250 OP 250 PS 637: Performed by: PSYCHIATRY & NEUROLOGY

## 2023-07-14 PROCEDURE — 94003 VENT MGMT INPAT SUBQ DAY: CPT

## 2023-07-14 PROCEDURE — 70551 MRI BRAIN STEM W/O DYE: CPT | Mod: 26 | Performed by: RADIOLOGY

## 2023-07-14 RX ORDER — FENTANYL CITRATE 50 UG/ML
25-50 INJECTION, SOLUTION INTRAMUSCULAR; INTRAVENOUS
Status: DISCONTINUED | OUTPATIENT
Start: 2023-07-14 | End: 2023-07-15

## 2023-07-14 RX ORDER — NICOTINE POLACRILEX 4 MG
15-30 LOZENGE BUCCAL
Status: DISCONTINUED | OUTPATIENT
Start: 2023-07-14 | End: 2023-07-15

## 2023-07-14 RX ORDER — POTASSIUM PHOS IN 0.9 % NACL 15MMOL/250
15 PLASTIC BAG, INJECTION (ML) INTRAVENOUS ONCE
Status: COMPLETED | OUTPATIENT
Start: 2023-07-14 | End: 2023-07-14

## 2023-07-14 RX ORDER — SODIUM CHLORIDE, SODIUM GLUCONATE, SODIUM ACETATE, POTASSIUM CHLORIDE AND MAGNESIUM CHLORIDE 526; 502; 368; 37; 30 MG/100ML; MG/100ML; MG/100ML; MG/100ML; MG/100ML
1000 INJECTION, SOLUTION INTRAVENOUS ONCE
Status: COMPLETED | OUTPATIENT
Start: 2023-07-14 | End: 2023-07-14

## 2023-07-14 RX ORDER — DEXTROSE MONOHYDRATE 25 G/50ML
25-50 INJECTION, SOLUTION INTRAVENOUS
Status: DISCONTINUED | OUTPATIENT
Start: 2023-07-14 | End: 2023-07-15

## 2023-07-14 RX ADMIN — Medication 1 MG: at 21:29

## 2023-07-14 RX ADMIN — POTASSIUM PHOSPHATE, MONOBASIC POTASSIUM PHOSPHATE, DIBASIC 15 MMOL: 224; 236 INJECTION, SOLUTION, CONCENTRATE INTRAVENOUS at 06:10

## 2023-07-14 RX ADMIN — Medication 15 ML: at 08:09

## 2023-07-14 RX ADMIN — SODIUM CHLORIDE 1000 MG: 9 INJECTION, SOLUTION INTRAVENOUS at 08:12

## 2023-07-14 RX ADMIN — FAMOTIDINE 20 MG: 40 POWDER, FOR SUSPENSION ORAL at 21:30

## 2023-07-14 RX ADMIN — FENTANYL CITRATE 25 MCG: 50 INJECTION, SOLUTION INTRAMUSCULAR; INTRAVENOUS at 16:52

## 2023-07-14 RX ADMIN — SENNOSIDES AND DOCUSATE SODIUM 2 TABLET: 50; 8.6 TABLET ORAL at 08:09

## 2023-07-14 RX ADMIN — FENTANYL CITRATE 25 MCG: 50 INJECTION, SOLUTION INTRAMUSCULAR; INTRAVENOUS at 19:34

## 2023-07-14 RX ADMIN — Medication 50 MCG/HR: at 03:46

## 2023-07-14 RX ADMIN — THIAMINE HCL TAB 100 MG 100 MG: 100 TAB at 08:09

## 2023-07-14 RX ADMIN — FAMOTIDINE 20 MG: 40 POWDER, FOR SUSPENSION ORAL at 08:12

## 2023-07-14 RX ADMIN — ASPIRIN 81 MG CHEWABLE TABLET 81 MG: 81 TABLET CHEWABLE at 08:09

## 2023-07-14 RX ADMIN — LABETALOL HYDROCHLORIDE 20 MG: 5 INJECTION, SOLUTION INTRAVENOUS at 19:46

## 2023-07-14 RX ADMIN — SODIUM CHLORIDE, SODIUM GLUCONATE, SODIUM ACETATE, POTASSIUM CHLORIDE AND MAGNESIUM CHLORIDE 1000 ML: 526; 502; 368; 37; 30 INJECTION, SOLUTION INTRAVENOUS at 17:55

## 2023-07-14 RX ADMIN — INSULIN ASPART 1 UNITS: 100 INJECTION, SOLUTION INTRAVENOUS; SUBCUTANEOUS at 08:30

## 2023-07-14 RX ADMIN — ENOXAPARIN SODIUM 40 MG: 40 INJECTION SUBCUTANEOUS at 09:59

## 2023-07-14 RX ADMIN — ROSUVASTATIN CALCIUM 20 MG: 20 TABLET, FILM COATED ORAL at 08:09

## 2023-07-14 RX ADMIN — POLYETHYLENE GLYCOL 3350 17 G: 17 POWDER, FOR SOLUTION ORAL at 08:09

## 2023-07-14 ASSESSMENT — VISUAL ACUITY
OU: NOT TESTABLE

## 2023-07-14 ASSESSMENT — ACTIVITIES OF DAILY LIVING (ADL)
ADLS_ACUITY_SCORE: 47

## 2023-07-14 NOTE — PLAN OF CARE
ICU End of Shift Summary. See flowsheets for vital signs and detailed assessment. Handoff report given to oncoming RN.     Major Events: Continues to be RASS -4, extends to pain in uppers, no response to stimuli in lowers. Pupils deviated up and out, team aware- MRI ordered. Fentanyl gtt stopped, fentanyl bump given x1. Sinus tachycardia worsening throughout the day- team aware- 1L plasmalyte bolus given. BP maintained without intervention. PS 5/5 majority of day, switched to CMV settings d/t desat episode. Now requiring increase in PEEP to 10 and FiO2 to 80%. Thick ETT secretions. Loose, watery BMs, rectal pouch replaced. Mcdaniel in place, good urine output.     Plan: MRI to be completed. Continue with plan of care, alert team of changes.

## 2023-07-14 NOTE — PROGRESS NOTES
CLINICAL NUTRITION SERVICES - REASSESSMENT NOTE     Nutrition Prescription    Malnutrition Status:    Patient does not meet two of the established criteria necessary for diagnosing malnutrition    Recommendations already ordered by Registered Dietitian (RD):  Continue goal EN via NGT    Future/Additional Recommendations:  - Monitor ongoing tolerance to EN via NGT  - Stool and weight trends     EVALUATION OF THE PROGRESS TOWARD GOALS   Diet: NPO  Nutrition Support via NGT: Since 7/9 - Osmolite 1.5 Baldev (or equivalent) @ goal of  60ml/hr  (1440ml/day) + Prosource TF 20 modulars, 1 pkt daily, provides: 2240 kcals, 110 g PRO, 1097 ml free H20, 293 g CHO, and 0 g fiber daily     Intake:   Pt NPO x first 3 days of admission, EN initiated 7/9 and reached goal 7/11.   7 day average intake of 765 ml + 0.71 pkts prosource TF 20 provided 1203 kcals (15 kcal/kg or 57% kcal needs), 62 g protein (0.8gm/kg or 83% protein needs)     NEW FINDINGS   Weight: 83.4 kg, up from admit, likely fluid related.  Continue to use dosing weight of  79 kg (admit / driest) weight    Labs: Phos 2.4 (L), previously WNL.  High electrolyte replacement protocol ordered    Meds:   Reviewed and notable for:  High resistance novolog  IV solumedrol  MVI with minerals  Scheduled miralax BID and senna  Thiamine 100 mg    GI: several days without BM - now with multiple    Skin: NGT with FTAD in place, no skin breakdown    Respiratory: Intubated    MALNUTRITION  % Intake: < 75% for > 7 days (moderate)  % Weight Loss: None noted, confounded by fluid  Subcutaneous Fat Loss: None observed  Muscle Loss: None observed  Fluid Accumulation/Edema: Does not meet criteria  Malnutrition Diagnosis: Patient does not meet two of the established criteria necessary for diagnosing malnutrition    Previous Goals   Total avg nutritional intake to meet a minimum of 25 kcal/kg and 1.2 g PRO/kg daily (per dosing wt 79 kg).  Evaluation: Not met, with EN initiated day 3 and advanced  to goal by day 5    Previous Nutrition Diagnosis  Inadequate protein-energy intake related to NPO, intubated as evidenced by NPO x 3-4 days    Evaluation: No longer applicable, nutrition diagnosis changed below    CURRENT NUTRITION DIAGNOSIS  Inadequate oral intake related to NPO with mechanical ventilation as evidenced by reliant on EN via NGT to meet 100% nutrition needs      INTERVENTIONS  Implementation  Enteral Nutrition - Continue    Goals  Total avg nutritional intake to meet a minimum of 25 kcal/kg and 1.2 g PRO/kg daily (per dosing wt 79 kg).    Monitoring/Evaluation  Progress toward goals will be monitored and evaluated per protocol.    Georgia Hanson, RD, LD, [facility]  Neuro ICU RD pager [phone]  Ascom number [id number]  Weekend RD pager [phone]

## 2023-07-14 NOTE — PROGRESS NOTES
ICU End of Shift Summary. See flowsheets for vital signs and detailed assessment.    Changes this shift: Continues to be RASS -4, grimaces/moves head with oral care, and some extension in all 4 extremities to pain, neurocrit aware. Hypertensive most of the night, SBP 180s-190s. Continues to have very thick ETT secretions, w/ intermittent desatting needing more frequent suctioning. Sputum sample ordered. 1 small bowel movement, rectal pouch placed. Blood sugars elevated, sliding scale insulin ordered. Phos replaced per protocol.    Plan: Continue q2hr neuro checks, notify team of changes.

## 2023-07-14 NOTE — PROGRESS NOTES
ICU End of Shift Summary. See flowsheets for vital signs and detailed assessment.    Changes this shift: Continues to be RASS -4 with minimal response to stimuli. Tmax 100.1.  Fentanyl gtt 50mcg/hr, no boluses required. Continues on 5/5 PST, FiO2 increased to 40% to maintain sats 90-92%. Thick ETT secretions, frequent PO and nasal secretions. Small watery BM.     Plan: Continue with plan of care.

## 2023-07-15 ENCOUNTER — APPOINTMENT (OUTPATIENT)
Dept: GENERAL RADIOLOGY | Facility: CLINIC | Age: 53
End: 2023-07-15
Attending: NURSE PRACTITIONER
Payer: COMMERCIAL

## 2023-07-15 ENCOUNTER — APPOINTMENT (OUTPATIENT)
Dept: CT IMAGING | Facility: CLINIC | Age: 53
End: 2023-07-15
Attending: NURSE PRACTITIONER
Payer: COMMERCIAL

## 2023-07-15 ENCOUNTER — APPOINTMENT (OUTPATIENT)
Dept: CT IMAGING | Facility: CLINIC | Age: 53
End: 2023-07-15
Payer: COMMERCIAL

## 2023-07-15 LAB
ALBUMIN SERPL BCG-MCNC: 3.5 G/DL (ref 3.5–5.2)
ALBUMIN UR-MCNC: NEGATIVE MG/DL
ALP SERPL-CCNC: 72 U/L (ref 40–129)
ALT SERPL W P-5'-P-CCNC: 61 U/L (ref 0–70)
ANION GAP SERPL CALCULATED.3IONS-SCNC: 15 MMOL/L (ref 7–15)
APPEARANCE UR: CLEAR
AST SERPL W P-5'-P-CCNC: 27 U/L (ref 0–45)
BACTERIA BLD CULT: NO GROWTH
BACTERIA BLD CULT: NO GROWTH
BILIRUB SERPL-MCNC: <0.2 MG/DL
BILIRUB UR QL STRIP: NEGATIVE
BUN SERPL-MCNC: 39.4 MG/DL (ref 6–20)
CALCIUM SERPL-MCNC: 8.7 MG/DL (ref 8.6–10)
CHLORIDE SERPL-SCNC: 113 MMOL/L (ref 98–107)
COLOR UR AUTO: ABNORMAL
CREAT SERPL-MCNC: 0.83 MG/DL (ref 0.67–1.17)
DEPRECATED HCO3 PLAS-SCNC: 22 MMOL/L (ref 22–29)
ERYTHROCYTE [DISTWIDTH] IN BLOOD BY AUTOMATED COUNT: 12.8 % (ref 10–15)
GFR SERPL CREATININE-BSD FRML MDRD: >90 ML/MIN/1.73M2
GLUCOSE BLDC GLUCOMTR-MCNC: 136 MG/DL (ref 70–99)
GLUCOSE BLDC GLUCOMTR-MCNC: 146 MG/DL (ref 70–99)
GLUCOSE BLDC GLUCOMTR-MCNC: 173 MG/DL (ref 70–99)
GLUCOSE BLDC GLUCOMTR-MCNC: 189 MG/DL (ref 70–99)
GLUCOSE BLDC GLUCOMTR-MCNC: 191 MG/DL (ref 70–99)
GLUCOSE BLDC GLUCOMTR-MCNC: 195 MG/DL (ref 70–99)
GLUCOSE BLDC GLUCOMTR-MCNC: 206 MG/DL (ref 70–99)
GLUCOSE BLDC GLUCOMTR-MCNC: 242 MG/DL (ref 70–99)
GLUCOSE BLDC GLUCOMTR-MCNC: 253 MG/DL (ref 70–99)
GLUCOSE BLDC GLUCOMTR-MCNC: 259 MG/DL (ref 70–99)
GLUCOSE BLDC GLUCOMTR-MCNC: 263 MG/DL (ref 70–99)
GLUCOSE BLDC GLUCOMTR-MCNC: 287 MG/DL (ref 70–99)
GLUCOSE SERPL-MCNC: 296 MG/DL (ref 70–99)
GLUCOSE UR STRIP-MCNC: >=1000 MG/DL
HCT VFR BLD AUTO: 37.7 % (ref 40–53)
HGB BLD-MCNC: 12.3 G/DL (ref 13.3–17.7)
HGB UR QL STRIP: ABNORMAL
KETONES UR STRIP-MCNC: NEGATIVE MG/DL
LACTATE SERPL-SCNC: 3.5 MMOL/L (ref 0.7–2)
LEUKOCYTE ESTERASE UR QL STRIP: NEGATIVE
MAGNESIUM SERPL-MCNC: 2.7 MG/DL (ref 1.7–2.3)
MCH RBC QN AUTO: 31.1 PG (ref 26.5–33)
MCHC RBC AUTO-ENTMCNC: 32.6 G/DL (ref 31.5–36.5)
MCV RBC AUTO: 95 FL (ref 78–100)
MISCELLANEOUS TEST 1 (ARUP): NORMAL
MRSA DNA SPEC QL NAA+PROBE: NEGATIVE
MUCOUS THREADS #/AREA URNS LPF: PRESENT /LPF
NITRATE UR QL: NEGATIVE
PH UR STRIP: 5.5 [PH] (ref 5–7)
PHOSPHATE SERPL-MCNC: 2.2 MG/DL (ref 2.5–4.5)
PLATELET # BLD AUTO: 360 10E3/UL (ref 150–450)
POTASSIUM SERPL-SCNC: 4.8 MMOL/L (ref 3.4–5.3)
PROT SERPL-MCNC: 6.1 G/DL (ref 6.4–8.3)
RBC # BLD AUTO: 3.96 10E6/UL (ref 4.4–5.9)
RBC URINE: 86 /HPF
SA TARGET DNA: POSITIVE
SODIUM SERPL-SCNC: 150 MMOL/L (ref 136–145)
SP GR UR STRIP: 1.03 (ref 1–1.03)
TRANSITIONAL EPI: <1 /HPF
UROBILINOGEN UR STRIP-MCNC: NORMAL MG/DL
WBC # BLD AUTO: 10 10E3/UL (ref 4–11)
WBC URINE: 1 /HPF

## 2023-07-15 PROCEDURE — 250N000011 HC RX IP 250 OP 636: Performed by: PSYCHIATRY & NEUROLOGY

## 2023-07-15 PROCEDURE — 70486 CT MAXILLOFACIAL W/O DYE: CPT | Mod: 26 | Performed by: RADIOLOGY

## 2023-07-15 PROCEDURE — 272N000473 HC KIT, VPS RHYTHM STYLET

## 2023-07-15 PROCEDURE — 99291 CRITICAL CARE FIRST HOUR: CPT | Mod: GC | Performed by: PSYCHIATRY & NEUROLOGY

## 2023-07-15 PROCEDURE — 258N000003 HC RX IP 258 OP 636

## 2023-07-15 PROCEDURE — 70498 CT ANGIOGRAPHY NECK: CPT | Mod: 26 | Performed by: RADIOLOGY

## 2023-07-15 PROCEDURE — 87641 MR-STAPH DNA AMP PROBE: CPT

## 2023-07-15 PROCEDURE — 36415 COLL VENOUS BLD VENIPUNCTURE: CPT | Performed by: PSYCHIATRY & NEUROLOGY

## 2023-07-15 PROCEDURE — 999N000157 HC STATISTIC RCP TIME EA 10 MIN

## 2023-07-15 PROCEDURE — 250N000009 HC RX 250: Performed by: NURSE PRACTITIONER

## 2023-07-15 PROCEDURE — 70496 CT ANGIOGRAPHY HEAD: CPT

## 2023-07-15 PROCEDURE — 250N000013 HC RX MED GY IP 250 OP 250 PS 637

## 2023-07-15 PROCEDURE — 83735 ASSAY OF MAGNESIUM: CPT | Performed by: STUDENT IN AN ORGANIZED HEALTH CARE EDUCATION/TRAINING PROGRAM

## 2023-07-15 PROCEDURE — 70496 CT ANGIOGRAPHY HEAD: CPT | Mod: 26 | Performed by: RADIOLOGY

## 2023-07-15 PROCEDURE — 999N000185 HC STATISTIC TRANSPORT TIME EA 15 MIN

## 2023-07-15 PROCEDURE — 71045 X-RAY EXAM CHEST 1 VIEW: CPT | Mod: 26 | Performed by: STUDENT IN AN ORGANIZED HEALTH CARE EDUCATION/TRAINING PROGRAM

## 2023-07-15 PROCEDURE — 80053 COMPREHEN METABOLIC PANEL: CPT

## 2023-07-15 PROCEDURE — 70450 CT HEAD/BRAIN W/O DYE: CPT

## 2023-07-15 PROCEDURE — 70498 CT ANGIOGRAPHY NECK: CPT

## 2023-07-15 PROCEDURE — 36569 INSJ PICC 5 YR+ W/O IMAGING: CPT

## 2023-07-15 PROCEDURE — 250N000013 HC RX MED GY IP 250 OP 250 PS 637: Performed by: PSYCHIATRY & NEUROLOGY

## 2023-07-15 PROCEDURE — 250N000009 HC RX 250

## 2023-07-15 PROCEDURE — 84100 ASSAY OF PHOSPHORUS: CPT | Performed by: STUDENT IN AN ORGANIZED HEALTH CARE EDUCATION/TRAINING PROGRAM

## 2023-07-15 PROCEDURE — 250N000013 HC RX MED GY IP 250 OP 250 PS 637: Performed by: NURSE PRACTITIONER

## 2023-07-15 PROCEDURE — 272N000451 HC KIT SHRLOCK 5FR POWER PICC DOUBLE LUMEN

## 2023-07-15 PROCEDURE — 250N000011 HC RX IP 250 OP 636: Performed by: NURSE PRACTITIONER

## 2023-07-15 PROCEDURE — 250N000011 HC RX IP 250 OP 636

## 2023-07-15 PROCEDURE — 83605 ASSAY OF LACTIC ACID: CPT

## 2023-07-15 PROCEDURE — 999N000065 XR CHEST PORT 1 VIEW

## 2023-07-15 PROCEDURE — 70486 CT MAXILLOFACIAL W/O DYE: CPT

## 2023-07-15 PROCEDURE — 87040 BLOOD CULTURE FOR BACTERIA: CPT | Performed by: PSYCHIATRY & NEUROLOGY

## 2023-07-15 PROCEDURE — 200N000002 HC R&B ICU UMMC

## 2023-07-15 PROCEDURE — 250N000011 HC RX IP 250 OP 636: Mod: JZ | Performed by: STUDENT IN AN ORGANIZED HEALTH CARE EDUCATION/TRAINING PROGRAM

## 2023-07-15 PROCEDURE — 94003 VENT MGMT INPAT SUBQ DAY: CPT

## 2023-07-15 PROCEDURE — 85027 COMPLETE CBC AUTOMATED: CPT | Performed by: STUDENT IN AN ORGANIZED HEALTH CARE EDUCATION/TRAINING PROGRAM

## 2023-07-15 PROCEDURE — 999N000128 HC STATISTIC PERIPHERAL IV START W/O US GUIDANCE

## 2023-07-15 PROCEDURE — 250N000013 HC RX MED GY IP 250 OP 250 PS 637: Performed by: STUDENT IN AN ORGANIZED HEALTH CARE EDUCATION/TRAINING PROGRAM

## 2023-07-15 PROCEDURE — 999N000015 HC STATISTIC ARTERIAL MONITORING DAILY

## 2023-07-15 PROCEDURE — 258N000003 HC RX IP 258 OP 636: Performed by: PSYCHIATRY & NEUROLOGY

## 2023-07-15 PROCEDURE — 70450 CT HEAD/BRAIN W/O DYE: CPT | Mod: 26 | Performed by: RADIOLOGY

## 2023-07-15 PROCEDURE — 81001 URINALYSIS AUTO W/SCOPE: CPT

## 2023-07-15 RX ORDER — CEFEPIME HYDROCHLORIDE 2 G/1
2 INJECTION, POWDER, FOR SOLUTION INTRAVENOUS EVERY 8 HOURS
Status: DISCONTINUED | OUTPATIENT
Start: 2023-07-15 | End: 2023-07-16

## 2023-07-15 RX ORDER — DEXMEDETOMIDINE HYDROCHLORIDE 4 UG/ML
.1-1.2 INJECTION, SOLUTION INTRAVENOUS CONTINUOUS
Status: DISCONTINUED | OUTPATIENT
Start: 2023-07-15 | End: 2023-07-16

## 2023-07-15 RX ORDER — LABETALOL HYDROCHLORIDE 5 MG/ML
10-20 INJECTION, SOLUTION INTRAVENOUS EVERY 10 MIN PRN
Status: DISCONTINUED | OUTPATIENT
Start: 2023-07-15 | End: 2023-07-24

## 2023-07-15 RX ORDER — HYDRALAZINE HYDROCHLORIDE 20 MG/ML
10-20 INJECTION INTRAMUSCULAR; INTRAVENOUS
Status: DISCONTINUED | OUTPATIENT
Start: 2023-07-15 | End: 2023-07-24

## 2023-07-15 RX ORDER — NICOTINE POLACRILEX 4 MG
15-30 LOZENGE BUCCAL
Status: DISCONTINUED | OUTPATIENT
Start: 2023-07-15 | End: 2023-07-22

## 2023-07-15 RX ORDER — IOPAMIDOL 755 MG/ML
75 INJECTION, SOLUTION INTRAVASCULAR ONCE
Status: COMPLETED | OUTPATIENT
Start: 2023-07-15 | End: 2023-07-15

## 2023-07-15 RX ORDER — METHYLPHENIDATE HYDROCHLORIDE 5 MG/1
5 TABLET ORAL 2 TIMES DAILY
Status: DISCONTINUED | OUTPATIENT
Start: 2023-07-15 | End: 2023-07-16

## 2023-07-15 RX ORDER — HEPARIN SODIUM,PORCINE 10 UNIT/ML
5-20 VIAL (ML) INTRAVENOUS EVERY 24 HOURS
Status: DISCONTINUED | OUTPATIENT
Start: 2023-07-15 | End: 2023-07-26 | Stop reason: HOSPADM

## 2023-07-15 RX ORDER — DEXTROSE MONOHYDRATE 25 G/50ML
25-50 INJECTION, SOLUTION INTRAVENOUS
Status: DISCONTINUED | OUTPATIENT
Start: 2023-07-15 | End: 2023-07-22

## 2023-07-15 RX ORDER — FENTANYL CITRATE 50 UG/ML
50-100 INJECTION, SOLUTION INTRAMUSCULAR; INTRAVENOUS
Status: DISCONTINUED | OUTPATIENT
Start: 2023-07-15 | End: 2023-07-23

## 2023-07-15 RX ORDER — SODIUM CHLORIDE, SODIUM GLUCONATE, SODIUM ACETATE, POTASSIUM CHLORIDE AND MAGNESIUM CHLORIDE 526; 502; 368; 37; 30 MG/100ML; MG/100ML; MG/100ML; MG/100ML; MG/100ML
1000 INJECTION, SOLUTION INTRAVENOUS ONCE
Status: CANCELLED | OUTPATIENT
Start: 2023-07-15 | End: 2023-07-15

## 2023-07-15 RX ORDER — AMOXICILLIN 250 MG
1-2 CAPSULE ORAL 2 TIMES DAILY
Status: DISCONTINUED | OUTPATIENT
Start: 2023-07-15 | End: 2023-07-26 | Stop reason: HOSPADM

## 2023-07-15 RX ORDER — LIDOCAINE 40 MG/G
CREAM TOPICAL
Status: ACTIVE | OUTPATIENT
Start: 2023-07-15 | End: 2023-07-18

## 2023-07-15 RX ORDER — POLYETHYLENE GLYCOL 3350 17 G/17G
17 POWDER, FOR SOLUTION ORAL DAILY PRN
Status: DISCONTINUED | OUTPATIENT
Start: 2023-07-15 | End: 2023-07-26 | Stop reason: HOSPADM

## 2023-07-15 RX ORDER — SODIUM CHLORIDE, SODIUM GLUCONATE, SODIUM ACETATE, POTASSIUM CHLORIDE AND MAGNESIUM CHLORIDE 526; 502; 368; 37; 30 MG/100ML; MG/100ML; MG/100ML; MG/100ML; MG/100ML
1000 INJECTION, SOLUTION INTRAVENOUS ONCE
Status: COMPLETED | OUTPATIENT
Start: 2023-07-15 | End: 2023-07-15

## 2023-07-15 RX ORDER — HEPARIN SODIUM,PORCINE 10 UNIT/ML
5-20 VIAL (ML) INTRAVENOUS
Status: DISCONTINUED | OUTPATIENT
Start: 2023-07-15 | End: 2023-07-26 | Stop reason: HOSPADM

## 2023-07-15 RX ADMIN — SODIUM CHLORIDE, SODIUM GLUCONATE, SODIUM ACETATE, POTASSIUM CHLORIDE AND MAGNESIUM CHLORIDE 1000 ML: 526; 502; 368; 37; 30 INJECTION, SOLUTION INTRAVENOUS at 16:41

## 2023-07-15 RX ADMIN — THIAMINE HCL TAB 100 MG 100 MG: 100 TAB at 07:44

## 2023-07-15 RX ADMIN — ENOXAPARIN SODIUM 40 MG: 40 INJECTION SUBCUTANEOUS at 10:09

## 2023-07-15 RX ADMIN — FENTANYL CITRATE 100 MCG: 50 INJECTION, SOLUTION INTRAMUSCULAR; INTRAVENOUS at 20:21

## 2023-07-15 RX ADMIN — POTASSIUM & SODIUM PHOSPHATES POWDER PACK 280-160-250 MG 1 PACKET: 280-160-250 PACK at 06:54

## 2023-07-15 RX ADMIN — Medication 15 ML: at 07:44

## 2023-07-15 RX ADMIN — CEFEPIME 2 G: 2 INJECTION, POWDER, FOR SOLUTION INTRAVENOUS at 16:19

## 2023-07-15 RX ADMIN — IOPAMIDOL 75 ML: 755 INJECTION, SOLUTION INTRAVENOUS at 12:12

## 2023-07-15 RX ADMIN — SODIUM CHLORIDE 1000 MG: 9 INJECTION, SOLUTION INTRAVENOUS at 07:52

## 2023-07-15 RX ADMIN — HUMAN INSULIN 2.5 UNITS/HR: 100 INJECTION, SOLUTION SUBCUTANEOUS at 12:40

## 2023-07-15 RX ADMIN — FAMOTIDINE 20 MG: 40 POWDER, FOR SUSPENSION ORAL at 20:21

## 2023-07-15 RX ADMIN — CEFEPIME 2 G: 2 INJECTION, POWDER, FOR SOLUTION INTRAVENOUS at 23:52

## 2023-07-15 RX ADMIN — POTASSIUM & SODIUM PHOSPHATES POWDER PACK 280-160-250 MG 1 PACKET: 280-160-250 PACK at 13:52

## 2023-07-15 RX ADMIN — POTASSIUM & SODIUM PHOSPHATES POWDER PACK 280-160-250 MG 1 PACKET: 280-160-250 PACK at 10:09

## 2023-07-15 RX ADMIN — FAMOTIDINE 20 MG: 40 POWDER, FOR SUSPENSION ORAL at 07:46

## 2023-07-15 RX ADMIN — ACETAMINOPHEN 650 MG: 325 SOLUTION ORAL at 00:36

## 2023-07-15 RX ADMIN — DEXMEDETOMIDINE HYDROCHLORIDE 0.2 MCG/KG/HR: 400 INJECTION INTRAVENOUS at 14:39

## 2023-07-15 RX ADMIN — ASPIRIN 81 MG CHEWABLE TABLET 81 MG: 81 TABLET CHEWABLE at 07:44

## 2023-07-15 RX ADMIN — VANCOMYCIN HYDROCHLORIDE 2000 MG: 1 INJECTION, POWDER, LYOPHILIZED, FOR SOLUTION INTRAVENOUS at 18:01

## 2023-07-15 RX ADMIN — FENTANYL CITRATE 50 MCG: 50 INJECTION, SOLUTION INTRAMUSCULAR; INTRAVENOUS at 12:05

## 2023-07-15 RX ADMIN — ROSUVASTATIN CALCIUM 20 MG: 20 TABLET, FILM COATED ORAL at 07:44

## 2023-07-15 RX ADMIN — Medication 1 MG: at 20:22

## 2023-07-15 RX ADMIN — LIDOCAINE HYDROCHLORIDE 5 ML: 10 INJECTION, SOLUTION EPIDURAL; INFILTRATION; INTRACAUDAL; PERINEURAL at 20:28

## 2023-07-15 RX ADMIN — HUMAN INSULIN 6 UNITS/HR: 100 INJECTION, SOLUTION SUBCUTANEOUS at 18:54

## 2023-07-15 RX ADMIN — FENTANYL CITRATE 50 MCG: 50 INJECTION, SOLUTION INTRAMUSCULAR; INTRAVENOUS at 13:37

## 2023-07-15 RX ADMIN — METHYLPHENIDATE HYDROCHLORIDE 5 MG: 5 TABLET ORAL at 12:47

## 2023-07-15 RX ADMIN — ACETAMINOPHEN 650 MG: 325 SOLUTION ORAL at 15:14

## 2023-07-15 ASSESSMENT — ACTIVITIES OF DAILY LIVING (ADL)
ADLS_ACUITY_SCORE: 47
ADLS_ACUITY_SCORE: 45

## 2023-07-15 ASSESSMENT — VISUAL ACUITY
OU: NOT TESTABLE

## 2023-07-15 NOTE — PLAN OF CARE
Major Shift Events:   Neuro: Pupils reactive, eyes dysconjugate and deviated upwards and out. No movement in BLE, extending in BUE. CT completed. Pt biting ETT, and several lower teeth dislodged. NCC notified and dentistry consult ordered. PRN fentanyl and precedex gtt given.   CV: SR 70's-110's. SBP<180 w/o intervention. Pt given 1 L plasmalyte for period of hypotension. MAP>65 w/o levo.  Tmax 102.9, team notified and artic sun cooling device initiated.  Pulm: CMV settings, peep 10, FiO2 60%. Thick, yellow secretions thru ETT. Bloody oral secretions d/t teeth dislodgement. LS coarse.  GI/: TF at goal. FWF increased to 100 q4h d/t Na. Rectal tube in place. Mcdaniel in place w/ adequate OP.   Plan: Continue to monitor neuro and hemodynamic status closely.  For vital signs and complete assessments, please see documentation flowsheets.

## 2023-07-15 NOTE — PROGRESS NOTES
Neurocritical Care Progress Note    Reason for critical care admission:   Admitting Team: Neurocritical care  Date of Service:  07/15/2023  Date of Admission:  7/6/2023  Hospital Day: 10    Assessment/Plan  Michael Stewart is a 52 year old male w/ PMHx past methamphetamine abuse and tobacco use who presents on 7/6/2023 as a transfer from [facility] after the discovery of bilateral thalamic strokes as well as Strep A pharyngitis. No acute interventions were performed. However, upon presentation to [facility], he was noted to have increased somnolence, bilateral ophthalmoplegia w/ nystagmus, and atypical breathing raising concern for alternate etiologies beyond stroke. Diagnostic possibilities include post-streptococcal acute demyelinating encephalomyelitis, other autoimmune processes, or viral meningitis.       Last 24hrs: Intermittently febrile over the past 24 hours, with Tmax of 103 today.  Moreover, sputum cultures returned positive for multiple microbial species.  This afternoon, patient became hypotensive and tachycardic.  Was given fluid resuscitation and started on broad-spectrum antibiotics.    Neuro  #acute ischemic stroke of bilateral thalami with involvement of posterior limb and left internal capsule, unclear etiology   -Neurochecks every 2 hrs; Assess pupils Q4h.  -SBP goal < 200 mmHg and DBP < 110 mmHg  -ASA 81mg daily   -IV thiamine Wernicke's encephalopathy protocol  -MRI Brain w/ and w/o contrast performed 07/08, no additional enhancing areas   -LP performed 07/08, opening Pressure: 16 cm H2O, 63 nucleated cells, protein of 42, glucose of 64, additional labs ordered on CSF   -Cytology abnormal for LP on 7/8.  On repeat LP on 7/11, with the primary intent being to achieve fresh cells to low flow cytometry to be conducted.   -So far during hospital course, patient CSF has not been consistent with results that would be expected in acute ischemic stroke.  Rather, CSF suggesting underlying  subacute to chronic inflammatory or viral encephalitic process.  -Oligoclonal bands returned positive on 7/12.   -Initiated trial of 1g methylprednisolone daily for 7 days to cover for possible autoimmune or inflammatory process that would be consistent with workup thus far.   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  -Ritalin 5 mg 0600, 1200 for neurostimulation     #Analgesics & sedation  RASS goal: 0 to -1   -tylenol as needed  -fentanyl as needed  -Added Fentanyl infusion w/boluses on 7/10     CV  #Hypotension concerning for sequelae of sepsis  #hyperlipidemia  -Cardiac monitoring  -BP goal 200  -PRN Hydralazine/Labetalol, can order clevidipine if needed  -Rosuvastatin 20 mg   -Patient was resuscitated with fluids on 7/15.  Norepinephrine was ordered, but not given as patient's blood pressure had improved.  Order for PICC was placed given lack of central access and possibility of looming pressor requirement.    Resp  #Acute hypoxic respiratory failure requiring mechanical ventilation  #Possible ventilator associated pneumonia    #Hx Tobacco Use  #Pulmonary nodules  Oxygen/vent:   -For treatment of suspected ventilator associated pneumonia, given positive sputum cultures on 7/15, refer to infectious disease section below for antibiotic plan. Otherwise, we will treat with aggressive supportive care including fluid resuscitation, ongoing ventilation, and pressor support if needed.  -Continuous pulse ox  -Maintain O2 saturations greater than 92%  -CXR on 7/10 revealed improved consolidation in LL lobe  -CXR on 7/15 showed Bibasilar streaky pulmonary opacities left more than right      GI  #MARIA T  Diet: TF,  mL q 1 hrs   Last BM: PTA  GI prophylaxis: IV Famotidine 20mg BID  Bowel regimen: scheduled senna-docusate, prn Miralax and prn milk of magnesia      Renal/  #Acute kidney  injury, resolved  -Daily BMP  -IV fluids: Plasmalyte discontinued on 7/10.  -Electrolyte replacement protocols  -Increase  Q1h.  -Doxazosin 2 mg daily.   -Remove dorantes  - Q4h       Endo  #DM2  #overweight   #hyperglycemia 2/2 steroids  -Hgb A1c 6.1  -Monitor glucose levels  -TSH 2.13  -Stop insulin sliding scale, add insulin infusion      Heme  #relative anemia   -Daily CBC  -Hgb goal >7, plt goal >50k  -Transfuse to meet Hgb and plt goals     ID  #Suspicion for ventilator associated pneumonia  #Concern for sepsis  -Resuscitation as indicated above  -For antibiotics, we will go with cefepime and vancomycin for now.  Will consider peeling off vancomycin when MRSA swab is back.  -Daily CBC  -Follow temperature curve  -Fully treated for Group A strep with penicillin G and ceftriaxone  -add on HIV test.  Negative.    ICU Checklist  Lines/tubes/drains: PIVs, PICC, arterial line, ETT, OGT  FEN: NPO, TFs  PPX: DVT - SCDs, Lovenox 40mg daily; GI - Famotidine  Code: FULL CODE  Dispo: ICU - NCC    The patient was seen and discussed with the NCC attending, Dr. Kelly.    Raymond Breen MD  PGY-2, Neurology  Neurocritical Care  *[id number]     24 Hour Vital Signs Summary:  Temp: 100.1  F (37.8  C) Temp  Min: 98.6  F (37  C)  Max: 101.6  F (38.7  C)  Resp: 27 Resp  Min: 15  Max: 28  SpO2: 95 % SpO2  Min: 93 %  Max: 98 %  Pulse: 103 Pulse  Min: 83  Max: 117    No data recorded   No data recorded.  No data recorded.    Respiratory monitoring:   Vent Mode: CMV/AC  (Continuous Mandatory Ventilation/ Assist Control)  FiO2 (%): 60 %  Resp Rate (Set): 12 breaths/min  Tidal Volume (Set, mL): 500 mL  PEEP (cm H2O): 5 cmH2O  Pressure Support (cm H2O): 5 cmH2O  Resp: 27    I/O last 3 completed shifts:  In: 3025.5 [I.V.:1175.5; NG/GT:470]  Out: 4145 [Urine:3145; Stool:1000]    Current Medications:    aspirin  81 mg Oral or Feeding Tube Daily     doxazosin  1 mg Oral or Feeding Tube Daily     enoxaparin ANTICOAGULANT  40 mg  "Subcutaneous Q24H     famotidine  20 mg Oral or Feeding Tube BID     insulin aspart  1-12 Units Subcutaneous Q4H     methylPREDNISolone  1,000 mg Intravenous Daily     multivitamins w/minerals  15 mL Per Feeding Tube Daily     polyethylene glycol  17 g Oral or Feeding Tube BID     potassium & sodium phosphates  1 packet Oral or Feeding Tube Q4H     protein modular  1 packet Per Feeding Tube Daily     rosuvastatin  20 mg Oral or Feeding Tube Daily     senna-docusate  2 tablet Oral or Feeding Tube BID     sodium chloride (PF)  3 mL Intracatheter Q8H     thiamine  100 mg Oral or Feeding Tube Daily     PRN Medications:  acetaminophen, glucose **OR** dextrose **OR** glucagon, fentaNYL, labetalol **OR** hydrALAZINE, magnesium hydroxide, naloxone **OR** naloxone **OR** naloxone **OR** naloxone, ondansetron **OR** ondansetron, sodium chloride (PF)    Infusions:    No Known Allergies    Physical Examination:  Vitals: BP (!) 177/88 (BP Location: Left arm)   Pulse 103   Temp 100.1  F (37.8  C) (Axillary)   Resp 27   Ht 1.702 m (5' 7\")   Wt 83.7 kg (184 lb 8.4 oz)   SpO2 95%   BMI 28.90 kg/m    General: Adult male patient, lying in bed, critically-ill  HEENT: Normocephalic, atraumatic, no icterus, oral cavity/oropharynx pink and moist  Cardiac: RRR per bedside monitor   Pulm: unlabored, expansion symmetric, no retractions or use of accessory muscles, synchronous with mechanical ventilator  Abdomen: Soft, non-distended, bowel sounds present  Extremities: Warm, no edema, well perfused  Skin: No rash or lesion  Psych: unable to assess  Neuro:  Mental status: largely unresponsive, unable to assess mentation or language   Cranial nerves: PERRL, eyes deviated temporally and superiorly, face symmetric, tongue midline  Motor: Normal bulk and tone. No abnormal movements. Does not follow commands.  Sensory: unresponsive to nox stim x 4  Coordination: deferred.  Gait: ADAM, deferred.    Labs and Imaging:    Results for orders placed " or performed during the hospital encounter of 07/06/23 (from the past 24 hour(s))   Glucose by meter   Result Value Ref Range    GLUCOSE BY METER POCT 201 (H) 70 - 99 mg/dL   Glucose by meter   Result Value Ref Range    GLUCOSE BY METER POCT 269 (H) 70 - 99 mg/dL   CBC with platelets   Result Value Ref Range    WBC Count 11.1 (H) 4.0 - 11.0 10e3/uL    RBC Count 4.39 (L) 4.40 - 5.90 10e6/uL    Hemoglobin 13.3 13.3 - 17.7 g/dL    Hematocrit 41.5 40.0 - 53.0 %    MCV 95 78 - 100 fL    MCH 30.3 26.5 - 33.0 pg    MCHC 32.0 31.5 - 36.5 g/dL    RDW 12.6 10.0 - 15.0 %    Platelet Count 395 150 - 450 10e3/uL   Comprehensive metabolic panel   Result Value Ref Range    Sodium 148 (H) 136 - 145 mmol/L    Potassium 4.8 3.4 - 5.3 mmol/L    Chloride 112 (H) 98 - 107 mmol/L    Carbon Dioxide (CO2) 22 22 - 29 mmol/L    Anion Gap 14 7 - 15 mmol/L    Urea Nitrogen 40.0 (H) 6.0 - 20.0 mg/dL    Creatinine 0.79 0.67 - 1.17 mg/dL    Calcium 9.1 8.6 - 10.0 mg/dL    Glucose 352 (H) 70 - 99 mg/dL    Alkaline Phosphatase 87 40 - 129 U/L    AST 28 0 - 45 U/L    ALT 61 0 - 70 U/L    Protein Total 6.7 6.4 - 8.3 g/dL    Albumin 3.9 3.5 - 5.2 g/dL    Bilirubin Total 0.2 <=1.2 mg/dL    GFR Estimate >90 >60 mL/min/1.73m2   XR Chest Port 1 View    Narrative    EXAM: XR CHEST PORT 1 VIEW 7/14/2023 6:14 PM      HISTORY: looking for infection.    COMPARISON: Chest radiograph 7/10/2023    TECHNIQUE: Frontal view of the chest.    FINDINGS: Endotracheal tube tip projecting over the midthoracic  trachea, approximately 6.4 cm above the rocío. Enteric tube courses  beyond the field-of-view. Trachea is midline. Cardiac silhouette is  within normal limits. Pulmonary vasculature is distinct. Hazy opacity  obscuring the left hemidiaphragm. No appreciable pneumothorax. No  significant pleural effusion.    No acute osseous abnormality. Visualized soft tissues and upper  abdomen are unremarkable.      Impression    IMPRESSION: Streaky left basilar opacity likely  representing  atelectasis and/or consolidation.    I have personally reviewed the examination and initial interpretation  and I agree with the findings.    HARSHAL MARVIN MD         SYSTEM ID:  [id number]   MR Brain w/o Contrast    Narrative    MR BRAIN W/O CONTRAST 7/14/2023 8:40 PM    Provided History: evaluate for new stroke with exam change    Comparison:  7/8/2023     Technique: Sagittal T1-weighted, coronal T2-weighted, axial T2 FLAIR,  axial susceptibility weighted, and axial diffusion-weighted with ADC  map images of the brain were obtained without intravenous contrast.    Findings: Relatively unchanged areas of restricted diffusion and T2  hyperintensity in the thalami bilaterally, left greater than right,  generally unchanged from prior. No new areas of restricted diffusion.  Stable left medullopontine and right cerebellar vermis punctate foci  of diffusion restriction. Stable right choroid plexus xanthogranuloma.    There is no mass effect, midline shift, or evidence of intracranial  hemorrhage.  The ventricles are not enlarged out of proportion to the  cerebral sulci. The gray-white matter differentiation of the cerebral  hemispheres is preserved. No areas of microhemorrhage on  susceptibility weighted imaging. No focal areas of increased T2 signal  suggests edema. Small arachnoid cyst anterior to the right temporal  lobe.      Impression    Impression: Evolution of bilateral thalamic infarcts. Brainstem  infarcts are more apparent.    I have personally reviewed the examination and initial interpretation  and I agree with the findings.    WINDY HALL MD         SYSTEM ID:  [id number]   Glucose by meter   Result Value Ref Range    GLUCOSE BY METER POCT 227 (H) 70 - 99 mg/dL   Glucose by meter   Result Value Ref Range    GLUCOSE BY METER POCT 195 (H) 70 - 99 mg/dL   UA with Microscopic reflex to Culture    Specimen: Urine, Mcdaniel Catheter   Result Value Ref Range    Color Urine Light Yellow Colorless, Straw,  Light Yellow, Yellow    Appearance Urine Clear Clear    Glucose Urine >=1000 (A) Negative mg/dL    Bilirubin Urine Negative Negative    Ketones Urine Negative Negative mg/dL    Specific Gravity Urine 1.029 1.003 - 1.035    Blood Urine Large (A) Negative    pH Urine 5.5 5.0 - 7.0    Protein Albumin Urine Negative Negative mg/dL    Urobilinogen Urine Normal Normal, 2.0 mg/dL    Nitrite Urine Negative Negative    Leukocyte Esterase Urine Negative Negative    Mucus Urine Present (A) None Seen /LPF    RBC Urine 86 (H) <=2 /HPF    WBC Urine 1 <=5 /HPF    Transitional Epithelials Urine <1 <=1 /HPF    Narrative    Urine Culture not indicated   CBC with platelets   Result Value Ref Range    WBC Count 10.0 4.0 - 11.0 10e3/uL    RBC Count 3.96 (L) 4.40 - 5.90 10e6/uL    Hemoglobin 12.3 (L) 13.3 - 17.7 g/dL    Hematocrit 37.7 (L) 40.0 - 53.0 %    MCV 95 78 - 100 fL    MCH 31.1 26.5 - 33.0 pg    MCHC 32.6 31.5 - 36.5 g/dL    RDW 12.8 10.0 - 15.0 %    Platelet Count 360 150 - 450 10e3/uL   Magnesium   Result Value Ref Range    Magnesium 2.7 (H) 1.7 - 2.3 mg/dL   Phosphorus   Result Value Ref Range    Phosphorus 2.2 (L) 2.5 - 4.5 mg/dL   Comprehensive metabolic panel   Result Value Ref Range    Sodium 150 (H) 136 - 145 mmol/L    Potassium 4.8 3.4 - 5.3 mmol/L    Chloride 113 (H) 98 - 107 mmol/L    Carbon Dioxide (CO2) 22 22 - 29 mmol/L    Anion Gap 15 7 - 15 mmol/L    Urea Nitrogen 39.4 (H) 6.0 - 20.0 mg/dL    Creatinine 0.83 0.67 - 1.17 mg/dL    Calcium 8.7 8.6 - 10.0 mg/dL    Glucose 296 (H) 70 - 99 mg/dL    Alkaline Phosphatase 72 40 - 129 U/L    AST 27 0 - 45 U/L    ALT 61 0 - 70 U/L    Protein Total 6.1 (L) 6.4 - 8.3 g/dL    Albumin 3.5 3.5 - 5.2 g/dL    Bilirubin Total <0.2 <=1.2 mg/dL    GFR Estimate >90 >60 mL/min/1.73m2   Glucose by meter   Result Value Ref Range    GLUCOSE BY METER POCT 242 (H) 70 - 99 mg/dL   Glucose by meter   Result Value Ref Range    GLUCOSE BY METER POCT 206 (H) 70 - 99 mg/dL     All relevant  imaging and laboratory values personally reviewed.

## 2023-07-15 NOTE — PROGRESS NOTES
Neurocritical Care Progress Note    Reason for critical care admission:   Admitting Team: Neurocritical care  Date of Service:  07/14/2023  Date of Admission:  7/6/2023  Hospital Day: 9    Assessment/Plan  Michael Stewart is a 52 year old male w/ PMHx past methamphetamine abuse and tobacco use who presents on 7/6/2023 as a transfer from [facility] after the discovery of bilateral thalamic strokes as well as Strep A pharyngitis. No acute interventions were performed. However, upon presentation to [facility], he was noted to have increased somnolence, bilateral ophthalmoplegia w/ nystagmus, and atypical breathing raising concern for alternate etiologies beyond stroke. Diagnostic possibilities include post-streptococcal acute demyelinating encephalomyelitis, other autoimmune processes, or viral meningitis.       Last 24hrs: Patient had temperature of 100.4 overnight. Sugars elevated.     Neuro  #acute ischemic stroke of bilateral thalami with involvement of posterior limb and left internal capsule, unclear etiology   -Neurochecks every 2 hrs; Assess pupils Q4h.  -SBP goal < 200 mmHg and DBP < 110 mmHg  -ASA 81mg daily   -IV thiamine Wernicke's encephalopathy protocol  -MRI Brain w/ and w/o contrast performed 07/08, no additional enhancing areas   -LP performed 07/08, opening Pressure: 16 cm H2O, 63 nucleated cells, protein of 42, glucose of 64, additional labs ordered on CSF   -Cytology abnormal for LP on 7/8.  On repeat LP on 7/11, with the primary intent being to achieve fresh cells to low flow cytometry to be conducted.   -So far during hospital course, patient CSF has not been consistent with results that would be expected in acute ischemic stroke.  Rather, CSF suggesting underlying subacute to chronic inflammatory or viral encephalitic process.  -Oligoclonal bands returned positive on 7/12.   -Initiated trial of 1g methylprednisolone daily for 7 days to cover for possible autoimmune or inflammatory process  that would be consistent with workup thus far.   -HOB > 30   -PT/OT/SLP  -vEEG, diffuse encephalopathy, severe generalized slowing; now discontinued      #Analgesics & sedation  RASS goal: 0 to -1   -tylenol as needed  -fentanyl as needed  -Added Fentanyl infusion w/boluses on 7/10     CV  #hypertension  #hyperlipidemia  -Cardiac monitoring  -BP goal 200  -PRN Hydralazine/Labetalol, can order clevidipine if needed  -Rosuvastatin 20 mg   -Tachycardic on 7/14. Gave fluid and checked CBC and BMP.     Resp  #Acute hypoxic respiratory failure requiring mechanical ventilation   #Hx Tobacco Use  #Pulmonary nodules  Oxygen/vent: CMV 18/500/5/30%  -Continuous pulse ox  -Maintain O2 saturations greater than 92%  -fungal sputum cultures ordered  -CXR on 7/10 revealed improved consolidation in LL lobe  -Will obtain repeat MRI given new exam findings of abducted and elevated eyes bilaterally.     GI  #MARIA T  Diet: TF,  mL q 1 hrs   Last BM: PTA  GI prophylaxis: IV Famotidine 20mg BID  Bowel regimen: scheduled senna-docusate and Miralax and prn milk of magnesia      Renal/  #Acute kidney injury, resolved  -Daily BMP  -IV fluids: Plasmalyte discontinued on 7/10.  -Electrolyte replacement protocols  -Increase  Q1h.  -Doxazosin 2 mg daily.      Endo  #DM2  #overweight  -Hgb A1c 6.1  -Monitor glucose levels  -TSH 2.13     Heme  #relative anemia   -Daily CBC  -Hgb goal >7, plt goal >50k  -Transfuse to meet Hgb and plt goals     ID  #febrile   -Daily CBC  -Follow temperature curve  -Fully treated for Group A strep with penicillin G and ceftriaxone  -add on HIV test.  Negative.    ICU Checklist  Lines/tubes/drains: PIVs, arterial line, ETT, OGT, urinary catheter  FEN: NPO; Plasmalyte 125 mL/hr  PPX: DVT - SCDs, Lovenox 40mg daily; GI - Famotidine  Code: FULL CODE  Dispo: ICU - NCC    The patient was seen and discussed with the NCC attending, Dr. Kavon Iqbal.    Raymond Breen MD  PGY-2, Neurology  Neurocritical Care   "*[phone]     24 Hour Vital Signs Summary:  Temp: 99.4  F (37.4  C) Temp  Min: 98.6  F (37  C)  Max: 100.4  F (38  C)  Resp: 20 Resp  Min: 15  Max: 26  SpO2: 98 % SpO2  Min: 88 %  Max: 98 %  Pulse: 94 Pulse  Min: 82  Max: 117    No data recorded  BP: (!) 177/88 Systolic (24hrs), Av , Min:177 , Max:177   Diastolic (24hrs), Av, Min:88, Max:88    Respiratory monitoring:   Vent Mode: CMV/AC  (Continuous Mandatory Ventilation/ Assist Control)  FiO2 (%): 60 %  Resp Rate (Set): 12 breaths/min  Tidal Volume (Set, mL): 500 mL  PEEP (cm H2O): (S) 10 cmH2O  Pressure Support (cm H2O): 5 cmH2O  Resp: 20    I/O last 3 completed shifts:  In: 2248.5 [I.V.:418.5; NG/GT:390]  Out: 2875 [Urine:2875]    Current Medications:    aspirin  81 mg Oral or Feeding Tube Daily     doxazosin  1 mg Oral or Feeding Tube Daily     enoxaparin ANTICOAGULANT  40 mg Subcutaneous Q24H     famotidine  20 mg Oral or Feeding Tube BID     insulin aspart  1-12 Units Subcutaneous Q4H     methylPREDNISolone  1,000 mg Intravenous Daily     multivitamins w/minerals  15 mL Per Feeding Tube Daily     polyethylene glycol  17 g Oral or Feeding Tube BID     protein modular  1 packet Per Feeding Tube Daily     rosuvastatin  20 mg Oral or Feeding Tube Daily     senna-docusate  2 tablet Oral or Feeding Tube BID     sodium chloride (PF)  3 mL Intracatheter Q8H     thiamine  100 mg Oral or Feeding Tube Daily     PRN Medications:  acetaminophen, glucose **OR** dextrose **OR** glucagon, fentaNYL, labetalol **OR** hydrALAZINE, magnesium hydroxide, naloxone **OR** naloxone **OR** naloxone **OR** naloxone, ondansetron **OR** ondansetron, sodium chloride (PF)    Infusions:    No Known Allergies    Physical Examination:  Vitals: BP (!) 177/88 (BP Location: Left arm)   Pulse 94   Temp 99.4  F (37.4  C) (Axillary)   Resp 20   Ht 1.702 m (5' 7\")   Wt 83.4 kg (183 lb 13.8 oz)   SpO2 98%   BMI 28.80 kg/m    General: Adult male patient, lying in bed, " critically-ill  HEENT: Normocephalic, atraumatic, no icterus, oral cavity/oropharynx pink and moist  Cardiac: RRR per bedside monitor   Pulm: unlabored, expansion symmetric, no retractions or use of accessory muscles, synchronous with mechanical ventilator  Abdomen: Soft, non-distended, bowel sounds present  Extremities: Warm, no edema, well perfused  Skin: No rash or lesion  Psych: unable to assess  Neuro:  Mental status: largely unresponsive, unable to assess mentation or language   Cranial nerves: PERRL, eyes deviated temporally and superiorly, face symmetric, tongue midline  Motor: Normal bulk and tone. No abnormal movements. Does not follow commands.  Sensory: unresponsive to nox stim x 4  Coordination: deferred.  Gait: ADAM, deferred.    Labs and Imaging:    Results for orders placed or performed during the hospital encounter of 07/06/23 (from the past 24 hour(s))   Glucose by meter   Result Value Ref Range    GLUCOSE BY METER POCT 206 (H) 70 - 99 mg/dL   Glucose by meter   Result Value Ref Range    GLUCOSE BY METER POCT 321 (H) 70 - 99 mg/dL   CBC with platelets   Result Value Ref Range    WBC Count 15.2 (H) 4.0 - 11.0 10e3/uL    RBC Count 4.41 4.40 - 5.90 10e6/uL    Hemoglobin 13.3 13.3 - 17.7 g/dL    Hematocrit 41.0 40.0 - 53.0 %    MCV 93 78 - 100 fL    MCH 30.2 26.5 - 33.0 pg    MCHC 32.4 31.5 - 36.5 g/dL    RDW 12.5 10.0 - 15.0 %    Platelet Count 367 150 - 450 10e3/uL   Basic metabolic panel   Result Value Ref Range    Sodium 144 136 - 145 mmol/L    Potassium 4.5 3.4 - 5.3 mmol/L    Chloride 110 (H) 98 - 107 mmol/L    Carbon Dioxide (CO2) 20 (L) 22 - 29 mmol/L    Anion Gap 14 7 - 15 mmol/L    Urea Nitrogen 35.5 (H) 6.0 - 20.0 mg/dL    Creatinine 0.74 0.67 - 1.17 mg/dL    Calcium 9.2 8.6 - 10.0 mg/dL    Glucose 342 (H) 70 - 99 mg/dL    GFR Estimate >90 >60 mL/min/1.73m2   Magnesium   Result Value Ref Range    Magnesium 2.5 (H) 1.7 - 2.3 mg/dL   Phosphorus   Result Value Ref Range    Phosphorus 2.4 (L) 2.5  - 4.5 mg/dL   Blood gas arterial   Result Value Ref Range    pH Arterial 7.47 (H) 7.35 - 7.45    pCO2 Arterial 35 35 - 45 mm Hg    pO2 Arterial 80 80 - 105 mm Hg    FIO2 50     Bicarbonate Arterial 25 21 - 28 mmol/L    Base Excess/Deficit (+/-) 1.8 -9.0 - 1.8 mmol/L    Vlad's Test Artline    Glucose by meter   Result Value Ref Range    GLUCOSE BY METER POCT 155 (H) 70 - 99 mg/dL   Respiratory Aerobic Bacterial Culture with Gram Stain    Specimen: Endotracheal; Sputum   Result Value Ref Range    Gram Stain Result >25 PMNs/low power field     Gram Stain Result 4+ Mixed monique    Glucose by meter   Result Value Ref Range    GLUCOSE BY METER POCT 201 (H) 70 - 99 mg/dL   Glucose by meter   Result Value Ref Range    GLUCOSE BY METER POCT 269 (H) 70 - 99 mg/dL   CBC with platelets   Result Value Ref Range    WBC Count 11.1 (H) 4.0 - 11.0 10e3/uL    RBC Count 4.39 (L) 4.40 - 5.90 10e6/uL    Hemoglobin 13.3 13.3 - 17.7 g/dL    Hematocrit 41.5 40.0 - 53.0 %    MCV 95 78 - 100 fL    MCH 30.3 26.5 - 33.0 pg    MCHC 32.0 31.5 - 36.5 g/dL    RDW 12.6 10.0 - 15.0 %    Platelet Count 395 150 - 450 10e3/uL   Comprehensive metabolic panel   Result Value Ref Range    Sodium 148 (H) 136 - 145 mmol/L    Potassium 4.8 3.4 - 5.3 mmol/L    Chloride 112 (H) 98 - 107 mmol/L    Carbon Dioxide (CO2) 22 22 - 29 mmol/L    Anion Gap 14 7 - 15 mmol/L    Urea Nitrogen 40.0 (H) 6.0 - 20.0 mg/dL    Creatinine 0.79 0.67 - 1.17 mg/dL    Calcium 9.1 8.6 - 10.0 mg/dL    Glucose 352 (H) 70 - 99 mg/dL    Alkaline Phosphatase 87 40 - 129 U/L    AST 28 0 - 45 U/L    ALT 61 0 - 70 U/L    Protein Total 6.7 6.4 - 8.3 g/dL    Albumin 3.9 3.5 - 5.2 g/dL    Bilirubin Total 0.2 <=1.2 mg/dL    GFR Estimate >90 >60 mL/min/1.73m2   MR Brain w/o Contrast    Impression    RESIDENT PRELIMINARY INTERPRETATION  Impression: Unchanged bilateral thalamic and brainstem infarcts. No  new acute intracranial pathology.        Glucose by meter   Result Value Ref Range    GLUCOSE  BY METER POCT 227 (H) 70 - 99 mg/dL     All relevant imaging and laboratory values personally reviewed.

## 2023-07-15 NOTE — PHARMACY-VANCOMYCIN DOSING SERVICE
Pharmacy Vancomycin Initial Note  Date of Service July 15, 2023  Patient's  1970  52 year old, male    Indication: Sepsis    Current estimated CrCl = Estimated Creatinine Clearance: 108.9 mL/min (based on SCr of 0.83 mg/dL).    Creatinine for last 3 days  2023:  3:52 AM Creatinine 0.66 mg/dL  2023:  3:59 AM Creatinine 0.74 mg/dL;  5:31 PM Creatinine 0.79 mg/dL  7/15/2023:  4:39 AM Creatinine 0.83 mg/dL    Recent Vancomycin Level(s) for last 3 days  No results found for requested labs within last 3 days.      Vancomycin IV Administrations (past 72 hours)      No vancomycin orders with administrations in past 72 hours.                Nephrotoxins and other renal medications (From now, onward)    Start     Dose/Rate Route Frequency Ordered Stop    23 0430  vancomycin (VANCOCIN) 1,250 mg in 0.9% NaCl 250 mL intermittent infusion         1,250 mg  over 90 Minutes Intravenous EVERY 12 HOURS 07/15/23 1557      07/15/23 1600  vancomycin (VANCOCIN) 2,000 mg in sodium chloride 0.9 % 500 mL intermittent infusion         2,000 mg  over 120 Minutes Intravenous ONCE 07/15/23 1544            Contrast Orders - past 72 hours (72h ago, onward)    Start     Dose/Rate Route Frequency Stop    07/15/23 1130  iopamidol (ISOVUE-370) solution 75 mL         75 mL Intravenous ONCE 07/15/23 1212          [employer] Prediction of Planned Initial Vancomycin Regimen  Loading dose: 2000 mg at 16:15 07/15/2023.  Regimen: 1250 mg IV every 12 hours.  Start time: 15:57 on 07/15/2023  Exposure target: AUC24 (range)400-600 mg/L.hr   AUC24,ss: 539 mg/L.hr  Probability of AUC24 > 400: 79 %  Ctrough,ss: 16.5 mg/L  Probability of Ctrough,ss > 20: 35 %  Probability of nephrotoxicity (Lodise LUIS ): 12 %          Plan:  1. Give vancomycin 2000mg IV x1, then start vancomycin 1250mg IV q12 hours  2. Vancomycin monitoring method: AUC  3. Vancomycin therapeutic monitoring goal: 400-600 mg*h/L  4. Pharmacy will check vancomycin levels as  appropriate in 1-3 Days.    5. Serum creatinine levels will be ordered daily for the first week of therapy and at least twice weekly for subsequent weeks.      Favio Kenney, PharmD, BCPS

## 2023-07-15 NOTE — PLAN OF CARE
Neuro: RASS -4. Somnolent, unresponsive - does not open eyes.. Postures/extension in BUE from pain. No response in BLE. PERR, conjugate. Gag reflex intact from suction.  Resp: ETT, CMV 60% PEEP 10. Thick creamy secretions. Coarse LS.   CV: Sinus tach/sinus. Labetolo given x1 for SBP goal < 200. Tmax 101.6 - ice packs and fan applied, cultures collected and sent.   GI/: NG w/ TF @ goal 60mL/hr w/ q4h 60mL FWF. Rectal tube in place w/ watery output. Mcdaniel in place w/ AUO.    Plan: Continue course of steroids. Continue POC. Will continue to monitor pt status and will notify the NeuroCrit team w/ any concerns or changes.    For vital signs and complete assessments, please see documentation flowsheets.   Plan of Care Reviewed With: patient, family  Overall Patient Progress: no change

## 2023-07-16 LAB
ANION GAP SERPL CALCULATED.3IONS-SCNC: 10 MMOL/L (ref 7–15)
BACTERIA CSF CULT: NO GROWTH
BUN SERPL-MCNC: 38.3 MG/DL (ref 6–20)
CALCIUM SERPL-MCNC: 8 MG/DL (ref 8.6–10)
CHLORIDE SERPL-SCNC: 121 MMOL/L (ref 98–107)
CREAT SERPL-MCNC: 0.73 MG/DL (ref 0.67–1.17)
DEPRECATED HCO3 PLAS-SCNC: 23 MMOL/L (ref 22–29)
ERYTHROCYTE [DISTWIDTH] IN BLOOD BY AUTOMATED COUNT: 13 % (ref 10–15)
GFR SERPL CREATININE-BSD FRML MDRD: >90 ML/MIN/1.73M2
GLUCOSE BLDC GLUCOMTR-MCNC: 117 MG/DL (ref 70–99)
GLUCOSE BLDC GLUCOMTR-MCNC: 119 MG/DL (ref 70–99)
GLUCOSE BLDC GLUCOMTR-MCNC: 145 MG/DL (ref 70–99)
GLUCOSE BLDC GLUCOMTR-MCNC: 153 MG/DL (ref 70–99)
GLUCOSE BLDC GLUCOMTR-MCNC: 158 MG/DL (ref 70–99)
GLUCOSE BLDC GLUCOMTR-MCNC: 160 MG/DL (ref 70–99)
GLUCOSE BLDC GLUCOMTR-MCNC: 161 MG/DL (ref 70–99)
GLUCOSE BLDC GLUCOMTR-MCNC: 165 MG/DL (ref 70–99)
GLUCOSE BLDC GLUCOMTR-MCNC: 166 MG/DL (ref 70–99)
GLUCOSE BLDC GLUCOMTR-MCNC: 169 MG/DL (ref 70–99)
GLUCOSE BLDC GLUCOMTR-MCNC: 170 MG/DL (ref 70–99)
GLUCOSE BLDC GLUCOMTR-MCNC: 176 MG/DL (ref 70–99)
GLUCOSE BLDC GLUCOMTR-MCNC: 176 MG/DL (ref 70–99)
GLUCOSE BLDC GLUCOMTR-MCNC: 210 MG/DL (ref 70–99)
GLUCOSE BLDC GLUCOMTR-MCNC: 210 MG/DL (ref 70–99)
GLUCOSE BLDC GLUCOMTR-MCNC: 265 MG/DL (ref 70–99)
GLUCOSE SERPL-MCNC: 173 MG/DL (ref 70–99)
GRAM STAIN RESULT: NORMAL
GRAM STAIN RESULT: NORMAL
HCT VFR BLD AUTO: 38.5 % (ref 40–53)
HGB BLD-MCNC: 12.1 G/DL (ref 13.3–17.7)
LACTATE SERPL-SCNC: 2.2 MMOL/L (ref 0.7–2)
MAGNESIUM SERPL-MCNC: 3 MG/DL (ref 1.7–2.3)
MCH RBC QN AUTO: 30.6 PG (ref 26.5–33)
MCHC RBC AUTO-ENTMCNC: 31.4 G/DL (ref 31.5–36.5)
MCV RBC AUTO: 97 FL (ref 78–100)
PHOSPHATE SERPL-MCNC: 3.2 MG/DL (ref 2.5–4.5)
PLATELET # BLD AUTO: 358 10E3/UL (ref 150–450)
POTASSIUM SERPL-SCNC: 4.3 MMOL/L (ref 3.4–5.3)
RBC # BLD AUTO: 3.96 10E6/UL (ref 4.4–5.9)
SODIUM SERPL-SCNC: 154 MMOL/L (ref 136–145)
WBC # BLD AUTO: 11.2 10E3/UL (ref 4–11)

## 2023-07-16 PROCEDURE — 250N000013 HC RX MED GY IP 250 OP 250 PS 637: Performed by: NURSE PRACTITIONER

## 2023-07-16 PROCEDURE — 80048 BASIC METABOLIC PNL TOTAL CA: CPT | Performed by: NURSE PRACTITIONER

## 2023-07-16 PROCEDURE — 250N000011 HC RX IP 250 OP 636: Mod: JZ

## 2023-07-16 PROCEDURE — 250N000009 HC RX 250

## 2023-07-16 PROCEDURE — 258N000003 HC RX IP 258 OP 636: Performed by: PSYCHIATRY & NEUROLOGY

## 2023-07-16 PROCEDURE — 250N000009 HC RX 250: Performed by: NURSE PRACTITIONER

## 2023-07-16 PROCEDURE — 250N000011 HC RX IP 250 OP 636: Mod: JZ | Performed by: PSYCHIATRY & NEUROLOGY

## 2023-07-16 PROCEDURE — 83605 ASSAY OF LACTIC ACID: CPT | Performed by: NURSE PRACTITIONER

## 2023-07-16 PROCEDURE — 94003 VENT MGMT INPAT SUBQ DAY: CPT

## 2023-07-16 PROCEDURE — 99291 CRITICAL CARE FIRST HOUR: CPT | Mod: FS | Performed by: NURSE PRACTITIONER

## 2023-07-16 PROCEDURE — 84100 ASSAY OF PHOSPHORUS: CPT | Performed by: PSYCHIATRY & NEUROLOGY

## 2023-07-16 PROCEDURE — 99292 CRITICAL CARE ADDL 30 MIN: CPT | Mod: FS | Performed by: NURSE PRACTITIONER

## 2023-07-16 PROCEDURE — 250N000011 HC RX IP 250 OP 636: Performed by: NURSE PRACTITIONER

## 2023-07-16 PROCEDURE — 250N000011 HC RX IP 250 OP 636

## 2023-07-16 PROCEDURE — 250N000013 HC RX MED GY IP 250 OP 250 PS 637: Performed by: STUDENT IN AN ORGANIZED HEALTH CARE EDUCATION/TRAINING PROGRAM

## 2023-07-16 PROCEDURE — 200N000002 HC R&B ICU UMMC

## 2023-07-16 PROCEDURE — 250N000011 HC RX IP 250 OP 636: Mod: JZ | Performed by: STUDENT IN AN ORGANIZED HEALTH CARE EDUCATION/TRAINING PROGRAM

## 2023-07-16 PROCEDURE — 250N000011 HC RX IP 250 OP 636: Performed by: PSYCHIATRY & NEUROLOGY

## 2023-07-16 PROCEDURE — 250N000013 HC RX MED GY IP 250 OP 250 PS 637: Performed by: PSYCHIATRY & NEUROLOGY

## 2023-07-16 PROCEDURE — 85027 COMPLETE CBC AUTOMATED: CPT | Performed by: NURSE PRACTITIONER

## 2023-07-16 PROCEDURE — 83735 ASSAY OF MAGNESIUM: CPT | Performed by: NURSE PRACTITIONER

## 2023-07-16 PROCEDURE — 999N000157 HC STATISTIC RCP TIME EA 10 MIN

## 2023-07-16 PROCEDURE — 258N000003 HC RX IP 258 OP 636

## 2023-07-16 PROCEDURE — 250N000013 HC RX MED GY IP 250 OP 250 PS 637

## 2023-07-16 RX ORDER — PROPOFOL 10 MG/ML
5-75 INJECTION, EMULSION INTRAVENOUS CONTINUOUS
Status: DISCONTINUED | OUTPATIENT
Start: 2023-07-16 | End: 2023-07-21

## 2023-07-16 RX ORDER — OXYCODONE HYDROCHLORIDE 5 MG/1
5 TABLET ORAL EVERY 4 HOURS
Status: DISCONTINUED | OUTPATIENT
Start: 2023-07-16 | End: 2023-07-20

## 2023-07-16 RX ORDER — VECURONIUM BROMIDE 1 MG/ML
1 INJECTION, POWDER, LYOPHILIZED, FOR SOLUTION INTRAVENOUS ONCE
Status: COMPLETED | OUTPATIENT
Start: 2023-07-16 | End: 2023-07-16

## 2023-07-16 RX ORDER — BROMOCRIPTINE MESYLATE 2.5 MG/1
5 TABLET ORAL EVERY 8 HOURS
Status: DISCONTINUED | OUTPATIENT
Start: 2023-07-16 | End: 2023-07-18

## 2023-07-16 RX ORDER — ACETAMINOPHEN 325 MG/10.15ML
1000 LIQUID ORAL EVERY 6 HOURS
Status: COMPLETED | OUTPATIENT
Start: 2023-07-16 | End: 2023-07-17

## 2023-07-16 RX ADMIN — PROPOFOL 30 MCG/KG/MIN: 10 INJECTION, EMULSION INTRAVENOUS at 22:50

## 2023-07-16 RX ADMIN — HUMAN INSULIN 7 UNITS/HR: 100 INJECTION, SOLUTION SUBCUTANEOUS at 16:44

## 2023-07-16 RX ADMIN — OXYCODONE HYDROCHLORIDE 5 MG: 5 TABLET ORAL at 22:04

## 2023-07-16 RX ADMIN — ACETAMINOPHEN 1000 MG: 325 SOLUTION ORAL at 10:31

## 2023-07-16 RX ADMIN — OXYCODONE HYDROCHLORIDE 5 MG: 5 TABLET ORAL at 14:57

## 2023-07-16 RX ADMIN — HUMAN INSULIN 9 UNITS/HR: 100 INJECTION, SOLUTION SUBCUTANEOUS at 10:38

## 2023-07-16 RX ADMIN — BROMOCRIPTINE MESYLATE 5 MG: 2.5 TABLET ORAL at 22:11

## 2023-07-16 RX ADMIN — METHYLPHENIDATE HYDROCHLORIDE 5 MG: 5 TABLET ORAL at 06:09

## 2023-07-16 RX ADMIN — Medication 1 MG: at 20:29

## 2023-07-16 RX ADMIN — ASPIRIN 81 MG CHEWABLE TABLET 81 MG: 81 TABLET CHEWABLE at 08:44

## 2023-07-16 RX ADMIN — VANCOMYCIN HYDROCHLORIDE 1250 MG: 1 INJECTION, POWDER, LYOPHILIZED, FOR SOLUTION INTRAVENOUS at 18:05

## 2023-07-16 RX ADMIN — DEXMEDETOMIDINE HYDROCHLORIDE 0.8 MCG/KG/HR: 400 INJECTION INTRAVENOUS at 14:57

## 2023-07-16 RX ADMIN — ENOXAPARIN SODIUM 40 MG: 40 INJECTION SUBCUTANEOUS at 10:31

## 2023-07-16 RX ADMIN — ACETAMINOPHEN 1000 MG: 325 SOLUTION ORAL at 22:03

## 2023-07-16 RX ADMIN — FENTANYL CITRATE 100 MCG: 50 INJECTION, SOLUTION INTRAMUSCULAR; INTRAVENOUS at 13:45

## 2023-07-16 RX ADMIN — BROMOCRIPTINE MESYLATE 5 MG: 2.5 TABLET ORAL at 14:57

## 2023-07-16 RX ADMIN — PROPOFOL 50 MCG/KG/MIN: 10 INJECTION, EMULSION INTRAVENOUS at 17:53

## 2023-07-16 RX ADMIN — FAMOTIDINE 20 MG: 40 POWDER, FOR SUSPENSION ORAL at 20:30

## 2023-07-16 RX ADMIN — Medication 15 ML: at 08:44

## 2023-07-16 RX ADMIN — FAMOTIDINE 20 MG: 40 POWDER, FOR SUSPENSION ORAL at 08:45

## 2023-07-16 RX ADMIN — ACETAMINOPHEN 1000 MG: 325 SOLUTION ORAL at 16:44

## 2023-07-16 RX ADMIN — CEFEPIME 2 G: 2 INJECTION, POWDER, FOR SOLUTION INTRAVENOUS at 09:00

## 2023-07-16 RX ADMIN — HUMAN INSULIN 17 UNITS/HR: 100 INJECTION, SOLUTION SUBCUTANEOUS at 20:50

## 2023-07-16 RX ADMIN — VECURONIUM BROMIDE 1 MG: 1 INJECTION, POWDER, LYOPHILIZED, FOR SOLUTION INTRAVENOUS at 18:14

## 2023-07-16 RX ADMIN — VANCOMYCIN HYDROCHLORIDE 1250 MG: 1 INJECTION, POWDER, LYOPHILIZED, FOR SOLUTION INTRAVENOUS at 06:09

## 2023-07-16 RX ADMIN — HUMAN INSULIN 8 UNITS/HR: 100 INJECTION, SOLUTION SUBCUTANEOUS at 05:05

## 2023-07-16 RX ADMIN — DEXMEDETOMIDINE HYDROCHLORIDE 0.3 MCG/KG/HR: 400 INJECTION INTRAVENOUS at 04:07

## 2023-07-16 RX ADMIN — OXYCODONE HYDROCHLORIDE 5 MG: 5 TABLET ORAL at 17:57

## 2023-07-16 RX ADMIN — HYDRALAZINE HYDROCHLORIDE 20 MG: 20 INJECTION INTRAMUSCULAR; INTRAVENOUS at 13:45

## 2023-07-16 RX ADMIN — DEXTROSE MONOHYDRATE 1000 MG: 50 INJECTION, SOLUTION INTRAVENOUS at 08:57

## 2023-07-16 RX ADMIN — THIAMINE HCL TAB 100 MG 100 MG: 100 TAB at 08:44

## 2023-07-16 RX ADMIN — CEFEPIME HYDROCHLORIDE 2 G: 2 INJECTION, POWDER, FOR SOLUTION INTRAVENOUS at 16:44

## 2023-07-16 RX ADMIN — ROSUVASTATIN CALCIUM 20 MG: 20 TABLET, FILM COATED ORAL at 08:44

## 2023-07-16 ASSESSMENT — ACTIVITIES OF DAILY LIVING (ADL)
ADLS_ACUITY_SCORE: 28
ADLS_ACUITY_SCORE: 43
ADLS_ACUITY_SCORE: 28
FALL_HISTORY_WITHIN_LAST_SIX_MONTHS: NO
TOILETING_ISSUES: NO
DIFFICULTY_EATING/SWALLOWING: NO
ADLS_ACUITY_SCORE: 28
WALKING_OR_CLIMBING_STAIRS_DIFFICULTY: NO
ADLS_ACUITY_SCORE: 28
WEAR_GLASSES_OR_BLIND: NO
CONCENTRATING,_REMEMBERING_OR_MAKING_DECISIONS_DIFFICULTY: NO
CHANGE_IN_FUNCTIONAL_STATUS_SINCE_ONSET_OF_CURRENT_ILLNESS/INJURY: NO
ADLS_ACUITY_SCORE: 26
ADLS_ACUITY_SCORE: 28
ADLS_ACUITY_SCORE: 28
DRESSING/BATHING_DIFFICULTY: NO
ADLS_ACUITY_SCORE: 28
DOING_ERRANDS_INDEPENDENTLY_DIFFICULTY: NO
ADLS_ACUITY_SCORE: 28
ADLS_ACUITY_SCORE: 26
ADLS_ACUITY_SCORE: 28

## 2023-07-16 NOTE — CONSULTS
[facility]    Patient: Michael Stewart  : 1970  MRN: [id number]  Date of Admission: 2023  Date of Visit: 2023  Requesting Provider: Kavon Iqbal    Assessment and Recommendations:   Assessment:   Michael Stewart is a 52 year old male with PMH of methamphetamine and tobacco abuse presents with anteriorly displaced teeth #24,25 (lower anterior central incisors) s/p intubation       Recommendations:  Dental will perform extraction of teeth #24,25 bedside under local anesthesia. Our team will call to set up a visit time.     Clinic information:   [facility]  6th floor [address]   [address] [place] [address]    Recommendations discussed with attending Dr. Govind Lubin     Thank you for this consult. The [facility] team will continue to follow along. Please feel free to call dental team on call with any questions.     Nessa Flores DDS   [facility] ER PGY-1   Pager: [phone]    History of Present Illness:   Michael Stewart is a 52 year old male w/ PMHx past methamphetamine abuse and tobacco use who presents on 2023 as a transfer from [facility] after the discovery of bilateral thalamic strokes as well as Strep A pharyngitis. No acute interventions were performed. However, upon presentation to [facility], he was noted to have increased somnolence, bilateral ophthalmoplegia w/ nystagmus, and atypical breathing raising concern for alternate etiologies beyond stroke. Diagnostic possibilities include post-streptococcal acute demyelinating encephalomyelitis, other autoimmune processes, or viral meningitis.    Dental team was consulted due to broken teeth s/p intubation. Patient was intubated and sedated during the entirety of the interview and exam    Review of Systems:     Complete ROS obtained. Pertinent positives/negatives as noted in the HPI.     CONSTITUTIONAL:  No fevers  or chills  INTEGUMENTARY/SKIN: NEGATIVE for worrisome rashes, moles or lesions  EYES: Negative for icterus, vision changes or irritation  ENT/MOUTH:  Negative for oral lesions and sore throat  RESPIRATORY:  Negative for cough and dyspnea  CARDIOVASCULAR:  Negative for chest pain, palpitations and  shortness of breath  GASTROINTESTINAL:  Negative for abdominal pain, nausea, vomiting, diarrhea and constipation  GENITOURINARY:  Negative for dysuria, hematuria, frequency and urgency  MUSCULOSKELETAL: Negative for joint pain, swelling, motion restriction, negative for musculoskeletal pain  NEURO:  Negative for headache, altered mental status, numbness or weakness  PSYCHIATRIC: Negative for changes in mood or affect  HEMATOLOGIC/LYMPHATIC: negative for lymphadenopathy or bleeding  ALLERGIC/IMMUNOLOGIC: Negative for allergic reaction   ENDOCRINE: Negative for temperature intolerance, skin/hair changes    Past Medical History:   No past medical history on file.    Allergies:   No Known Allergies    Family History:   No family history on file.    Social History:     Social History     Socioeconomic History     Marital status:      Spouse name: Not on file     Number of children: Not on file     Years of education: Not on file     Highest education level: Not on file   Occupational History     Not on file   Tobacco Use     Smoking status: Never     Smokeless tobacco: Never   Substance and Sexual Activity     Alcohol use: Yes     Comment: rarely     Drug use: Not Currently     Sexual activity: Not on file   Other Topics Concern     Not on file   Social History Narrative     Not on file     Social Determinants of Health     Financial Resource Strain: Not on file   Food Insecurity: Not on file   Transportation Needs: Not on file   Physical Activity: Not on file   Stress: Not on file   Social Connections: Not on file   Intimate Partner Violence: Not on file   Housing Stability: Not on file         Physical Exam:   BP  "120/71 (BP Location: Right arm)   Pulse 78   Temp 99.7  F (37.6  C) (Bladder)   Resp 20   Ht 1.702 m (5' 7\")   Wt 79.7 kg (175 lb 11.3 oz)   SpO2 97%   BMI 27.52 kg/m       General: patient is sedated.   HEENT: AT/NC. Sclera and conjunctiva clear. Pupils equal. TMs normal. Nasal and oral cavities without lesions. No tonsillar erythema, edema, or exudates.     Extraoral exam: NC/AT, EOMI, PERRL, No submandibular lymphadenopathy, no induration or erythema, no abnormal skin lesions, inferior border of mandible is palpable bilaterally, YVETTE >45mm. No TMJ discomfort bilaterally. FOM soft and non tender. No clicking of TMJ on opening and lateral movements.     Intraoral exam: limited due to patient being intubated. Dark blood is present at #24,25 with presence of partially visible crown due to being displaced from the intubation         Recent Microbiology Data:   No results for input(s): CULT, SDES in the last 168 hours.    Imaging:       Dental CT reveals partially edentulous adult dentition, displacement of tooth #24 and #25 anteriorly with minimal bony attachment. No periapical radiolucencies.     CT Dental wo Contrast    Narrative    EXAM: CT DENTAL WO CONTRAST 7/15/2023 9:44 PM    HISTORY:  determine dental infection    TECHNIQUE: 3-D reconstruction by the technologists, with curved  multiplanar reformat of thin section imaging through the mandible and  maxilla obtained without intravenous contrast.    FINDINGS:  Partially visualized endotracheal tube in the oral cavity and  nasogastric tube in the right nasal cavity and upper thoracic  esophagus. No significant soft tissue swelling or mass. Normal facial  bone alignment. No bony erosion. Small periapical lucencies along the  bilateral central mandibular incisors with near horizontal alignment  of the teeth, which may suggest dislodged/displacement.    Extensive air-fluid levels are seen throughout the paranasal sinuses.  Bilateral mastoid air cell effusions. " Normal temporomandibular joints.      Impression    IMPRESSION:  1. New anterior displacement of both central mandibular incisors near  horizontal alignment of crown and roots since 7/6/2023, perhaps by the  endotracheal tube. No periapical abscess.  2. Extensive air-fluid levels are seen throughout the paranasal  sinuses, which may suggest sequelae of sinusitis.    I have personally reviewed the examination and initial interpretation  and I agree with the findings.    WINDY HALL MD         SYSTEM ID:  [id number]

## 2023-07-16 NOTE — SIGNIFICANT EVENT
SPIRITUAL HEALTH SERVICES Significant Event  [employer] of ANOINTING  [facility] 4a    Pt anointed by Father Mook Troy   Pager [phone]

## 2023-07-16 NOTE — PLAN OF CARE
Neuro: Sedated/intubated on dex. Scheduled oxycodone started due to neuro storms symptoms (hyperthermia, HTN (200/100s), tachycardia). Pupils 2-3mm/reactive/round.  Cardiac: SR 70-90s w/ no noted ectopy. Hydralazine given once and patient started on bromocriptine for SBP >180. -160/50s post-intervention. Arctic sun remains on for temperature management and fluctuating temperature throughout the day.  Resp: CMV/AC 60%/8/500/12. Coarse/dim lung sounds. Mod amt of thick/yellow sputum  GI: NG w/ TF at 60ml/hr with q2h 100ml free water flush. Rectal tube w/ scant loose/brn output.  : Dorantes replaced as per order, new dorantes w/ temperature probe placed. -120ml/hr.  LADs: L PICC x2, R Rad ART, L PIV x2, R PIV, NG, Rectal tube  Gtts:  -propofol at 30mcg/kg/hr  -Insulin gtt 7-14u/hr (Alg 4+5)  Integ:  -Lower incisors dangling in mouth r/t trauma - dental consult placed  -Sedation/bolus of paralytic given to visualize tongue ulceration r/t clamping down with teeth. Pictures x3 taken and are on media tab. Offloading tongue by having gauze covered tongue depressor prevent further damage from teeth when clenched.  Family:  Dr. Cadet had a sit-down discussion with daughter and mother concerning patient status and future course (in-person  present) Family questions were thoroughly answered at this time.  -Family approached RN at 1700 wanting to ask additional questions. In-person  left with voicemail to schedule at 0900 7/17 per family request.    Plan: Continue to monitor for hemodynamic changes and update MD as needed.

## 2023-07-16 NOTE — CONSULTS
Care Management Follow Up    Length of Stay (days): 10    Expected Discharge Date:       Concerns to be Addressed:       Patient plan of care discussed at interdisciplinary rounds: Yes    Anticipated Discharge Disposition: TBD      Anticipated Discharge Services:  TBD  Anticipated Discharge DME: TBD     Patient/family educated on Medicare website which has current facility and service quality ratings:  Yes  Education Provided on the Discharge Plan: Yes   Patient/Family in Agreement with the Plan:  Yes    Referrals Placed by CM/SW:    Private pay costs discussed: insurance costs out of pocket expenses    Additional Information:  SW called patient's daughter to inquire about note needed for work. Patient's daughter reported that the doctor had written them a note for patient's work. However, she did report they wanted to look into the process for getting patient a POA because they are having a difficult time paying patient's bills. SW explained that there is a form they can fill out and they would need to get signed by patient and notarized. Patient's daughter expressed understanding. SW offered to come by when she is visiting and explain it to her further and she agreed.       LORRAINE Woods  ICU   Phone: [phone]  Pager: [phone]

## 2023-07-16 NOTE — PLAN OF CARE
"D/I: Patient admitted to unit 4A Surgical/Neuro ICU s/p ischemic stroke  /71 (BP Location: Right arm)   Pulse 78   Temp 98.8  F (37.1  C) (Esophageal)   Resp 23   Ht 1.702 m (5' 7\")   Wt 79.7 kg (175 lb 11.3 oz)   SpO2 90%   BMI 27.52 kg/m    Neuro-pupils equal and reactive, minimal movement in BUE to pain, fair cough  CV- VS stable, temp normal with arctic sun in place  Respiratory- Continues to be vented, no changes this shift  GI-TF @ goal with 100 ml/4hr H2O flushes, rectal tube in place with small amount of stool this shift  -Mcdaniel with good UOP  Skin-No noted skin issues, mepilex on sacrum for prevention  Gtts- Precedex and Insulin gtts  Pain- No noted pain  Family:  Pt wife and daughter at bedside last evening, asking appropriate questions  See flowsheets for further interventions and assessments.   A: Pt remains critically stable  P: Continue to monitor pt closely. Notify MD of significant changes.  Keep family updated on pt progress.     Goal Outcome Evaluation:      Plan of Care Reviewed With: patient, family    Overall Patient Progress: no changeOverall Patient Progress: no change           "

## 2023-07-16 NOTE — PROGRESS NOTES
Neurocritical Care Progress Note    Reason for critical care admission: Thalamic stroke   Admitting Team: VISHAL  Date of Service:  07/16/2023  Date of Admission:  7/6/2023  Hospital Day: 11    Assessment/Plan  Michael Stewart is a 52 year old male w/ PMHx past methamphetamine abuse and tobacco use who presents on 7/6/2023 as a transfer from [facility] after the discovery of bilateral thalamic strokes as well as Strep A pharyngitis. No acute interventions were performed. However, upon presentation to [facility], he was noted to have increased somnolence, bilateral ophthalmoplegia w/ nystagmus, and atypical breathing raising concern for alternate etiologies beyond stroke. Diagnostic possibilities include post-streptococcal acute demyelinating encephalomyelitis, other autoimmune processes, or viral meningitis.       24 hour events: Biting down on ETT, broken teeth with CT dental obtained overnight. Precedex infusion in place. Fevering with transient hypotension last evening. 1L Plasma Lyte given x1 and arctic sun initiated.     Neuro  #New brainstem infarcts   #Acute ischemic stroke of bilateral thalami with involvement of posterior limb and left internal capsule, unclear etiology  #Encephalopathy  -ASA 81mg daily  -Neurochecks Q4h  -sBP goal < 180 mmHg   -HOB > 30   -PT/OT/SLP when appropriate  -Discuss care conference this week     #CSF with possible chronic inflammatory or viral encephalitic process  -7/8 MR brain with no additional enhancing areas   -7/8 LP with opening pressure: 16 cm H2O, 63 nucleated cells, protein of 42, glucose of 64, additional labs ordered on CSF  -Cytology abnormal for LP on 7/8.  On repeat LP on 7/11, with the primary intent being to achieve fresh cells to low flow cytometry to be conducted.   -So far during hospital course, patient CSF has not been consistent with results that would be expected in acute ischemic stroke.  Rather, CSF suggesting underlying subacute to chronic  inflammatory or viral encephalitic process.   -Oligoclonal bands returned positive on 7/12   -Initiated trial of 1g methylprednisolone daily for 7 days to cover for possible autoimmune or inflammatory process that would be consistent with workup thus far       #Sympathetic hyperactivity  -Precedex infusion   -Scheduled Tylenol 1000 mg Q6h x24 hrs  -Start Bromocriptine 5 mg Q8h  -Start Oxycodone 5 mg Q4h  -Arctic sun as needed for normothermia  -Stop Ritalin 5 mg 0600, 1200 for neuro stimulation        #Analgesics & sedation  RASS goal: 0 to -1   -Fentanyl  mcg Q1h PRN    HEENT   #Biting down on ETT, clenched jaw  #Broken loose bottom teeth  #Tongue lesions, bloody  -7/15 CT dental showing displacement of both central mandibular incisors  -Dentistry following - Plan on removing incisors sometime early this week. Will reach out to staff prior to coming.     CV  #HTN  -Cardiac monitoring  -sBP goal < 180 mmHg   -PRN Hydralazine and Labetalol     #Hyperlipidemia  -Rosuvastatin 20 mg  -7/7     Resp  #Acute hypoxic respiratory failure   #Ventilator associated pneumonia    #Hx Tobacco Use  #Pulmonary nodules  Oxygen/vent: AC 12/500/+5  -Increase Peep to 8  -Further discussion this week for Trach placement   -Pulmonary hygiene    -Continuous pulse ox  -Maintain O2 saturations greater than 92%     GI  Diet: TF's @ goal (60)  -NGT in place  -Further discussion this week for PEG placement  -Nutrition following    Last BM: 7/16  GI prophylaxis: Famotidine 20 mg BID  Bowel regimen: Scheduled Senna-docusate BID, Miralax and MOM PRN      Renal/  #Hypernatremia  #Hyperchloremia  #Hypocalcemia  #Hypermagnesemia  #Lactic acidosis  -Lactic acidosis  -Repeat lactic acid    - Q2h      -Daily BMP  -IV fluids: None  -Electrolyte replacement protocols    #Urinary retention   -Continue Doxazosin 2 mg daily   -Mcdaniel replaced on 7/16 for temperature probe with arctic sun     Endo  #DM2  #Overweight   #Hyperglycemia 2/2  "steroids  -Hgb A1c; 6.1  -TSH; 2.13  -Continue insulin infusion   -Monitor glucose levels     Heme  #Normocyctic anemia   -Daily CBC  -Hgb goal >7, plt goal >50k  -Transfuse to meet Hgb and plt goals     ID  #Leukocytosis  #Fevers   #Ventilator associated pneumonia  #Concern for sepsis  -Continue Cefepime   -7/15 MRSA negative - Stop Vancomycin   -7/15 BC with NGTD  -7/15 Sputum prelim growing streptococcus anginosus, staphylococcus aureus, and klebsiella aerogenes   -Daily CBC  -Follow temperature curve     ICU Checklist  Lines/tubes/drains: PIV x3, L/PICC, Daisy, ETT, NGT, dorantes, rectal tube    FEN: TFs, replacement   PPX: DVT - SCDs, Lovenox; GI - Famotidine  Code: FULL CODE  Dispo: ICU - NCC      Clinically Significant Risk Factors         # Hypernatremia: Highest Na = 154 mmol/L in last 2 days, will monitor as appropriate  # Hypocalcemia: Lowest Ca = 8 mg/dL in last 2 days, will monitor and replace as appropriate                # Overweight: Estimated body mass index is 27.52 kg/m  as calculated from the following:    Height as of this encounter: 1.702 m (5' 7\").    Weight as of this encounter: 79.7 kg (175 lb 11.3 oz).          TIME SPENT ON THIS ENCOUNTER   I spent 52 minutes of critical care time on the unit/floor managing the care of Michael Stewart excluding time performing procedures. Upon evaluation, this patient had a high probability of imminent or life-threatening deterioration due to an acute ischemic stroke, which required my direct attention, intervention, and personal management. Greater than 50% of my time was spent at the bedside counseling the patient and/or coordinating care including chart review, history, exam, documentation, and further activities per this note. I have personally reviewed the following data/imaging over the past 24 hours.     The patient was seen and discussed with the NCC attending, Dr. Kelly.    Lavonne HOUSER, JOSÉ  Neurocritical care nurse practitioner  Pager: " [phone]  Ascom: *[phone] available [facility] 0700 to 1700    24 Hour Vital Signs Summary:  Temp: 99.9  F (37.7  C) Temp  Min: 98.2  F (36.8  C)  Max: 102.6  F (39.2  C)  Resp: 24 Resp  Min: 13  Max: 25  SpO2: 95 % SpO2  Min: 90 %  Max: 99 %  Pulse: 78 Pulse  Min: 70  Max: 99  BP: 120/71 Systolic (24hrs), Av , Min:99 , Max:127   Diastolic (24hrs), Av, Min:62, Max:80    Respiratory monitoring:   Vent Mode: CMV/AC  (Continuous Mandatory Ventilation/ Assist Control)  FiO2 (%): 60 %  Resp Rate (Set): 12 breaths/min  Tidal Volume (Set, mL): 500 mL  PEEP (cm H2O): 8 cmH2O  Resp: 24       I/O last 3 completed shifts:  In: 4744.44 [I.V.:2394.44; NG/GT:1030]  Out: 2890 [Urine:2690; Stool:200]    Current Medications:    acetaminophen  1,000 mg Oral or Feeding Tube Q6H     aspirin  81 mg Oral or Feeding Tube Daily     bromocriptine  5 mg Oral Q8H     ceFEPIme  2 g Intravenous Q8H     doxazosin  1 mg Oral or Feeding Tube Daily     enoxaparin ANTICOAGULANT  40 mg Subcutaneous Q24H     famotidine  20 mg Oral or Feeding Tube BID     heparin lock flush  5-20 mL Intracatheter Q24H     methylPREDNISolone  1,000 mg Intravenous Q24H     multivitamins w/minerals  15 mL Per Feeding Tube Daily     oxyCODONE  5 mg Oral Q4H     protein modular  1 packet Per Feeding Tube Daily     rosuvastatin  20 mg Oral or Feeding Tube Daily     senna-docusate  1-2 tablet Oral or Feeding Tube BID     sodium chloride (PF)  3 mL Intracatheter Q8H     thiamine  100 mg Oral or Feeding Tube Daily     vancomycin  1,250 mg Intravenous Q12H       PRN Medications:  glucose **OR** dextrose **OR** glucagon, fentaNYL, heparin lock flush, labetalol **OR** hydrALAZINE, lidocaine 4%, lidocaine (buffered or not buffered), magnesium hydroxide, naloxone **OR** naloxone **OR** naloxone **OR** naloxone, ondansetron **OR** ondansetron, polyethylene glycol, sodium chloride (PF), sodium chloride (PF)    Infusions:    dexmedetomidine 0.8 mcg/kg/hr (23 1600)      "insulin regular 7 Units/hr (07/16/23 1500)       No Known Allergies    Physical Examination:  Vitals: /71 (BP Location: Right arm)   Pulse 78   Temp 99.9  F (37.7  C) (Bladder)   Resp 24   Ht 1.702 m (5' 7\")   Wt 79.7 kg (175 lb 11.3 oz)   SpO2 95%   BMI 27.52 kg/m    General: Adult male patient, lying in bed, critically-ill  HEENT: Normocephalic, atraumatic, no icterus, teeth missing, tongue lesions, mouth bloody   Cardiac: Sinus rhythm on bedside monitor   Pulm: Unlabored, expansion symmetric, no retractions or use of accessory muscles, assisted mechanically  Abdomen: Soft, non-distended abdomen   Extremities: Warm, no edema, appears adequately perfused  Skin: No rash or lesion observed on exposed skin  Psych: Calm   Neuro:  Mental status: Sedated, does not open eyes, does not follow commands, intubated.  Cranial nerves: PERRL, conjugate gaze, weak cough and gag with deep suction.  Motor: Normal bulk and tone. No abnormal movements. 1/5 strength in LUE with extension to noxious stimuli. 1/5 strength in RUE with flexion to noxious stimuli. 0/5 strength to bilateral lower extremities with no withdraw to noxious stimuli.   Sensory: responds to noxious stimuli in bilateral uppers only, does not grimace.  Coordination: ADAM, deferred.  Gait: ADAM, deferred.    Labs and Imaging:    Results for orders placed or performed during the hospital encounter of 07/06/23 (from the past 24 hour(s))   Glucose by meter   Result Value Ref Range    GLUCOSE BY METER POCT 173 (H) 70 - 99 mg/dL   Glucose by meter   Result Value Ref Range    GLUCOSE BY METER POCT 146 (H) 70 - 99 mg/dL   Double Lumen PICC Placement    Narrative    Kendell Horn RN     7/15/2023  8:20 PM  [facility]    Double Lumen PICC Placement    Date/Time: 7/15/2023 8:16 PM    Performed by: Kendell Horn RN  Authorized by: Raymond Breen MD  Indications: vascular access      UNIVERSAL PROTOCOL   Site Marked: " Yes  Prior Images Obtained and Reviewed:  Yes  Required items: Required blood products, implants, devices and special   equipment available    Patient identity confirmed:  Verbally with patient, arm band, provided   demographic data, hospital-assigned identification number and anonymous   protocol, patient vented/unresponsive  NA - No sedation, light sedation, or local anesthesia  Confirmation Checklist:  Patient's identity using two indicators, relevant   allergies, procedure was appropriate and matched the consent or emergent   situation and correct equipment/implants were available  Time out: Immediately prior to the procedure a time out was called (Kendell)    Universal Protocol: the Joint Commission Universal Protocol was followed    Preparation: Patient was prepped and draped in usual sterile fashion       ANESTHESIA    Anesthesia: Local infiltration  Local Anesthetic:  Lidocaine 1% without epinephrine  Anesthetic Total (mL):  5      SEDATION    Patient Sedated: No        Preparation: skin prepped with ChloraPrep  Skin prep agent: skin prep agent completely dried prior to procedure  Sterile barriers: maximum sterile barriers were used: cap, mask, sterile   gown, sterile gloves, and large sterile sheet  Hand hygiene: hand hygiene performed prior to central venous catheter   insertion  Type of line used: PICC  Catheter type: double lumen  Lumen type: non-valved and power PICC  Lumen Identification: Purple and Red  Catheter size: 5 Fr  Brand: Bard  Lot number: [id number]  Placement method: venipuncture, MST, ultrasound and tip navigation system  Number of attempts: 1  Difficulty threading catheter: no  Successful placement: yes  Orientation: left    Location: basilic vein (0.58cm)  Tip Location: SVC  Arm circumference: adults 10 cm  Extremity circumference: 28  Visible catheter length: 1  Total catheter length: 48  Dressing and securement: adhesive securement device, alcohol impregnated   caps, blood cleaned with CHG,  blood removed, fixation device,   chlorhexidine patch applied, site cleansed, securement device, statlock   and transparent securement dressing  Post procedure assessment: blood return through all ports, free fluid flow   and placement verified by 3CG technology  PROCEDURE Describe Procedure: Left basilic vein 0.58cm dm. 1cm   external.Placement verified by [place] 3CG.PICC okay to use.  Disposal: sharps and needle count correct at the end of procedure, needles   and guidewire disposed in sharps container   Glucose by meter   Result Value Ref Range    GLUCOSE BY METER POCT 136 (H) 70 - 99 mg/dL   CT Dental wo Contrast    Narrative    EXAM: CT DENTAL WO CONTRAST 7/15/2023 9:44 PM    HISTORY:  determine dental infection    TECHNIQUE: 3-D reconstruction by the technologists, with curved  multiplanar reformat of thin section imaging through the mandible and  maxilla obtained without intravenous contrast.    FINDINGS:  Partially visualized endotracheal tube in the oral cavity and  nasogastric tube in the right nasal cavity and upper thoracic  esophagus. No significant soft tissue swelling or mass. Normal facial  bone alignment. No bony erosion. Small periapical lucencies along the  bilateral central mandibular incisors with near horizontal alignment  of the teeth, which may suggest dislodged/displacement.    Extensive air-fluid levels are seen throughout the paranasal sinuses.  Bilateral mastoid air cell effusions. Normal temporomandibular joints.      Impression    IMPRESSION:  1. New anterior displacement of both central mandibular incisors near  horizontal alignment of crown and roots since 7/6/2023, perhaps by the  endotracheal tube. No periapical abscess.  2. Extensive air-fluid levels are seen throughout the paranasal  sinuses, which may suggest sequelae of sinusitis.    I have personally reviewed the examination and initial interpretation  and I agree with the findings.    WINDY HALL MD         SYSTEM ID:   [id number]   Glucose by meter   Result Value Ref Range    GLUCOSE BY METER POCT 191 (H) 70 - 99 mg/dL   Glucose by meter   Result Value Ref Range    GLUCOSE BY METER POCT 166 (H) 70 - 99 mg/dL   Glucose by meter   Result Value Ref Range    GLUCOSE BY METER POCT 176 (H) 70 - 99 mg/dL   Glucose by meter   Result Value Ref Range    GLUCOSE BY METER POCT 145 (H) 70 - 99 mg/dL   Phosphorus   Result Value Ref Range    Phosphorus 3.2 2.5 - 4.5 mg/dL   CBC with platelets   Result Value Ref Range    WBC Count 11.2 (H) 4.0 - 11.0 10e3/uL    RBC Count 3.96 (L) 4.40 - 5.90 10e6/uL    Hemoglobin 12.1 (L) 13.3 - 17.7 g/dL    Hematocrit 38.5 (L) 40.0 - 53.0 %    MCV 97 78 - 100 fL    MCH 30.6 26.5 - 33.0 pg    MCHC 31.4 (L) 31.5 - 36.5 g/dL    RDW 13.0 10.0 - 15.0 %    Platelet Count 358 150 - 450 10e3/uL   Basic metabolic panel   Result Value Ref Range    Sodium 154 (H) 136 - 145 mmol/L    Potassium 4.3 3.4 - 5.3 mmol/L    Chloride 121 (H) 98 - 107 mmol/L    Carbon Dioxide (CO2) 23 22 - 29 mmol/L    Anion Gap 10 7 - 15 mmol/L    Urea Nitrogen 38.3 (H) 6.0 - 20.0 mg/dL    Creatinine 0.73 0.67 - 1.17 mg/dL    Calcium 8.0 (L) 8.6 - 10.0 mg/dL    Glucose 173 (H) 70 - 99 mg/dL    GFR Estimate >90 >60 mL/min/1.73m2   Magnesium   Result Value Ref Range    Magnesium 3.0 (H) 1.7 - 2.3 mg/dL   Glucose by meter   Result Value Ref Range    GLUCOSE BY METER POCT 161 (H) 70 - 99 mg/dL   Glucose by meter   Result Value Ref Range    GLUCOSE BY METER POCT 169 (H) 70 - 99 mg/dL   Glucose by meter   Result Value Ref Range    GLUCOSE BY METER POCT 160 (H) 70 - 99 mg/dL   Glucose by meter   Result Value Ref Range    GLUCOSE BY METER POCT 153 (H) 70 - 99 mg/dL   Glucose by meter   Result Value Ref Range    GLUCOSE BY METER POCT 165 (H) 70 - 99 mg/dL   Glucose by meter   Result Value Ref Range    GLUCOSE BY METER POCT 176 (H) 70 - 99 mg/dL   Glucose by meter   Result Value Ref Range    GLUCOSE BY METER POCT 119 (H) 70 - 99 mg/dL   Glucose by meter    Result Value Ref Range    GLUCOSE BY METER POCT 117 (H) 70 - 99 mg/dL       All relevant imaging and laboratory values personally reviewed.

## 2023-07-17 ENCOUNTER — APPOINTMENT (OUTPATIENT)
Dept: INTERPRETER SERVICES | Facility: CLINIC | Age: 53
End: 2023-07-17
Payer: COMMERCIAL

## 2023-07-17 ENCOUNTER — APPOINTMENT (OUTPATIENT)
Dept: GENERAL RADIOLOGY | Facility: CLINIC | Age: 53
End: 2023-07-17
Attending: NURSE PRACTITIONER
Payer: COMMERCIAL

## 2023-07-17 LAB
ANION GAP SERPL CALCULATED.3IONS-SCNC: 12 MMOL/L (ref 7–15)
BACTERIA SPT CULT: ABNORMAL
BUN SERPL-MCNC: 37.1 MG/DL (ref 6–20)
CA-I BLD-MCNC: 4.5 MG/DL (ref 4.4–5.2)
CALCIUM SERPL-MCNC: 7.7 MG/DL (ref 8.6–10)
CHLORIDE SERPL-SCNC: 115 MMOL/L (ref 98–107)
CREAT SERPL-MCNC: 0.72 MG/DL (ref 0.67–1.17)
DEPRECATED HCO3 PLAS-SCNC: 22 MMOL/L (ref 22–29)
ERYTHROCYTE [DISTWIDTH] IN BLOOD BY AUTOMATED COUNT: 12.6 % (ref 10–15)
GFR SERPL CREATININE-BSD FRML MDRD: >90 ML/MIN/1.73M2
GLUCOSE BLDC GLUCOMTR-MCNC: 114 MG/DL (ref 70–99)
GLUCOSE BLDC GLUCOMTR-MCNC: 121 MG/DL (ref 70–99)
GLUCOSE BLDC GLUCOMTR-MCNC: 123 MG/DL (ref 70–99)
GLUCOSE BLDC GLUCOMTR-MCNC: 152 MG/DL (ref 70–99)
GLUCOSE BLDC GLUCOMTR-MCNC: 152 MG/DL (ref 70–99)
GLUCOSE BLDC GLUCOMTR-MCNC: 155 MG/DL (ref 70–99)
GLUCOSE BLDC GLUCOMTR-MCNC: 155 MG/DL (ref 70–99)
GLUCOSE BLDC GLUCOMTR-MCNC: 158 MG/DL (ref 70–99)
GLUCOSE BLDC GLUCOMTR-MCNC: 163 MG/DL (ref 70–99)
GLUCOSE BLDC GLUCOMTR-MCNC: 165 MG/DL (ref 70–99)
GLUCOSE BLDC GLUCOMTR-MCNC: 167 MG/DL (ref 70–99)
GLUCOSE BLDC GLUCOMTR-MCNC: 178 MG/DL (ref 70–99)
GLUCOSE BLDC GLUCOMTR-MCNC: 178 MG/DL (ref 70–99)
GLUCOSE BLDC GLUCOMTR-MCNC: 179 MG/DL (ref 70–99)
GLUCOSE BLDC GLUCOMTR-MCNC: 180 MG/DL (ref 70–99)
GLUCOSE BLDC GLUCOMTR-MCNC: 183 MG/DL (ref 70–99)
GLUCOSE BLDC GLUCOMTR-MCNC: 186 MG/DL (ref 70–99)
GLUCOSE BLDC GLUCOMTR-MCNC: 194 MG/DL (ref 70–99)
GLUCOSE BLDC GLUCOMTR-MCNC: 195 MG/DL (ref 70–99)
GLUCOSE BLDC GLUCOMTR-MCNC: 197 MG/DL (ref 70–99)
GLUCOSE BLDC GLUCOMTR-MCNC: 205 MG/DL (ref 70–99)
GLUCOSE SERPL-MCNC: 126 MG/DL (ref 70–99)
GRAM STAIN RESULT: ABNORMAL
GRAM STAIN RESULT: ABNORMAL
HCT VFR BLD AUTO: 35.7 % (ref 40–53)
HCT VFR BLD AUTO: 38.5 % (ref 40–53)
HEMOCCULT STL QL IA: POSITIVE
HGB BLD-MCNC: 11.4 G/DL (ref 13.3–17.7)
HGB BLD-MCNC: 11.9 G/DL (ref 13.3–17.7)
LACTATE SERPL-SCNC: 2.1 MMOL/L (ref 0.7–2)
MAGNESIUM SERPL-MCNC: 2.8 MG/DL (ref 1.7–2.3)
MCH RBC QN AUTO: 30.7 PG (ref 26.5–33)
MCHC RBC AUTO-ENTMCNC: 31.9 G/DL (ref 31.5–36.5)
MCV RBC AUTO: 96 FL (ref 78–100)
PHOSPHATE SERPL-MCNC: 3 MG/DL (ref 2.5–4.5)
PLATELET # BLD AUTO: 354 10E3/UL (ref 150–450)
POTASSIUM SERPL-SCNC: 4.2 MMOL/L (ref 3.4–5.3)
RBC # BLD AUTO: 3.71 10E6/UL (ref 4.4–5.9)
SCANNED LAB RESULT: ABNORMAL
SODIUM SERPL-SCNC: 148 MMOL/L (ref 136–145)
SODIUM SERPL-SCNC: 149 MMOL/L (ref 136–145)
WBC # BLD AUTO: 13.2 10E3/UL (ref 4–11)

## 2023-07-17 PROCEDURE — 250N000013 HC RX MED GY IP 250 OP 250 PS 637: Performed by: NURSE PRACTITIONER

## 2023-07-17 PROCEDURE — 250N000009 HC RX 250: Performed by: NURSE PRACTITIONER

## 2023-07-17 PROCEDURE — 99223 1ST HOSP IP/OBS HIGH 75: CPT | Mod: 25 | Performed by: PHYSICIAN ASSISTANT

## 2023-07-17 PROCEDURE — 258N000003 HC RX IP 258 OP 636: Performed by: PSYCHIATRY & NEUROLOGY

## 2023-07-17 PROCEDURE — 99291 CRITICAL CARE FIRST HOUR: CPT | Mod: GC | Performed by: PSYCHIATRY & NEUROLOGY

## 2023-07-17 PROCEDURE — 250N000013 HC RX MED GY IP 250 OP 250 PS 637

## 2023-07-17 PROCEDURE — 250N000011 HC RX IP 250 OP 636: Performed by: PSYCHIATRY & NEUROLOGY

## 2023-07-17 PROCEDURE — 200N000002 HC R&B ICU UMMC

## 2023-07-17 PROCEDURE — 82330 ASSAY OF CALCIUM: CPT | Performed by: NURSE PRACTITIONER

## 2023-07-17 PROCEDURE — 250N000011 HC RX IP 250 OP 636: Mod: JZ | Performed by: STUDENT IN AN ORGANIZED HEALTH CARE EDUCATION/TRAINING PROGRAM

## 2023-07-17 PROCEDURE — 85014 HEMATOCRIT: CPT

## 2023-07-17 PROCEDURE — 250N000011 HC RX IP 250 OP 636: Mod: JZ | Performed by: PSYCHIATRY & NEUROLOGY

## 2023-07-17 PROCEDURE — 80048 BASIC METABOLIC PNL TOTAL CA: CPT | Performed by: NURSE PRACTITIONER

## 2023-07-17 PROCEDURE — 250N000013 HC RX MED GY IP 250 OP 250 PS 637: Performed by: PSYCHIATRY & NEUROLOGY

## 2023-07-17 PROCEDURE — 94003 VENT MGMT INPAT SUBQ DAY: CPT

## 2023-07-17 PROCEDURE — 250N000011 HC RX IP 250 OP 636

## 2023-07-17 PROCEDURE — 83735 ASSAY OF MAGNESIUM: CPT | Performed by: NURSE PRACTITIONER

## 2023-07-17 PROCEDURE — C9113 INJ PANTOPRAZOLE SODIUM, VIA: HCPCS | Mod: JZ | Performed by: NURSE PRACTITIONER

## 2023-07-17 PROCEDURE — 83605 ASSAY OF LACTIC ACID: CPT | Performed by: NURSE PRACTITIONER

## 2023-07-17 PROCEDURE — 250N000011 HC RX IP 250 OP 636: Performed by: NURSE PRACTITIONER

## 2023-07-17 PROCEDURE — G0463 HOSPITAL OUTPT CLINIC VISIT: HCPCS

## 2023-07-17 PROCEDURE — 250N000013 HC RX MED GY IP 250 OP 250 PS 637: Performed by: STUDENT IN AN ORGANIZED HEALTH CARE EDUCATION/TRAINING PROGRAM

## 2023-07-17 PROCEDURE — 84295 ASSAY OF SERUM SODIUM: CPT

## 2023-07-17 PROCEDURE — 85027 COMPLETE CBC AUTOMATED: CPT | Performed by: NURSE PRACTITIONER

## 2023-07-17 PROCEDURE — 99254 IP/OBS CNSLTJ NEW/EST MOD 60: CPT | Mod: GC | Performed by: PHYSICAL MEDICINE & REHABILITATION

## 2023-07-17 PROCEDURE — 71045 X-RAY EXAM CHEST 1 VIEW: CPT | Mod: 26 | Performed by: RADIOLOGY

## 2023-07-17 PROCEDURE — 71045 X-RAY EXAM CHEST 1 VIEW: CPT

## 2023-07-17 PROCEDURE — 258N000003 HC RX IP 258 OP 636

## 2023-07-17 PROCEDURE — 82274 ASSAY TEST FOR BLOOD FECAL: CPT | Performed by: PSYCHIATRY & NEUROLOGY

## 2023-07-17 PROCEDURE — 84100 ASSAY OF PHOSPHORUS: CPT | Performed by: NURSE PRACTITIONER

## 2023-07-17 PROCEDURE — 99497 ADVNCD CARE PLAN 30 MIN: CPT | Performed by: PHYSICIAN ASSISTANT

## 2023-07-17 PROCEDURE — 999N000157 HC STATISTIC RCP TIME EA 10 MIN

## 2023-07-17 PROCEDURE — 250N000011 HC RX IP 250 OP 636: Mod: JZ | Performed by: NURSE PRACTITIONER

## 2023-07-17 RX ORDER — CALCIUM GLUCONATE 20 MG/ML
2 INJECTION, SOLUTION INTRAVENOUS
Status: COMPLETED | OUTPATIENT
Start: 2023-07-17 | End: 2023-07-17

## 2023-07-17 RX ORDER — VECURONIUM BROMIDE 1 MG/ML
5 INJECTION, POWDER, LYOPHILIZED, FOR SOLUTION INTRAVENOUS ONCE
Status: COMPLETED | OUTPATIENT
Start: 2023-07-17 | End: 2023-07-17

## 2023-07-17 RX ORDER — NOREPINEPHRINE BITARTRATE 0.06 MG/ML
.01-.6 INJECTION, SOLUTION INTRAVENOUS CONTINUOUS
Status: DISCONTINUED | OUTPATIENT
Start: 2023-07-17 | End: 2023-07-23

## 2023-07-17 RX ORDER — FAMOTIDINE 20 MG/1
20 TABLET, FILM COATED ORAL 2 TIMES DAILY
Status: DISCONTINUED | OUTPATIENT
Start: 2023-07-17 | End: 2023-07-17

## 2023-07-17 RX ADMIN — PROPOFOL 30 MCG/KG/MIN: 10 INJECTION, EMULSION INTRAVENOUS at 23:58

## 2023-07-17 RX ADMIN — CEFEPIME HYDROCHLORIDE 2 G: 2 INJECTION, POWDER, FOR SOLUTION INTRAVENOUS at 23:58

## 2023-07-17 RX ADMIN — OXYCODONE HYDROCHLORIDE 5 MG: 5 TABLET ORAL at 23:58

## 2023-07-17 RX ADMIN — VECURONIUM BROMIDE 5 MG: 1 INJECTION, POWDER, LYOPHILIZED, FOR SOLUTION INTRAVENOUS at 15:59

## 2023-07-17 RX ADMIN — OXYCODONE HYDROCHLORIDE 5 MG: 5 TABLET ORAL at 07:58

## 2023-07-17 RX ADMIN — PANTOPRAZOLE SODIUM 40 MG: 40 INJECTION, POWDER, FOR SOLUTION INTRAVENOUS at 20:19

## 2023-07-17 RX ADMIN — Medication 0.03 MCG/KG/MIN: at 07:37

## 2023-07-17 RX ADMIN — CALCIUM GLUCONATE 2 G: 20 INJECTION, SOLUTION INTRAVENOUS at 11:41

## 2023-07-17 RX ADMIN — VANCOMYCIN HYDROCHLORIDE 1250 MG: 1 INJECTION, POWDER, LYOPHILIZED, FOR SOLUTION INTRAVENOUS at 06:09

## 2023-07-17 RX ADMIN — BROMOCRIPTINE MESYLATE 5 MG: 2.5 TABLET ORAL at 06:09

## 2023-07-17 RX ADMIN — FAMOTIDINE 20 MG: 40 POWDER, FOR SUSPENSION ORAL at 07:59

## 2023-07-17 RX ADMIN — ACETAMINOPHEN 1000 MG: 325 SOLUTION ORAL at 03:50

## 2023-07-17 RX ADMIN — SENNOSIDES AND DOCUSATE SODIUM 2 TABLET: 50; 8.6 TABLET ORAL at 20:19

## 2023-07-17 RX ADMIN — HUMAN INSULIN 3 UNITS/HR: 100 INJECTION, SOLUTION SUBCUTANEOUS at 15:14

## 2023-07-17 RX ADMIN — PROPOFOL 30 MCG/KG/MIN: 10 INJECTION, EMULSION INTRAVENOUS at 18:02

## 2023-07-17 RX ADMIN — OXYCODONE HYDROCHLORIDE 5 MG: 5 TABLET ORAL at 11:44

## 2023-07-17 RX ADMIN — PROPOFOL 30 MCG/KG/MIN: 10 INJECTION, EMULSION INTRAVENOUS at 11:44

## 2023-07-17 RX ADMIN — CALCIUM GLUCONATE 2 G: 20 INJECTION, SOLUTION INTRAVENOUS at 13:45

## 2023-07-17 RX ADMIN — HUMAN INSULIN 10 UNITS/HR: 100 INJECTION, SOLUTION SUBCUTANEOUS at 03:50

## 2023-07-17 RX ADMIN — BROMOCRIPTINE MESYLATE 5 MG: 2.5 TABLET ORAL at 13:46

## 2023-07-17 RX ADMIN — ENOXAPARIN SODIUM 40 MG: 40 INJECTION SUBCUTANEOUS at 10:08

## 2023-07-17 RX ADMIN — DEXTROSE MONOHYDRATE 1000 MG: 50 INJECTION, SOLUTION INTRAVENOUS at 07:58

## 2023-07-17 RX ADMIN — ASPIRIN 81 MG CHEWABLE TABLET 81 MG: 81 TABLET CHEWABLE at 07:58

## 2023-07-17 RX ADMIN — Medication 15 ML: at 07:58

## 2023-07-17 RX ADMIN — Medication 20 MG: at 15:59

## 2023-07-17 RX ADMIN — BROMOCRIPTINE MESYLATE 5 MG: 2.5 TABLET ORAL at 22:15

## 2023-07-17 RX ADMIN — PROPOFOL 30 MCG/KG/MIN: 10 INJECTION, EMULSION INTRAVENOUS at 05:43

## 2023-07-17 RX ADMIN — THIAMINE HCL TAB 100 MG 100 MG: 100 TAB at 07:58

## 2023-07-17 RX ADMIN — OXYCODONE HYDROCHLORIDE 5 MG: 5 TABLET ORAL at 03:49

## 2023-07-17 RX ADMIN — HUMAN INSULIN 11 UNITS/HR: 100 INJECTION, SOLUTION SUBCUTANEOUS at 00:18

## 2023-07-17 RX ADMIN — ROSUVASTATIN CALCIUM 20 MG: 20 TABLET, FILM COATED ORAL at 07:58

## 2023-07-17 RX ADMIN — CEFEPIME HYDROCHLORIDE 2 G: 2 INJECTION, POWDER, FOR SOLUTION INTRAVENOUS at 08:15

## 2023-07-17 RX ADMIN — OXYCODONE HYDROCHLORIDE 5 MG: 5 TABLET ORAL at 16:20

## 2023-07-17 RX ADMIN — CEFEPIME HYDROCHLORIDE 2 G: 2 INJECTION, POWDER, FOR SOLUTION INTRAVENOUS at 16:20

## 2023-07-17 RX ADMIN — OXYCODONE HYDROCHLORIDE 5 MG: 5 TABLET ORAL at 20:19

## 2023-07-17 RX ADMIN — CEFEPIME HYDROCHLORIDE 2 G: 2 INJECTION, POWDER, FOR SOLUTION INTRAVENOUS at 00:08

## 2023-07-17 ASSESSMENT — ACTIVITIES OF DAILY LIVING (ADL)
ADLS_ACUITY_SCORE: 28

## 2023-07-17 NOTE — PLAN OF CARE
ICU End of Shift Summary. See flowsheets for vital signs and detailed assessment. Handoff report given to oncoming RN.     Major Events: Dental provider at bedside to assess missing teeth.  Sedated/paralyzed to better view mouth, teeth not found in oral cavity. Care conference with family this AM, no immediate change in plan.     Neuro: RASS -5, sedated on propofol. No response to stimuli in all extremities. Pupils equal and reactive, slightly deviated up and out.   CV: SR. Levo required in AM to maintain MAP goal, off since 1030. Artic sun remains on to maintain normothermia.   Resp: CMV settings PEEP 8, FiO2 60%. Copious secretions. Additional bite blocks placed on molars.   GI/: TF at goal, 100 ml Q2H FWF. Rectal tube removed d/t positive occult blood in stool. Rectal pouch in place. Mcdaniel remains in place, good urine output.     Plan: Continue with plan of care. Notify MD of changes.

## 2023-07-17 NOTE — PROGRESS NOTES
Neurocritical Care Progress Note    Reason for critical care admission: Thalamic stroke   Admitting Team: VISHAL  Date of Service:  07/17/2023  Date of Admission:  7/6/2023  Hospital Day: 12    Assessment/Plan  Michael Stewart is a 52 year old male w/ PMHx past methamphetamine abuse and tobacco use who presents on 7/6/2023 as a transfer from [facility] after the discovery of bilateral thalamic strokes as well as Strep A pharyngitis. No acute interventions were performed. However, upon presentation to [facility], he was noted to have increased somnolence, bilateral ophthalmoplegia w/ nystagmus, and atypical breathing raising concern for alternate etiologies beyond stroke. Diagnostic possibilities include post-streptococcal acute demyelinating encephalomyelitis, other autoimmune processes, or viral meningitis.       24 hour events: Intermittently drops his pressure to the 90s systolic. Norepinephrine available as needed.     Neuro  #New brainstem infarcts   #Acute ischemic stroke of bilateral thalami with involvement of posterior limb and left internal capsule, unclear etiology  #Encephalopathy  -ASA 81mg daily  -Neurochecks Q4h  -sBP goal < 180 mmHg   -HOB > 30   -PT/OT/SLP when appropriate  -Multiple conversations have been held with the assistance of interpretation services, and which the current clinical picture and suspected poor prognosis for this patient have been communicated.  On the morning of 7/17, the family expressed understanding of the current situation.  However, they requested transfer to [facility] if possible.  Family was advised that this would only be possible if there was a physician at [facility] who would be willing to accept the patient as a transfer.  Palliative care team was involved in the case, given the challenging goals of care conversations that are ongoing.    #CSF with possible chronic inflammatory or viral encephalitic process  -7/8 MR brain with no additional enhancing areas   -7/8  LP with opening pressure: 16 cm H2O, 63 nucleated cells, protein of 42, glucose of 64, additional labs ordered on CSF  -Cytology abnormal for LP on 7/8.  On repeat LP on 7/11, with the primary intent being to achieve fresh cells to low flow cytometry to be conducted.   -So far during hospital course, patient CSF has not been consistent with results that would be expected in acute ischemic stroke.  Rather, CSF suggesting underlying subacute to chronic inflammatory or viral encephalitic process.   -Oligoclonal bands returned positive on 7/12   -Initiated trial of 1g methylprednisolone daily for 7 days to cover for possible autoimmune or inflammatory process that would be consistent with workup thus far       #Sympathetic hyperactivity  -Precedex infusion   -Scheduled Tylenol 1000 mg Q6h x24 hrs  -Start Bromocriptine 5 mg Q8h  -Start Oxycodone 5 mg Q4h  -Arctic sun as needed for normothermia  -on 7/16, stopped Ritalin 5 mg 0600, 1200 for neuro stimulation        #Analgesics & sedation  RASS goal: 0 to -1   -Fentanyl  mcg Q1h PRN  -propofol     HEENT   #Biting down on ETT, clenched jaw  #Broken loose bottom teeth  #Tongue lesions, bloody  -7/15 CT dental showing displacement of both central mandibular incisors  -Dentistry following - Plan on removing incisors sometime early this week. Will reach out to staff prior to coming.  -PM&R consult to see about botox for jaw clenching.      CV  #HTN  #Intermittent hypotension  -Cardiac monitoring  -sBP goal < 180 mmHg   -PRN Hydralazine and Labetalol  -ordered new lactate on 7/17  -Intermittently drops pressures, which is currently likely due to propofol.   -Norepinephrine as needed to maintain MAP > 65.      #Hyperlipidemia  -Rosuvastatin 20 mg  -7/7     Resp  #Acute hypoxic respiratory failure   #Ventilator associated pneumonia    #Hx Tobacco Use  #Pulmonary nodules  Oxygen/vent: AC 12/500/+5  -Maintain Peep at 8  -Further discussion this week for Trach placement  "if within goals of care  -Pulmonary hygiene    -Continuous pulse ox  -Maintain O2 saturations greater than 92%  -Obtained new CXR on 7/17.     GI  Diet: TF's @ goal (60)  -NGT in place  -Further discussion this week for PEG placement if within goals of care  -Nutrition following    Last BM: 7/16  GI prophylaxis: Famotidine 20 mg BID  Bowel regimen: Holding bowel regimen on 7/17 due to frequent watery stools. Added fiber packets as well.     Renal/  #Hypernatremia  #Hyperchloremia  #Hypocalcemia  #Hypermagnesemia  #Lactic acidosis  - Q2h      -Daily BMP  -IV fluids: None  -Electrolyte replacement protocols  -Gave calcium gluconate on 7/17 for hypocalcemia.     #Urinary retention   -Continue Doxazosin 2 mg daily   -Dorantes replaced on 7/16 for temperature probe with arctic sun     Endo  #DM2  #Overweight   #Hyperglycemia 2/2 steroids  -Hgb A1c; 6.1  -TSH; 2.13  -Continue insulin infusion   -Monitor glucose levels     Heme  #Normocyctic anemia   -Daily CBC  -Hgb goal >7, plt goal >50k  -Transfuse to meet Hgb and plt goals     ID  #Leukocytosis  #Fevers   #Ventilator associated pneumonia  #Concern for sepsis  -Continue Cefepime   -7/15 MRSA negative - Stopped Vancomycin on 7/17.   -7/15 BC with NGTD  -7/15 Sputum prelim growing streptococcus anginosus, staphylococcus aureus, and klebsiella aerogenes   -Daily CBC  -Follow temperature curve     ICU Checklist  Lines/tubes/drains: PIV x3, L/PICC, [place], ETT, NGT, dorantes, rectal tube    FEN: TFs, replacement   PPX: DVT - SCDs, Lovenox; GI - Famotidine  Code: FULL CODE  Dispo: ICU - NCC      Clinically Significant Risk Factors         # Hypernatremia: Highest Na = 154 mmol/L in last 2 days, will monitor as appropriate                 # Overweight: Estimated body mass index is 28.24 kg/m  as calculated from the following:    Height as of this encounter: 1.702 m (5' 7\").    Weight as of this encounter: 81.8 kg (180 lb 5.4 oz).          The patient was seen and discussed " with the NCC attending, Dr. Kelly.    Raymond Breen MD  PGY-2, Neurology    24 Hour Vital Signs Summary:  Temp: 99.1  F (37.3  C) Temp  Min: 98.4  F (36.9  C)  Max: 99.9  F (37.7  C)  Resp: 22 Resp  Min: 20  Max: 25  SpO2: 93 % SpO2  Min: 91 %  Max: 98 %  Pulse: 86 Pulse  Min: 73  Max: 94    No data recorded.  No data recorded.    Respiratory monitoring:   Vent Mode: CMV/AC  (Continuous Mandatory Ventilation/ Assist Control)  FiO2 (%): 60 %  Resp Rate (Set): 12 breaths/min  Tidal Volume (Set, mL): 500 mL  PEEP (cm H2O): 8 cmH2O  Resp: 22       I/O last 3 completed shifts:  In: 4531.54 [I.V.:1901.54; NG/GT:1250]  Out: 2235 [Urine:2235]    Current Medications:    aspirin  81 mg Oral or Feeding Tube Daily     bromocriptine  5 mg Oral Q8H     ceFEPIme  2 g Intravenous Q8H     doxazosin  1 mg Oral or Feeding Tube Daily     enoxaparin ANTICOAGULANT  40 mg Subcutaneous Q24H     famotidine  20 mg Oral or Feeding Tube BID     heparin lock flush  5-20 mL Intracatheter Q24H     methylPREDNISolone  1,000 mg Intravenous Q24H     multivitamins w/minerals  15 mL Per Feeding Tube Daily     oxyCODONE  5 mg Oral Q4H     protein modular  1 packet Per Feeding Tube Daily     rosuvastatin  20 mg Oral or Feeding Tube Daily     senna-docusate  1-2 tablet Oral or Feeding Tube BID     sodium chloride (PF)  3 mL Intracatheter Q8H     thiamine  100 mg Oral or Feeding Tube Daily     vancomycin  1,250 mg Intravenous Q12H       PRN Medications:  glucose **OR** dextrose **OR** glucagon, fentaNYL, heparin lock flush, [Held by provider] labetalol **OR** [Held by provider] hydrALAZINE, lidocaine 4%, lidocaine (buffered or not buffered), magnesium hydroxide, naloxone **OR** naloxone **OR** naloxone **OR** naloxone, ondansetron **OR** ondansetron, polyethylene glycol, sodium chloride (PF), sodium chloride (PF)    Infusions:    insulin regular 3 Units/hr (07/17/23 0756)     norepinephrine Stopped (07/17/23 0745)     propofol 30 mcg/kg/min (07/17/23  "0600)       No Known Allergies    Physical Examination:  Vitals: /71 (BP Location: Right arm)   Pulse 86   Temp 99.1  F (37.3  C) (Bladder)   Resp 22   Ht 1.702 m (5' 7\")   Wt 81.8 kg (180 lb 5.4 oz)   SpO2 93%   BMI 28.24 kg/m    General: Adult male patient, lying in bed, critically-ill  HEENT: Normocephalic, atraumatic, no icterus, teeth missing, tongue lesions, mouth bloody   Cardiac: Sinus rhythm on bedside monitor   Pulm: Unlabored, expansion symmetric, no retractions or use of accessory muscles, assisted mechanically  Abdomen: Soft, non-distended abdomen   Extremities: Warm, no edema, appears adequately perfused  Skin: No rash or lesion observed on exposed skin  Psych: Calm   Neuro:  Mental status: Sedated, does not open eyes, does not follow commands, intubated.  Cranial nerves: PERRL, disconjugate gaze with each eye up and out, weak cough and gag with deep suction.  Motor: Normal bulk and tone. No abnormal movements. Intermittently has extensor posturing in BLUE.   Sensory: responds to noxious stimuli with extension in bilateral uppers only, does not grimace.  Coordination: ADAM, deferred.  Gait: ADAM, deferred.    Labs and Imaging:    Results for orders placed or performed during the hospital encounter of 07/06/23 (from the past 24 hour(s))   Glucose by meter   Result Value Ref Range    GLUCOSE BY METER POCT 153 (H) 70 - 99 mg/dL   Glucose by meter   Result Value Ref Range    GLUCOSE BY METER POCT 165 (H) 70 - 99 mg/dL   Glucose by meter   Result Value Ref Range    GLUCOSE BY METER POCT 176 (H) 70 - 99 mg/dL   Glucose by meter   Result Value Ref Range    GLUCOSE BY METER POCT 119 (H) 70 - 99 mg/dL   Glucose by meter   Result Value Ref Range    GLUCOSE BY METER POCT 117 (H) 70 - 99 mg/dL   Lactic acid whole blood   Result Value Ref Range    Lactic Acid 2.2 (H) 0.7 - 2.0 mmol/L   Glucose by meter   Result Value Ref Range    GLUCOSE BY METER POCT 265 (H) 70 - 99 mg/dL   Glucose by meter   Result " Value Ref Range    GLUCOSE BY METER POCT 210 (H) 70 - 99 mg/dL   Glucose by meter   Result Value Ref Range    GLUCOSE BY METER POCT 210 (H) 70 - 99 mg/dL   Glucose by meter   Result Value Ref Range    GLUCOSE BY METER POCT 170 (H) 70 - 99 mg/dL   Glucose by meter   Result Value Ref Range    GLUCOSE BY METER POCT 158 (H) 70 - 99 mg/dL   Glucose by meter   Result Value Ref Range    GLUCOSE BY METER POCT 178 (H) 70 - 99 mg/dL   Glucose by meter   Result Value Ref Range    GLUCOSE BY METER POCT 183 (H) 70 - 99 mg/dL   CBC with platelets   Result Value Ref Range    WBC Count 13.2 (H) 4.0 - 11.0 10e3/uL    RBC Count 3.71 (L) 4.40 - 5.90 10e6/uL    Hemoglobin 11.4 (L) 13.3 - 17.7 g/dL    Hematocrit 35.7 (L) 40.0 - 53.0 %    MCV 96 78 - 100 fL    MCH 30.7 26.5 - 33.0 pg    MCHC 31.9 31.5 - 36.5 g/dL    RDW 12.6 10.0 - 15.0 %    Platelet Count 354 150 - 450 10e3/uL   Basic metabolic panel   Result Value Ref Range    Sodium 149 (H) 136 - 145 mmol/L    Potassium 4.2 3.4 - 5.3 mmol/L    Chloride 115 (H) 98 - 107 mmol/L    Carbon Dioxide (CO2) 22 22 - 29 mmol/L    Anion Gap 12 7 - 15 mmol/L    Urea Nitrogen 37.1 (H) 6.0 - 20.0 mg/dL    Creatinine 0.72 0.67 - 1.17 mg/dL    Calcium 7.7 (L) 8.6 - 10.0 mg/dL    Glucose 126 (H) 70 - 99 mg/dL    GFR Estimate >90 >60 mL/min/1.73m2   Magnesium   Result Value Ref Range    Magnesium 2.8 (H) 1.7 - 2.3 mg/dL   Phosphorus   Result Value Ref Range    Phosphorus 3.0 2.5 - 4.5 mg/dL   Glucose by meter   Result Value Ref Range    GLUCOSE BY METER POCT 123 (H) 70 - 99 mg/dL   Glucose by meter   Result Value Ref Range    GLUCOSE BY METER POCT 114 (H) 70 - 99 mg/dL   Glucose by meter   Result Value Ref Range    GLUCOSE BY METER POCT 121 (H) 70 - 99 mg/dL   Glucose by meter   Result Value Ref Range    GLUCOSE BY METER POCT 180 (H) 70 - 99 mg/dL   Glucose by meter   Result Value Ref Range    GLUCOSE BY METER POCT 155 (H) 70 - 99 mg/dL       All relevant imaging and laboratory values personally  reviewed.

## 2023-07-17 NOTE — CONSULTS
[facility]  WO Nurse Inpatient Assessment     Consulted for: Lip      Patient History (according to provider note(s):      Michael Stewart is a 52 year old male w/ PMHx past methamphetamine abuse and tobacco use who presents on 7/6/2023 as a transfer from [facility] after the discovery of bilateral thalamic strokes as well as Strep A pharyngitis. No acute interventions were performed. However, upon presentation to [facility], he was noted to have increased somnolence, bilateral ophthalmoplegia w/ nystagmus, and atypical breathing raising concern for alternate etiologies beyond stroke. Diagnostic possibilities include post-streptococcal acute demyelinating encephalomyelitis, other autoimmune processes, or viral meningitis.       Assessment:      Areas visualized during today's visit: Face and neck    Wound location: tongue        7/16  Last photo: 7/16  Wound due to: Trauma  Wound history/plan of care: Pt clenching ETT and loose teeth causing trauma. Dental consult has been made.   Wound base: 100 % mucosa     Palpation of the wound bed: boggy      Drainage: scant     Description of drainage: serous     Measurements (length x width x depth, in cm): See photo  Periwound skin: Intact      Color: normal and consistent with surrounding tissue      Temperature: normal   Odor: none  Pain: unable to assess due to  sedation , none  Pain interventions prior to dressing change: slow and gentle cares   Treatment goal: Protection  STATUS: initial assessment  Supplies ordered: discussed with RN      Treatment Plan:     Tongue wound(s): Daily    Do not use anything other than bite block or RT approved equipment to offload tongue.   per unit routine oral cares and ETT positioning per unit routine. Ensure to disengage tongue from bite block with each tube reposition. Ensure tongue is not stuck between teeth and bite block after each position change. Lubricate lips with Vaseline/  mouth moisturizer every shift.     Orders: Written    RECOMMEND PRIMARY TEAM ORDER: None, at this time  Education provided: plan of care and wound progress  Discussed plan of care with: Nurse  [facility] nurse follow-up plan: weekly  Notify WOC if wound(s) deteriorate.  Nursing to notify the Provider(s) and re-consult the WO Nurse if new skin concern.    DATA:     Current support surface: Standard  Low air loss (NINA pump, Isolibrium, Pulsate, skin guard, etc)  Containment of urine/stool: Indwelling catheter and Internal fecal management  BMI: Body mass index is 28.24 kg/m .   Active diet order: Orders Placed This Encounter      NPO for Medical/Clinical Reasons Except for: Meds, NPO but receiving Tube Feeding     Output: I/O last 3 completed shifts:  In: 4531.54 [I.V.:1901.54; NG/GT:1250]  Out: 2235 [Urine:2235]     Labs: Recent Labs   Lab 07/17/23  0344 07/16/23  0359 07/15/23  0439   ALBUMIN  --   --  3.5   HGB 11.4*   < > 12.3*   WBC 13.2*   < > 10.0    < > = values in this interval not displayed.     Pressure injury risk assessment:   Sensory Perception: 1-->completely limited  Moisture: 3-->occasionally moist  Activity: 1-->bedfast  Mobility: 1-->completely immobile  Nutrition: 3-->adequate  Friction and Shear: 2-->potential problem  Jacques Score: 11    Tresa Gaitan RN CWOCN  Pager no longer is use, please contact through [employer] group: [facility] Nurse [place]  Dept. Office Number: *[phone]

## 2023-07-17 NOTE — CONSULTS
Palliative Care Consultation Note  [facility]      Patient: Michael Stewart  Date of Admission:  7/6/2023    Requesting Clinician / Team: NeuroCrit  Reason for consult: Goals of care     Recommendations & Counseling     GOALS OF CARE:     Restorative without limits    Met with Becca (spouse), Em (daughter) and Karlie (daughter) at bedside with phone  today from 12:40-13:25.  Summary:    Em primarily spoke on behalf of her family. She expressed good understanding of Michael's medical issues and critical condition.     Family knows that Michael's neurologic deficits could be permanent and recovery does not appear likely. They are still coming to terms with this difficult news.    Family understands they need to make a decision about tracheostomy/PEG placement. They know that proceeding with these interventions would mean Michael will require ongoing care in a medical setting for the foreseeable future.    Michael previously told family that he would never want to be dependent on others for his care. Family does not believe he would find his current condition acceptable, but are reluctant to give up hope of a recovery - however unlikely.    Em understood from primary team that they have until next week to make a decision about trach/PEG. They plan to think it over and discuss as a family. We will continue to follow for support.    Family has requested transfer to [facility]. They understand that [facility] may not have additional treatments to offer Michael, but would like them to review his records and give their opinion.    ADVANCE CARE PLANNING:    No HCD or POLST    Code status: Full Code    PSYCHOSOCIAL/SPIRITUAL SUPPORT:    Family - strong support from spouse Becca and 5 adult children (mE, Karlie and 3 sons), mom and 2 brothers also live nearby. Immigrated from [place] at age 16.    Bela - Rastafarian, spiritual > Caodaism    Palliative Care will  continue to follow. Thank you for the consult and allowing us to aid in the care of Michael Stewart.    These recommendations have been discussed with primary team.    Nicole Resendiz PA-C  Securely message with [employer] (more info)  Text page via [facility] Paging/Directory       Palliative Summary/HPI     Michael is a 51 y/o man with history of past methamphetamine use and tobacco use, admitted from [facility] 7/6 for bilateral thalamic strokes and strep A pharyngitis. Course complicated by acute encephalopathy and respiratory failure requiring intubation. 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 Also with broken teeth due to clenched jaw/biting down on ETT.    Today, the patient was seen for:  Introductory visit, goals of care    Palliative Care Summary:   Met with Becca (spouse), Joshua (dtrs) at bedside. Michael remains intubated and sedated.    I introduced our role as an extra layer of support and how we help patients and families dealing with serious, potentially life-limiting illnesses. I explained the composition of the palliative care team.  Palliative care helps patients and families navigate their care while focusing on the whole person; providing emotional, social and spiritual support  Palliative care often assists with symptom management, information sharing about what to expect from the illness, available treatment options and what effect those options may have on the disease course, and provide effective communication and caring support.    Prognosis, Goals, & Planning:      Functional Status just prior to this current hospitalization:    There is not reported or documented decline in patient's function.        Prognosis, Goals, and/or Advance Care Planning:    We discussed general treatment options (full/restorative, selective/conservatives, and comfort only/hospice). We then discussed how these  specifically apply to Lucy. Based on this discussion, family would like more time to discuss and decide whether to proceed with trach/PEG      Code Status was addressed today:     No not addressed today      Patient's decision making preferences: unable to assess          Patient has decision-making capacity today for complex decisions:Unreliable            Coping, Meaning, & Spirituality:     Mood, coping, and/or meaning in the context of serious illness were addressed today: Yes    Social:   Living situation:lives with family  Important relationships/caregivers:Becca (spouse), 5 adult children  Occupation: [employer], 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 Notable medications:  PO oxycodone 5mg q4h  Propofol drip  IV fentanyl 50-100mcg q1h prn    Physical Exam   Vital Signs with Ranges  Temp:  [98.1  F (36.7  C)-99.9  F (37.7  C)] 99.3  F (37.4  C)  Pulse:  [73-94] 87  Resp:  [19-25] 22  MAP:  [54 mmHg-100 mmHg] 77 mmHg  Arterial Line BP: ()/(37-74) 134/56  FiO2 (%):  [50 %-60 %] 50 %  SpO2:  [91 %-99 %] 97 %  180 lbs 5.38 oz    PHYSICAL EXAM:  General: intubated and sedated    Data reviewed:  CTA head and neck 7/15  IMPRESSION:    1. Head CTA demonstrates a possible aneurysm of what appears to be the  right callosomarginal artery versus a dilated adjacent vein.  2. Neck CTA demonstrates no stenosis of the major cervical arteries.  3. Stable infarcts in the bilateral thalamus, please see same day head  CT 7/15/2023 for further details.    CTH 7/15  IMPRESSION:   1. Stable ill-defined hypoattenuating infarcts within the bilateral  thalamus, left greater than right, better appreciated on MRI  7/14/2023. No CT evidence of intracranial hemorrhage.  2. Multiple air-fluid levels seen throughout the paranasal sinuses,  can be seen in the setting of sinusitis.    MR brain 7/14  Impression:  Evolution of bilateral thalamic infarcts. Brainstem  infarcts are more apparent.    Medical Decision Making       MANAGEMENT DISCUSSED with the following over the past 24 hours: Neuro, RN   NOTE(S)/MEDICAL RECORDS REVIEWED over the past 24 hours: Neuro, Dental, PMR, SW  Medical complexity over the past 24 hours:  - Decision regarding ESCALATION OF LEVEL OF CARE  - Treatment limited by SOCIAL DETERMINANTS OF HEALTH      Advance Care Planning Discussion 7/17/2023. Nicole DIAMOND PA-C met with The Legal decision maker(s) today at the hospital to discuss Advance Care Planning. Michael Terry does not have decisional capacity  and was present for this discussion.  Those present were informed of the voluntary nature of this discussion and wished to proceed.  The discussion included: trach/PEG, goals of care, prognosis, values and preferences. This discussion began at 12:40  and ended at 1:25 for a total of 25 minutes.

## 2023-07-17 NOTE — PLAN OF CARE
Major Shift Events:  Pt VS stable, afebrile with arctic sun in place.  Pt intubated and sedated on propofol gtt.  Pupils equal and reactive, strong cough. no other responses noted. TF @ goal.  Mcdaniel in place with good urine output.  Rectal tube in place.  Tongue injury unchanged.     Plan:  Continue current plan  of care.   Notify MD with concerns or questions.  Plan is for family conference @ 0900 with . See flow sheets for further data.   Goal Outcome Evaluation:      Plan of Care Reviewed With: patient    Overall Patient Progress: decliningOverall Patient Progress: declining

## 2023-07-17 NOTE — PROGRESS NOTES
Assessment:  - No mandibular central incisor present.  -Teeth socket is healing.   - Avulsed teeth not found in the oral cavity and pt's nurse and medical team was notified.     Celine Shahid6

## 2023-07-17 NOTE — CONSULTS
[facility]     PM&R CONSULT        Consulting Provider: Raymond Breen MD  Reason for Consult: Assessment for Botox  Location of Patient: Neuro ICU, [facility]  Date of Encounter: 7/17/2023   Date of Admission: 7/6/2023      ASSESSMENT/PLAN:    Mr. Michael Stewart is a 52 year old with bilateral thalamic strokes, strep A pharyngitis, and later encephalopathy. He required intubation and admission to the ICU, with a working differential including autoimmune processes, viral meningitis, and post-streptococcal acute demyelinating encephalomyelitis. His chronic medical problems include prior methamphetamine use and tobacco use.     PM&R was consulted for possible use of Botox on Mr. Stewart's masseters in order to decrease biting of his ET tube. Discussed the procedure, risks, and benefits with family at bedside, including pt's sister.     If the plan is to pursue trach, Botox is less indicated for masseter paralysis in the short term, as it takes 3 days to take effect and 3 weeks for peak effect.     Discussed this with primary team, and how the earliest we could place Botox would be 7/18, as it would need to be EMG-guided to avoid depositing toxin in the salivary glands.     Will continue to follow for now, looking for decision regarding trach placement.    Thank you for this interesting consult.     Please call for any questions. Pager number [phone].        HPI:    Michael Stewart is a 52 year old male with bilateral thalamic strokes, strep A pharyngitis, and later encephalopathy. He required intubation and admission to the ICU, with a working differential including autoimmune processes, viral meningitis, and post-streptococcal acute demyelinating encephalomyelitis. His chronic medical problems include prior methamphetamine use and tobacco use.     He was seen at bedside in the neuro ICU today, with multiple family members  present at bedside. Mr. Stewart was intubated and sedated, and was unable to provide hx.      NURSING REPORT: Intermittently biting down on ETT, recently broke teeth with dentistry planning incisor removal    CURRENT PRECAUTIONS/RESTRICTIONS:  Intubated, sedated    DVT PPX: SCDs, Lovenox    PREVIOUS LEVEL OF FUNCTION:  Independent with all ADLs and IADLs      LIVING SITUATION/SUPPORT:  Patient lives with a friend in a town home, with 1-2 flights of stairs inside.  Support system includes sister, other family     PAST MEDICAL HISTORY:  No past medical history on file.        CURRENT MEDICATIONS:  Current Facility-Administered Medications   Medication     aspirin (ASA) chewable tablet 81 mg     bromocriptine (PARLODEL) tablet 5 mg     calcium gluconate 2 g in  mL intermittent infusion     ceFEPIme (MAXIPIME) 2 g in D5W 100 mL intermittent infusion     glucose gel 15-30 g    Or     dextrose 50 % injection 25-50 mL    Or     glucagon injection 1 mg     [Held by provider] doxazosin (CARDURA) suspension 1 mg     enoxaparin ANTICOAGULANT (LOVENOX) injection 40 mg     fentaNYL (PF) (SUBLIMAZE) injection  mcg     fiber modular (BANATROL TF) packet 2 packet     heparin lock flush 10 UNIT/ML injection 5-20 mL     heparin lock flush 10 UNIT/ML injection 5-20 mL     [Held by provider] labetalol (NORMODYNE/TRANDATE) injection 10-20 mg    Or     [Held by provider] hydrALAZINE (APRESOLINE) injection 10-20 mg     insulin 1 unit/mL in saline (NovoLIN, HumuLIN Regular) drip - ADULT IV Infusion     lidocaine (LMX4) cream     lidocaine 1 % 0.1-5 mL     magnesium hydroxide (MILK OF MAGNESIA) suspension 30 mL     methylPREDNISolone sodium succinate (solu-MEDROL) 1,000 mg in D5W 291 mL intermittent infusion     multivitamins w/minerals liquid 15 mL     naloxone (NARCAN) injection 0.2 mg    Or     naloxone (NARCAN) injection 0.4 mg    Or     naloxone (NARCAN) injection 0.2 mg    Or     naloxone (NARCAN) injection 0.4 mg      "norepinephrine (LEVOPHED) 16 mg in  mL infusion MAX CONC CENTRAL LINE     ondansetron (ZOFRAN ODT) ODT tab 4 mg    Or     ondansetron (ZOFRAN) injection 4 mg     oxyCODONE (ROXICODONE) tablet 5 mg     pantoprazole (PROTONIX) IV push injection 40 mg     polyethylene glycol (MIRALAX) Packet 17 g     propofol (DIPRIVAN) infusion     protein modular (PROSOURCE TF20) packet 1 packet     rosuvastatin (CRESTOR) tablet 20 mg     senna-docusate (SENOKOT-S/PERICOLACE) 8.6-50 MG per tablet 1-2 tablet     sodium chloride (PF) 0.9% PF flush 10-40 mL     sodium chloride (PF) 0.9% PF flush 3 mL     sodium chloride (PF) 0.9% PF flush 3 mL     thiamine (B-1) tablet 100 mg         EXAMINATION:  Vital signs:  Temp: 99.3  F (37.4  C) Temp src: Bladder   Pulse: 87   Resp: 22 SpO2: 97 % O2 Device: Mechanical Ventilator Oxygen Delivery: 1 LPM Height: 170.2 cm (5' 7\") Weight: 81.8 kg (180 lb 5.4 oz)  Estimated body mass index is 28.24 kg/m  as calculated from the following:    Height as of this encounter: 1.702 m (5' 7\").    Weight as of this encounter: 81.8 kg (180 lb 5.4 oz).    General: sedated, intubated  HEENT: no discharge from nares, ETT in place   Pulmonary: intubated  Cardiovascular: BLE well perfused, HR regular on monitor  Abdominal: nondistended  Extremities:  Warm, well perfused BLE  MSK/neuro:   Mental Status:  sedated  Skin: no bruising or bleeding visualized      LABS AND IMAGING:  Results for orders placed or performed during the hospital encounter of 07/06/23 (from the past 24 hour(s))   Glucose by meter   Result Value Ref Range    GLUCOSE BY METER POCT 119 (H) 70 - 99 mg/dL   Glucose by meter   Result Value Ref Range    GLUCOSE BY METER POCT 117 (H) 70 - 99 mg/dL   Lactic acid whole blood   Result Value Ref Range    Lactic Acid 2.2 (H) 0.7 - 2.0 mmol/L   Glucose by meter   Result Value Ref Range    GLUCOSE BY METER POCT 265 (H) 70 - 99 mg/dL   Glucose by meter   Result Value Ref Range    GLUCOSE BY METER POCT 210 " (H) 70 - 99 mg/dL   Glucose by meter   Result Value Ref Range    GLUCOSE BY METER POCT 210 (H) 70 - 99 mg/dL   Glucose by meter   Result Value Ref Range    GLUCOSE BY METER POCT 170 (H) 70 - 99 mg/dL   Glucose by meter   Result Value Ref Range    GLUCOSE BY METER POCT 158 (H) 70 - 99 mg/dL   Glucose by meter   Result Value Ref Range    GLUCOSE BY METER POCT 178 (H) 70 - 99 mg/dL   Glucose by meter   Result Value Ref Range    GLUCOSE BY METER POCT 183 (H) 70 - 99 mg/dL   CBC with platelets   Result Value Ref Range    WBC Count 13.2 (H) 4.0 - 11.0 10e3/uL    RBC Count 3.71 (L) 4.40 - 5.90 10e6/uL    Hemoglobin 11.4 (L) 13.3 - 17.7 g/dL    Hematocrit 35.7 (L) 40.0 - 53.0 %    MCV 96 78 - 100 fL    MCH 30.7 26.5 - 33.0 pg    MCHC 31.9 31.5 - 36.5 g/dL    RDW 12.6 10.0 - 15.0 %    Platelet Count 354 150 - 450 10e3/uL   Basic metabolic panel   Result Value Ref Range    Sodium 149 (H) 136 - 145 mmol/L    Potassium 4.2 3.4 - 5.3 mmol/L    Chloride 115 (H) 98 - 107 mmol/L    Carbon Dioxide (CO2) 22 22 - 29 mmol/L    Anion Gap 12 7 - 15 mmol/L    Urea Nitrogen 37.1 (H) 6.0 - 20.0 mg/dL    Creatinine 0.72 0.67 - 1.17 mg/dL    Calcium 7.7 (L) 8.6 - 10.0 mg/dL    Glucose 126 (H) 70 - 99 mg/dL    GFR Estimate >90 >60 mL/min/1.73m2   Magnesium   Result Value Ref Range    Magnesium 2.8 (H) 1.7 - 2.3 mg/dL   Phosphorus   Result Value Ref Range    Phosphorus 3.0 2.5 - 4.5 mg/dL   Glucose by meter   Result Value Ref Range    GLUCOSE BY METER POCT 123 (H) 70 - 99 mg/dL   Glucose by meter   Result Value Ref Range    GLUCOSE BY METER POCT 114 (H) 70 - 99 mg/dL   Glucose by meter   Result Value Ref Range    GLUCOSE BY METER POCT 121 (H) 70 - 99 mg/dL   Glucose by meter   Result Value Ref Range    GLUCOSE BY METER POCT 180 (H) 70 - 99 mg/dL   Glucose by meter   Result Value Ref Range    GLUCOSE BY METER POCT 155 (H) 70 - 99 mg/dL   Glucose by meter   Result Value Ref Range    GLUCOSE BY METER POCT 152 (H) 70 - 99 mg/dL   Glucose by meter    Result Value Ref Range    GLUCOSE BY METER POCT 152 (H) 70 - 99 mg/dL   Glucose by meter   Result Value Ref Range    GLUCOSE BY METER POCT 165 (H) 70 - 99 mg/dL   Fecal colorectal cancer screen FIT   Result Value Ref Range    Occult Blood Screen FIT Positive (A) Negative   Lactic acid whole blood   Result Value Ref Range    Lactic Acid 2.1 (H) 0.7 - 2.0 mmol/L   Ionized Calcium   Result Value Ref Range    Calcium Ionized 4.5 4.4 - 5.2 mg/dL   XR Chest Port 1 View    Narrative    Exam: XR CHEST PORT 1 VIEW, 7/17/2023 11:37 AM    Comparison: Chest radiograph dated 7/15/2023    History: hypoxia, ETT position    Findings:  Semiupright portable AP chest radiograph. Endotracheal tube tip is  approximately 5.4 cm above the rocío. Left upper extremity PICC tip  terminates in the low SVC. Enteric tube is partially visualized  coursing towards the stomach with distal end out of the field-of-view.  Trachea is midline. Mediastinum is within normal limits.  Cardiopulmonary silhouette is within normal limits. Blunting of the  left costophrenic angle. Mixed interstitial and airspace  opacifications predominantly in the mid to lower lungs, slightly worse  from prior particularly in the right lung. Retrocardiac opacification,  similar to prior. There is no pneumothorax or pleural effusion. Right  shoulder incompletely visualized.      Impression    Impression:   1. Slightly worsening mixed interstitial airspace opacifications  predominantly in the mid to lower lungs, likely atelectasis and/or  pulmonary edema but infection cannot be ruled out. Stable retrocardiac  opacification.  2. Small left pleural effusion.  3. Endotracheal tube tip is approximately 5.4 cm above the rocío.    I have personally reviewed the examination and initial interpretation  and I agree with the findings.    NUNU FONSECA MD         SYSTEM ID:  [id number]   Glucose by meter   Result Value Ref Range    GLUCOSE BY METER POCT 197 (H) 70 - 99 mg/dL    Glucose by meter   Result Value Ref Range    GLUCOSE BY METER POCT 194 (H) 70 - 99 mg/dL   Glucose by meter   Result Value Ref Range    GLUCOSE BY METER POCT 158 (H) 70 - 99 mg/dL           Patient was seen and discussed with staff attending, Dr. Baugh.    Isabelle Monson, DO  PGY-3  Physical Medicine and Rehabilitation  [facility]

## 2023-07-18 ENCOUNTER — APPOINTMENT (OUTPATIENT)
Dept: GENERAL RADIOLOGY | Facility: CLINIC | Age: 53
End: 2023-07-18
Payer: COMMERCIAL

## 2023-07-18 LAB
ANION GAP SERPL CALCULATED.3IONS-SCNC: 10 MMOL/L (ref 7–15)
BUN SERPL-MCNC: 32.9 MG/DL (ref 6–20)
CALCIUM SERPL-MCNC: 8.1 MG/DL (ref 8.6–10)
CHLORIDE SERPL-SCNC: 109 MMOL/L (ref 98–107)
CREAT SERPL-MCNC: 0.65 MG/DL (ref 0.67–1.17)
DEPRECATED HCO3 PLAS-SCNC: 23 MMOL/L (ref 22–29)
ERYTHROCYTE [DISTWIDTH] IN BLOOD BY AUTOMATED COUNT: 12.2 % (ref 10–15)
GFR SERPL CREATININE-BSD FRML MDRD: >90 ML/MIN/1.73M2
GLUCOSE BLDC GLUCOMTR-MCNC: 128 MG/DL (ref 70–99)
GLUCOSE BLDC GLUCOMTR-MCNC: 129 MG/DL (ref 70–99)
GLUCOSE BLDC GLUCOMTR-MCNC: 136 MG/DL (ref 70–99)
GLUCOSE BLDC GLUCOMTR-MCNC: 139 MG/DL (ref 70–99)
GLUCOSE BLDC GLUCOMTR-MCNC: 143 MG/DL (ref 70–99)
GLUCOSE BLDC GLUCOMTR-MCNC: 144 MG/DL (ref 70–99)
GLUCOSE BLDC GLUCOMTR-MCNC: 148 MG/DL (ref 70–99)
GLUCOSE BLDC GLUCOMTR-MCNC: 150 MG/DL (ref 70–99)
GLUCOSE BLDC GLUCOMTR-MCNC: 153 MG/DL (ref 70–99)
GLUCOSE BLDC GLUCOMTR-MCNC: 153 MG/DL (ref 70–99)
GLUCOSE BLDC GLUCOMTR-MCNC: 154 MG/DL (ref 70–99)
GLUCOSE BLDC GLUCOMTR-MCNC: 158 MG/DL (ref 70–99)
GLUCOSE BLDC GLUCOMTR-MCNC: 159 MG/DL (ref 70–99)
GLUCOSE BLDC GLUCOMTR-MCNC: 159 MG/DL (ref 70–99)
GLUCOSE BLDC GLUCOMTR-MCNC: 161 MG/DL (ref 70–99)
GLUCOSE BLDC GLUCOMTR-MCNC: 170 MG/DL (ref 70–99)
GLUCOSE BLDC GLUCOMTR-MCNC: 171 MG/DL (ref 70–99)
GLUCOSE BLDC GLUCOMTR-MCNC: 172 MG/DL (ref 70–99)
GLUCOSE BLDC GLUCOMTR-MCNC: 182 MG/DL (ref 70–99)
GLUCOSE BLDC GLUCOMTR-MCNC: 188 MG/DL (ref 70–99)
GLUCOSE BLDC GLUCOMTR-MCNC: 196 MG/DL (ref 70–99)
GLUCOSE BLDC GLUCOMTR-MCNC: 202 MG/DL (ref 70–99)
GLUCOSE SERPL-MCNC: 154 MG/DL (ref 70–99)
HCT VFR BLD AUTO: 35.3 % (ref 40–53)
HGB BLD-MCNC: 11.7 G/DL (ref 13.3–17.7)
LACTATE SERPL-SCNC: 2.4 MMOL/L (ref 0.7–2)
MAGNESIUM SERPL-MCNC: 2.2 MG/DL (ref 1.7–2.3)
MCH RBC QN AUTO: 31 PG (ref 26.5–33)
MCHC RBC AUTO-ENTMCNC: 33.1 G/DL (ref 31.5–36.5)
MCV RBC AUTO: 93 FL (ref 78–100)
PHOSPHATE SERPL-MCNC: 3.6 MG/DL (ref 2.5–4.5)
PLATELET # BLD AUTO: 342 10E3/UL (ref 150–450)
POTASSIUM SERPL-SCNC: 5 MMOL/L (ref 3.4–5.3)
RBC # BLD AUTO: 3.78 10E6/UL (ref 4.4–5.9)
SODIUM SERPL-SCNC: 142 MMOL/L (ref 136–145)
WBC # BLD AUTO: 14 10E3/UL (ref 4–11)

## 2023-07-18 PROCEDURE — 71045 X-RAY EXAM CHEST 1 VIEW: CPT | Mod: 26 | Performed by: RADIOLOGY

## 2023-07-18 PROCEDURE — 94003 VENT MGMT INPAT SUBQ DAY: CPT

## 2023-07-18 PROCEDURE — 83605 ASSAY OF LACTIC ACID: CPT

## 2023-07-18 PROCEDURE — 250N000011 HC RX IP 250 OP 636: Mod: JZ | Performed by: NURSE PRACTITIONER

## 2023-07-18 PROCEDURE — 250N000013 HC RX MED GY IP 250 OP 250 PS 637: Performed by: NURSE PRACTITIONER

## 2023-07-18 PROCEDURE — 84100 ASSAY OF PHOSPHORUS: CPT | Performed by: NURSE PRACTITIONER

## 2023-07-18 PROCEDURE — 250N000013 HC RX MED GY IP 250 OP 250 PS 637

## 2023-07-18 PROCEDURE — 71045 X-RAY EXAM CHEST 1 VIEW: CPT

## 2023-07-18 PROCEDURE — C9113 INJ PANTOPRAZOLE SODIUM, VIA: HCPCS | Mod: JZ | Performed by: NURSE PRACTITIONER

## 2023-07-18 PROCEDURE — 80048 BASIC METABOLIC PNL TOTAL CA: CPT | Performed by: NURSE PRACTITIONER

## 2023-07-18 PROCEDURE — 250N000009 HC RX 250: Performed by: NURSE PRACTITIONER

## 2023-07-18 PROCEDURE — 74018 RADEX ABDOMEN 1 VIEW: CPT | Mod: 26 | Performed by: RADIOLOGY

## 2023-07-18 PROCEDURE — 87077 CULTURE AEROBIC IDENTIFY: CPT

## 2023-07-18 PROCEDURE — 250N000011 HC RX IP 250 OP 636: Performed by: PSYCHIATRY & NEUROLOGY

## 2023-07-18 PROCEDURE — 999N000157 HC STATISTIC RCP TIME EA 10 MIN

## 2023-07-18 PROCEDURE — 258N000003 HC RX IP 258 OP 636: Performed by: PSYCHIATRY & NEUROLOGY

## 2023-07-18 PROCEDURE — 74018 RADEX ABDOMEN 1 VIEW: CPT

## 2023-07-18 PROCEDURE — 250N000013 HC RX MED GY IP 250 OP 250 PS 637: Performed by: PSYCHIATRY & NEUROLOGY

## 2023-07-18 PROCEDURE — 85027 COMPLETE CBC AUTOMATED: CPT | Performed by: NURSE PRACTITIONER

## 2023-07-18 PROCEDURE — 250N000011 HC RX IP 250 OP 636: Mod: JZ | Performed by: STUDENT IN AN ORGANIZED HEALTH CARE EDUCATION/TRAINING PROGRAM

## 2023-07-18 PROCEDURE — 87040 BLOOD CULTURE FOR BACTERIA: CPT | Performed by: PSYCHIATRY & NEUROLOGY

## 2023-07-18 PROCEDURE — 250N000011 HC RX IP 250 OP 636: Mod: JZ | Performed by: PSYCHIATRY & NEUROLOGY

## 2023-07-18 PROCEDURE — 250N000013 HC RX MED GY IP 250 OP 250 PS 637: Performed by: STUDENT IN AN ORGANIZED HEALTH CARE EDUCATION/TRAINING PROGRAM

## 2023-07-18 PROCEDURE — 36415 COLL VENOUS BLD VENIPUNCTURE: CPT | Performed by: PSYCHIATRY & NEUROLOGY

## 2023-07-18 PROCEDURE — 87205 SMEAR GRAM STAIN: CPT

## 2023-07-18 PROCEDURE — 83735 ASSAY OF MAGNESIUM: CPT | Performed by: NURSE PRACTITIONER

## 2023-07-18 PROCEDURE — 200N000002 HC R&B ICU UMMC

## 2023-07-18 PROCEDURE — 99291 CRITICAL CARE FIRST HOUR: CPT | Mod: GC | Performed by: PSYCHIATRY & NEUROLOGY

## 2023-07-18 RX ORDER — BROMOCRIPTINE MESYLATE 2.5 MG/1
10 TABLET ORAL EVERY 8 HOURS
Status: DISCONTINUED | OUTPATIENT
Start: 2023-07-18 | End: 2023-07-22

## 2023-07-18 RX ADMIN — OXYCODONE HYDROCHLORIDE 5 MG: 5 TABLET ORAL at 16:39

## 2023-07-18 RX ADMIN — PANTOPRAZOLE SODIUM 40 MG: 40 INJECTION, POWDER, FOR SOLUTION INTRAVENOUS at 19:55

## 2023-07-18 RX ADMIN — OXYCODONE HYDROCHLORIDE 5 MG: 5 TABLET ORAL at 08:24

## 2023-07-18 RX ADMIN — BROMOCRIPTINE MESYLATE 10 MG: 2.5 TABLET ORAL at 14:34

## 2023-07-18 RX ADMIN — CEFEPIME HYDROCHLORIDE 2 G: 2 INJECTION, POWDER, FOR SOLUTION INTRAVENOUS at 08:24

## 2023-07-18 RX ADMIN — ROSUVASTATIN CALCIUM 20 MG: 20 TABLET, FILM COATED ORAL at 08:24

## 2023-07-18 RX ADMIN — OXYCODONE HYDROCHLORIDE 5 MG: 5 TABLET ORAL at 23:45

## 2023-07-18 RX ADMIN — ENOXAPARIN SODIUM 40 MG: 40 INJECTION SUBCUTANEOUS at 09:19

## 2023-07-18 RX ADMIN — OXYCODONE HYDROCHLORIDE 5 MG: 5 TABLET ORAL at 04:08

## 2023-07-18 RX ADMIN — PROPOFOL 30 MCG/KG/MIN: 10 INJECTION, EMULSION INTRAVENOUS at 06:21

## 2023-07-18 RX ADMIN — THIAMINE HCL TAB 100 MG 100 MG: 100 TAB at 08:24

## 2023-07-18 RX ADMIN — OXYCODONE HYDROCHLORIDE 5 MG: 5 TABLET ORAL at 12:32

## 2023-07-18 RX ADMIN — ASPIRIN 81 MG CHEWABLE TABLET 81 MG: 81 TABLET CHEWABLE at 08:24

## 2023-07-18 RX ADMIN — HUMAN INSULIN 8 UNITS/HR: 100 INJECTION, SOLUTION SUBCUTANEOUS at 21:42

## 2023-07-18 RX ADMIN — OXYCODONE HYDROCHLORIDE 5 MG: 5 TABLET ORAL at 19:55

## 2023-07-18 RX ADMIN — HUMAN INSULIN 10 UNITS/HR: 100 INJECTION, SOLUTION SUBCUTANEOUS at 09:19

## 2023-07-18 RX ADMIN — CEFEPIME HYDROCHLORIDE 2 G: 2 INJECTION, POWDER, FOR SOLUTION INTRAVENOUS at 16:39

## 2023-07-18 RX ADMIN — Medication 15 ML: at 08:24

## 2023-07-18 RX ADMIN — BROMOCRIPTINE MESYLATE 5 MG: 2.5 TABLET ORAL at 06:21

## 2023-07-18 RX ADMIN — HUMAN INSULIN 8 UNITS/HR: 100 INJECTION, SOLUTION SUBCUTANEOUS at 15:56

## 2023-07-18 RX ADMIN — PANTOPRAZOLE SODIUM 40 MG: 40 INJECTION, POWDER, FOR SOLUTION INTRAVENOUS at 08:24

## 2023-07-18 RX ADMIN — DEXTROSE MONOHYDRATE 1000 MG: 50 INJECTION, SOLUTION INTRAVENOUS at 08:24

## 2023-07-18 RX ADMIN — HUMAN INSULIN 8 UNITS/HR: 100 INJECTION, SOLUTION SUBCUTANEOUS at 01:36

## 2023-07-18 RX ADMIN — BROMOCRIPTINE MESYLATE 10 MG: 2.5 TABLET ORAL at 21:47

## 2023-07-18 ASSESSMENT — ACTIVITIES OF DAILY LIVING (ADL)
ADLS_ACUITY_SCORE: 28
ADLS_ACUITY_SCORE: 36
ADLS_ACUITY_SCORE: 32
ADLS_ACUITY_SCORE: 28
ADLS_ACUITY_SCORE: 32
ADLS_ACUITY_SCORE: 32
ADLS_ACUITY_SCORE: 28
ADLS_ACUITY_SCORE: 28
ADLS_ACUITY_SCORE: 32
ADLS_ACUITY_SCORE: 28

## 2023-07-18 NOTE — PLAN OF CARE
Goal Outcome Evaluation:      Plan of Care Reviewed With: patient    Overall Patient Progress: no changeOverall Patient Progress: no change    Outcome Evaluation: No response to stimuli.    ICU End of Shift Summary. See flowsheets for vital signs and detailed assessment.    Changes this shift: Remains RASS -5. Sedated on 30 of Propofol. Flexed to pain in LUE at 0400. Otherwise no response to stimuli. Does have cough/gag response. Eyes slightly deviated up and out. SR 80s-90s. Pressures within goal range. Arctic sun continues to maintain normothermia. No vent changes overnight. Small to moderate secretions. RN was able to find one missing tooth in oral cavity. Insulin gtt in algorithm 4. Rectal pouch in place with minimal output. Mcdaniel remains in place with good urine output.     Plan: Continue plan of care.

## 2023-07-18 NOTE — PROGRESS NOTES
Neurocritical Care Progress Note    Reason for critical care admission: Thalamic stroke   Admitting Team: VISHAL  Date of Service:  07/18/2023  Date of Admission:  7/6/2023  Hospital Day: 13    Assessment/Plan  Michael Stewart is a 52 year old male w/ PMHx past methamphetamine abuse and tobacco use who presents on 7/6/2023 as a transfer from [facility] after the discovery of bilateral thalamic strokes as well as Strep A pharyngitis. No acute interventions were performed. However, upon presentation to [facility], he was noted to have increased somnolence, bilateral ophthalmoplegia w/ nystagmus, and atypical breathing raising concern for alternate etiologies beyond stroke. Diagnostic possibilities include post-streptococcal acute demyelinating encephalomyelitis, other autoimmune processes, or viral meningitis.     24 hour events: Afebrile overnight, though has had Arctic Sun in place. Continues to have leukocytosis.     Neuro  #New brainstem infarcts   #Acute ischemic stroke of bilateral thalami with involvement of posterior limb and left internal capsule, unclear etiology  #Encephalopathy  -ASA 81mg daily  -Neurochecks Q4h  -sBP goal < 180 mmHg   -HOB > 30   -PT/OT/SLP when appropriate  -Multiple conversations have been held with the assistance of interpretation services, and which the current clinical picture and suspected poor prognosis for this patient have been communicated.  On the morning of 7/17, the family expressed understanding of the current situation.  However, they requested transfer to [facility] if possible.  Family was advised that this would only be possible if there was a physician at [place] who would be willing to accept the patient as a transfer.  Palliative care team was involved in the case, given the challenging goals of care conversations that are ongoing.    #CSF with possible chronic inflammatory or viral encephalitic process  -7/8 MR brain with no additional enhancing areas   -7/8 LP  with opening pressure: 16 cm H2O, 63 nucleated cells, protein of 42, glucose of 64, additional labs ordered on CSF  -Cytology abnormal for LP on 7/8.  On repeat LP on 7/11, with the primary intent being to achieve fresh cells to low flow cytometry to be conducted.   -So far during hospital course, patient CSF has not been consistent with results that would be expected in acute ischemic stroke.  Rather, CSF suggesting underlying subacute to chronic inflammatory or viral encephalitic process.   -Oligoclonal bands returned positive on 7/12   -Initiated trial of 1g methylprednisolone daily for 7 days to cover for possible autoimmune or inflammatory process that would be consistent with workup thus far   -Will add on [place] autoimmune/paraneoplastic panel per discussion in neuroradiology conference on 7/18.       #Sympathetic hyperactivity  -Precedex infusion   -Scheduled Tylenol 1000 mg Q6h x24 hrs  -Start Bromocriptine 5 mg Q8h  -Start Oxycodone 5 mg Q4h  -Arctic sun as needed for normothermia  -on 7/16, stopped Ritalin 5 mg 0600, 1200 for neuro stimulation        #Analgesics & sedation  RASS goal: 0 to -1   -Fentanyl  mcg Q1h PRN  -propofol     HEENT   #Biting down on ETT, clenched jaw  #Broken loose bottom teeth  #Tongue lesions, bloody  -7/15 CT dental showing displacement of both central mandibular incisors  -Dentistry following - Patient seen by dental on 7/17 who did not find teeth in place. One tooth found my nurse in oral cavity.    - Ordered CXR and AXR to look for missing tooth.   -PM&R consult to consider botox for jaw clenching.     CV  #HTN  #Intermittent hypotension  -Cardiac monitoring  -sBP goal < 180 mmHg   -PRN Hydralazine and Labetalol  -ordered new lactate on 7/17  -Intermittently drops pressures, which is currently likely due to propofol. Improved on 7/18.   -Norepinephrine as needed to maintain MAP > 65.      #Hyperlipidemia  -Rosuvastatin 20 mg  -7/7     Resp  #Acute hypoxic  respiratory failure   #Ventilator associated pneumonia    #Hx Tobacco Use  #Pulmonary nodules  Oxygen/vent: AC 12/500/+5  -Maintain Peep at 8  -Further discussion this week for Trach placement if within goals of care  -Pulmonary hygiene    -Continuous pulse ox  -Maintain O2 saturations greater than 92%     GI  Diet: TF's @ goal (60)  -NGT in place  -Further discussion this week for PEG placement if within goals of care  -Nutrition following    Last BM: 7/16  GI prophylaxis: Famotidine 20 mg BID  Bowel regimen: Holding bowel regimen on 7/17 due to frequent watery stools. Added fiber packets as well on 7/17. Stools better.     Renal/  #Hypernatremia, resolved  #Hyperchloremia  #Hypocalcemia  #Hypermagnesemia  #Lactic acidosis  - Q2h      -Daily BMP  -IV fluids: None  -Electrolyte replacement protocols  -Gave calcium gluconate on 7/17 for hypocalcemia.     #Urinary retention   -Continue Doxazosin 2 mg daily   -Dorantes replaced on 7/16 for temperature probe with arctic sun     Endo  #DM2  #Overweight   #Hyperglycemia 2/2 steroids  -Hgb A1c; 6.1  -TSH; 2.13  -Continue insulin infusion   -Monitor glucose levels     Heme  #Normocyctic anemia   -Daily CBC  -Hgb goal >7, plt goal >50k  -Transfuse to meet Hgb and plt goals     ID  #Leukocytosis  #Fevers   #Ventilator associated pneumonia  #Concern for sepsis  -Continue Cefepime   -7/15 MRSA negative - Stopped Vancomycin on 7/17.   -7/15 BC with NGTD  -7/15 Sputum growing streptococcus anginosus, staphylococcus aureus, and klebsiella aerogenes   -Daily CBC  -Follow temperature curve  -Repeated respiratory and blood cultures on 7/18.      ICU Checklist  Lines/tubes/drains: PIV x3, L/PICC, [place], ETT, NGT, dorantes, rectal tube    FEN: TFs, replacement   PPX: DVT - SCDs, Lovenox; GI - Famotidine  Code: FULL CODE  Dispo: ICU - NCC      Clinically Significant Risk Factors         # Hypernatremia: Highest Na = 149 mmol/L in last 2 days, will monitor as appropriate                "  # Overweight: Estimated body mass index is 28.35 kg/m  as calculated from the following:    Height as of this encounter: 1.702 m (5' 7\").    Weight as of this encounter: 82.1 kg (181 lb).          The patient was seen and discussed with the NCC attending, Dr. Kelly.    Raymond Breen MD  PGY-2, Neurology    24 Hour Vital Signs Summary:  Temp: 98.8  F (37.1  C) Temp  Min: 97.3  F (36.3  C)  Max: 99.5  F (37.5  C)  Resp: 21 Resp  Min: 16  Max: 22  SpO2: 95 % SpO2  Min: 90 %  Max: 99 %  Pulse: 89 Pulse  Min: 77  Max: 93    No data recorded.  No data recorded.    Respiratory monitoring:   Vent Mode: CMV/AC  (Continuous Mandatory Ventilation/ Assist Control)  FiO2 (%): 60 %  Resp Rate (Set): 12 breaths/min  Tidal Volume (Set, mL): 500 mL  PEEP (cm H2O): 5 cmH2O  Resp: 21       I/O last 3 completed shifts:  In: 4347.44 [I.V.:1337.44; NG/GT:1630]  Out: 2190 [Urine:1990; Stool:200]    Current Medications:    aspirin  81 mg Oral or Feeding Tube Daily     bromocriptine  5 mg Oral Q8H     ceFEPIme  2 g Intravenous Q8H     [Held by provider] doxazosin  1 mg Oral or Feeding Tube Daily     enoxaparin ANTICOAGULANT  40 mg Subcutaneous Q24H     fiber modular  2 packet Per Feeding Tube TID     heparin lock flush  5-20 mL Intracatheter Q24H     methylPREDNISolone  1,000 mg Intravenous Q24H     multivitamins w/minerals  15 mL Per Feeding Tube Daily     oxyCODONE  5 mg Oral Q4H     pantoprazole  40 mg Intravenous BID     protein modular  1 packet Per Feeding Tube Daily     rosuvastatin  20 mg Oral or Feeding Tube Daily     senna-docusate  1-2 tablet Oral or Feeding Tube BID     sodium chloride (PF)  3 mL Intracatheter Q8H     thiamine  100 mg Oral or Feeding Tube Daily       PRN Medications:  glucose **OR** dextrose **OR** glucagon, fentaNYL, heparin lock flush, [Held by provider] labetalol **OR** [Held by provider] hydrALAZINE, lidocaine 4%, lidocaine (buffered or not buffered), magnesium hydroxide, naloxone **OR** naloxone **OR** " "naloxone **OR** naloxone, ondansetron **OR** ondansetron, polyethylene glycol, sodium chloride (PF), sodium chloride (PF)    Infusions:    insulin regular 9 Units/hr (07/18/23 0700)     norepinephrine Stopped (07/17/23 1036)     propofol 30 mcg/kg/min (07/18/23 0700)       No Known Allergies    Physical Examination:  Vitals: /71 (BP Location: Right arm)   Pulse 89   Temp 98.8  F (37.1  C) (Esophageal)   Resp 21   Ht 1.702 m (5' 7\")   Wt 82.1 kg (181 lb)   SpO2 95%   BMI 28.35 kg/m    General: Adult male patient, lying in bed, critically-ill  HEENT: Normocephalic, atraumatic, no icterus, teeth missing, tongue lesions, mouth bloody   Cardiac: Sinus rhythm on bedside monitor   Pulm: Unlabored, expansion symmetric, no retractions or use of accessory muscles, assisted mechanically  Abdomen: Soft, non-distended abdomen   Extremities: Warm, no edema, appears adequately perfused  Skin: No rash or lesion observed on exposed skin  Psych: Calm   Neuro:  Mental status: Sedated, does not open eyes, does not follow commands, intubated.  Cranial nerves: PERRL, disconjugate gaze with each eye up and out, weak cough and gag with deep suction.  Motor: Normal bulk and tone. No abnormal movements. Intermittently has extensor posturing in BLUE.   Sensory: responds to noxious stimuli with extension in bilateral uppers only, does not grimace.  Coordination: ADAM, deferred.  Gait: ADAM, deferred.    Labs and Imaging:    Results for orders placed or performed during the hospital encounter of 07/06/23 (from the past 24 hour(s))   Glucose by meter   Result Value Ref Range    GLUCOSE BY METER POCT 152 (H) 70 - 99 mg/dL   Glucose by meter   Result Value Ref Range    GLUCOSE BY METER POCT 152 (H) 70 - 99 mg/dL   Glucose by meter   Result Value Ref Range    GLUCOSE BY METER POCT 165 (H) 70 - 99 mg/dL   Fecal colorectal cancer screen FIT   Result Value Ref Range    Occult Blood Screen FIT Positive (A) Negative   Lactic acid whole blood "   Result Value Ref Range    Lactic Acid 2.1 (H) 0.7 - 2.0 mmol/L   Ionized Calcium   Result Value Ref Range    Calcium Ionized 4.5 4.4 - 5.2 mg/dL   XR Chest Port 1 View    Narrative    Exam: XR CHEST PORT 1 VIEW, 7/17/2023 11:37 AM    Comparison: Chest radiograph dated 7/15/2023    History: hypoxia, ETT position    Findings:  Semiupright portable AP chest radiograph. Endotracheal tube tip is  approximately 5.4 cm above the rocío. Left upper extremity PICC tip  terminates in the low SVC. Enteric tube is partially visualized  coursing towards the stomach with distal end out of the field-of-view.  Trachea is midline. Mediastinum is within normal limits.  Cardiopulmonary silhouette is within normal limits. Blunting of the  left costophrenic angle. Mixed interstitial and airspace  opacifications predominantly in the mid to lower lungs, slightly worse  from prior particularly in the right lung. Retrocardiac opacification,  similar to prior. There is no pneumothorax or pleural effusion. Right  shoulder incompletely visualized.      Impression    Impression:   1. Slightly worsening mixed interstitial airspace opacifications  predominantly in the mid to lower lungs, likely atelectasis and/or  pulmonary edema but infection cannot be ruled out. Stable retrocardiac  opacification.  2. Small left pleural effusion.  3. Endotracheal tube tip is approximately 5.4 cm above the rocío.    I have personally reviewed the examination and initial interpretation  and I agree with the findings.    NUNU FONSECA MD         SYSTEM ID:  [id number]   Glucose by meter   Result Value Ref Range    GLUCOSE BY METER POCT 197 (H) 70 - 99 mg/dL   Glucose by meter   Result Value Ref Range    GLUCOSE BY METER POCT 194 (H) 70 - 99 mg/dL   Glucose by meter   Result Value Ref Range    GLUCOSE BY METER POCT 158 (H) 70 - 99 mg/dL   Glucose by meter   Result Value Ref Range    GLUCOSE BY METER POCT 179 (H) 70 - 99 mg/dL   Hemoglobin and hematocrit    Result Value Ref Range    Hemoglobin 11.9 (L) 13.3 - 17.7 g/dL    Hematocrit 38.5 (L) 40.0 - 53.0 %   Glucose by meter   Result Value Ref Range    GLUCOSE BY METER POCT 155 (H) 70 - 99 mg/dL   Sodium   Result Value Ref Range    Sodium 148 (H) 136 - 145 mmol/L   Glucose by meter   Result Value Ref Range    GLUCOSE BY METER POCT 167 (H) 70 - 99 mg/dL   Glucose by meter   Result Value Ref Range    GLUCOSE BY METER POCT 186 (H) 70 - 99 mg/dL   Glucose by meter   Result Value Ref Range    GLUCOSE BY METER POCT 205 (H) 70 - 99 mg/dL   Glucose by meter   Result Value Ref Range    GLUCOSE BY METER POCT 195 (H) 70 - 99 mg/dL   Glucose by meter   Result Value Ref Range    GLUCOSE BY METER POCT 163 (H) 70 - 99 mg/dL   Glucose by meter   Result Value Ref Range    GLUCOSE BY METER POCT 178 (H) 70 - 99 mg/dL   Glucose by meter   Result Value Ref Range    GLUCOSE BY METER POCT 172 (H) 70 - 99 mg/dL   Glucose by meter   Result Value Ref Range    GLUCOSE BY METER POCT 188 (H) 70 - 99 mg/dL   Glucose by meter   Result Value Ref Range    GLUCOSE BY METER POCT 159 (H) 70 - 99 mg/dL   Glucose by meter   Result Value Ref Range    GLUCOSE BY METER POCT 143 (H) 70 - 99 mg/dL   CBC with platelets   Result Value Ref Range    WBC Count 14.0 (H) 4.0 - 11.0 10e3/uL    RBC Count 3.78 (L) 4.40 - 5.90 10e6/uL    Hemoglobin 11.7 (L) 13.3 - 17.7 g/dL    Hematocrit 35.3 (L) 40.0 - 53.0 %    MCV 93 78 - 100 fL    MCH 31.0 26.5 - 33.0 pg    MCHC 33.1 31.5 - 36.5 g/dL    RDW 12.2 10.0 - 15.0 %    Platelet Count 342 150 - 450 10e3/uL   Basic metabolic panel   Result Value Ref Range    Sodium 142 136 - 145 mmol/L    Potassium 5.0 3.4 - 5.3 mmol/L    Chloride 109 (H) 98 - 107 mmol/L    Carbon Dioxide (CO2) 23 22 - 29 mmol/L    Anion Gap 10 7 - 15 mmol/L    Urea Nitrogen 32.9 (H) 6.0 - 20.0 mg/dL    Creatinine 0.65 (L) 0.67 - 1.17 mg/dL    Calcium 8.1 (L) 8.6 - 10.0 mg/dL    Glucose 154 (H) 70 - 99 mg/dL    GFR Estimate >90 >60 mL/min/1.73m2   Magnesium    Result Value Ref Range    Magnesium 2.2 1.7 - 2.3 mg/dL   Phosphorus   Result Value Ref Range    Phosphorus 3.6 2.5 - 4.5 mg/dL   Lactic acid whole blood   Result Value Ref Range    Lactic Acid 2.4 (H) 0.7 - 2.0 mmol/L   Glucose by meter   Result Value Ref Range    GLUCOSE BY METER POCT 150 (H) 70 - 99 mg/dL   Glucose by meter   Result Value Ref Range    GLUCOSE BY METER POCT 158 (H) 70 - 99 mg/dL   Glucose by meter   Result Value Ref Range    GLUCOSE BY METER POCT 171 (H) 70 - 99 mg/dL   Glucose by meter   Result Value Ref Range    GLUCOSE BY METER POCT 182 (H) 70 - 99 mg/dL       All relevant imaging and laboratory values personally reviewed.

## 2023-07-18 NOTE — PLAN OF CARE
ICU End of Shift Summary. See flowsheets for vital signs and detailed assessment. Handoff report given to oncoming RN.     Neuro: RASS -5, propofol off. No response to stimuli in all extremities. Pupils equal and reactive, slightly deviated up and out.   CV: SR. BP maintained without intervention. Afebrile, arctic sun turned off in AM.   Resp: CMV settings PEEP 8, FiO2 40%. Moderate secretions. LS clear.   GI/: TF at goal, 100 ml Q4H FWF. Rectal pouch in place. Mcdaniel remains in place, good urine output.     Plan: Continue with plan of care. Notify MD of changes.

## 2023-07-18 NOTE — PROGRESS NOTES
Care Management Follow Up    Length of Stay (days): 12    Expected Discharge Date:  TBD     Concerns to be Addressed:  Process to transfer to [facility].    Patient plan of care discussed at interdisciplinary rounds: Yes    Anticipated Discharge Disposition:  TBD     Anticipated Discharge Services:  TBD  Anticipated Discharge DME:  TBD    Additional Information:  Pt remain in ICU, vented and sedated.  RNCC was informed that pt family would like to transfer pt to [place] and family would like to know the process for the transfer.     RNCC visited pt 2 dtrs and spouse for support and address their questions.  RNCC used  over the phone to communicate with the family.  RNCC explained to the family the process to transfer pt to OSH, when family requested.  RNCC informed pt family that pt family need to provide us the name of the provide that they would like pt to be transfer to and after pt is accepted by the provider, the hospital will assess him for hospital admission and insurance coverage.  RNCC informed family most insurance do not cover transport and transport might be private pay if accepted.  Insurance covers only when pt transfer for higher level of care and to the nearest hospital.  Pt family verbalized understanding of the process.  Pt family asked the transport cost and if they have to pay it at once.  RNCC informed family that most transport company need payment and credit card but will not know the cost until we request the transport ( RNCC gave family the general medical transport estimate cost).  RNCC informed family that RNCC/SW will cont to follow the plan of care and assist with any care coordination assistance needs.  Pt family agreed.      Arely Cheatham RN, PHN, BSN  4A and 4E/ ICU  Care Coordinator  Phone: [phone]  Pager: [phone]    To contact the weekend RNCC  [place] (0800 - 1630) Saturday and Sunday   Units: 4A, 4C, 4E, 5A and 5B- Pager [phone]   Units: 6A,  6B- Pager [phone]   Unit : 6C, 6D- pager [phone]   Units: 7A, 7B, 7C, 7D, and 5C- Pager [phone]

## 2023-07-19 ENCOUNTER — APPOINTMENT (OUTPATIENT)
Dept: GENERAL RADIOLOGY | Facility: CLINIC | Age: 53
End: 2023-07-19
Attending: NURSE PRACTITIONER
Payer: COMMERCIAL

## 2023-07-19 LAB
ANION GAP SERPL CALCULATED.3IONS-SCNC: 9 MMOL/L (ref 7–15)
APPEARANCE CSF: CLEAR
BUN SERPL-MCNC: 30.8 MG/DL (ref 6–20)
CALCIUM SERPL-MCNC: 8.1 MG/DL (ref 8.6–10)
CHLORIDE SERPL-SCNC: 106 MMOL/L (ref 98–107)
COLOR CSF: COLORLESS
CREAT SERPL-MCNC: 0.56 MG/DL (ref 0.67–1.17)
DEPRECATED HCO3 PLAS-SCNC: 24 MMOL/L (ref 22–29)
ERYTHROCYTE [DISTWIDTH] IN BLOOD BY AUTOMATED COUNT: 12.2 % (ref 10–15)
GFR SERPL CREATININE-BSD FRML MDRD: >90 ML/MIN/1.73M2
GLUCOSE BLDC GLUCOMTR-MCNC: 108 MG/DL (ref 70–99)
GLUCOSE BLDC GLUCOMTR-MCNC: 120 MG/DL (ref 70–99)
GLUCOSE BLDC GLUCOMTR-MCNC: 126 MG/DL (ref 70–99)
GLUCOSE BLDC GLUCOMTR-MCNC: 126 MG/DL (ref 70–99)
GLUCOSE BLDC GLUCOMTR-MCNC: 127 MG/DL (ref 70–99)
GLUCOSE BLDC GLUCOMTR-MCNC: 139 MG/DL (ref 70–99)
GLUCOSE BLDC GLUCOMTR-MCNC: 141 MG/DL (ref 70–99)
GLUCOSE BLDC GLUCOMTR-MCNC: 142 MG/DL (ref 70–99)
GLUCOSE BLDC GLUCOMTR-MCNC: 146 MG/DL (ref 70–99)
GLUCOSE BLDC GLUCOMTR-MCNC: 154 MG/DL (ref 70–99)
GLUCOSE BLDC GLUCOMTR-MCNC: 163 MG/DL (ref 70–99)
GLUCOSE BLDC GLUCOMTR-MCNC: 174 MG/DL (ref 70–99)
GLUCOSE BLDC GLUCOMTR-MCNC: 190 MG/DL (ref 70–99)
GLUCOSE CSF-MCNC: 87 MG/DL (ref 40–70)
GLUCOSE SERPL-MCNC: 141 MG/DL (ref 70–99)
HCT VFR BLD AUTO: 36.9 % (ref 40–53)
HGB BLD-MCNC: 12.2 G/DL (ref 13.3–17.7)
HSV1 DNA CSF QL NAA+PROBE: NOT DETECTED
HSV2 DNA CSF QL NAA+PROBE: NOT DETECTED
MAGNESIUM SERPL-MCNC: 2.3 MG/DL (ref 1.7–2.3)
MCH RBC QN AUTO: 30.3 PG (ref 26.5–33)
MCHC RBC AUTO-ENTMCNC: 33.1 G/DL (ref 31.5–36.5)
MCV RBC AUTO: 92 FL (ref 78–100)
PHOSPHATE SERPL-MCNC: 3.6 MG/DL (ref 2.5–4.5)
PLATELET # BLD AUTO: 391 10E3/UL (ref 150–450)
POTASSIUM SERPL-SCNC: 4.8 MMOL/L (ref 3.4–5.3)
PROT CSF-MCNC: 32.1 MG/DL (ref 15–45)
RBC # BLD AUTO: 4.02 10E6/UL (ref 4.4–5.9)
RBC # CSF MANUAL: 147 /UL (ref 0–2)
SODIUM SERPL-SCNC: 139 MMOL/L (ref 136–145)
TUBE # CSF: 4
WBC # BLD AUTO: 19.9 10E3/UL (ref 4–11)
WBC # CSF MANUAL: 1 /UL (ref 0–5)

## 2023-07-19 PROCEDURE — 250N000011 HC RX IP 250 OP 636: Mod: JZ | Performed by: STUDENT IN AN ORGANIZED HEALTH CARE EDUCATION/TRAINING PROGRAM

## 2023-07-19 PROCEDURE — 84100 ASSAY OF PHOSPHORUS: CPT | Performed by: NURSE PRACTITIONER

## 2023-07-19 PROCEDURE — 258N000003 HC RX IP 258 OP 636: Performed by: PSYCHIATRY & NEUROLOGY

## 2023-07-19 PROCEDURE — 94003 VENT MGMT INPAT SUBQ DAY: CPT

## 2023-07-19 PROCEDURE — 250N000009 HC RX 250: Performed by: NURSE PRACTITIONER

## 2023-07-19 PROCEDURE — 250N000011 HC RX IP 250 OP 636: Mod: JZ | Performed by: NURSE PRACTITIONER

## 2023-07-19 PROCEDURE — 250N000013 HC RX MED GY IP 250 OP 250 PS 637: Performed by: NURSE PRACTITIONER

## 2023-07-19 PROCEDURE — 86341 ISLET CELL ANTIBODY: CPT | Performed by: STUDENT IN AN ORGANIZED HEALTH CARE EDUCATION/TRAINING PROGRAM

## 2023-07-19 PROCEDURE — 250N000011 HC RX IP 250 OP 636: Performed by: PSYCHIATRY & NEUROLOGY

## 2023-07-19 PROCEDURE — 99291 CRITICAL CARE FIRST HOUR: CPT | Mod: 25 | Performed by: PSYCHIATRY & NEUROLOGY

## 2023-07-19 PROCEDURE — 88108 CYTOPATH CONCENTRATE TECH: CPT | Mod: TC | Performed by: STUDENT IN AN ORGANIZED HEALTH CARE EDUCATION/TRAINING PROGRAM

## 2023-07-19 PROCEDURE — 250N000013 HC RX MED GY IP 250 OP 250 PS 637

## 2023-07-19 PROCEDURE — 250N000013 HC RX MED GY IP 250 OP 250 PS 637: Performed by: PSYCHIATRY & NEUROLOGY

## 2023-07-19 PROCEDURE — 80048 BASIC METABOLIC PNL TOTAL CA: CPT | Performed by: NURSE PRACTITIONER

## 2023-07-19 PROCEDURE — 999N000157 HC STATISTIC RCP TIME EA 10 MIN

## 2023-07-19 PROCEDURE — 200N000002 HC R&B ICU UMMC

## 2023-07-19 PROCEDURE — 999N000015 HC STATISTIC ARTERIAL MONITORING DAILY

## 2023-07-19 PROCEDURE — 250N000012 HC RX MED GY IP 250 OP 636 PS 637: Performed by: NURSE PRACTITIONER

## 2023-07-19 PROCEDURE — C9113 INJ PANTOPRAZOLE SODIUM, VIA: HCPCS | Mod: JZ | Performed by: NURSE PRACTITIONER

## 2023-07-19 PROCEDURE — 83735 ASSAY OF MAGNESIUM: CPT | Performed by: NURSE PRACTITIONER

## 2023-07-19 PROCEDURE — 87015 SPECIMEN INFECT AGNT CONCNTJ: CPT | Performed by: STUDENT IN AN ORGANIZED HEALTH CARE EDUCATION/TRAINING PROGRAM

## 2023-07-19 PROCEDURE — 87102 FUNGUS ISOLATION CULTURE: CPT | Performed by: STUDENT IN AN ORGANIZED HEALTH CARE EDUCATION/TRAINING PROGRAM

## 2023-07-19 PROCEDURE — 74018 RADEX ABDOMEN 1 VIEW: CPT | Mod: 26 | Performed by: STUDENT IN AN ORGANIZED HEALTH CARE EDUCATION/TRAINING PROGRAM

## 2023-07-19 PROCEDURE — 99232 SBSQ HOSP IP/OBS MODERATE 35: CPT | Performed by: PHYSICIAN ASSISTANT

## 2023-07-19 PROCEDURE — 89050 BODY FLUID CELL COUNT: CPT | Performed by: STUDENT IN AN ORGANIZED HEALTH CARE EDUCATION/TRAINING PROGRAM

## 2023-07-19 PROCEDURE — 74018 RADEX ABDOMEN 1 VIEW: CPT

## 2023-07-19 PROCEDURE — 62270 DX LMBR SPI PNXR: CPT | Performed by: PSYCHIATRY & NEUROLOGY

## 2023-07-19 PROCEDURE — 009U3ZX DRAINAGE OF SPINAL CANAL, PERCUTANEOUS APPROACH, DIAGNOSTIC: ICD-10-PCS | Performed by: PSYCHIATRY & NEUROLOGY

## 2023-07-19 PROCEDURE — 250N000011 HC RX IP 250 OP 636

## 2023-07-19 PROCEDURE — 88108 CYTOPATH CONCENTRATE TECH: CPT | Mod: 26 | Performed by: PATHOLOGY

## 2023-07-19 PROCEDURE — 87075 CULTR BACTERIA EXCEPT BLOOD: CPT | Performed by: STUDENT IN AN ORGANIZED HEALTH CARE EDUCATION/TRAINING PROGRAM

## 2023-07-19 PROCEDURE — 85027 COMPLETE CBC AUTOMATED: CPT | Performed by: NURSE PRACTITIONER

## 2023-07-19 PROCEDURE — 82945 GLUCOSE OTHER FLUID: CPT | Performed by: STUDENT IN AN ORGANIZED HEALTH CARE EDUCATION/TRAINING PROGRAM

## 2023-07-19 PROCEDURE — 250N000013 HC RX MED GY IP 250 OP 250 PS 637: Performed by: STUDENT IN AN ORGANIZED HEALTH CARE EDUCATION/TRAINING PROGRAM

## 2023-07-19 PROCEDURE — 87529 HSV DNA AMP PROBE: CPT | Performed by: STUDENT IN AN ORGANIZED HEALTH CARE EDUCATION/TRAINING PROGRAM

## 2023-07-19 PROCEDURE — 84999 UNLISTED CHEMISTRY PROCEDURE: CPT | Performed by: STUDENT IN AN ORGANIZED HEALTH CARE EDUCATION/TRAINING PROGRAM

## 2023-07-19 PROCEDURE — 86255 FLUORESCENT ANTIBODY SCREEN: CPT | Performed by: STUDENT IN AN ORGANIZED HEALTH CARE EDUCATION/TRAINING PROGRAM

## 2023-07-19 PROCEDURE — 84157 ASSAY OF PROTEIN OTHER: CPT | Performed by: STUDENT IN AN ORGANIZED HEALTH CARE EDUCATION/TRAINING PROGRAM

## 2023-07-19 RX ORDER — PREDNISONE 20 MG/1
60 TABLET ORAL DAILY
Status: DISCONTINUED | OUTPATIENT
Start: 2023-07-19 | End: 2023-07-24

## 2023-07-19 RX ORDER — PREDNISONE 10 MG/1
10 TABLET ORAL DAILY
Status: DISCONTINUED | OUTPATIENT
Start: 2023-08-23 | End: 2023-07-24

## 2023-07-19 RX ORDER — PREDNISONE 20 MG/1
40 TABLET ORAL DAILY
Status: DISCONTINUED | OUTPATIENT
Start: 2023-08-02 | End: 2023-07-24

## 2023-07-19 RX ORDER — SULFAMETHOXAZOLE/TRIMETHOPRIM 800-160 MG
1 TABLET ORAL
Status: DISCONTINUED | OUTPATIENT
Start: 2023-07-19 | End: 2023-07-24

## 2023-07-19 RX ORDER — PREDNISONE 20 MG/1
20 TABLET ORAL DAILY
Status: DISCONTINUED | OUTPATIENT
Start: 2023-08-16 | End: 2023-07-24

## 2023-07-19 RX ADMIN — OXYCODONE HYDROCHLORIDE 5 MG: 5 TABLET ORAL at 16:35

## 2023-07-19 RX ADMIN — Medication 15 ML: at 08:35

## 2023-07-19 RX ADMIN — Medication 5 ML: at 20:57

## 2023-07-19 RX ADMIN — OXYCODONE HYDROCHLORIDE 5 MG: 5 TABLET ORAL at 20:58

## 2023-07-19 RX ADMIN — ASPIRIN 81 MG CHEWABLE TABLET 81 MG: 81 TABLET CHEWABLE at 08:35

## 2023-07-19 RX ADMIN — PANTOPRAZOLE SODIUM 40 MG: 40 INJECTION, POWDER, FOR SOLUTION INTRAVENOUS at 08:35

## 2023-07-19 RX ADMIN — BROMOCRIPTINE MESYLATE 10 MG: 2.5 TABLET ORAL at 23:35

## 2023-07-19 RX ADMIN — PANTOPRAZOLE SODIUM 40 MG: 40 INJECTION, POWDER, FOR SOLUTION INTRAVENOUS at 20:56

## 2023-07-19 RX ADMIN — CEFEPIME HYDROCHLORIDE 2 G: 2 INJECTION, POWDER, FOR SOLUTION INTRAVENOUS at 00:01

## 2023-07-19 RX ADMIN — OXYCODONE HYDROCHLORIDE 5 MG: 5 TABLET ORAL at 08:35

## 2023-07-19 RX ADMIN — SENNOSIDES AND DOCUSATE SODIUM 1 TABLET: 50; 8.6 TABLET ORAL at 20:56

## 2023-07-19 RX ADMIN — CEFEPIME HYDROCHLORIDE 2 G: 2 INJECTION, POWDER, FOR SOLUTION INTRAVENOUS at 08:41

## 2023-07-19 RX ADMIN — HUMAN INSULIN 4 UNITS/HR: 100 INJECTION, SOLUTION SUBCUTANEOUS at 22:56

## 2023-07-19 RX ADMIN — BROMOCRIPTINE MESYLATE 10 MG: 2.5 TABLET ORAL at 14:16

## 2023-07-19 RX ADMIN — BROMOCRIPTINE MESYLATE 10 MG: 2.5 TABLET ORAL at 05:20

## 2023-07-19 RX ADMIN — HUMAN INSULIN 6 UNITS/HR: 100 INJECTION, SOLUTION SUBCUTANEOUS at 05:22

## 2023-07-19 RX ADMIN — OXYCODONE HYDROCHLORIDE 5 MG: 5 TABLET ORAL at 03:27

## 2023-07-19 RX ADMIN — SULFAMETHOXAZOLE AND TRIMETHOPRIM 1 TABLET: 800; 160 TABLET ORAL at 10:45

## 2023-07-19 RX ADMIN — ENOXAPARIN SODIUM 40 MG: 40 INJECTION SUBCUTANEOUS at 10:25

## 2023-07-19 RX ADMIN — ROSUVASTATIN CALCIUM 20 MG: 20 TABLET, FILM COATED ORAL at 08:35

## 2023-07-19 RX ADMIN — PREDNISONE 60 MG: 20 TABLET ORAL at 10:24

## 2023-07-19 RX ADMIN — CEFEPIME HYDROCHLORIDE 2 G: 2 INJECTION, POWDER, FOR SOLUTION INTRAVENOUS at 16:42

## 2023-07-19 RX ADMIN — OXYCODONE HYDROCHLORIDE 5 MG: 5 TABLET ORAL at 12:10

## 2023-07-19 RX ADMIN — THIAMINE HCL TAB 100 MG 100 MG: 100 TAB at 08:35

## 2023-07-19 ASSESSMENT — ACTIVITIES OF DAILY LIVING (ADL)
ADLS_ACUITY_SCORE: 29
ADLS_ACUITY_SCORE: 29
ADLS_ACUITY_SCORE: 36
ADLS_ACUITY_SCORE: 29
ADLS_ACUITY_SCORE: 36
ADLS_ACUITY_SCORE: 29
ADLS_ACUITY_SCORE: 36
ADLS_ACUITY_SCORE: 29
ADLS_ACUITY_SCORE: 36

## 2023-07-19 ASSESSMENT — VISUAL ACUITY: OU: NOT TESTABLE

## 2023-07-19 NOTE — PLAN OF CARE
Major Shift Events:  No major shift events to report. Neuros unchanged. Afebrile. BP maintained without intervention. CMV settings PEEP 5, FiO2 50%, frequent suctioning of thick secretions. Remains on insulin gtt.   Plan: Discuss goals of care, family is requesting to transfer to [facility].    For vital signs and complete assessments, please see documentation flowsheets.

## 2023-07-19 NOTE — PROGRESS NOTES
Neurocritical Care Progress Note    Reason for critical care admission: Thalamic stroke   Admitting Team: VISHAL  Date of Service:  07/19/2023  Date of Admission:  7/6/2023  Hospital Day: 14    Assessment/Plan  Michael Stewart is a 52 year old male w/ PMHx past methamphetamine abuse and tobacco use who presents on 7/6/2023 as a transfer from [facility] after the discovery of bilateral thalamic strokes as well as Strep A pharyngitis. No acute interventions were performed. However, upon presentation to [facility], he was noted to have increased somnolence, bilateral ophthalmoplegia w/ nystagmus, and atypical breathing raising concern for alternate etiologies beyond stroke. Diagnostic possibilities include post-streptococcal acute demyelinating encephalomyelitis, other autoimmune processes, or viral meningitis.     24 hour events: Afebrile overnight, though has had Arctic Sun in place. Continues to have leukocytosis.     Neuro  #New brainstem infarcts   #Acute ischemic stroke of bilateral thalami with involvement of posterior limb and left internal capsule, unclear etiology  #Encephalopathy  -ASA 81mg daily  -Neurochecks Q4h  -sBP goal < 180 mmHg   -HOB > 30   -PT/OT/SLP when appropriate  -Multiple conversations have been held with the assistance of interpretation services, and which the current clinical picture and suspected poor prognosis for this patient have been communicated.  On the morning of 7/17, the family expressed understanding of the current situation.  However, they requested transfer to [facility] if possible.  Family was advised that this would only be possible if there was a physician at [place] who would be willing to accept the patient as a transfer.  Palliative care team was involved in the case, given the challenging goals of care conversations that are ongoing.    #CSF with possible chronic inflammatory or viral encephalitic process  -7/8 MR brain with no additional enhancing areas   -7/8 LP  with opening pressure: 16 cm H2O, 63 nucleated cells, protein of 42, glucose of 64, additional labs ordered on CSF  -Cytology abnormal for LP on 7/8.  On repeat LP on 7/11, with the primary intent being to achieve fresh cells to low flow cytometry to be conducted.   -So far during hospital course, patient CSF has not been consistent with results that would be expected in acute ischemic stroke.  Rather, CSF suggesting underlying subacute to chronic inflammatory or viral encephalitic process.   -Oligoclonal bands returned positive on 7/12   -Initiated trial of 1g methylprednisolone daily for 7 days to cover for possible autoimmune or inflammatory process that would be consistent with workup thus far   -Will add on [place] autoimmune/paraneoplastic panel per discussion in neuroradiology conference on 7/18.   -Will repeat workup, with MRI C-spine, MRI brain, and LP, vEEG.   -Initiate steroid taper   -Initiate bactrim prophylaxis.      #Sympathetic hyperactivity  -Precedex infusion   -Scheduled Tylenol 1000 mg Q6h x24 hrs  -Start Bromocriptine 5 mg Q8h  -Start Oxycodone 5 mg Q4h  -Arctic sun as needed for normothermia  -on 7/16, stopped Ritalin 5 mg 0600, 1200 for neuro stimulation        #Analgesics & sedation  RASS goal: 0 to -1   -Fentanyl  mcg Q1h PRN  -propofol     HEENT   #Biting down on ETT, clenched jaw  #Broken loose bottom teeth  #Tongue lesions, bloody  -7/15 CT dental showing displacement of both central mandibular incisors  -Dentistry following - Patient seen by dental on 7/17 who did not find teeth in place. One tooth found my nurse in oral cavity.    - Ordered CXR and AXR to look for missing tooth. Appears to be a hyperintensity in stomach.   -PM&R consult to consider botox for jaw clenching.     CV  #HTN  #Intermittent hypotension  -Cardiac monitoring  -sBP goal < 180 mmHg   -PRN Hydralazine and Labetalol  -ordered new lactate on 7/17  -Intermittently drops pressures, which is currently likely due to  propofol. Improved on 7/18. Now off prop.    -Norepinephrine as needed to maintain MAP > 65.      #Hyperlipidemia  -Rosuvastatin 20 mg  -7/7     Resp  #Acute hypoxic respiratory failure   #Ventilator associated pneumonia    #Hx Tobacco Use  #Pulmonary nodules  Oxygen/vent: AC 12/500/+5  -Maintain Peep at 8  -Further discussion this week for Trach placement if within goals of care  -Pulmonary hygiene    -Continuous pulse ox  -Maintain O2 saturations greater than 92%   -Consult SICU for trach and PEG placement.     GI  Diet: TF's @ goal (60)  -NGT in place  -Further discussion this week for PEG placement if within goals of care  -Nutrition following    Last BM: 7/16  GI prophylaxis: Famotidine 20 mg BID  Bowel regimen: Holding bowel regimen on 7/17 due to frequent watery stools. Added fiber packets as well on 7/17. Stools better.    Renal/  #Hypernatremia, resolved  #Hyperchloremia  #Hypocalcemia  #Hypermagnesemia  #Lactic acidosis  - Q2h      -Daily BMP  -IV fluids: None  -Electrolyte replacement protocols  -Gave calcium gluconate on 7/17 for hypocalcemia.     #Urinary retention   -Continue Doxazosin 2 mg daily   -Dorantes replaced on 7/16 for temperature probe with arctic sun     Endo  #DM2  #Overweight   #Hyperglycemia 2/2 steroids  -Hgb A1c; 6.1  -TSH; 2.13  -Continue insulin infusion   -Monitor glucose levels     Heme  #Normocyctic anemia   -Daily CBC  -Hgb goal >7, plt goal >50k  -Transfuse to meet Hgb and plt goals     ID  #Leukocytosis  #Fevers   #Ventilator associated pneumonia  #Concern for sepsis  -Continue Cefepime (day 5)  -Bactrim for PJP prophylaxis.   -7/15 MRSA negative - Stopped Vancomycin on 7/17.   -7/15 BC with NGTD  -7/15 Sputum growing streptococcus anginosus, staphylococcus aureus, and klebsiella aerogenes   -Daily CBC  -Follow temperature curve  -Repeated respiratory and blood cultures on 7/18.      ICU Checklist  Lines/tubes/drains: PIV x3, L/PICC, Brynn, ETT, NGT, dorantes, rectal  "tube    FEN: TFs, replacement   PPX: DVT - SCDs, Lovenox; GI - Famotidine  Code: FULL CODE  Dispo: ICU - NCC      Clinically Significant Risk Factors         # Hypernatremia: Highest Na = 148 mmol/L in last 2 days, will monitor as appropriate                 # Overweight: Estimated body mass index is 28.21 kg/m  as calculated from the following:    Height as of this encounter: 1.702 m (5' 7\").    Weight as of this encounter: 81.7 kg (180 lb 1.9 oz).          The patient was seen and discussed with the NCC attending, Dr. Kelly.    Raymond Breen MD  PGY-2, Neurology    24 Hour Vital Signs Summary:  Temp: 99  F (37.2  C) Temp  Min: 98.4  F (36.9  C)  Max: 99.5  F (37.5  C)  Resp: 18 Resp  Min: 16  Max: 22  SpO2: 95 % SpO2  Min: 91 %  Max: 98 %  Pulse: 83 Pulse  Min: 70  Max: 91    No data recorded.  No data recorded.    Respiratory monitoring:   Vent Mode: CMV/AC  (Continuous Mandatory Ventilation/ Assist Control)  FiO2 (%): 50 %  Resp Rate (Set): 12 breaths/min  Tidal Volume (Set, mL): 500 mL  PEEP (cm H2O): 5 cmH2O  Resp: 18       I/O last 3 completed shifts:  In: 3024.08 [I.V.:794.08; NG/GT:850]  Out: 2165 [Urine:2165]    Current Medications:    aspirin  81 mg Oral or Feeding Tube Daily     bromocriptine  10 mg Oral Q8H     ceFEPIme  2 g Intravenous Q8H     [Held by provider] doxazosin  1 mg Oral or Feeding Tube Daily     enoxaparin ANTICOAGULANT  40 mg Subcutaneous Q24H     fiber modular  2 packet Per Feeding Tube TID     heparin lock flush  5-20 mL Intracatheter Q24H     multivitamins w/minerals  15 mL Per Feeding Tube Daily     oxyCODONE  5 mg Oral Q4H     pantoprazole  40 mg Intravenous BID     protein modular  1 packet Per Feeding Tube Daily     rosuvastatin  20 mg Oral or Feeding Tube Daily     senna-docusate  1-2 tablet Oral or Feeding Tube BID     sodium chloride (PF)  3 mL Intracatheter Q8H     thiamine  100 mg Oral or Feeding Tube Daily       PRN Medications:  glucose **OR** dextrose **OR** glucagon, " "fentaNYL, heparin lock flush, [Held by provider] labetalol **OR** [Held by provider] hydrALAZINE, magnesium hydroxide, naloxone **OR** naloxone **OR** naloxone **OR** naloxone, ondansetron **OR** ondansetron, polyethylene glycol, sodium chloride (PF), sodium chloride (PF)    Infusions:    insulin regular 2 Units/hr (07/19/23 0907)     norepinephrine Stopped (07/17/23 1036)     propofol Stopped (07/18/23 1013)       No Known Allergies    Physical Examination:  Vitals: /71 (BP Location: Right arm)   Pulse 83   Temp 99  F (37.2  C) (Bladder)   Resp 18   Ht 1.702 m (5' 7\")   Wt 81.7 kg (180 lb 1.9 oz)   SpO2 95%   BMI 28.21 kg/m    General: Adult male patient, lying in bed, critically-ill  HEENT: Normocephalic, atraumatic, no icterus, teeth missing, tongue lesions, mouth bloody   Cardiac: Sinus rhythm on bedside monitor   Pulm: Unlabored, expansion symmetric, no retractions or use of accessory muscles, assisted mechanically  Abdomen: Soft, non-distended abdomen   Extremities: Warm, no edema, appears adequately perfused  Skin: No rash or lesion observed on exposed skin  Psych: Calm   Neuro:  Mental status: Sedated, does not open eyes, does not follow commands, intubated.  Cranial nerves: PERRL, disconjugate gaze with each eye up and out, weak cough and gag with deep suction.  Motor: Normal bulk and tone. No abnormal movements. Intermittently has extensor posturing in BLUE.   Sensory: responds to noxious stimuli with extension in bilateral uppers only, does not grimace.  Coordination: ADAM, deferred.  Gait: ADAM, deferred.    Labs and Imaging:    Results for orders placed or performed during the hospital encounter of 07/06/23 (from the past 24 hour(s))   Glucose by meter   Result Value Ref Range    GLUCOSE BY METER POCT 196 (H) 70 - 99 mg/dL   Glucose by meter   Result Value Ref Range    GLUCOSE BY METER POCT 153 (H) 70 - 99 mg/dL   XR Chest Port 1 View    Narrative    Exam: XR CHEST PORT 1 VIEW, 7/18/2023 " 10:40 AM    Comparison: Chest radiograph dated 7/17/2023    History: 52 yo M who may have swallowed or aspirated teeth.    Findings:  Supine portable AP chest radiograph. Endotracheal tube tip terminates  approximately 5.4 cm above the rocío. Left upper extremity PICC tip  terminates in the low SVC. Enteric tube partially visualized with side  port projecting over the gastric fundus and distal and out of field of  view. Trachea is midline. Mediastinum is within normal limits.  Cardiopulmonary silhouette is within normal limits. Stable blunting of  the left costophrenic angle. Interval improvement in mixed  interstitial and airspace opacities throughout the mid to lower lungs.  No focal opacifications. There is no pneumothorax.      Impression    Impression:   1. Interval improvement in mixed interstitial and airspace opacities  throughout the mid to lower lungs. No focal opacifications or foreign  bodies visualized.  2. Stable trace left pleural effusion.    I have personally reviewed the examination and initial interpretation  and I agree with the findings.    HARSHAL MARVIN MD         SYSTEM ID:  [id number]   XR Abdomen Port 1 View    Narrative    EXAM: XR ABDOMEN PORT 1 VIEW  7/18/2023 10:41 AM     HISTORY:  52 yo M who may have swallowed or aspirated teeth.       TECHNIQUE: Single frontal radiograph of the abdomen    COMPARISON:  7/7/2023    FINDINGS:   AP portable supine radiograph the abdomen. Gastric tube sidehole and  tip is in stomach. Urethral catheter. Ovoid 1.4 cm density seen  projecting over the stomach. Nonobstructive bowel gas pattern. No  pneumatosis or portal venous gas.      Impression    IMPRESSION:   Ovoid 1.4 cm density projects over the stomach, which may represent  ingested tooth. Nonobstructive bowel gas pattern.    I have personally reviewed the examination and initial interpretation  and I agree with the findings.    JOSE HERNANDEZ MD         SYSTEM ID:  [id number]   Blood Culture Hand,  Right    Specimen: Hand, Right; Blood   Result Value Ref Range    Culture No growth after 12 hours    Blood Culture Hand, Left    Specimen: Hand, Left; Blood   Result Value Ref Range    Culture No growth after 12 hours    Glucose by meter   Result Value Ref Range    GLUCOSE BY METER POCT 161 (H) 70 - 99 mg/dL   Glucose by meter   Result Value Ref Range    GLUCOSE BY METER POCT 159 (H) 70 - 99 mg/dL   Respiratory Aerobic Bacterial Culture with Gram Stain    Specimen: Endotracheal; Sputum   Result Value Ref Range    Gram Stain Result >25 PMNs/low power field     Gram Stain Result 3+ Mixed monique    Glucose by meter   Result Value Ref Range    GLUCOSE BY METER POCT 136 (H) 70 - 99 mg/dL   Glucose by meter   Result Value Ref Range    GLUCOSE BY METER POCT 128 (H) 70 - 99 mg/dL   Glucose by meter   Result Value Ref Range    GLUCOSE BY METER POCT 154 (H) 70 - 99 mg/dL   Glucose by meter   Result Value Ref Range    GLUCOSE BY METER POCT 144 (H) 70 - 99 mg/dL   Glucose by meter   Result Value Ref Range    GLUCOSE BY METER POCT 148 (H) 70 - 99 mg/dL   Glucose by meter   Result Value Ref Range    GLUCOSE BY METER POCT 202 (H) 70 - 99 mg/dL   Glucose by meter   Result Value Ref Range    GLUCOSE BY METER POCT 139 (H) 70 - 99 mg/dL   Glucose by meter   Result Value Ref Range    GLUCOSE BY METER POCT 153 (H) 70 - 99 mg/dL   Glucose by meter   Result Value Ref Range    GLUCOSE BY METER POCT 129 (H) 70 - 99 mg/dL   Glucose by meter   Result Value Ref Range    GLUCOSE BY METER POCT 127 (H) 70 - 99 mg/dL   Glucose by meter   Result Value Ref Range    GLUCOSE BY METER POCT 126 (H) 70 - 99 mg/dL   Glucose by meter   Result Value Ref Range    GLUCOSE BY METER POCT 120 (H) 70 - 99 mg/dL   CBC with platelets   Result Value Ref Range    WBC Count 19.9 (H) 4.0 - 11.0 10e3/uL    RBC Count 4.02 (L) 4.40 - 5.90 10e6/uL    Hemoglobin 12.2 (L) 13.3 - 17.7 g/dL    Hematocrit 36.9 (L) 40.0 - 53.0 %    MCV 92 78 - 100 fL    MCH 30.3 26.5 - 33.0 pg     MCHC 33.1 31.5 - 36.5 g/dL    RDW 12.2 10.0 - 15.0 %    Platelet Count 391 150 - 450 10e3/uL   Basic metabolic panel   Result Value Ref Range    Sodium 139 136 - 145 mmol/L    Potassium 4.8 3.4 - 5.3 mmol/L    Chloride 106 98 - 107 mmol/L    Carbon Dioxide (CO2) 24 22 - 29 mmol/L    Anion Gap 9 7 - 15 mmol/L    Urea Nitrogen 30.8 (H) 6.0 - 20.0 mg/dL    Creatinine 0.56 (L) 0.67 - 1.17 mg/dL    Calcium 8.1 (L) 8.6 - 10.0 mg/dL    Glucose 141 (H) 70 - 99 mg/dL    GFR Estimate >90 >60 mL/min/1.73m2   Magnesium   Result Value Ref Range    Magnesium 2.3 1.7 - 2.3 mg/dL   Phosphorus   Result Value Ref Range    Phosphorus 3.6 2.5 - 4.5 mg/dL   Glucose by meter   Result Value Ref Range    GLUCOSE BY METER POCT 146 (H) 70 - 99 mg/dL   Glucose by meter   Result Value Ref Range    GLUCOSE BY METER POCT 108 (H) 70 - 99 mg/dL   Glucose by meter   Result Value Ref Range    GLUCOSE BY METER POCT 141 (H) 70 - 99 mg/dL   Glucose by meter   Result Value Ref Range    GLUCOSE BY METER POCT 126 (H) 70 - 99 mg/dL       All relevant imaging and laboratory values personally reviewed.

## 2023-07-19 NOTE — CONSULTS
General Surgery Consultation    Michael Stewart  MRN#: [id number]    Date of Admission:  7/6/2023    Date of Consult: 7/19/2023    Reason for consult: Tracheostomy/PEG tube placement       Requesting service: Neurocrit       Requesting provider: Dr. Mark Kelly                   Assessment and Plan:   Assessment:   Michael Stewart is a 52 year old male w/ bilateral thalamic strokes, strep A pharyngitis, and later encephalopathy of unclear origin. He was transferred from [facility] on 7/6/23. He required intubation and admission to the ICU d/  increased somnolence, bilateral ophthalmoplegia w/ nystagmus, and atypical breathing. Currently, his differential includes autoimmune processes, viral meningitis, and post-streptococcal acute demyelinating encephalomyelitis. His chronic medical problems include prior methamphetamine use and tobacco use.     He has been requiring a ventilator since 7/7/23 and has been unable to be weaned off of it. He has also been tolerating tube feeds via an NG tube since 7/9/23. He is not on pressors and is being treated for VAP with ongoing increasing WBC count.    After discussions with the family and palliative team, the primary team has requested tracheostomy and PEG tube placement. We discussed these operations with the family, and they confirmed their interest in proceeding.      Plan:   - Tentatively plan trach/PEG placement on Friday at 2PM. RT and Endoscopy are aware. This time and date will be confirmed by SICU to the neurocrit primary team after in person assessment by Dr. Garcia.  - Once confirmed, the SICU team will communicate pre-op plans (holding TFs, AC plan, etc).  - Formal consent from family is pending    Discussed with Critical care fellow, Dr. Medellin, who discussed with attending staff, Dr. Garcia. They were all in agreement    Kanu Zayas MD   General Surgery Resident            Chief Complaint:   Tracheostomy/PEG tube placement         History of  Present Illness:   Michael Stewart is a 52 year old male w/ bilateral thalamic strokes, strep A pharyngitis, and later encephalopathy of unclear origin. He was transferred from [facility] on 7/6/23. He required intubation and admission to the ICU d/  increased somnolence, bilateral ophthalmoplegia w/ nystagmus, and atypical breathing. Currently, his differential includes autoimmune processes, viral meningitis, and post-streptococcal acute demyelinating encephalomyelitis. His chronic medical problems include prior methamphetamine use and tobacco use.     He has been unable to be weaned from the ventilator. He has also been tolerating tube feeds via an NG tube since 7/9/23. He is currently being treated for VAP (7/15 Sputum Cx: klebsiella aerogenes, staph aureus, strep boris)- cefepime for 7 days course and is noted to have an increasing WBC count. He is not on any pressors currently.    After discussions with Palliative Care, the family, and the primary team have requested tracheostomy and PEG tube placement.         Past Medical History:   Hx of Tobacco use and methamphetamine use          Past Surgical History:     Past Surgical History:   Procedure Laterality Date     PICC DOUBLE LUMEN PLACEMENT Left 07/15/2023    Left basilic vein 48cm total 1cm external.             Social History:     Hx of tobacco and meth use.  Social History     Tobacco Use     Smoking status: Never     Smokeless tobacco: Never   Substance Use Topics     Alcohol use: Yes     Comment: rarely             Family History:     Unable to assess.           Allergies:   No Known Allergies          Medications:     No current facility-administered medications on file prior to encounter.  acetaminophen (TYLENOL) 325 MG tablet, Take 325-650 mg by mouth every 8 hours as needed for mild pain  ibuprofen (ADVIL/MOTRIN) 200 MG tablet, Take 200 mg by mouth every 8 hours as needed for pain  Naproxen Sodium (FLANAX PAIN RELIEF PO), Take 550 mg by mouth 3  times daily as needed (pain)              Review of Systems:   Unable to obtain due to patient condition.          Physical Exam:     Temp:  [98.4  F (36.9  C)-99.5  F (37.5  C)] 99  F (37.2  C)  Pulse:  [70-91] 83  Resp:  [16-22] 18  MAP:  [76 mmHg-90 mmHg] 85 mmHg  Arterial Line BP: (128-155)/(54-66) 144/62  FiO2 (%):  [40 %-50 %] 50 %  SpO2:  [92 %-98 %] 95 %     General: critically ill male laying comfortably in bed in NAD  Neuro: Unresponsive to voice  HEENT: short thick neck. small cricothyroid membrane window, no thyromegaly or scars.  CV:Normocardic, Hypertensive  Pulm: no dyspnea, overbreathing the ventilator  Abd: soft, moderately distended, non-tender, no surgical scars appreciated. Typanic  Extremities: Extremities warm and well perfused. PPP    I/O last 3 completed shifts:  In: 3024.08 [I.V.:794.08; NG/GT:850]  Out: 2165 [Urine:2165]          Data:   Labs:  Arterial Blood Gases   Recent Labs   Lab 07/14/23  0738   PH 7.47*   PCO2 35   PO2 80   HCO3 25        Complete Blood Count   Recent Labs   Lab 07/19/23  0333 07/18/23  0409 07/17/23  1544 07/17/23  0344 07/16/23  0359   WBC 19.9* 14.0*  --  13.2* 11.2*   HGB 12.2* 11.7* 11.9* 11.4* 12.1*    342  --  354 358       Basic Metabolic Panel  Recent Labs   Lab 07/19/23  1027 07/19/23  0833 07/19/23  0629 07/19/23  0529 07/19/23  0334 07/19/23  0333 07/18/23  0416 07/18/23  0409 07/17/23  1729 07/17/23  1728 07/17/23  0345 07/17/23  0344 07/16/23  0402 07/16/23  0359   NA  --   --   --   --   --  139  --  142  --  148*  --  149*  --  154*   POTASSIUM  --   --   --   --   --  4.8  --  5.0  --   --   --  4.2  --  4.3   CHLORIDE  --   --   --   --   --  106  --  109*  --   --   --  115*  --  121*   CO2  --   --   --   --   --  24  --  23  --   --   --  22  --  23   BUN  --   --   --   --   --  30.8*  --  32.9*  --   --   --  37.1*  --  38.3*   CR  --   --   --   --   --  0.56*  --  0.65*  --   --   --  0.72  --  0.73   * 126* 141* 108*   < > 141*    < > 154*   < >  --    < > 126*   < > 173*   SAJI  --   --   --   --   --  8.1*  --  8.1*  --   --   --  7.7*  --  8.0*   MAG  --   --   --   --   --  2.3  --  2.2  --   --   --  2.8*  --  3.0*   PHOS  --   --   --   --   --  3.6  --  3.6  --   --   --  3.0  --  3.2    < > = values in this interval not displayed.       Liver Function Tests  Recent Labs   Lab 07/15/23  0439 07/14/23  1731   AST 27 28   ALT 61 61   ALKPHOS 72 87   BILITOTAL <0.2 0.2   ALBUMIN 3.5 3.9       Pancreatic Enzymes  No lab results found in last 7 days.    Coagulation Profile  No lab results found in last 7 days.    Lactate  Recent Labs   Lab 07/18/23  0409 07/17/23  1127 07/16/23  1748 07/15/23  1650   LACT 2.4* 2.1* 2.2* 3.5*       Imaging:   Results for orders placed or performed during the hospital encounter of 07/06/23   CT Head w/o Contrast    Narrative    CT OF THE HEAD WITHOUT CONTRAST 7/6/2023 10:48 AM     COMPARISON: None.    HISTORY: Altered mental status.    TECHNIQUE: Axial CT images of the head from the skull base to the  vertex were acquired without IV contrast.    FINDINGS: The ventricles and basal cisterns are within normal limits  in configuration. There is no midline shift. There are no extra-axial  fluid collections. Gray-white differentiation is well maintained.    No intracranial hemorrhage, mass or recent infarct.    The visualized paranasal sinuses are well-aerated. There is no  mastoiditis. There are no fractures of the visualized bones.      Impression    IMPRESSION: Normal head CT.      Radiation dose for this scan was reduced using automated exposure  control, adjustment of the mA and/or kV according to patient size, or  iterative reconstruction technique    AZIZA DENNY MD         SYSTEM ID:  [id number]   CTA Head Neck with Contrast    Narrative    CT ANGIOGRAM OF THE HEAD AND NECK WITHOUT AND WITH CONTRAST July 6, 2023 10:52 AM     HISTORY: Altered mental status.    TECHNIQUE: Precontrast localizing scans were  followed by CT  angiography with an injection of 75mL Isovue 370 nonionic intravenous  contrast material with scans through the head and neck. Images were  transferred to a separate 3-D workstation where multiplanar  reformations and 3-D images were created. Estimates of carotid  stenoses are made relative to the distal internal carotid artery  diameters except as noted. Radiation dose for this scan was reduced  using automated exposure control, adjustment of the mA and/or kV  according to patient size, or iterative reconstruction technique.     COMPARISON: None.    FINDINGS:   Neck CTA: The bilateral common carotid, bilateral cervical internal  carotid and bilateral vertebral arteries are patent without stenosis.     Head CTA: The basilar, bilateral intracranial distal internal carotid,  bilateral anterior cerebral, bilateral middle cerebral and bilateral  posterior cerebral arteries are patent and unremarkable.      Impression    IMPRESSION: Normal neck and head CTA.      AZIZA DENNY MD         SYSTEM ID:  [id number]   CT Head Perfusion w Contrast - For Tier 2 Stroke    Narrative    CT BRAIN PERFUSION July 6, 2023 11:04 AM    HISTORY: Code Stroke to evaluate for potential thrombolysis and  thrombectomy. Evaluate mismatch between penumbra and core infarct.     TECHNIQUE: Time sequential axial CT images of the head were acquired  during the administration of intravenous contrast (50 mL isovue 370).  CTA images of the [place] as well as color perfusion maps of  the brain were created from this time sequential axial source data.  Radiation dose for this scan was reduced using automated exposure  control, adjustment of the mA and/or kV according to patient size, or  iterative reconstruction technique.    COMPARISON: None.    FINDINGS: There are no focal or regional perfusion defects in the  brain.      Impression    IMPRESSION: Normal CT perfusion of the brain.      AZIZA DENNY MD         SYSTEM ID:   [id number]   MR Brain w/o Contrast    Narrative    MRI OF THE BRAIN WITHOUT CONTRAST July 6, 2023 12:49 PM     HISTORY: Acute neuro deficit, stroke suspected.     TECHNIQUE: Sagittal T1-weighted, axial T2-weighted, axial FLAIR, and  axial diffusion-weighted MR images of the brain were acquired without  intravenous contrast.    COMPARISON: Head CT same day.    FINDINGS: There are globular areas of restricted diffusion in the  thalami bilaterally (left greater than right) consistent with recent  ischemic infarcts. The left-sided infarcts also appear to intersect  the posterior limb of the left internal capsule which corresponds with  the patient's recent symptoms of right-sided weakness. Given both  thalami are affected, normal variant artery of [place] vasculature  may be present. No other recent infarct.    The ventricles and basal cisterns are within normal limits in  configuration. There is no midline shift. There are no extra-axial  fluid collections. There is no evidence for acute intracranial  hemorrhage. Gray-white differentiation is well maintained.    There is no sinusitis or mastoiditis.      Impression    IMPRESSION:  1. Bilateral thalamic infarcts (left greater than right) with likely  involvement of the posterior limb of the left internal capsule which  corresponds with the patient's recent symptoms of right-sided weakness  and incoordination.  2. Otherwise, normal brain MRI.    AZIZA DENNY MD         SYSTEM ID:  [id number]   MRV Brain wo & w Contrast    Narrative    MR VENOGRAM OF THE HEAD WITHOUT AND WITH CONTRAST  7/6/2023 3:01 PM     HISTORY: Bilateral thalamic stroke.    TECHNIQUE: 2D TOF and 2D phase contrast MR venogram without contrast  material. 3D TOF MR venogram with 7.9 mL Gadavist.    COMPARISON: None.      Impression    IMPRESSION: Motion-limited exam. No evidence of dural venous sinus  thrombosis. The superior sagittal sinus, internal cerebral veins,  basal veins of Yovany, vein of  Prudencio, straight sinus, torcula,  transverse sinuses, sigmoid sinuses, and jugular bulbs are patent. No  evidence of dural venous sinus or deep vein thrombosis. Right  transverse sinus is dominant. Prominent vein versus development of  venous anomaly associated with the left thalamus considered to be a  normal variant.    IMPRESSION: No evidence of dural venous sinus or deep vein thrombosis.    MIGUEL CHIANG MD         SYSTEM ID:  [id number]   XR Chest Port 1 View    Narrative    Exam: XR CHEST PORT 1 VIEW, 7/6/2023 10:20 PM    Indication: New onset fever; concern for pneumonia    Comparison: None    Findings:   Semiupright AP portable radiograph of the chest. Normal trachea and  cardia mediastinal silhouette. Mild asymmetric elevation of right  hemidiaphragm. Central streaky opacities, no pneumothorax or pleural  effusion. Faint mixed opacities in the peripheral left lung base      Impression    Impression: Subtle mixed opacities in the peripheral left lower lobe,  may represent atelectasis however cannot exclude infection.    I have personally reviewed the examination and initial interpretation  and I agree with the findings.    TOYA GODOY MD         SYSTEM ID:  [id number]   MR Brain w/o & w Contrast    Narrative    Brain MRI without and with contrast    History: Concern for meningitis.    Comparison: 7/6/2023    Technique: Axial FLAIR,  T1-weighted, and susceptibility images were  obtained without intravenous contrast. Following intravenous  gadolinium-based contrast administration, axial T2-weighted,  diffusion, FLAIR, and axial and coronal T1-weighted images were  obtained.     Contrast: 8.2 cc gadavist    Findings:   Redemonstrated are areas of restricted diffusion and T2 hyperintensity  in the thalami bilaterally, left greater than right, unchanged from  prior. No new areas of restricted diffusion.    There is no mass effect, midline shift, or evidence of intracranial  hemorrhage.  The ventricles are not  enlarged out of proportion to the  cerebral sulci. The gray-white matter differentiation of the cerebral  hemispheres is preserved. No areas of microhemorrhage on  susceptibility weighted imaging. No focal areas of increased T2 signal  suggests edema. Small arachnoid cyst anterior to the right temporal  lobe.     Postcontrast images demonstrate no abnormal intracranial parenchymal  or meningeal enhancement. Focal T2 signal along a gyrus in the left  temporal lobe does not demonstrate enhancement on postcontrast imaging  (series 6 image 9 and series 16 image 9). No enhancement is  appreciated in this area on subtraction imaging. Focal area of  apparent enhancement in the left temporal lobe on series 15 image 60  is a vessel within a sulci demonstrated on sagittal view.     The major vascular flow-voids appear patent. Orbits are unremarkable.  Mild mucosal thickening in the paranasal sinuses. Mastoid air cells  are clear..      Impression    Impression:  1. No definite evidence of meningitis.   2. Unchanged bilateral thalamic infarctions.    I have personally reviewed the examination and initial interpretation  and I agree with the findings.    TISHA MORALES MD         SYSTEM ID:  [id number]   CT Head w/o Contrast    Narrative    CT HEAD W/O CONTRAST 7/7/2023 1:15 AM    Provided History: increasing somnolence; concern for hydrocephalus    Comparison: CT head 7/6/2023, MR brain 7/6/2023    Technique: Using multidetector thin collimation helical acquisition  technique, axial, coronal and sagittal CT images from the skull base  to the vertex were obtained without intravenous contrast.     Findings:    No intracranial hemorrhage. No mass effect. No midline shift. No  extra-axial fluid collection. The gray to white matter differentiation  of the cerebral hemispheres is preserved. Bilateral ventricles are  small. No sulcal effacement. The basal cisterns are patent. Small  right temporal arachnoid cyst.    The visualized  paranasal sinuses are otherwise clear. The mastoid air  cells are clear. Orbits appear unremarkable. No acute fracture.      Impression    Impression: No acute intracranial pathology. Decompressed lateral  ventricles, no hydrocephalus.    I have personally reviewed the examination and initial interpretation  and I agree with the findings.    TISHA MORALES MD         SYSTEM ID:  [id number]   CT Chest Pulmonary Embolism w Contrast    Narrative    CT CHEST PULMONARY EMBOLISM W CONTRAST, 7/7/2023 2:50 PM    History: concern for PE    Comparison: None.    Technique: Helical acquisition of CT images of the chest from the lung  apices to the kidneys were acquired after the administration of  intravenous contrast according to the CT pulmonary angiogram protocol.  Axial images were reconstructed in 1 and 3 mm slice thickness. Coronal  reconstructions performed. Three-dimensional (3D) post-processed  angiographic images were reconstructed, archived to PACS and used in  the interpretation of this study.    Contrast dose: iopamidol (ISOVUE-370) solution 70 mL    Findings:   The contrast bolus is adequate.  There is no pulmonary embolism. No  evidence of infection. Trace bilateral pleural effusions and adjacent  atelectasis. There is a 5 mm pleural-based groundglass opacity in the  right lower lobe as seen on series 8 image 176. There is a 4 mm solid  pulmonary nodule in the right upper lobe as seen on series 8 image  139. Adjacent 2 mm right upper lobe nodule on image 145.    Mediastinum: The heart is not enlarged. Trace pericardial effusion.  The ascending aorta and main pulmonary artery are normal in caliber  with the main pulmonary artery measuring up to 3.0 cm in diameter.    Upper abdomen: Small amount of dense material within the gallbladder.  Possible sludge rather than vicarious excretion of contrast. No wall  thickening. No adrenal nodule or upper abdominal mass. Otherwise, the  appearance of the upper abdomen is  unremarkable.    Bones and soft tissues: No acute or aggressive osseous lesions. Mild  degenerative changes of the thoracic spine      Impression    Impression:  1. No pulmonary embolism identified.  2. Multiple sub 6 mm pulmonary nodules. May consider 12 month  follow-up to evaluate for stability if patient is considered high risk  for lung cancer.    In the event of a positive result for acute pulmonary embolism  resulting in right heart strain, consider calling the   [facility] patient placement ([phone]) for PERT (Pulmonary Embolism  Response Team) Activation?    PERT -- Pulmonary Embolism Response Team (Multidisciplinary team  including cardiology, interventional radiology, critical care,  hematology)    I have personally reviewed the examination and initial interpretation  and I agree with the findings.    NUNU FONSECA MD         SYSTEM ID:  [id number]   XR Chest Port 1 View    Narrative    Portable chest    INDICATION: Endotracheal tube placement    COMPARISON: Yesterday 2213 hours    FINDINGS: Image at 1542 hours of 7/7/2023 shows normal size and shape  of heart. Slightly prominent increased retrocardiac density is noted  which may indicate developing atelectasis or perhaps even  consolidation. A new endotracheal tube is present with tip  approximately 4.3 cm above the rocío. NG/OG tube tip and sidehole  multiple project in the stomach.      Impression    IMPRESSION: Increasing retrocardiac opacity which may indicate  atelectasis but recommend follow to clearing to exclude developing  infection. Intubated.    NUNU FONSECA MD         SYSTEM ID:  [id number]   XR Abdomen Port 1 View    Narrative    Exam: XR ABDOMEN PORT 1 VIEW, 7/7/2023 9:53 PM    Indication: NG placement    Comparison: 7/7/2023    Findings:   Semiupright AP radiograph of the abdomen. Enteric tube tip and  sidehole project over the stomach. Nonobstructive bowel gas pattern.  Subsegmental dependent atelectasis.      Impression     Impression: Enteric tube tip and sidehole project over the stomach.    I have personally reviewed the examination and initial interpretation  and I agree with the findings.    JOSE HERNANDEZ MD         SYSTEM ID:  [id number]   CT Head w/o Contrast    Narrative    CT HEAD W/O CONTRAST 7/8/2023 1:33 AM    Provided History: Abnormal posturing; concern for hydrocephalus    Comparison: CT head 7/7/2023.    Technique: Using multidetector thin collimation helical acquisition  technique, axial, coronal and sagittal CT images from the skull base  to the vertex were obtained without intravenous contrast.     Findings:    No intracranial hemorrhage. No mass effect. No midline shift. No  extra-axial fluid collection. The gray to white matter differentiation  of the cerebral hemispheres is preserved. Stable small bilateral  ventricles. No sulcal effacement. The basal cisterns are patent. Small  right temporal arachnoid cyst.    Scattered mucosal thickening in the paranasal sinuses. The mastoid air  cells are clear. Orbits appear unremarkable. No acute fracture.      Impression    Impression:   No acute intracranial pathology. No hydrocephalus.    I have personally reviewed the examination and initial interpretation  and I agree with the findings.    TISHA MORALES MD         SYSTEM ID:  [id number]   XR Chest Port 1 View    Narrative    Portable chest    INDICATION: Desaturations     COMPARISON: 7/7/2023    FINDINGS: Normal size and shape of heart. Slightly prominent increased  retrocardiac density is noted which may indicate developing  atelectasis or perhaps even consolidation. Endotracheal tube tip  approximately 4.3 cm above the rocío. NG/OG tube tip and sidehole in  the stomach.      Impression    IMPRESSION: Further increase in left retrocardiac opacification which  may indicate worsening atelectasis or infection. Stable support  devices.    I have personally reviewed the examination and initial interpretation  and I  agree with the findings.    AG HACKETT MD         SYSTEM ID:  [id number]   XR Chest Port 1 View    Narrative    Portable chest    INDICATION: Hypoxia    COMPARISON: Yesterday    FINDINGS: Heart size normal. Endotracheal tube tip approximately 2.9  cm above the rocío. NG/OG tube beyond the inferior margin of the  image with sidehole projecting in the stomach. No new opacities or  ectopic air collections. Improved visualization of the left lower lung  zone and left hemidiaphragm.      Impression    IMPRESSION: Improved aeration left lower lung zone with continued mild  residual atelectasis or consolidation. Recommend follow-up to complete  clearing.    NUNU FONSECA MD         SYSTEM ID:  [id number]   MR Brain w/o Contrast    Narrative    MR BRAIN W/O CONTRAST 7/14/2023 8:40 PM    Provided History: evaluate for new stroke with exam change    Comparison:  7/8/2023     Technique: Sagittal T1-weighted, coronal T2-weighted, axial T2 FLAIR,  axial susceptibility weighted, and axial diffusion-weighted with ADC  map images of the brain were obtained without intravenous contrast.    Findings: Relatively unchanged areas of restricted diffusion and T2  hyperintensity in the thalami bilaterally, left greater than right,  generally unchanged from prior. No new areas of restricted diffusion.  Stable left medullopontine and right cerebellar vermis punctate foci  of diffusion restriction. Stable right choroid plexus xanthogranuloma.    There is no mass effect, midline shift, or evidence of intracranial  hemorrhage.  The ventricles are not enlarged out of proportion to the  cerebral sulci. The gray-white matter differentiation of the cerebral  hemispheres is preserved. No areas of microhemorrhage on  susceptibility weighted imaging. No focal areas of increased T2 signal  suggests edema. Small arachnoid cyst anterior to the right temporal  lobe.      Impression    Impression: Evolution of bilateral thalamic infarcts.  Brainstem  infarcts are more apparent.    I have personally reviewed the examination and initial interpretation  and I agree with the findings.    WINDY HALL MD         SYSTEM ID:  [id number]   XR Chest Port 1 View    Narrative    EXAM: XR CHEST PORT 1 VIEW 7/14/2023 6:14 PM      HISTORY: looking for infection.    COMPARISON: Chest radiograph 7/10/2023    TECHNIQUE: Frontal view of the chest.    FINDINGS: Endotracheal tube tip projecting over the midthoracic  trachea, approximately 6.4 cm above the rocío. Enteric tube courses  beyond the field-of-view. Trachea is midline. Cardiac silhouette is  within normal limits. Pulmonary vasculature is distinct. Hazy opacity  obscuring the left hemidiaphragm. No appreciable pneumothorax. No  significant pleural effusion.    No acute osseous abnormality. Visualized soft tissues and upper  abdomen are unremarkable.      Impression    IMPRESSION: Streaky left basilar opacity likely representing  atelectasis and/or consolidation.    I have personally reviewed the examination and initial interpretation  and I agree with the findings.    HARSHAL MARVIN MD         SYSTEM ID:  [id number]   CTA Head Neck with Contrast    Narrative    EXAM: CTA HEAD NECK W CONTRAST  7/15/2023 12:29 PM     HISTORY:  New brainstem strokes on MR brain       COMPARISON:  CT head 7/15/2023, brain MRI 7/14/2023    TECHNIQUE:    HEAD and NECK CTA: During rapid bolus intravenous injection of  nonionic contrast material, axial images were obtained using thin  collimation multidetector helical technique from the base of the upper  aortic arch through the [place]. This CT angiogram data was  reconstructed at thin intervals with mild overlap. Images were sent to  the 3D workstation, and 3D reconstructions were obtained. The axial  source images, multiplanar reformations, 3D reconstructions in both  maximum intensity projection display and volume rendered models were  reviewed, with reconstructions  performed by the technologist.    CONTRAST: iopamidol (ISOVUE-370) solution 75 mL    FINDINGS:  Head CTA demonstrates no intracranial stenosis. Possible aneurysm of  what appears to be the right callosomarginal artery versus a dilated  adjacent vein, image 202 of series 5.    Neck CTA demonstrates conventional aortic arch branching pattern and  patent great vessel origins. No internal carotid artery stenosis. No  vertebral artery stenosis.    No acute finding in the visualized neck soft tissues or in the  superior mediastinum/thorax. Multiple air-fluid levels are seen  throughout the paranasal sinuses. Partial left mastoid air cell  effusion.    Procedure visualized enteric tube in the right nasal cavity and  thoracic esophagus. Endotracheal tube seen within the thoracic trachea  with tip above the rocío.       Impression    IMPRESSION:    1. Head CTA demonstrates a possible aneurysm of what appears to be the  right callosomarginal artery versus a dilated adjacent vein.  2. Neck CTA demonstrates no stenosis of the major cervical arteries.  3. Stable infarcts in the bilateral thalamus, please see same day head  CT 7/15/2023 for further details.    I have personally reviewed the examination and initial interpretation  and I agree with the findings.    WINDY HALL MD         SYSTEM ID:  [id number]   XR Chest Port 1 View    Narrative    Exam: XR CHEST PORT 1 VIEW, 7/15/2023 11:12 AM    Indication: ETT location verification    Comparison: Chest x-ray 7/14/2023    Findings:     Frontal projection radiograph of the chest. Endotracheal tube projects  about 5.4 cm above the rocío. Enteric tube sidehole projects over the  proximal stomach with tip collimated of the field of view. Similar  cardiomediastinal silhouette. No pleural effusion or discernible  pneumothorax. Similar streaky basilar opacities left more than right.  Visualized upper abdomen and osseous structures are within normal  limits.      Impression     Impression:   1. Endotracheal tube projects 5.4 cm above the rocío.  2. Bibasilar streaky pulmonary opacities left more than right, may  represent atelectasis and/or consolidation.    MANI CARRENO MD         SYSTEM ID:  [id number]   CT Head w/o Contrast    Narrative    EXAM: CT HEAD W/O CONTRAST  7/15/2023 12:28 PM     HISTORY:  New brainstem strokes on MR brain       COMPARISON:  Brain MRI 7/14/2023    TECHNIQUE: Using multidetector thin collimation helical acquisition  technique, axial, coronal and sagittal CT images from the skull base  to the vertex were obtained without intravenous contrast.   (topogram) image(s) also obtained and reviewed.    FINDINGS:  Ill-defined hypoattenuation within the bilateral thalamus, left  greater than right, consistent with known infarcts, better appreciated  on brain MRI 7/14/2023. No CT evidence of hemorrhagic conversion. No  mass effect or midline shift. No acute loss of gray-white matter  differentiation in the cerebral hemispheres. Ventricles are  proportionate to the cerebral sulci. Clear basal cisterns.    The bony calvaria and the bones of the skull base are normal. Multiple  air-fluid levels seen throughout the visualized paranasal sinuses.  Partial opacification of the left mastoid air cells. Grossly normal  orbits.       Impression    IMPRESSION:   1. Stable ill-defined hypoattenuating infarcts within the bilateral  thalamus, left greater than right, better appreciated on MRI  7/14/2023. No CT evidence of intracranial hemorrhage.  2. Multiple air-fluid levels seen throughout the paranasal sinuses,  can be seen in the setting of sinusitis.    I have personally reviewed the examination and initial interpretation  and I agree with the findings.    WINDY HALL MD         SYSTEM ID:  [id number]   CT Dental wo Contrast    Narrative    EXAM: CT DENTAL WO CONTRAST 7/15/2023 9:44 PM    HISTORY:  determine dental infection    TECHNIQUE: 3-D reconstruction by the  technologists, with curved  multiplanar reformat of thin section imaging through the mandible and  maxilla obtained without intravenous contrast.    FINDINGS:  Partially visualized endotracheal tube in the oral cavity and  nasogastric tube in the right nasal cavity and upper thoracic  esophagus. No significant soft tissue swelling or mass. Normal facial  bone alignment. No bony erosion. Small periapical lucencies along the  bilateral central mandibular incisors with near horizontal alignment  of the teeth, which may suggest dislodged/displacement.    Extensive air-fluid levels are seen throughout the paranasal sinuses.  Bilateral mastoid air cell effusions. Normal temporomandibular joints.      Impression    IMPRESSION:  1. New anterior displacement of both central mandibular incisors near  horizontal alignment of crown and roots since 7/6/2023, perhaps by the  endotracheal tube. No periapical abscess.  2. Extensive air-fluid levels are seen throughout the paranasal  sinuses, which may suggest sequelae of sinusitis.    I have personally reviewed the examination and initial interpretation  and I agree with the findings.    WINDY HALL MD         SYSTEM ID:  [id number]   XR Chest Port 1 View    Narrative    Exam: XR CHEST PORT 1 VIEW, 7/17/2023 11:37 AM    Comparison: Chest radiograph dated 7/15/2023    History: hypoxia, ETT position    Findings:  Semiupright portable AP chest radiograph. Endotracheal tube tip is  approximately 5.4 cm above the rocío. Left upper extremity PICC tip  terminates in the low SVC. Enteric tube is partially visualized  coursing towards the stomach with distal end out of the field-of-view.  Trachea is midline. Mediastinum is within normal limits.  Cardiopulmonary silhouette is within normal limits. Blunting of the  left costophrenic angle. Mixed interstitial and airspace  opacifications predominantly in the mid to lower lungs, slightly worse  from prior particularly in the right lung.  Retrocardiac opacification,  similar to prior. There is no pneumothorax or pleural effusion. Right  shoulder incompletely visualized.      Impression    Impression:   1. Slightly worsening mixed interstitial airspace opacifications  predominantly in the mid to lower lungs, likely atelectasis and/or  pulmonary edema but infection cannot be ruled out. Stable retrocardiac  opacification.  2. Small left pleural effusion.  3. Endotracheal tube tip is approximately 5.4 cm above the rocío.    I have personally reviewed the examination and initial interpretation  and I agree with the findings.    NUNU FONSECA MD         SYSTEM ID:  [id number]   XR Abdomen Port 1 View    Narrative    EXAM: XR ABDOMEN PORT 1 VIEW  7/18/2023 10:41 AM     HISTORY:  54 yo M who may have swallowed or aspirated teeth.       TECHNIQUE: Single frontal radiograph of the abdomen    COMPARISON:  7/7/2023    FINDINGS:   AP portable supine radiograph the abdomen. Gastric tube sidehole and  tip is in stomach. Urethral catheter. Ovoid 1.4 cm density seen  projecting over the stomach. Nonobstructive bowel gas pattern. No  pneumatosis or portal venous gas.      Impression    IMPRESSION:   Ovoid 1.4 cm density projects over the stomach, which may represent  ingested tooth. Nonobstructive bowel gas pattern.    I have personally reviewed the examination and initial interpretation  and I agree with the findings.    JOSE HERNANDEZ MD         SYSTEM ID:  [id number]   XR Chest Port 1 View    Narrative    Exam: XR CHEST PORT 1 VIEW, 7/18/2023 10:40 AM    Comparison: Chest radiograph dated 7/17/2023    History: 54 yo M who may have swallowed or aspirated teeth.    Findings:  Supine portable AP chest radiograph. Endotracheal tube tip terminates  approximately 5.4 cm above the rocío. Left upper extremity PICC tip  terminates in the low SVC. Enteric tube partially visualized with side  port projecting over the gastric fundus and distal and out of field  of  view. Trachea is midline. Mediastinum is within normal limits.  Cardiopulmonary silhouette is within normal limits. Stable blunting of  the left costophrenic angle. Interval improvement in mixed  interstitial and airspace opacities throughout the mid to lower lungs.  No focal opacifications. There is no pneumothorax.      Impression    Impression:   1. Interval improvement in mixed interstitial and airspace opacities  throughout the mid to lower lungs. No focal opacifications or foreign  bodies visualized.  2. Stable trace left pleural effusion.    I have personally reviewed the examination and initial interpretation  and I agree with the findings.    HARSHAL MARVIN MD         SYSTEM ID:  [id number]   XR Abdomen Port 1 View    Narrative    EXAMINATION:  XR ABDOMEN PORT 1 VIEW 2023     COMPARISON: Abdominal radiographs 2023    HISTORY: progression of tooth in stomach.    TECHNIQUE: Three frontal supine views of the abdomen.    FINDINGS: Gastric tube tip and sidehole project over the stomach. No  abnormally dilated air-filled loops of bowel. The lung bases are  unremarkable. Mcdaniel catheter projects over the bladder. Ovoid density  measuring about 1.5 cm projects over the stomach, similar to prior.      Impression    IMPRESSION:   1.5 cm ovoid density projects over the stomach similar to prior, and  may represent ingested tooth. Non-obstructive bowel gas pattern.    I have personally reviewed the examination and initial interpretation  and I agree with the findings.    CARINA HDZ DO         SYSTEM ID:  [id number]   Echocardiogram Complete w Bubble Study - For age < 60 yrs     Value    LVEF  55-60%    Narrative    [id number]  [id number]  [id number]  [id number]^JENNIE^NAYE     [facility]  Echocardiography Laboratory  [address] [place] [id number]     Name: ISABELLA CRYSTAL  MRN: [id number]  : 1970  Study Date: 2023 07:55 AM  Age: 52 yrs  Gender:  Male  Patient Location: [facility]  Reason For Study: Cerebrovascular Incident  Ordering Physician: NAYE SCHNEIDER  Performed By: Raymon Brooks     BSA: 1.9 m2  Height: 67 in  Weight: 180 lb  HR: 99  BP: 163/113 mmHg  ______________________________________________________________________________  Procedure  Bubble Echocardiogram with two-dimensional, color and spectral Doppler  performed. Echocardiogram with two-dimensional, color and spectral Doppler  performed.  ______________________________________________________________________________  Interpretation Summary  Left ventricular size, wall motion and function are normal. The ejection  fraction is 55-60%.  Global right ventricular function is normal.  Right-to-left shunt is present demonstrated by a positive saline bubble study  with delayed bubbles.  ______________________________________________________________________________  Left Ventricle  Left ventricular size, wall motion and function are normal. The ejection  fraction is 55-60%. Left ventricular diastolic function is not assessable.     Right Ventricle  The right ventricle is normal size. Global right ventricular function is  normal.     Atria  Both atria appear normal. Right-to-left shunt is present demonstrated by a  positive saline bubble study with delayed bubbles. This could represent AVM,  interatrial shunt cannot be excluded.     Mitral Valve  The mitral valve is normal.     Aortic Valve  The valve leaflets are not well visualized. On Doppler interrogation, there is  no significant stenosis or regurgitation.     Tricuspid Valve  The tricuspid valve is normal. Trace tricuspid insufficiency is present.  Pulmonary artery systolic pressure cannot be assessed.     Pulmonic Valve  The pulmonic valve is normal.     Vessels  The aorta root is normal. The thoracic aorta is normal. The pulmonary artery  cannot be assessed. The inferior vena cava was normal in size with preserved  respiratory variability.      Pericardium  No pericardial effusion is present.     Compared to Previous Study  Previous study not available for comparison.  ______________________________________________________________________________  MMode/2D Measurements & Calculations     IVSd: 1.0 cm  LVIDd: 4.9 cm  LVIDs: 2.7 cm  LVPWd: 1.1 cm  FS: 45.3 %  LV mass(C)d: 181.5 grams  LV mass(C)dI: 93.9 grams/m2  Ao root diam: 3.2 cm  asc Aorta Diam: 3.4 cm  LVOT diam: 2.0 cm  LVOT area: 3.2 cm2  LA Volume (BP): 29.2 ml  LA Volume Index (BP): 15.1 ml/m2  RV Base: 3.8 cm     RWT: 0.44  TAPSE: 2.6 cm     Doppler Measurements & Calculations  MV E max will: 63.9 cm/sec  MV A max will: 96.4 cm/sec  MV E/A: 0.66  MV dec slope: 394.7 cm/sec2  MV dec time: 0.16 sec  PA acc time: 0.09 sec  E/E' av.9  Lateral E/e': 4.2  Medial E/e': 7.5  RV S Will: 19.0 cm/sec     ______________________________________________________________________________  Report approved by: MD Esteban Richardson 2023 09:33 AM

## 2023-07-19 NOTE — PROGRESS NOTES
[facility]  Palliative Care Daily Progress Note       Recommendations & Counseling     GOALS OF CARE:     Restorative without limits, family considering DNR  Met with Em (daughter) at bedside today with  over the phone.  Summary:    Em and family have decided to proceed with tracheostomy and PEG placement to allow more time for possible recovery, which they understand appears unlikely.    Em states that if Michael is not showing signs of improvements in a few weeks, they would consider changing course. Today we discussed what it would look like if they opt to transition to comfort-focused care in the future. Em was appreciative of this information and asked appropriate questions.    Addressed code status. Explained that if Michael suffered a cardiac arrest in his current condition, he would be even less likely to recover. Outlined options of having DNR order in place vs remaining full code. Em plans to discuss further with family and let medical team know their decision.    ADVANCE CARE PLANNING:    No HCD or POLST    Code status: Full Code    PSYCHOSOCIAL/SPIRITUAL:    Family - strong support from spouse Becca and 5 adult children (Em, Karlie and 3 sons), mom and 2 brothers also live nearby. Immigrated from [place] at age 16.    [place] - Lutheran, spiritual > Rastafari    Today Em requested letters from hospital to (1) allow sister Karlie to visit from [place] and (2) allow family to access Michael's financial accounts. Discussed with SW/CC.    Palliative Care will continue to follow. Recommendations discussed with primary team.    Nicole Resendiz PA-C  Securely message with [employer] (more info)  Text page via [employer] Paging/Directory       Assessments          Michael is a 51 y/o man with history of past methamphetamine use and tobacco use, admitted from [facility] 7/6 for bilateral thalamic strokes and strep A pharyngitis. Course complicated by acute  "encephalopathy and respiratory failure requiring intubation. 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 Also with broken teeth due to clenched jaw/biting down on ETT.    Today, the patient was seen for:  Goals of care, family support            Interval History:     Chart review/discussion with unit or clinical team members:   - Discussed with primary team, clinically unchanged. Plan to repeat MR brain and LP today. Pending possible transfer to [facility].    Per patient or family/caregivers today:  Michael remains intubated and sedated, jerking movements noted. Discussed goals with daughter at bedside.          Medications:     Notable for:  Bromocriptine 10mg q8h  PO oxycodone 5mg q4h  Prednisone taper  IV fentanyl 50-100mcg q1h prn (none used last 23h)              Physical Exam:   Vital Signs: Blood pressure 120/71, pulse 83, temperature 99  F (37.2  C), temperature source Bladder, resp. rate 18, height 1.702 m (5' 7\"), weight 81.7 kg (180 lb 1.9 oz), SpO2 95 %.   GENERAL: intubated and sedated, +jerking movements of abdomen and extremities while being changed by nursing           Data Reviewed:     Reviewed recent pertinent imaging:   AXR notable for ingested tooth    Reviewed recent labs:   CMPRecent Labs   Lab 07/19/23  1027 07/19/23  0833 07/19/23  0334 07/19/23  0333 07/18/23  0416 07/18/23  0409 07/15/23  0442 07/15/23  0439 07/14/23  2132 07/14/23  1731   NA  --   --   --  139  --  142   < > 150*  --  148*   POTASSIUM  --   --   --  4.8  --  5.0   < > 4.8  --  4.8   CHLORIDE  --   --   --  106  --  109*   < > 113*  --  112*   CO2  --   --   --  24  --  23   < > 22  --  22   ANIONGAP  --   --   --  9  --  10   < > 15  --  14   * 126*   < > 141*   < > 154*   < > 296*   < > 352*   BUN  --   --   --  30.8*  --  32.9*   < > 39.4*  --  40.0*   CR  --   --   --  0.56*  --  0.65*   < > 0.83  --  0.79 "   GFRESTIMATED  --   --   --  >90  --  >90   < > >90  --  >90   SAJI  --   --   --  8.1*  --  8.1*   < > 8.7  --  9.1   MAG  --   --   --  2.3  --  2.2   < > 2.7*  --   --    PHOS  --   --   --  3.6  --  3.6   < > 2.2*  --   --    PROTTOTAL  --   --   --   --   --   --   --  6.1*  --  6.7   ALBUMIN  --   --   --   --   --   --   --  3.5  --  3.9   BILITOTAL  --   --   --   --   --   --   --  <0.2  --  0.2   ALKPHOS  --   --   --   --   --   --   --  72  --  87   AST  --   --   --   --   --   --   --  27  --  28   ALT  --   --   --   --   --   --   --  61  --  61    < > = values in this interval not displayed.     CBC  Recent Labs   Lab 07/19/23  0333 07/18/23  0409   WBC 19.9* 14.0*   RBC 4.02* 3.78*   HGB 12.2* 11.7*   HCT 36.9* 35.3*   MCV 92 93   MCH 30.3 31.0   MCHC 33.1 33.1   RDW 12.2 12.2    342          Medical Decision Making       MANAGEMENT DISCUSSED with the following over the past 24 hours: NeuroCrit   NOTE(S)/MEDICAL RECORDS REVIEWED over the past 24 hours: NeuroCrit,   Medical complexity over the past 24 hours:  - Decision regarding ESCALATION OF LEVEL OF CARE

## 2023-07-19 NOTE — PROGRESS NOTES
CLINICAL NUTRITION SERVICES - BRIEF NOTE    Nutrition Prescription    RECOMMENDATIONS FOR MDs/PROVIDERS TO ORDER:  - None additional from full assessments     Recommendations already ordered by Registered Dietitian (RD):  - Increased protein packets:  - Osmolite 1.5 Baldev (or equivalent) @ goal of  60ml/hr  (1440ml/day) provides: 2160 kcals, 90 g PRO, 1097 ml free H20, 293 g CHO, and 0 g fiber daily + 2 [id number] = 130 gm PRO (1.6 gm/kg) and 2320 kcals (29 kcals/kg)  - Increased [id number]     Future/Additional Recommendations:  - Ongoing EN tolerance      Dosing Weight: 79 kg (based on lowest wt so far this admission of 79.4 kg on 7/6)     REASSESSED NUTRITION NEEDS (for protein)   Estimated Energy Needs: 2322-1171 kcals/day (25 - 30 kcals/kg)   Justification: Maintenance needs   Estimated Protein Needs: 119 -158 grams protein/day (1.5- 2.0 grams of pro/kg)   Justification: Increased needs pending renal function   Estimated Fluid Needs: 5547-9465 mL/day (25 - 30 mL/kg)   Justification: Maintenance     Nutrition Progress Note - f/u for progress towards previous nutrition POC (see previous reassessment for details)     Reina Alfaro RD, LD, [facility]  Neuro ICU  Pager: [phone]

## 2023-07-19 NOTE — PROCEDURES
[facility]    Lumbar puncture    Date/Time: 7/19/2023 4:41 PM    Performed by: Raymond Breen MD  Authorized by: Raymond Breen MD  Indications: evaluation for altered mental status  Preparation: Patient was prepped and draped in the usual sterile fashion.      UNIVERSAL PROTOCOL   Site Marked: Yes  Prior Images Obtained and Reviewed:  Yes  Required items: Required blood products, implants, devices and special equipment available    Patient identity confirmed:  Arm band  NA - No sedation, light sedation, or local anesthesia  Confirmation Checklist:  Procedure was appropriate and matched the consent or emergent situation  Time out: Immediately prior to the procedure a time out was called    Universal Protocol: the Joint Commission Universal Protocol was followed    Preparation: Patient was prepped and draped in usual sterile fashion      SEDATION    Patient Sedated: No      PROCEDURE DETAILS  Lumbar space: L4-L5 interspace  Patient's position: left lateral decubitus  Number of attempts: 1  Opening pressure: 19 cm H2O  Closing pressure: 13 cm H2O  Fluid appearance: clear  Tubes of fluid: 4  Total volume: 22 ml  Post-procedure: adhesive bandage applied      PROCEDURE    Length of time physician/provider present for 1:1 monitoring during sedation: 0      Procedure was supervised by neuro critical care fellow Bryan Vila MD.     Signed,    Raymond Breen MD  PGY-2, NCC Service

## 2023-07-20 ENCOUNTER — APPOINTMENT (OUTPATIENT)
Dept: NEUROLOGY | Facility: CLINIC | Age: 53
End: 2023-07-20
Payer: COMMERCIAL

## 2023-07-20 ENCOUNTER — APPOINTMENT (OUTPATIENT)
Dept: MRI IMAGING | Facility: CLINIC | Age: 53
End: 2023-07-20
Attending: NURSE PRACTITIONER
Payer: COMMERCIAL

## 2023-07-20 LAB
ANION GAP SERPL CALCULATED.3IONS-SCNC: 9 MMOL/L (ref 7–15)
BACTERIA BLD CULT: NO GROWTH
BACTERIA BLD CULT: NO GROWTH
BACTERIA SPT CULT: ABNORMAL
BUN SERPL-MCNC: 31.2 MG/DL (ref 6–20)
CALCIUM SERPL-MCNC: 7.8 MG/DL (ref 8.6–10)
CHLORIDE SERPL-SCNC: 103 MMOL/L (ref 98–107)
CREAT SERPL-MCNC: 0.6 MG/DL (ref 0.67–1.17)
DEPRECATED HCO3 PLAS-SCNC: 24 MMOL/L (ref 22–29)
ERYTHROCYTE [DISTWIDTH] IN BLOOD BY AUTOMATED COUNT: 12.5 % (ref 10–15)
GFR SERPL CREATININE-BSD FRML MDRD: >90 ML/MIN/1.73M2
GLUCOSE BLDC GLUCOMTR-MCNC: 101 MG/DL (ref 70–99)
GLUCOSE BLDC GLUCOMTR-MCNC: 119 MG/DL (ref 70–99)
GLUCOSE BLDC GLUCOMTR-MCNC: 120 MG/DL (ref 70–99)
GLUCOSE BLDC GLUCOMTR-MCNC: 131 MG/DL (ref 70–99)
GLUCOSE BLDC GLUCOMTR-MCNC: 134 MG/DL (ref 70–99)
GLUCOSE BLDC GLUCOMTR-MCNC: 138 MG/DL (ref 70–99)
GLUCOSE BLDC GLUCOMTR-MCNC: 139 MG/DL (ref 70–99)
GLUCOSE BLDC GLUCOMTR-MCNC: 139 MG/DL (ref 70–99)
GLUCOSE BLDC GLUCOMTR-MCNC: 149 MG/DL (ref 70–99)
GLUCOSE BLDC GLUCOMTR-MCNC: 165 MG/DL (ref 70–99)
GLUCOSE BLDC GLUCOMTR-MCNC: 170 MG/DL (ref 70–99)
GLUCOSE BLDC GLUCOMTR-MCNC: 178 MG/DL (ref 70–99)
GLUCOSE SERPL-MCNC: 147 MG/DL (ref 70–99)
GRAM STAIN RESULT: ABNORMAL
GRAM STAIN RESULT: ABNORMAL
HCT VFR BLD AUTO: 35.4 % (ref 40–53)
HGB BLD-MCNC: 11.7 G/DL (ref 13.3–17.7)
Lab: NORMAL
MAGNESIUM SERPL-MCNC: 2.2 MG/DL (ref 1.7–2.3)
MCH RBC QN AUTO: 30.5 PG (ref 26.5–33)
MCHC RBC AUTO-ENTMCNC: 33.1 G/DL (ref 31.5–36.5)
MCV RBC AUTO: 92 FL (ref 78–100)
PATH REPORT.COMMENTS IMP SPEC: NORMAL
PATH REPORT.COMMENTS IMP SPEC: NORMAL
PATH REPORT.FINAL DX SPEC: NORMAL
PATH REPORT.GROSS SPEC: NORMAL
PATH REPORT.MICROSCOPIC SPEC OTHER STN: NORMAL
PATH REPORT.RELEVANT HX SPEC: NORMAL
PERFORMING LABORATORY: NORMAL
PHOSPHATE SERPL-MCNC: 2.5 MG/DL (ref 2.5–4.5)
PLATELET # BLD AUTO: 345 10E3/UL (ref 150–450)
POTASSIUM SERPL-SCNC: 4.6 MMOL/L (ref 3.4–5.3)
RBC # BLD AUTO: 3.84 10E6/UL (ref 4.4–5.9)
SODIUM SERPL-SCNC: 136 MMOL/L (ref 136–145)
SPECIMEN STATUS: NORMAL
TEST NAME: NORMAL
WBC # BLD AUTO: 18.8 10E3/UL (ref 4–11)

## 2023-07-20 PROCEDURE — 70553 MRI BRAIN STEM W/O & W/DYE: CPT | Mod: 26 | Performed by: RADIOLOGY

## 2023-07-20 PROCEDURE — 250N000011 HC RX IP 250 OP 636: Performed by: PSYCHIATRY & NEUROLOGY

## 2023-07-20 PROCEDURE — 72156 MRI NECK SPINE W/O & W/DYE: CPT

## 2023-07-20 PROCEDURE — 250N000011 HC RX IP 250 OP 636: Performed by: NURSE PRACTITIONER

## 2023-07-20 PROCEDURE — 250N000013 HC RX MED GY IP 250 OP 250 PS 637: Performed by: PSYCHIATRY & NEUROLOGY

## 2023-07-20 PROCEDURE — 84100 ASSAY OF PHOSPHORUS: CPT | Performed by: NURSE PRACTITIONER

## 2023-07-20 PROCEDURE — C9113 INJ PANTOPRAZOLE SODIUM, VIA: HCPCS | Mod: JZ | Performed by: NURSE PRACTITIONER

## 2023-07-20 PROCEDURE — 250N000013 HC RX MED GY IP 250 OP 250 PS 637: Performed by: NURSE PRACTITIONER

## 2023-07-20 PROCEDURE — 95714 VEEG EA 12-26 HR UNMNTR: CPT

## 2023-07-20 PROCEDURE — 95720 EEG PHY/QHP EA INCR W/VEEG: CPT | Performed by: PSYCHIATRY & NEUROLOGY

## 2023-07-20 PROCEDURE — 80048 BASIC METABOLIC PNL TOTAL CA: CPT | Performed by: NURSE PRACTITIONER

## 2023-07-20 PROCEDURE — 250N000009 HC RX 250: Performed by: PSYCHIATRY & NEUROLOGY

## 2023-07-20 PROCEDURE — 86363 MOG-IGG1 ANTB FLO CYTMTRY EA: CPT

## 2023-07-20 PROCEDURE — 258N000003 HC RX IP 258 OP 636

## 2023-07-20 PROCEDURE — A9585 GADOBUTROL INJECTION: HCPCS | Performed by: PSYCHIATRY & NEUROLOGY

## 2023-07-20 PROCEDURE — 255N000002 HC RX 255 OP 636: Performed by: PSYCHIATRY & NEUROLOGY

## 2023-07-20 PROCEDURE — 258N000003 HC RX IP 258 OP 636: Performed by: PSYCHIATRY & NEUROLOGY

## 2023-07-20 PROCEDURE — 83735 ASSAY OF MAGNESIUM: CPT | Performed by: NURSE PRACTITIONER

## 2023-07-20 PROCEDURE — 250N000013 HC RX MED GY IP 250 OP 250 PS 637: Performed by: STUDENT IN AN ORGANIZED HEALTH CARE EDUCATION/TRAINING PROGRAM

## 2023-07-20 PROCEDURE — 250N000013 HC RX MED GY IP 250 OP 250 PS 637

## 2023-07-20 PROCEDURE — 250N000012 HC RX MED GY IP 250 OP 636 PS 637: Performed by: NURSE PRACTITIONER

## 2023-07-20 PROCEDURE — 250N000011 HC RX IP 250 OP 636: Mod: JZ | Performed by: STUDENT IN AN ORGANIZED HEALTH CARE EDUCATION/TRAINING PROGRAM

## 2023-07-20 PROCEDURE — 94003 VENT MGMT INPAT SUBQ DAY: CPT

## 2023-07-20 PROCEDURE — 86052 AQUAPORIN-4 ANTB CBA EACH: CPT

## 2023-07-20 PROCEDURE — 250N000009 HC RX 250: Performed by: NURSE PRACTITIONER

## 2023-07-20 PROCEDURE — 250N000011 HC RX IP 250 OP 636

## 2023-07-20 PROCEDURE — 99291 CRITICAL CARE FIRST HOUR: CPT | Mod: 25 | Performed by: PSYCHIATRY & NEUROLOGY

## 2023-07-20 PROCEDURE — 70553 MRI BRAIN STEM W/O & W/DYE: CPT

## 2023-07-20 PROCEDURE — 99221 1ST HOSP IP/OBS SF/LOW 40: CPT | Performed by: PHYSICIAN ASSISTANT

## 2023-07-20 PROCEDURE — 72156 MRI NECK SPINE W/O & W/DYE: CPT | Mod: 26 | Performed by: RADIOLOGY

## 2023-07-20 PROCEDURE — 85027 COMPLETE CBC AUTOMATED: CPT | Performed by: NURSE PRACTITIONER

## 2023-07-20 PROCEDURE — 999N000015 HC STATISTIC ARTERIAL MONITORING DAILY

## 2023-07-20 PROCEDURE — 250N000011 HC RX IP 250 OP 636: Mod: JZ | Performed by: NURSE PRACTITIONER

## 2023-07-20 PROCEDURE — 999N000157 HC STATISTIC RCP TIME EA 10 MIN

## 2023-07-20 PROCEDURE — 200N000002 HC R&B ICU UMMC

## 2023-07-20 PROCEDURE — 999N000185 HC STATISTIC TRANSPORT TIME EA 15 MIN

## 2023-07-20 RX ORDER — GADOBUTROL 604.72 MG/ML
7.5 INJECTION INTRAVENOUS ONCE
Status: COMPLETED | OUTPATIENT
Start: 2023-07-20 | End: 2023-07-20

## 2023-07-20 RX ORDER — OXYCODONE HYDROCHLORIDE 5 MG/1
5 TABLET ORAL EVERY 4 HOURS PRN
Status: DISCONTINUED | OUTPATIENT
Start: 2023-07-20 | End: 2023-07-23

## 2023-07-20 RX ADMIN — CEFEPIME HYDROCHLORIDE 2 G: 2 INJECTION, POWDER, FOR SOLUTION INTRAVENOUS at 16:07

## 2023-07-20 RX ADMIN — CEFEPIME HYDROCHLORIDE 2 G: 2 INJECTION, POWDER, FOR SOLUTION INTRAVENOUS at 00:16

## 2023-07-20 RX ADMIN — Medication 5 ML: at 21:01

## 2023-07-20 RX ADMIN — PREDNISONE 60 MG: 20 TABLET ORAL at 07:41

## 2023-07-20 RX ADMIN — ASPIRIN 81 MG CHEWABLE TABLET 81 MG: 81 TABLET CHEWABLE at 07:41

## 2023-07-20 RX ADMIN — SENNOSIDES AND DOCUSATE SODIUM 1 TABLET: 50; 8.6 TABLET ORAL at 07:42

## 2023-07-20 RX ADMIN — CEFEPIME HYDROCHLORIDE 2 G: 2 INJECTION, POWDER, FOR SOLUTION INTRAVENOUS at 08:03

## 2023-07-20 RX ADMIN — CEFEPIME HYDROCHLORIDE 2 G: 2 INJECTION, POWDER, FOR SOLUTION INTRAVENOUS at 23:51

## 2023-07-20 RX ADMIN — SENNOSIDES AND DOCUSATE SODIUM 1 TABLET: 50; 8.6 TABLET ORAL at 21:01

## 2023-07-20 RX ADMIN — OXYCODONE HYDROCHLORIDE 5 MG: 5 TABLET ORAL at 00:16

## 2023-07-20 RX ADMIN — Medication 40 MG: at 21:07

## 2023-07-20 RX ADMIN — PANTOPRAZOLE SODIUM 40 MG: 40 INJECTION, POWDER, FOR SOLUTION INTRAVENOUS at 07:42

## 2023-07-20 RX ADMIN — THIAMINE HCL TAB 100 MG 100 MG: 100 TAB at 07:42

## 2023-07-20 RX ADMIN — ENOXAPARIN SODIUM 40 MG: 40 INJECTION SUBCUTANEOUS at 13:15

## 2023-07-20 RX ADMIN — OXYCODONE HYDROCHLORIDE 5 MG: 5 TABLET ORAL at 04:00

## 2023-07-20 RX ADMIN — SODIUM PHOSPHATE, MONOBASIC, MONOHYDRATE AND SODIUM PHOSPHATE, DIBASIC, ANHYDROUS 9 MMOL: 276; 142 INJECTION, SOLUTION INTRAVENOUS at 05:56

## 2023-07-20 RX ADMIN — BROMOCRIPTINE MESYLATE 10 MG: 2.5 TABLET ORAL at 05:59

## 2023-07-20 RX ADMIN — OXYCODONE HYDROCHLORIDE 5 MG: 5 TABLET ORAL at 07:42

## 2023-07-20 RX ADMIN — HUMAN INSULIN 4 UNITS/HR: 100 INJECTION, SOLUTION SUBCUTANEOUS at 14:38

## 2023-07-20 RX ADMIN — GADOBUTROL 7.5 ML: 604.72 INJECTION INTRAVENOUS at 12:29

## 2023-07-20 RX ADMIN — POLYETHYLENE GLYCOL 3350 17 G: 17 POWDER, FOR SOLUTION ORAL at 07:41

## 2023-07-20 RX ADMIN — Medication 15 ML: at 07:41

## 2023-07-20 RX ADMIN — BROMOCRIPTINE MESYLATE 10 MG: 2.5 TABLET ORAL at 14:25

## 2023-07-20 RX ADMIN — ROSUVASTATIN CALCIUM 20 MG: 20 TABLET, FILM COATED ORAL at 07:42

## 2023-07-20 RX ADMIN — FENTANYL CITRATE 50 MCG: 50 INJECTION, SOLUTION INTRAMUSCULAR; INTRAVENOUS at 11:49

## 2023-07-20 RX ADMIN — BROMOCRIPTINE MESYLATE 10 MG: 2.5 TABLET ORAL at 21:02

## 2023-07-20 ASSESSMENT — ACTIVITIES OF DAILY LIVING (ADL)
ADLS_ACUITY_SCORE: 36
ADLS_ACUITY_SCORE: 34
ADLS_ACUITY_SCORE: 36

## 2023-07-20 ASSESSMENT — VISUAL ACUITY: OU: NOT TESTABLE

## 2023-07-20 NOTE — PLAN OF CARE
Major Shift Events:  Neuros unchanged. Afebrile. All vitals stable without any interventions. Intubated, CMV settings PEEP 5, FiO2 40%, ,  frequent suctioning of thick secretions. Remains on insulin gtt. Phos 2.7 this morning, replaced per protocol. Adequate UOP. TF at goal with 100 ml water flushes every 4 hours  Plan: Family with lots of questions including payment for transfer to [place].    For vital signs and complete assessments, please see documentation flowsheets.       Goal Outcome Evaluation:      Plan of Care Reviewed With: patient    Overall Patient Progress: no changeOverall Patient Progress: no change    Outcome Evaluation: No response to stimuli noted

## 2023-07-20 NOTE — PROGRESS NOTES
Care Management Follow Up    Length of Stay (days): 14    Expected Discharge Date:  unknown     Concerns to be Addressed:     Hospital to hospital transportation/ expedited visa letter  Patient plan of care discussed at interdisciplinary rounds: Yes    Anticipated Discharge Disposition:  unknown     Anticipated Discharge Services:  unknown  Anticipated Discharge DME:  unknown    Patient/family educated on Medicare website which has current facility and service quality ratings:  n/a  Education Provided on the Discharge Plan:  n/a  Patient/Family in Agreement with the Plan:  n/a    Referrals Placed by CM/SW:  None at this encounter  Private pay costs discussed: Not applicable    Additional Information:  DAVEY met with pt daughter bedside and spoke with family member Zohra over phone who requested expedited visa letter for pt family member. Zohra also asked about cost of transportation for pt should he go to [place] for care from . DAVEY explained under what circumstances most health insurance plans would cover that ride and when it would be an out of pocket expense. Zohra states she knows she's not even sure if pt will be able to transfer or not an will inquire again then should he move to [place].    Care management will continue to follow and support as appropriate through discharge.      JEANNETTE Nickerson, CARLSW  4A & 4E ICU   Pager: [phone]  Phone: [phone]

## 2023-07-20 NOTE — CONSULTS
Otolaryngology Consult Note  July 20, 2023      CC: Tracheostomy placement    HPI: Michael Stewart is a 53 year old male admitted with b/l thalamic strokes, strep A pharyngitis, and encephalopathy. He develop increased somnolence, b/l ophthalmoplegia w/ nystagmus and atypical breathing requiring intubation on 7/7/23. Differential per primary team includes autoimmune process, viral meningitis, and post-streptococcal acute demyelinating encephalomyelitis. He has been unable to wean from the vent. He has a guarded prognosis for recovery and there have been ongoing discussions with SICU, palliative care, and patient's family. At this time, patient's family wishes to proceed with trach and peg to allow for any ongoing recovery. General surgery was initially consulted for tracheostomy and planning for percutaneous trach, however, due to low laryngeal cartilage/cricoid recommended ENT consult for open tracheostomy.     No past medical history on file.    Past Surgical History:   Procedure Laterality Date     PICC DOUBLE LUMEN PLACEMENT Left 07/15/2023    Left basilic vein 48cm total 1cm external.       No current outpatient medications on file.        No Known Allergies    Social History     Socioeconomic History     Marital status:      Spouse name: Not on file     Number of children: Not on file     Years of education: Not on file     Highest education level: Not on file   Occupational History     Not on file   Tobacco Use     Smoking status: Never     Smokeless tobacco: Never   Substance and Sexual Activity     Alcohol use: Yes     Comment: rarely     Drug use: Not Currently     Sexual activity: Not on file   Other Topics Concern     Not on file   Social History Narrative     Not on file     Social Determinants of Health     Financial Resource Strain: Not on file   Food Insecurity: Not on file   Transportation Needs: Not on file   Physical Activity: Not on file   Stress: Not on file   Social Connections: Not on  "file   Intimate Partner Violence: Not on file   Housing Stability: Not on file       No family history on file.    ROS: 12 point review of systems is negative unless noted in HPI.    PHYSICAL EXAM:  General: laying in bed, no acute distress  /71 (BP Location: Right arm)   Pulse 87   Temp 98.4  F (36.9  C)   Resp 17   Ht 1.702 m (5' 7\")   Wt 84.7 kg (186 lb 11.7 oz)   SpO2 99%   BMI 29.25 kg/m    HEAD: normocephalic, atraumatic  Face: symmetrical, no swelling, edema, or erythema   Ears: external ears appear normal   Nose: no anterior drainage  Mouth:orotracheall intubated  Neck: short neck, soft, palpable laryngeal landmarks. Cricoid at the sternal notch.   Respiratory: mechanically ventilated  Skin: no rashes or skin lesions of the face/neck  Cardio: extremities warm and well perfused     ROUTINE IP LABS (Last four results)  BMP  Recent Labs   Lab 07/20/23  1257 07/20/23  0811 07/20/23  0610 07/20/23  0406 07/20/23  0405 07/19/23  0334 07/19/23  0333 07/18/23  0416 07/18/23  0409 07/17/23  1729 07/17/23  1728 07/17/23  0345 07/17/23  0344   NA  --   --   --   --  136  --  139  --  142  --  148*  --  149*   POTASSIUM  --   --   --   --  4.6  --  4.8  --  5.0  --   --   --  4.2   CHLORIDE  --   --   --   --  103  --  106  --  109*  --   --   --  115*   SAJI  --   --   --   --  7.8*  --  8.1*  --  8.1*  --   --   --  7.7*   CO2  --   --   --   --  24  --  24  --  23  --   --   --  22   BUN  --   --   --   --  31.2*  --  30.8*  --  32.9*  --   --   --  37.1*   CR  --   --   --   --  0.60*  --  0.56*  --  0.65*  --   --   --  0.72   * 139* 119* 149* 147*   < > 141*   < > 154*   < >  --    < > 126*    < > = values in this interval not displayed.     CBC  Recent Labs   Lab 07/20/23  0405 07/19/23  0333 07/18/23  0409 07/17/23  1544 07/17/23  0344   WBC 18.8* 19.9* 14.0*  --  13.2*   RBC 3.84* 4.02* 3.78*  --  3.71*   HGB 11.7* 12.2* 11.7* 11.9* 11.4*   HCT 35.4* 36.9* 35.3* 38.5* 35.7*   MCV 92 92 93  -- "  96   MCH 30.5 30.3 31.0  --  30.7   MCHC 33.1 33.1 33.1  --  31.9   RDW 12.5 12.2 12.2  --  12.6    391 342  --  354     INRNo lab results found in last 7 days.      Assessment and Plan  Michael Stewart is a 53 year old male with a past medical history of b/l thalamic strokes, strep A pharyngitis, and encephalopathy and ventilator dependence since 7/7/23. ENT was consulted for tracheostomy placement. Patient is on minimal vent settings, however, low riding thyroid cartilage and cricoid.     - Will discuss with ENT surgeon on call   - Will need to obtain consent from patient's family prior to procedure  - TFs and anticoagulation will need to be held the night prior to surgery   - Remainder of care per primary team    -- Patient and above plan to be discussed w/ Dr. Maxwell.    MALVIN HernandezC  Otolaryngology-Head & Neck Surgery  Please page ENT with questions by dialing [phone] and entering job [phone] when prompted.

## 2023-07-20 NOTE — PROGRESS NOTES
Neurocritical Care Progress Note    Michael was seen and evaluated by me on 07/19/23.  He was in critical condition as the result of encephalopathy, thalamic diffusion restriction, concern for antibody mediated demyelinating process, acute hypoxic respiratory failure     His condition is now Critical.     Significant changes in status since my last evaluation include: remains off [place] sun. Afebrile. No sedation on board. Pan cx yesterday as he was on [place] sun.     I have reviewed changes in critical data from the last 24 hours, including medications, laboratory results, vital signs, radiograph results and consultant recommendations.     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 Suspecting auto immune process      Supportive interventions provided and/or ordered by me include supportive care. q4h neurocheck,  Complete high-dose methylprednisone day 7/7. Plan for Prednisone taper 60 mg x7 days and decrease by 10mg weekly.  Will repeat LP and send autoimmune encephalitis panel, and MRI brain today.  If LP suggestive of persistent pleiocytosis., will start IVIG vs PLEX. Cont asa 81 mg daily.  Continue mechanical ventilator. but likely will need to Trach/PEG if within goals. Cont tube at goal. Decrease FWF to 30ml q4h.  Insulin drip given persistent hyperglycemia. VAP (klebsiella aerogenes, staph aureus, strep boris) - cont cefepime for 5/7 days course. Will need PJP prophylaxis. Continue bromocriptine and schedule oxycodone for sympathetic hyperactivity. Family will like to move forward with Trach/PEG pending [facility] acceptance     I agree with the resident/fellow's assessment and plan of care.     Total Critical Care time spent by me, excluding procedures, was 57 minutes     Mark Kelly MD

## 2023-07-20 NOTE — PLAN OF CARE
D/I: No change in patients neuro exam. Down for MRI today. Sats stable on currentt vent settings. Vital signs within parameters. Tube feeds at goal. Insulin drip at 4-6 units/hr. Good UO. (See flow sheets).   A: No major shift events. Waiting for imaging and lab test results.  P: Continue with current plan of care. Update MD with concerns.

## 2023-07-20 NOTE — PROGRESS NOTES
Neurocritical Care Progress Note    Reason for critical care admission: Thalamic stroke   Admitting Team: VISHAL  Date of Service:  07/20/2023  Date of Admission:  7/6/2023  Hospital Day: 15    Assessment/Plan  Michael Stewart is a 52 year old male w/ PMHx past methamphetamine abuse and tobacco use who presents on 7/6/2023 as a transfer from [facility] after the discovery of bilateral thalamic strokes as well as Strep A pharyngitis. No acute interventions were performed. However, upon presentation to [facility], he was noted to have increased somnolence, bilateral ophthalmoplegia w/ nystagmus, and atypical breathing raising concern for alternate etiologies beyond stroke. Diagnostic possibilities include post-streptococcal acute demyelinating encephalomyelitis, other autoimmune processes, or viral meningitis.     24 hour events: Afebrile and now off arctic sun. Otherwise stable with no acute events.     Neuro  #New brainstem infarcts   #Acute ischemic stroke of bilateral thalami with involvement of posterior limb and left internal capsule, unclear etiology  #Encephalopathy  -ASA 81mg daily  -Neurochecks Q4h  -sBP goal < 180 mmHg   -HOB > 30   -PT/OT/SLP when appropriate  -Multiple conversations have been held with the assistance of interpretation services, and which the current clinical picture and suspected poor prognosis for this patient have been communicated.  On the morning of 7/17, the family expressed understanding of the current situation.  However, they requested transfer to [facility] if possible.  Family was advised that this would only be possible if there was a physician at [facility] who would be willing to accept the patient as a transfer.  Palliative care team was involved in the case, given the challenging goals of care conversations that are ongoing.    #CSF with possible chronic inflammatory or viral encephalitic process  -7/8 MR brain with no additional enhancing areas   -7/8 LP with opening  pressure: 16 cm H2O, 63 nucleated cells, protein of 42, glucose of 64, additional labs ordered on CSF  -Cytology abnormal for LP on 7/8.  On repeat LP on 7/11, with the primary intent being to achieve fresh cells to low flow cytometry to be conducted.   -So far during hospital course, patient CSF has not been consistent with results that would be expected in acute ischemic stroke.  Rather, CSF suggesting underlying subacute to chronic inflammatory or viral encephalitic process.   -Oligoclonal bands returned positive on 7/12   -Initiated trial of 1g methylprednisolone daily for 7 days to cover for possible autoimmune or inflammatory process that would be consistent with workup thus far   -Will add on Badger autoimmune/paraneoplastic panel per discussion in neuroradiology conference on 7/18.   -In process of repeating workup, with MRI C-spine, MRI brain, and LP, vEEG.    -CSF basic studies were WNL. Interestingly, there is no longer a pleocytosis. Sent Badger autoimmune encephalitis panel. Added demyelinating disease panel as well and NMO antibodies.    -MRI completed today. Now showing areas of possible T2 signal changes and diffusion restriction in cervical spine. Re-demonstrated lesions in evin and bilateral thalami. No uptake of contrast with any of these lesions.    -Connected to vEEG.   -Initiated steroid taper on 7/19.    -Initiated bactrim prophylaxis as well.      #Sympathetic hyperactivity  -Precedex infusion   -Scheduled Tylenol 1000 mg Q6h x24 hrs  -Start Bromocriptine 10 mg Q8h  -Switched scheduled Oxycodone 5 mg Q4h to prn only.   -Arctic sun as needed for normothermia  -on 7/16, stopped Ritalin 5 mg 0600, 1200 for neuro stimulation        #Analgesics & sedation  RASS goal: 0 to -1   -Fentanyl  mcg Q1h PRN  -propofol     HEENT   #Biting down on ETT, clenched jaw  #Broken loose bottom teeth  #Tongue lesions, bloody  -7/15 CT dental showing displacement of both central mandibular incisors  -Dentistry  following - Patient seen by dental on 7/17 who did not find teeth in place. One tooth found my nurse in oral cavity.    - Ordered CXR and AXR to look for missing tooth. Appears to be a hyperintensity in stomach.   -PM&R consult to consider botox for jaw clenching.     CV  #HTN  #Intermittent hypotension  -Cardiac monitoring  -sBP goal < 180 mmHg   -PRN Hydralazine and Labetalol  -ordered new lactate on 7/17  -Intermittently drops pressures, which is currently likely due to propofol. Improved on 7/18. Now off prop.    -Norepinephrine as needed to maintain MAP > 65.      #Hyperlipidemia  -Rosuvastatin 20 mg  -7/7     Resp  #Acute hypoxic respiratory failure   #Ventilator associated pneumonia    #Hx Tobacco Use  #Pulmonary nodules  Oxygen/vent: AC 12/500/+5  -Maintain Peep at 8  -Further discussion this week for Trach placement if within goals of care  -Pulmonary hygiene    -Continuous pulse ox  -Maintain O2 saturations greater than 92%   -Consult SICU for trach and PEG placement. Plan for placement on 7/21.      GI  Diet: TF's @ goal (60)  -NGT in place  -Further discussion this week for PEG placement if within goals of care  -Nutrition following    Last BM: 7/16  GI prophylaxis: Famotidine 20 mg BID  Bowel regimen: Holding bowel regimen on 7/17 due to frequent watery stools. Added fiber packets as well on 7/17. Stools better.    Renal/  #Hypernatremia, resolved  #Hyperchloremia  #Hypocalcemia  #Hypermagnesemia  #Lactic acidosis  - Q2h      -Daily BMP  -IV fluids: None  -Electrolyte replacement protocols  -Gave calcium gluconate on 7/17 for hypocalcemia.     #Urinary retention   -Continue Doxazosin 2 mg daily   -Mcdaniel replaced on 7/16 for temperature probe with arctic sun     Endo  #DM2  #Overweight   #Hyperglycemia 2/2 steroids  -Hgb A1c; 6.1  -TSH; 2.13  -Continue insulin infusion   -Monitor glucose levels     Heme  #Normocyctic anemia   -Daily CBC  -Hgb goal >7, plt goal >50k  -Transfuse to meet Hgb  "and plt goals     ID  #Leukocytosis  #Fevers   #Ventilator associated pneumonia  #Concern for sepsis  -Continue Cefepime (day 5)  -Bactrim for PJP prophylaxis.   -7/15 MRSA negative - Stopped Vancomycin on 7/17.   -7/15 BC with NGTD  -7/15 Sputum growing streptococcus anginosus, staphylococcus aureus, and klebsiella aerogenes   -Daily CBC  -Follow temperature curve  -Repeated respiratory and blood cultures on 7/18.      ICU Checklist  Lines/tubes/drains: PIV x3, L/PICC, [place], ETT, NGT, dorantes, rectal tube    FEN: TFs, replacement   PPX: DVT - SCDs, Lovenox; GI - Famotidine  Code: FULL CODE  Dispo: ICU - NCC      Clinically Significant Risk Factors                         # Overweight: Estimated body mass index is 29.25 kg/m  as calculated from the following:    Height as of this encounter: 1.702 m (5' 7\").    Weight as of this encounter: 84.7 kg (186 lb 11.7 oz).          The patient was seen and discussed with the NCC attending, Dr. Kelly.    Raymond Breen MD  PGY-2, Neurology    24 Hour Vital Signs Summary:  Temp: 98.4  F (36.9  C) Temp  Min: 97.5  F (36.4  C)  Max: 98.9  F (37.2  C)  Resp: 17 Resp  Min: 11  Max: 22  SpO2: 99 % SpO2  Min: 90 %  Max: 100 %  Pulse: 87 Pulse  Min: 63  Max: 97    No data recorded.  No data recorded.    Respiratory monitoring:   Vent Mode: CMV/AC  (Continuous Mandatory Ventilation/ Assist Control)  FiO2 (%): 40 %  Resp Rate (Set): 12 breaths/min  Tidal Volume (Set, mL): 500 mL  PEEP (cm H2O): 5 cmH2O  Resp: 17       I/O last 3 completed shifts:  In: 2959.65 [I.V.:564.65; NG/GT:1135]  Out: 2400 [Urine:2400]    Current Medications:    aspirin  81 mg Oral or Feeding Tube Daily     bromocriptine  10 mg Oral Q8H     ceFEPIme  2 g Intravenous Q8H     [Held by provider] doxazosin  1 mg Oral or Feeding Tube Daily     enoxaparin ANTICOAGULANT  40 mg Subcutaneous Q24H     fiber modular  2 packet Per Feeding Tube TID     heparin lock flush  5-20 mL Intracatheter Q24H     multivitamins " "w/minerals  15 mL Per Feeding Tube Daily     pantoprazole  40 mg Oral or Feeding Tube BID     predniSONE  60 mg Oral Daily    Followed by     [START ON 7/26/2023] predniSONE  50 mg Oral or Feeding Tube Daily    Followed by     [START ON 8/2/2023] predniSONE  40 mg Oral or Feeding Tube Daily    Followed by     [START ON 8/9/2023] predniSONE  30 mg Oral or Feeding Tube Daily    Followed by     [START ON 8/16/2023] predniSONE  20 mg Oral or Feeding Tube Daily    Followed by     [START ON 8/23/2023] predniSONE  10 mg Oral Daily     protein modular  1 packet Per Feeding Tube BID     rosuvastatin  20 mg Oral or Feeding Tube Daily     senna-docusate  1-2 tablet Oral or Feeding Tube BID     sodium chloride (PF)  3 mL Intracatheter Q8H     sulfamethoxazole-trimethoprim  1 tablet Oral Once per day on Mon Wed Fri     thiamine  100 mg Oral or Feeding Tube Daily       PRN Medications:  glucose **OR** dextrose **OR** glucagon, fentaNYL, heparin lock flush, [Held by provider] labetalol **OR** [Held by provider] hydrALAZINE, magnesium hydroxide, naloxone **OR** naloxone **OR** naloxone **OR** naloxone, ondansetron **OR** ondansetron, oxyCODONE, polyethylene glycol, sodium chloride (PF), sodium chloride (PF)    Infusions:    insulin regular 4 Units/hr (07/20/23 1438)     norepinephrine Stopped (07/17/23 1036)     propofol Stopped (07/18/23 1013)       No Known Allergies    Physical Examination:  Vitals: /71 (BP Location: Right arm)   Pulse 87   Temp 98.4  F (36.9  C)   Resp 17   Ht 1.702 m (5' 7\")   Wt 84.7 kg (186 lb 11.7 oz)   SpO2 99%   BMI 29.25 kg/m    General: Adult male patient, lying in bed, critically-ill  HEENT: Normocephalic, atraumatic, no icterus, teeth missing, tongue lesions, mouth bloody   Cardiac: Sinus rhythm on bedside monitor   Pulm: Unlabored, expansion symmetric, no retractions or use of accessory muscles, assisted mechanically  Abdomen: Soft, non-distended abdomen   Extremities: Warm, no edema, " appears adequately perfused  Skin: No rash or lesion observed on exposed skin  Psych: Calm   Neuro:  Mental status: Sedated, does not open eyes, does not follow commands, intubated.  Cranial nerves: PERRL, disconjugate gaze with each eye up and out, weak cough and gag with deep suction.  Motor: Normal bulk and tone. No abnormal movements. Intermittently has extensor posturing in BLUE.   Sensory: responds to noxious stimuli with extension in bilateral uppers only, does not grimace.  Coordination: ADAM, deferred.  Gait: ADAM, deferred.    Labs and Imaging:    Results for orders placed or performed during the hospital encounter of 07/06/23 (from the past 24 hour(s))   Glucose CSF:   Result Value Ref Range    Glucose CSF 87 (H) 40 - 70 mg/dL    Narrative    CSF glucose concentrations are about 60 percent of normal plasma glucose.   Protein total CSF:   Result Value Ref Range    Protein total CSF 32.1 15.0 - 45.0 mg/dL   Cerebrospinal fluid Aerobic Bacterial Culture Routine    Specimen: Spine; Cerebrospinal fluid   Result Value Ref Range    Culture No growth, less than 1 day     Gram Stain Result No organisms seen     Gram Stain Result 3+ WBC seen     Narrative    Gram Stain quantification of host cells and microbiological organisms was done on a cytocentrifuged preparation.     CSF Cell Count with Differential:    Narrative    The following orders were created for panel order CSF Cell Count with Differential:.  Procedure                               Abnormality         Status                     ---------                               -----------         ------                     Cell Count CSF[[id number]]               Abnormal            Final result                 Please view results for these tests on the individual orders.   HSV Types 1 and 2 Qualitative PCR CSF    Specimen: Lumbar Puncture; Cerebrospinal fluid   Result Value Ref Range    Herpes Simplex Virus 1 DNA - CSF Not Detected Not Detected    Herpes Simplex  Virus 2 DNA - CSF Not Detected Not Detected    Narrative    The [employer] Simplexa HSV 1&2 Direct assay is a FDA approved, real-time PCR test for the qualitative detection and differentiation of Herpes Simplex virus Types 1 & 2 DNA in cerebrospinal fluid (CSF).   Cytology non gyn Tube 4   Result Value Ref Range    Final Diagnosis       Specimen A     Interpretation:      Lymphocytosis with no clear morphologic evidence of malignancy (see comment)     Adequacy:     Satisfactory for evaluation          Comment       Please refer to flow cytometry report, [id number]. The specimen has a continued lymphocytosis with an increase in monocytes as well.  These changes may be associated with infections or other reactive disease processes.          Clinical Information       52 yo M with unexplained coma      Gross Description       A(A). Spine, :A. Spine, , CSF:  Received 6 ml of clear, colorless fluid, processed as 1 Pap stained cytospin and 1 Lebron stained cytospin.                 Microscopic Description       Microscopic examination has been performed.      I have personally reviewed all specimens and or slides, including the listed special stains, and used them with my medical judgement to determine the final diagnosis.       Performing Labs       The technical component of this testing was completed at [facility]     [email or web address]; ENC2; Encephalopathy, Autoimmune/Paraneoplastic Evaluation, Spinal Fluid (Laboratory Miscellaneous Order)   Result Value Ref Range    See Scanned Result       Specimen received. Reordered and sent to performing laboratory. Report to follow up on completion.    Performing Laboratory So1     Test Name       Encephalopathy, Autoimmune/Paraneoplastic Evaluation, Spinal Fluid    Test Code ENC2    Cell Count CSF   Result Value Ref Range    Tube Number 4     Color Colorless Colorless    Clarity  Clear Clear    Total Nucleated Cells 1 0 - 5 /uL    RBC Count 147 (H) 0 - 2 /uL   Lumbar puncture    Narrative    Raymond Breen MD     7/19/2023  4:58 PM  [facility]    Lumbar puncture    Date/Time: 7/19/2023 4:41 PM    Performed by: Raymond Breen MD  Authorized by: Raymond Breen MD  Indications: evaluation for altered   mental status  Preparation: Patient was prepped and draped in the usual sterile fashion.      UNIVERSAL PROTOCOL   Site Marked: Yes  Prior Images Obtained and Reviewed:  Yes  Required items: Required blood products, implants, devices and special   equipment available    Patient identity confirmed:  Arm band  NA - No sedation, light sedation, or local anesthesia  Confirmation Checklist:  Procedure was appropriate and matched the consent   or emergent situation  Time out: Immediately prior to the procedure a time out was called    Universal Protocol: the Joint Commission Universal Protocol was followed    Preparation: Patient was prepped and draped in usual sterile fashion      SEDATION    Patient Sedated: No      PROCEDURE DETAILS  Lumbar space: L4-L5 interspace  Patient's position: left lateral decubitus  Number of attempts: 1  Opening pressure: 19 cm H2O  Closing pressure: 13 cm H2O  Fluid appearance: clear  Tubes of fluid: 4  Total volume: 22 ml  Post-procedure: adhesive bandage applied      PROCEDURE    Length of time physician/provider present for 1:1 monitoring during   sedation: 0   Glucose by meter   Result Value Ref Range    GLUCOSE BY METER POCT 190 (H) 70 - 99 mg/dL   Glucose by meter   Result Value Ref Range    GLUCOSE BY METER POCT 139 (H) 70 - 99 mg/dL   Glucose by meter   Result Value Ref Range    GLUCOSE BY METER POCT 131 (H) 70 - 99 mg/dL   Glucose by meter   Result Value Ref Range    GLUCOSE BY METER POCT 134 (H) 70 - 99 mg/dL   Glucose by meter   Result Value Ref Range    GLUCOSE BY METER POCT 120 (H) 70 - 99 mg/dL   CBC with  platelets   Result Value Ref Range    WBC Count 18.8 (H) 4.0 - 11.0 10e3/uL    RBC Count 3.84 (L) 4.40 - 5.90 10e6/uL    Hemoglobin 11.7 (L) 13.3 - 17.7 g/dL    Hematocrit 35.4 (L) 40.0 - 53.0 %    MCV 92 78 - 100 fL    MCH 30.5 26.5 - 33.0 pg    MCHC 33.1 31.5 - 36.5 g/dL    RDW 12.5 10.0 - 15.0 %    Platelet Count 345 150 - 450 10e3/uL   Basic metabolic panel   Result Value Ref Range    Sodium 136 136 - 145 mmol/L    Potassium 4.6 3.4 - 5.3 mmol/L    Chloride 103 98 - 107 mmol/L    Carbon Dioxide (CO2) 24 22 - 29 mmol/L    Anion Gap 9 7 - 15 mmol/L    Urea Nitrogen 31.2 (H) 6.0 - 20.0 mg/dL    Creatinine 0.60 (L) 0.67 - 1.17 mg/dL    Calcium 7.8 (L) 8.6 - 10.0 mg/dL    Glucose 147 (H) 70 - 99 mg/dL    GFR Estimate >90 >60 mL/min/1.73m2   Magnesium   Result Value Ref Range    Magnesium 2.2 1.7 - 2.3 mg/dL   Phosphorus   Result Value Ref Range    Phosphorus 2.5 2.5 - 4.5 mg/dL   Glucose by meter   Result Value Ref Range    GLUCOSE BY METER POCT 149 (H) 70 - 99 mg/dL   Glucose by meter   Result Value Ref Range    GLUCOSE BY METER POCT 119 (H) 70 - 99 mg/dL   Glucose by meter   Result Value Ref Range    GLUCOSE BY METER POCT 139 (H) 70 - 99 mg/dL   MR Cervical Spine w/o & w Contrast    Narrative    MR CERVICAL SPINE W/O & W CONTRAST 7/20/2023 12:28 PM    Provided History: coma with unknow etiology    Comparison: 1/29/2018 CT soft tissue neck    Technique: Sagittal T1-weighted, sagittal T2-weighted, sagittal  diffusion weighted, axial T2-weighted, and axial T2* gradient echo  images of the cervical spine were obtained without intravenous  contrast. Following intravenous administration of gadolinium, axial  and sagittal T1-weighted images with fat saturation were also  obtained.    Contrast: 7.5 mL IV Gadavist     Findings:    The cervical vertebrae are normally aligned.  There is no disc height  narrowing at any level.  Abnormal T2 hyperintense signal within the  cord beginning at the craniocervical junction on the  left,  questionably at C3, primarily in the left hemicord at the level of C4,  primarily in the right hemicord from C5 to C7. There is no abnormal  contrast enhancement within the cervical spinal cord, thecal sac or  vertebral column. There is high signal on the diffusion-weighted  imaging in these areas of the cervical cord; however there is no  corresponding dark or bright signal on the ADC map which suggests  reduced diffusion. Within the evin there are areas with decreased  signal on the ADC map associated with high signal on the  diffusion-weighted imaging suggesting mostly reduced and some areas of  restricted diffusion.    Small disc osteophyte complex at C5/C6 with mild facet hypertrophy.  There is no moderate or severe spinal or neuroforaminal canal stenosis  throughout the cervical spine.     No abnormality of the paraspinous soft tissues.      Impression    Impression: Multiple areas of increased T2 signal which demonstrate  mildly reduced diffusion. No definite enhancement associated with  these lesions. Differential would include areas of demyelination which  could be secondary to a process such as acute disseminated  encephalomyelitis (ADEM) versus an infectious or inflammatory process  cannot exclude toxic etiology.    I have personally reviewed the examination and initial interpretation  and I agree with the findings.    CRISTIAN LANDIS MD         SYSTEM ID:  [id number]   MR Brain w/o & w Contrast    Narrative    MR BRAIN W/O & W CONTRAST 7/20/2023 12:37 PM    Provided History: coma with unknow etiology    Comparison:  7/14/2023, 7/8/2023 and 7/6/2023 MRI     Technique: Sagittal T1-weighted, coronal T2-weighted, axial T2 FLAIR,  axial susceptibility weighted, and axial diffusion-weighted with ADC  map images of the brain were obtained without intravenous contrast.  Following intravenous gadolinium-based contrast administration, axial  T2-weighted, diffusion, and T1-weighted images (in multiple  planes)  were obtained.    Findings:     Evolving abnormal reduced diffusion within the right frontal lobe,  bilateral thalami, posterior limb of the left internal capsule,  midbrain and evin that do not display superimposed susceptibility  artifact, intrinsic T1 hyperintense signal or contrast enhancement.  Questionable area of cortical diffusion restriction within the high  posterior parietal lobe that is without definitive hypointense ADC  signal.    No intracranial mass lesion mass effect, midline shift, or abnormal  fluid collection. No abnormality of reduced diffusion.  Normal  intravascular flow voids. The orbits and globes are unremarkable.  There is a small degree of mucosal fluid in the paranasal sinuses.  Bilateral mastoid effusions. Incidental arachnoid cyst anterior to the  right temporal lobe.    Postcontrast images demonstrate no abnormal intracranial enhancement.      Impression    Impression: Multifocal areas of reduced diffusion in the thalami and  brainstem. When compared to the exam on 7/6/2023, these areas do not  produce an expected pattern of evolving signal characteristics. Given  the appearance of these lesions and their presence in both the  brainstem and basal ganglia, an autoimmune encephalomyelitis is  favored as an etiology over cerebral infarctions or infections.     I have personally reviewed the examination and initial interpretation  and I agree with the findings.    WINDY HALL MD         SYSTEM ID:  [id number]   Glucose by meter   Result Value Ref Range    GLUCOSE BY METER POCT 178 (H) 70 - 99 mg/dL   Glucose by meter   Result Value Ref Range    GLUCOSE BY METER POCT 138 (H) 70 - 99 mg/dL       All relevant imaging and laboratory values personally reviewed.

## 2023-07-20 NOTE — PROGRESS NOTES
SICU Brief Note    PATIENT NAME: Michael Stewart  MRN: [id number]  DATE: 07/20/2023 12:38 PM  PLANNED PROCEDURE: Tracheostomy creation, PEG Tube Placement (7/21/2023)    The patient is planned for PEG tube placement at bedside tomorrow at 2:30PM. We will NOT be performing a tracheostomy creation tomorrow. We will communicate with ENT regarding planning for tracheostomy creation in the future.    The pimary team has been updated and is aware. Lucy's family is also aware and consent was obtained from the patient's daughter (Em Stewart).    Recommendations:  - Please have the patient NPO and hold tube feeds at 6AM. (ordered)   - Please also hold Fiber and protein. (end times set; will have to be reordered post procedure tomorrow)  - Okay to continue with other enteral meds  - Okay to continue DVT prophylaxis and ASA 81    If there are any questions or concerns regarding the planned procedure, please call SICU (Ascom: [phone]).     Kanu Zayas MD   General Surgery Resident

## 2023-07-20 NOTE — PROGRESS NOTES
Neurocritical Care Attending Progress Note    Michael was seen and evaluated by me on 07/20/23.  He was in critical condition as the result of acute encephalopathy, acute stroke, concerning for autoimmune mediated, hypoxic respiratory failure     His condition is now Critical.     Significant changes in status since my last evaluation include: unchanged exam. LP repeated yesterday  LP: TNC 1, , gluc 87, prot 32     I have reviewed changes in critical data from the last 24 hours, including medications, laboratory results, vital signs, radiograph results and consultant recommendations.     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 Suspecting auto immune process      Supportive interventions provided and/or ordered by me include supportive care.  vEEG today. MRI brain and cspine. q4h neurocheck,  cont prednisone taper 60 mg x7 days and decrease by 10mg weekly.  Follow autoimmune encephalopathy panel.  Will hold off IVIG/PLEX given no elevated protein and no pleocytosis. Cont asa 81 mg daily.  Continue mechanical ventilaton. Pending Trach/PEG  Insulin drip given persistent hyperglycemia. VAP (klebsiella aerogenes, staph aureus, strep boris) - cont cefepime for 6/7 days course. Cont PJP prophylaxis with Bactrim. Continue bromocriptine and dc schedule oxycodone for sympathetic hyperactivity.      I agree with the resident/fellow's assessment and plan of care.     Total Critical Care time spent by me, excluding procedures, was 57 minutes     Mark Kelly MD

## 2023-07-20 NOTE — PROGRESS NOTES
Brief Otolaryngology Note    Patient added on for tracheostomy tomorrow pending OR availability, likely after 3pm in the afternoon with Dr Maxwell. eConsent obtained by daughter, Em Perez via telephone. Please keep NPO and hold any anticoagulation if medically safe.    Carlos Leo MD  Otolaryngology Head and Neck Surgery Resident, PGY-4  Please page on call Otolaryngology resident with questions.

## 2023-07-21 ENCOUNTER — APPOINTMENT (OUTPATIENT)
Dept: NEUROLOGY | Facility: CLINIC | Age: 53
End: 2023-07-21
Payer: COMMERCIAL

## 2023-07-21 ENCOUNTER — ANESTHESIA EVENT (OUTPATIENT)
Dept: SURGERY | Facility: CLINIC | Age: 53
End: 2023-07-21
Payer: COMMERCIAL

## 2023-07-21 ENCOUNTER — ANESTHESIA (OUTPATIENT)
Dept: SURGERY | Facility: CLINIC | Age: 53
End: 2023-07-21
Payer: COMMERCIAL

## 2023-07-21 LAB
AMPAR2 IGG CSF QL CBA IFA: NEGATIVE
AMPHIPHYSIN AB CSF QL IF: NEGATIVE
ANION GAP SERPL CALCULATED.3IONS-SCNC: 8 MMOL/L (ref 7–15)
ANNOTATION COMMENT IMP: NORMAL
BUN SERPL-MCNC: 25.3 MG/DL (ref 6–20)
CALCIUM SERPL-MCNC: 8.1 MG/DL (ref 8.6–10)
CASPR2 IGG CSF QL CBA IFA: NEGATIVE
CHLORIDE SERPL-SCNC: 103 MMOL/L (ref 98–107)
CREAT SERPL-MCNC: 0.46 MG/DL (ref 0.67–1.17)
CV2 AB CSF QL IF: NEGATIVE
DEPRECATED HCO3 PLAS-SCNC: 26 MMOL/L (ref 22–29)
DPPX IGG CSF QL IF: NEGATIVE
ERYTHROCYTE [DISTWIDTH] IN BLOOD BY AUTOMATED COUNT: 12.8 % (ref 10–15)
GABABR IGG CSF QL CBA IFA: NEGATIVE
GAD65 IGG+IGM CSF IA-SCNC: 0 NMOL/L
GFAP ALPHA IGG CSF QL IF: NEGATIVE
GFR SERPL CREATININE-BSD FRML MDRD: >90 ML/MIN/1.73M2
GLIAL NUC TYPE 1 AB CSF QL IF: NEGATIVE
GLUCOSE BLDC GLUCOMTR-MCNC: 110 MG/DL (ref 70–99)
GLUCOSE BLDC GLUCOMTR-MCNC: 112 MG/DL (ref 70–99)
GLUCOSE BLDC GLUCOMTR-MCNC: 113 MG/DL (ref 70–99)
GLUCOSE BLDC GLUCOMTR-MCNC: 114 MG/DL (ref 70–99)
GLUCOSE BLDC GLUCOMTR-MCNC: 118 MG/DL (ref 70–99)
GLUCOSE BLDC GLUCOMTR-MCNC: 125 MG/DL (ref 70–99)
GLUCOSE BLDC GLUCOMTR-MCNC: 133 MG/DL (ref 70–99)
GLUCOSE BLDC GLUCOMTR-MCNC: 136 MG/DL (ref 70–99)
GLUCOSE BLDC GLUCOMTR-MCNC: 141 MG/DL (ref 70–99)
GLUCOSE BLDC GLUCOMTR-MCNC: 143 MG/DL (ref 70–99)
GLUCOSE BLDC GLUCOMTR-MCNC: 153 MG/DL (ref 70–99)
GLUCOSE BLDC GLUCOMTR-MCNC: 80 MG/DL (ref 70–99)
GLUCOSE SERPL-MCNC: 126 MG/DL (ref 70–99)
HCT VFR BLD AUTO: 37.8 % (ref 40–53)
HGB BLD-MCNC: 12.6 G/DL (ref 13.3–17.7)
HU1 AB CSF QL IF: NEGATIVE
HU2 AB CSF QL IF: NEGATIVE
HU3 AB CSF QL IF: NEGATIVE
IGLON5 IGG CSF QL IF: NEGATIVE
IMMUNOLOGIST REVIEW: NORMAL
LGI1 IGG CSF QL CBA IFA: NEGATIVE
MAGNESIUM SERPL-MCNC: 2.2 MG/DL (ref 1.7–2.3)
MCH RBC QN AUTO: 30.6 PG (ref 26.5–33)
MCHC RBC AUTO-ENTMCNC: 33.3 G/DL (ref 31.5–36.5)
MCV RBC AUTO: 92 FL (ref 78–100)
MGLUR1 IGG CSF QL IF: NEGATIVE
NEUROCHONDRIN AB CSF QL IF: NEGATIVE
NIF IGG CSF QL IF: NEGATIVE
NMDAR1 IGG CSF QL CBA IFA: NEGATIVE
PCA-1 AB CSF QL IF: NEGATIVE
PCA-2 AB CSF QL IF: NEGATIVE
PCA-TR AB CSF QL IF: NEGATIVE
PHOSPHATE SERPL-MCNC: 2.7 MG/DL (ref 2.5–4.5)
PLATELET # BLD AUTO: 371 10E3/UL (ref 150–450)
POTASSIUM SERPL-SCNC: 4.1 MMOL/L (ref 3.4–5.3)
RBC # BLD AUTO: 4.12 10E6/UL (ref 4.4–5.9)
SEPTIN-7 IGG CSF QL IF: NEGATIVE
SODIUM SERPL-SCNC: 137 MMOL/L (ref 136–145)
WBC # BLD AUTO: 19.1 10E3/UL (ref 4–11)

## 2023-07-21 PROCEDURE — 95720 EEG PHY/QHP EA INCR W/VEEG: CPT | Performed by: PSYCHIATRY & NEUROLOGY

## 2023-07-21 PROCEDURE — 999N000157 HC STATISTIC RCP TIME EA 10 MIN

## 2023-07-21 PROCEDURE — 250N000013 HC RX MED GY IP 250 OP 250 PS 637

## 2023-07-21 PROCEDURE — 250N000013 HC RX MED GY IP 250 OP 250 PS 637: Performed by: PSYCHIATRY & NEUROLOGY

## 2023-07-21 PROCEDURE — 0BJ08ZZ INSPECTION OF TRACHEOBRONCHIAL TREE, VIA NATURAL OR ARTIFICIAL OPENING ENDOSCOPIC: ICD-10-PCS | Performed by: OTOLARYNGOLOGY

## 2023-07-21 PROCEDURE — 250N000009 HC RX 250

## 2023-07-21 PROCEDURE — 250N000009 HC RX 250: Performed by: OTOLARYNGOLOGY

## 2023-07-21 PROCEDURE — 250N000025 HC SEVOFLURANE, PER MIN: Performed by: OTOLARYNGOLOGY

## 2023-07-21 PROCEDURE — 250N000011 HC RX IP 250 OP 636

## 2023-07-21 PROCEDURE — 370N000017 HC ANESTHESIA TECHNICAL FEE, PER MIN: Performed by: OTOLARYNGOLOGY

## 2023-07-21 PROCEDURE — 80048 BASIC METABOLIC PNL TOTAL CA: CPT | Performed by: NURSE PRACTITIONER

## 2023-07-21 PROCEDURE — 258N000003 HC RX IP 258 OP 636: Performed by: NURSE ANESTHETIST, CERTIFIED REGISTERED

## 2023-07-21 PROCEDURE — 250N000012 HC RX MED GY IP 250 OP 636 PS 637: Performed by: NURSE PRACTITIONER

## 2023-07-21 PROCEDURE — 250N000013 HC RX MED GY IP 250 OP 250 PS 637: Performed by: NURSE PRACTITIONER

## 2023-07-21 PROCEDURE — 250N000009 HC RX 250: Performed by: PSYCHIATRY & NEUROLOGY

## 2023-07-21 PROCEDURE — 272N000001 HC OR GENERAL SUPPLY STERILE: Performed by: OTOLARYNGOLOGY

## 2023-07-21 PROCEDURE — 0B110Z4 BYPASS TRACHEA TO CUTANEOUS, OPEN APPROACH: ICD-10-PCS | Performed by: OTOLARYNGOLOGY

## 2023-07-21 PROCEDURE — 360N000075 HC SURGERY LEVEL 2, PER MIN: Performed by: OTOLARYNGOLOGY

## 2023-07-21 PROCEDURE — 250N000009 HC RX 250: Performed by: NURSE PRACTITIONER

## 2023-07-21 PROCEDURE — 95714 VEEG EA 12-26 HR UNMNTR: CPT

## 2023-07-21 PROCEDURE — 200N000002 HC R&B ICU UMMC

## 2023-07-21 PROCEDURE — 31600 PLANNED TRACHEOSTOMY: CPT | Mod: GC | Performed by: OTOLARYNGOLOGY

## 2023-07-21 PROCEDURE — 94003 VENT MGMT INPAT SUBQ DAY: CPT

## 2023-07-21 PROCEDURE — 999N000015 HC STATISTIC ARTERIAL MONITORING DAILY

## 2023-07-21 PROCEDURE — 85027 COMPLETE CBC AUTOMATED: CPT | Performed by: NURSE PRACTITIONER

## 2023-07-21 PROCEDURE — 258N000003 HC RX IP 258 OP 636: Performed by: PSYCHIATRY & NEUROLOGY

## 2023-07-21 PROCEDURE — 0DH63UZ INSERTION OF FEEDING DEVICE INTO STOMACH, PERCUTANEOUS APPROACH: ICD-10-PCS | Performed by: SURGERY

## 2023-07-21 PROCEDURE — 83735 ASSAY OF MAGNESIUM: CPT | Performed by: NURSE PRACTITIONER

## 2023-07-21 PROCEDURE — 250N000013 HC RX MED GY IP 250 OP 250 PS 637: Performed by: STUDENT IN AN ORGANIZED HEALTH CARE EDUCATION/TRAINING PROGRAM

## 2023-07-21 PROCEDURE — 250N000011 HC RX IP 250 OP 636: Mod: JZ | Performed by: NURSE ANESTHETIST, CERTIFIED REGISTERED

## 2023-07-21 PROCEDURE — 84100 ASSAY OF PHOSPHORUS: CPT | Performed by: NURSE PRACTITIONER

## 2023-07-21 PROCEDURE — 258N000003 HC RX IP 258 OP 636

## 2023-07-21 PROCEDURE — 43246 EGD PLACE GASTROSTOMY TUBE: CPT | Mod: GC | Performed by: STUDENT IN AN ORGANIZED HEALTH CARE EDUCATION/TRAINING PROGRAM

## 2023-07-21 PROCEDURE — 99291 CRITICAL CARE FIRST HOUR: CPT | Mod: 25 | Performed by: PSYCHIATRY & NEUROLOGY

## 2023-07-21 PROCEDURE — 999N000197 HC STATISTIC WOC PT EDUCATION, 0-15 MIN

## 2023-07-21 RX ORDER — FENTANYL CITRATE 50 UG/ML
INJECTION, SOLUTION INTRAMUSCULAR; INTRAVENOUS PRN
Status: DISCONTINUED | OUTPATIENT
Start: 2023-07-21 | End: 2023-07-21

## 2023-07-21 RX ORDER — LIDOCAINE HYDROCHLORIDE AND EPINEPHRINE 10; 10 MG/ML; UG/ML
INJECTION, SOLUTION INFILTRATION; PERINEURAL PRN
Status: DISCONTINUED | OUTPATIENT
Start: 2023-07-21 | End: 2023-07-21 | Stop reason: HOSPADM

## 2023-07-21 RX ORDER — SODIUM CHLORIDE, SODIUM LACTATE, POTASSIUM CHLORIDE, CALCIUM CHLORIDE 600; 310; 30; 20 MG/100ML; MG/100ML; MG/100ML; MG/100ML
INJECTION, SOLUTION INTRAVENOUS CONTINUOUS PRN
Status: DISCONTINUED | OUTPATIENT
Start: 2023-07-21 | End: 2023-07-21

## 2023-07-21 RX ORDER — FENTANYL CITRATE 50 UG/ML
100 INJECTION, SOLUTION INTRAMUSCULAR; INTRAVENOUS
Status: ACTIVE | OUTPATIENT
Start: 2023-07-21 | End: 2023-07-21

## 2023-07-21 RX ORDER — PROPOFOL 10 MG/ML
INJECTION, EMULSION INTRAVENOUS PRN
Status: DISCONTINUED | OUTPATIENT
Start: 2023-07-21 | End: 2023-07-21

## 2023-07-21 RX ADMIN — CEFEPIME HYDROCHLORIDE 2 G: 2 INJECTION, POWDER, FOR SOLUTION INTRAVENOUS at 09:11

## 2023-07-21 RX ADMIN — FENTANYL CITRATE 50 MCG: 50 INJECTION, SOLUTION INTRAMUSCULAR; INTRAVENOUS at 15:07

## 2023-07-21 RX ADMIN — BROMOCRIPTINE MESYLATE 10 MG: 2.5 TABLET ORAL at 06:01

## 2023-07-21 RX ADMIN — HUMAN INSULIN 4 UNITS/HR: 100 INJECTION, SOLUTION SUBCUTANEOUS at 02:09

## 2023-07-21 RX ADMIN — Medication 30 MG: at 15:15

## 2023-07-21 RX ADMIN — Medication 40 MG: at 07:55

## 2023-07-21 RX ADMIN — PROPOFOL 50 MG: 10 INJECTION, EMULSION INTRAVENOUS at 16:28

## 2023-07-21 RX ADMIN — PREDNISONE 60 MG: 20 TABLET ORAL at 07:55

## 2023-07-21 RX ADMIN — THIAMINE HCL TAB 100 MG 100 MG: 100 TAB at 07:55

## 2023-07-21 RX ADMIN — FENTANYL CITRATE 50 MCG: 50 INJECTION, SOLUTION INTRAMUSCULAR; INTRAVENOUS at 15:41

## 2023-07-21 RX ADMIN — Medication 40 MG: at 21:35

## 2023-07-21 RX ADMIN — BROMOCRIPTINE MESYLATE 10 MG: 2.5 TABLET ORAL at 13:52

## 2023-07-21 RX ADMIN — PHENYLEPHRINE HYDROCHLORIDE 100 MCG: 10 INJECTION INTRAVENOUS at 16:33

## 2023-07-21 RX ADMIN — Medication 15 ML: at 07:55

## 2023-07-21 RX ADMIN — PROPOFOL 70 MG: 10 INJECTION, EMULSION INTRAVENOUS at 16:26

## 2023-07-21 RX ADMIN — Medication 30 MG: at 16:28

## 2023-07-21 RX ADMIN — POTASSIUM PHOSPHATE, MONOBASIC POTASSIUM PHOSPHATE, DIBASIC 9 MMOL: 224; 236 INJECTION, SOLUTION, CONCENTRATE INTRAVENOUS at 06:56

## 2023-07-21 RX ADMIN — ASPIRIN 81 MG CHEWABLE TABLET 81 MG: 81 TABLET CHEWABLE at 07:55

## 2023-07-21 RX ADMIN — BROMOCRIPTINE MESYLATE 10 MG: 2.5 TABLET ORAL at 21:35

## 2023-07-21 RX ADMIN — ROSUVASTATIN CALCIUM 20 MG: 20 TABLET, FILM COATED ORAL at 07:55

## 2023-07-21 RX ADMIN — Medication 50 MG: at 14:37

## 2023-07-21 RX ADMIN — SODIUM CHLORIDE, POTASSIUM CHLORIDE, SODIUM LACTATE AND CALCIUM CHLORIDE: 600; 310; 30; 20 INJECTION, SOLUTION INTRAVENOUS at 14:25

## 2023-07-21 RX ADMIN — Medication 20 MG: at 15:24

## 2023-07-21 RX ADMIN — SULFAMETHOXAZOLE AND TRIMETHOPRIM 1 TABLET: 800; 160 TABLET ORAL at 09:27

## 2023-07-21 RX ADMIN — CEFEPIME HYDROCHLORIDE 2 G: 2 INJECTION, POWDER, FOR SOLUTION INTRAVENOUS at 15:06

## 2023-07-21 ASSESSMENT — ACTIVITIES OF DAILY LIVING (ADL)
ADLS_ACUITY_SCORE: 34
ADLS_ACUITY_SCORE: 34
ADLS_ACUITY_SCORE: 36
ADLS_ACUITY_SCORE: 32
ADLS_ACUITY_SCORE: 34
ADLS_ACUITY_SCORE: 36
ADLS_ACUITY_SCORE: 36
ADLS_ACUITY_SCORE: 34
ADLS_ACUITY_SCORE: 32
ADLS_ACUITY_SCORE: 34
ADLS_ACUITY_SCORE: 36
ADLS_ACUITY_SCORE: 36

## 2023-07-21 ASSESSMENT — LIFESTYLE VARIABLES: TOBACCO_USE: 1

## 2023-07-21 NOTE — PROGRESS NOTES
CLINICAL NUTRITION SERVICES - REASSESSMENT NOTE     Nutrition Prescription    RECOMMENDATIONS FOR MDs/PROVIDERS TO ORDER:  - Total daily fluids/adjustments per MD   - Continue to minimize disruptions to EN infusions as able     Malnutrition Status:    - Patient does not meet two of the established criteria necessary for diagnosing malnutrition    Recommendations already ordered by Registered Dietitian (RD):  - Continue EN as ordered: Osmolite 1.5 Baldev (or equivalent) @ goal of  60ml/hr  (1440ml/day) provides: 2160 kcals, 90 g PRO, 1097 ml free H20, 293 g CHO, and 0 g fiber daily + 2 Eflzvaytf25 = 130 gm PRO (1.6 gm/kg) and 2320 kcals (29 kcals/kg)  - Change access to reflect upcoming NEW access/PEG. Re-start EN @ goal once able to use new access per MD    Future/Additional Recommendations:  - Ongoing EN tolerance via NGT/PEG (pending)   - GI status   - Weight trends      EVALUATION OF THE PROGRESS TOWARD GOALS   Diet: NPO  Nutrition Support: Osmolite 1.5 Baldev (or equivalent) @ goal of  60ml/hr  (1440ml/day) provides: 2160 kcals, 90 g PRO, 1097 ml free H20, 293 g CHO, and 0 g fiber daily + 2 Ftzhqeuop20 = 130 gm PRO (1.6 gm/kg) and 2320 kcals (29 kcals/kg)  Intake: Pt received nearly full infusions over 7-day averages.      NEW FINDINGS   Nutrition/GI: Pt on EN via NGT @ goal. Currently on hold for planned trach/PEG this afternoon. GI: Last BM: 7/21. Bowel regimen: NCC holding bowel regimen on 7/17 due to frequent watery stools and dded fiber packets as well on 7/17. Banatrol currently discontinued. Stools better.    Neuro New brainstem infarcts; Acute ischemic stroke of bilateral thalami with involvement of posterior limb and left internal capsule, unclear etiology; Encephalopathy; CSF with possible chronic inflammatory or viral encephalitic process; Sympathetic hyperactivity; 7/16, stopped Ritalin      HEENT  Broken loose bottom teeth; Tongue lesions, bloody. Dentistry following.     CV HTN; Intermittent hypotension  - Norepinephrine as needed to maintain MAP > 65. Hyperlipidemia on Rosuvastatin 20 mg     Resp Acute hypoxic respiratory failure ; Ventilator associated pneumonia  Plan for trach and PEG placement. Plan for placement on 7/21.      Renal/  Q2h      -Electrolyte replacement protocols  - calcium gluconate on 7/17 for hypocalcemia.      Endo DM2; Hyperglycemia 2/2 steroids; insulin infusion.    Labs/meds: BUN: 25.3 (H); Creatinine: 0.46 (L); others noted. Meds: folic acid; thiamine; bowel regimen; prednisone; MVI.     Weights: Overall up from admit: 79.4 kg --> 84.7 kg. Cannot r/o role of fluid in weight increases/flucutations.     MALNUTRITION  % Intake: < 75% for > 7 days (moderate)  % Weight Loss: None noted, confounded by fluid  Subcutaneous Fat Loss: None observed  Muscle Loss: None observed - decreased tone throughout   Fluid Accumulation/Edema: Does not meet criteria - medically related   Malnutrition Diagnosis: Patient does not meet two of the established criteria necessary for diagnosing malnutrition    Previous Goals   Total avg nutritional intake to meet a minimum of 25 kcal/kg and 1.2 g PRO/kg daily (per dosing wt 79 kg).  Evaluation: Met    Previous Nutrition Diagnosis  Inadequate oral intake related to NPO with mechanical ventilation as evidenced by reliant on EN via NGT to meet 100% nutrition needs   Evaluation: No change    CURRENT NUTRITION DIAGNOSIS  Inadequate oral intake related to NPO with mechanical ventilation as evidenced by reliant on EN via NGT to meet 100% nutrition needs      INTERVENTIONS  Implementation  Enteral Nutrition - continue as ordered   Monitor weights     Goals  Total avg nutritional intake to meet a minimum of 25 kcal/kg and 1.5 g PRO/kg daily (per dosing wt 79 kg).    Monitoring/Evaluation  Progress toward goals will be monitored and evaluated per protocol.    Reina Alfaro RD, LD, [facility]  Neuro ICU  Pager: [phone]

## 2023-07-21 NOTE — ANESTHESIA POSTPROCEDURE EVALUATION
Patient: Michael Stewart    Procedure: Procedure(s):  Tracheostomy,  bronchoscopy  Endoscopic insertion tube gastrostomy       Anesthesia Type:  General    Note:  Disposition: ICU            ICU Sign Out: Unable to perform physician to physician sign out   Postop Pain Control: Uneventful            Sign Out: Well controlled pain   PONV: No   Neuro/Psych: Uneventful            Sign Out: Acceptable/Baseline neuro status   Airway/Respiratory: Uneventful            Sign Out: AIRWAY IN SITU/Resp. Support               Airway in situ/Resp. Support: Tracheostomy                 Reason: Planned Pre-op   CV/Hemodynamics: Uneventful            Sign Out: Acceptable CV status; No obvious hypovolemia; No obvious fluid overload   Other NRE: NONE   DID A NON-ROUTINE EVENT OCCUR? No           Last vitals:  Vitals:    07/21/23 1300 07/21/23 1400 07/21/23 1705   BP: 93/55 113/65 105/59   Pulse: 77 81 76   Resp: 19  14   Temp: 37.2  C (99  F) 36.9  C (98.4  F)    SpO2: 95% 97% 93%       Electronically Signed By: Shania Whittington MD  July 21, 2023  5:11 PM

## 2023-07-21 NOTE — PROGRESS NOTES
SBAR: WOC attempted to assess tongue. Pt still clenching. Anticipate trach and PEG placed today 7/21. Will fully assess tongue Mon 7/24.   Tresa Gaitan RN BSN CWOCN

## 2023-07-21 NOTE — OP NOTE
Operative Note    Preop Diagnosis: Respiratory failure; dysphagia, Need for enteral access    Postop Diagnosis:  Same     Procedure:  Percutaneous endoscopic gastrostomy tube placement     Surgeon:  William Garcia MD    Assistant: Sulma Medellin MD; Yi Agrawal MD     Anethesia: YOLANDE with local     Specimens: None    Complications: None     Implants:  20 Fr PEG tube at 3cm at the skin      Indications: Michael Stewart is a 53 year old male with respiratory failure secondary to bilateral thalamic strokes.  They also have associated dysphagia and need for nutritional support is needed.  The above procedure was discussed with the patient's family / POA. Consent was obtained after reviewing risks and benefits.     Procedure: The patient was placed in a supine position with arms tucked and neck extended. The abdomen was prepped and draped in the usual sterile fashion.  A timeout was called to confirm the patient and procedure.          The abdomen was draped in the usual fashion.The endoscope was then introduced into the mouth, oropharynx and passed down the esophagus into the stomach.The stomach was without mucosal lesions, and on retrflexion there was no evidence of paraesophageal hernia.  The stomach was transilluminated through the abdominal wall. We identified an appropriate position on the anterior abdominal wall with good transillumination and 1:1 finger indentation. A 1cm skin incision was made.  The angiocatheter and needle were placed through the incision into the stomach under direct visualization of the endoscope, however, there was not a good window for wire insertion. We then made a second incision more medially where there was better 1:1 indentation. The angiocatheter passed directly into the stomach without issue. A wire was advanced and grasped with the snare device. The scope and wire were then withdrawn through the mouth and gastrostomy tube loaded onto the wire. The wire was pulled back into  the mouth, down the esophagus and into position in the gastric wall.  The scope followed the g-tube to visually confirm placement. There was no evidence of bleeding and the hub was in good position, 3 cm at the skin. The g-tube was then cut to desired length and prepared with the bumper, clip and adapter.    All instrument and sponge counts were correct. The patient tolerated the procedure well and remained in the ICU post-procedure. Dr. Garcia was present for the entire procedure.       Sulma Medellin MD   7/21/2023

## 2023-07-21 NOTE — PROGRESS NOTES
Care Management Follow Up    Length of Stay (days): 15    Expected Discharge Date:  TBD       Patient plan of care discussed at interdisciplinary rounds: Yes    Anticipated Discharge Disposition:  LTAC vs transfer to [facility], if accepted     Anticipated Discharge Services:  TBD  Anticipated Discharge DME:  TBD    Additional Information:  Per chart review and report, pt will have trach placement done today.    Pt family requested update about [facility] transfer, if pt is accepted or not.  RNCC discussed with Neuro ICU team about family question.  The team called [facility] again and was informed that they are still evaluating him and they also don't have bed.  RNCC visited pt 2 dtrs with  and shared the above info.   RNCC discussed about LTAC and the referral process once pt is medically stable and if he doesn't transfer to [facility].   Pt dtrs verbalized understanding the plan.  RNCC will make referral to LTAC.  RNCC will cont to follow the plan of care.      Arely Cheatham RN, PHN, BSN  4A and 4E/ ICU  Care Coordinator  Phone: [phone]  Pager: [phone]    To contact the weekend RNCC  Amasa (0800 - 1630) Saturday and Sunday   Units: 4A, 4C, 4E, 5A and 5B- Pager [phone]   Units: 6A, 6B- Pager [phone]   Unit : 6C, 6D- pager [phone]   Units: 7A, 7B, 7C, 7D, and 5C- Pager [phone]

## 2023-07-21 NOTE — BRIEF OP NOTE
[facility]    Brief Operative Note    Pre-operative diagnosis: Respiratory failure (H) [J96.90]  Post-operative diagnosis Same as pre-operative diagnosis    Procedure: Procedure(s):  Tracheostomy  Endoscopic insertion tube gastrostomy  Surgeon: Surgeon(s) and Role:  Panel 1:     * Shawna Maxwell MD - Primary  Panel 2:     * William Garcia MD - Primary     * Kanu Zayas MD - Resident - Assisting     * Sulma Medellin MD - Fellow - Assisting  Anesthesia: General   Estimated Blood Loss: Less than 50 ml    Drains: None  Specimens: * No specimens in log *  Findings:   Short, deep neck with cricoid near the sternal notch. Strap muscles, and thyroid isthmus partially divided. Horizontal incision made between first and second tracheal ring. 6-0 Proximal XLT placed and retained with sutures and twill ties. Surgicel was placed in stoma under base plate.    Complications: None.  Implants: * No implants in log *      - 6-0 Proximal XLT in place  - Surgicel can be removed POD 1 if dried  - ENT will remove sutures on POD 5  Trach Tube Instructions:   1) Contact ENT on-call with any questions or concerns   2) The first changing of trach tube, sponge, and or ties will be performed by ENT post-op day 5. Afterwards, other hospital staff can manage these items as needed.   3) Perform regular suctioning   4) RN should change disposable inner canulas every shift and/or clean it with a brush   5) Keep trach tube obturator taped to wall behind the head of the bed   6) Keep extra unopened 6-0 Shiley proximal XLT   7) Minimize the amount of air in the cuff needed to adequately apply positive pressure ventilate. If positive pressure ventilation is not need then the cuff should be down.     Karson Palencia MD   Department of Otolaryngology - Head and Neck Surgery, PGY 1  Please page ENT with questions

## 2023-07-21 NOTE — PLAN OF CARE
Problem: Enteral Nutrition  Goal: Feeding Tolerance  Outcome: Met  Goal: Absence of Aspiration Signs and Symptoms  Outcome: Met  Goal: Safe, Effective Therapy Delivery  Outcome: Met   Goal Outcome Evaluation:

## 2023-07-21 NOTE — PROGRESS NOTES
Physical Therapy: Cancel/defer, Orders received. Chart reviewed and discussed with care team.? Physical Therapy not indicated due to unable to participate in therapy & per MD notes, continues with suspected poor prognosis..? Defer discharge recommendations to OT with OT following for PROM/positioning.? Will complete orders.     Entered by: Lita Velasquez, PT 07/21/2023 12:31 PM

## 2023-07-21 NOTE — PLAN OF CARE
Major Shift Events:  Neuros unchanged. No reponse to stimuli, pupils reactive but unequal, unconjugate. Afebrile. All vitals stable without any interventions. Intubated, CMV settings PEEP 5, FiO2 40%, ,  frequent suctioning of thick secretions. Remains on insulin gtt. Phos 2.7 this morning, replaced per protocol. Adequate UOP. TF clamped/turned off at 0600 for PEG and trach procedures this afternoon.   Plan: Peg and trach today  For vital signs and complete assessments, please see documentation flowsheets.         Goal Outcome Evaluation:           Overall Patient Progress: no changeOverall Patient Progress: no change    Outcome Evaluation: No neuro changes.

## 2023-07-21 NOTE — PROGRESS NOTES
Preliminary Video EEG impression on July 20, 2023 1st 3 hours    EEG is abnormal due to the presences of delta slowing and diffuse with superimposed alpha activity. There is poorly parieto-occipital rhythm 6-7 hz. EEG reactive with stimulation.     This EEG is consistent with a moderate to severe diffuse encephalopathy. No epileptiform discharges or electrographic seizures were seen in this record. If events of interest are noted, please press the event button. Clinical correlation is advised.     Tessa Arora MD  Staff Epilepsy Neurologist    [phone]

## 2023-07-21 NOTE — PROGRESS NOTES
Came to bedside for 0400 vent check, and found molar bite blocks to be displaced, not preventing pt from biting down. Oral bleeding from lower gums observed w/ some clots. With assistance of bedside RN, attempted to replace bite blocks; however unable to d/t pt clenching teeth (unable to get mouth open enough to fit bite blocks over back molars). Tube currently positioned left to avoid coming down and putting pressure over center + bleeding gums.

## 2023-07-21 NOTE — INTERIM SUMMARY
Palliative Care Interim Summary  Palliative Care Interim Summary  GOC/POC are clearly established, no symptom management input needed from our team.  Per d/w primary team, we'll sign off.  Thanks for asking us to be involved; please re-consult if we can be of assistance in the future.    Shade Galloway MD  Palliative Medicine Consult Team  [facility] Inpatient Team Consult Pager [phone] (answered 8am-430pm M-F) - prefer text pages via [employer]. / Palliative After-Hours Answering Service [phone].

## 2023-07-21 NOTE — PROGRESS NOTES
SICU Brief Note    PATIENT NAME: Michael Stewart  MRN: [id number]  DATE: 07/21/2023 12:43 PM  PLANNED PROCEDURE: Tracheostomy creation, PEG Tube Placement (7/21/2023)    ENT is planning to perform tracheostomy creation in the OR today. This will potentially occur around 2PM, pending OR scheduling. The SICU team will join them in the OR for PEG tube placement after they have completed their portion of the case.    If there are any questions or concerns, please call SICU (Ascom: *[phone]).     Kanu Zayas MD   General Surgery Resident

## 2023-07-21 NOTE — ANESTHESIA PREPROCEDURE EVALUATION
Anesthesia Pre-Procedure Evaluation    Patient: Michael Stewart   MRN: [id number] : 1970        Procedure : Procedure(s):  Tracheostomy  Endoscopic insertion tube gastrostomy          No past medical history on file.   Past Surgical History:   Procedure Laterality Date     PICC DOUBLE LUMEN PLACEMENT Left 07/15/2023    Left basilic vein 48cm total 1cm external.      No Known Allergies   Social History     Tobacco Use     Smoking status: Never     Smokeless tobacco: Never   Substance Use Topics     Alcohol use: Yes     Comment: rarely      Wt Readings from Last 1 Encounters:   23 84.7 kg (186 lb 11.7 oz)        Anesthesia Evaluation   Pt has not had prior anesthetic         ROS/MED HX  ENT/Pulmonary: Comment: Hypoxic respiratory failure s/p CVA . Extended intubation    (+) tobacco use,     Neurologic: Comment: Encephalopathy unknown etiology, possible autoimune vs viral vs strept    (+) CVA (ischemic CVA bilateral thalamus, medullopontine), date: 23, with deficits,     Cardiovascular:       METS/Exercise Tolerance:     Hematologic:       Musculoskeletal:       GI/Hepatic:       Renal/Genitourinary:       Endo:       Psychiatric/Substance Use:     (+) Recreational drug usage: Meth.    Infectious Disease:       Malignancy:       Other:            Physical Exam    Airway             Respiratory Devices and Support         Dental           Cardiovascular             Pulmonary               Other findings: Pt has been intubated since 23 with encephalopathy and hypoxic respiratory failure    OUTSIDE LABS:  CBC:   Lab Results   Component Value Date    WBC 19.1 (H) 2023    WBC 18.8 (H) 2023    HGB 12.6 (L) 2023    HGB 11.7 (L) 2023    HCT 37.8 (L) 2023    HCT 35.4 (L) 2023     2023     2023     BMP:   Lab Results   Component Value Date     2023     2023    POTASSIUM 4.1 2023    POTASSIUM 4.6 2023     CHLORIDE 103 07/21/2023    CHLORIDE 103 07/20/2023    CO2 26 07/21/2023    CO2 24 07/20/2023    BUN 25.3 (H) 07/21/2023    BUN 31.2 (H) 07/20/2023    CR 0.46 (L) 07/21/2023    CR 0.60 (L) 07/20/2023     (H) 07/21/2023     (H) 07/21/2023     COAGS:   Lab Results   Component Value Date    PTT 24 07/06/2023    INR 0.91 07/06/2023     POC: No results found for: BGM, HCG, HCGS  HEPATIC:   Lab Results   Component Value Date    ALBUMIN 3.5 07/15/2023    PROTTOTAL 6.1 (L) 07/15/2023    ALT 61 07/15/2023    AST 27 07/15/2023    ALKPHOS 72 07/15/2023    BILITOTAL <0.2 07/15/2023     OTHER:   Lab Results   Component Value Date    PH 7.47 (H) 07/14/2023    LACT 2.4 (H) 07/18/2023    A1C 6.1 (H) 07/07/2023    SAJI 8.1 (L) 07/21/2023    PHOS 2.7 07/21/2023    MAG 2.2 07/21/2023    TSH 3.98 07/12/2023    T4 1.05 07/12/2023       Anesthesia Plan    ASA Status:  4      Anesthesia Type: General.     - Airway: ETT      Maintenance: Balanced.        Consents    Anesthesia Plan(s) and associated risks, benefits, and realistic alternatives discussed. Questions answered and patient/representative(s) expressed understanding.    - Discussed:     - Discussed with:  Patient, Legal Guardian      - Extended Intubation/Ventilatory Support Discussed: Yes.      - Patient is DNR/DNI Status: No    Use of blood products discussed: Yes.     - Discussed with: Legal guardian.     - Consented: consented to blood products            Reason for refusal: other.     Postoperative Care    Pain management: IV analgesics.   PONV prophylaxis: Ondansetron (or other 5HT-3), Dexamethasone or Solumedrol     Comments:           H&P reviewed: Unable to attach H&P to encounter due to EHR limitations. H&P Update: appropriate H&P reviewed, patient examined. No interval changes since H&P (within 30 days).         JESSE MCCLELLAN MD

## 2023-07-21 NOTE — PROGRESS NOTES
Neurocritical Care Progress Note    Reason for critical care admission: Thalamic stroke   Admitting Team: VISHAL  Date of Service:  07/21/2023  Date of Admission:  7/6/2023  Hospital Day: 16    Assessment/Plan  Michael Stewart is a 52 year old male w/ PMHx past methamphetamine abuse and tobacco use who presents on 7/6/2023 as a transfer from [facility] after the discovery of bilateral thalamic strokes as well as Strep A pharyngitis. No acute interventions were performed. However, upon presentation to [facility], he was noted to have increased somnolence, bilateral ophthalmoplegia w/ nystagmus, and atypical breathing raising concern for alternate etiologies beyond stroke. Diagnostic possibilities include post-streptococcal acute demyelinating encephalomyelitis, other autoimmune processes, or viral meningitis.     24 hour events: Afebrile and now off arctic sun. Went for trach and PEG on 7/21.     Neuro  #Diffusion restriction of bilateral thalami with involvement of posterior limb and left internal capsule, diffusion restriction of evin, diffusion restriction of cervical spine with FLAIR changes, unclear etiology  #Encephalopathy  -ASA 81mg daily  -Neurochecks Q4h  -sBP goal < 180 mmHg   -HOB > 30   -PT/OT/SLP when appropriate  -Multiple conversations have been held with the assistance of interpretation services, and which the current clinical picture and suspected poor prognosis for this patient have been communicated.  On the morning of 7/17, the family expressed understanding of the current situation.  However, they requested transfer to [facility] if possible.  Family was advised that this would only be possible if there was a physician at [place] who would be willing to accept the patient as a transfer.  Palliative care team was involved in the case, given the challenging goals of care conversations that are ongoing.    #CSF with possible inflammatory or viral encephalitic process  -7/8 MR brain with no  additional enhancing areas   -7/8 LP with opening pressure: 16 cm H2O, 63 nucleated cells, protein of 42, glucose of 64, additional labs ordered on CSF  -Cytology abnormal for LP on 7/8.  On repeat LP on 7/11, with the primary intent being to achieve fresh cells to low flow cytometry to be conducted.   -So far during hospital course, patient CSF has not been consistent with results that would be expected in acute ischemic stroke.  Rather, CSF suggesting underlying subacute to chronic inflammatory or viral encephalitic process.   -Oligoclonal bands returned positive on 7/12   -Initiated trial of 1g methylprednisolone daily for 7 days to cover for possible autoimmune or inflammatory process that would be consistent with workup thus far.  -Initiated steroid taper on 7/19.    -Initiated bactrim prophylaxis as well.  -Added on [place] autoimmune/paraneoplastic panel per discussion in neuroradiology conference on 7/18.   -In process of repeating workup, with MRI C-spine, MRI brain, and LP, vEEG.    -CSF basic studies were WNL. Interestingly, there is no longer a pleocytosis. Sent [place] autoimmune encephalitis panel. Added demyelinating disease panel as well and NMO antibodies.    -MRI completed 7/21. Now showing areas of possible T2 signal changes and diffusion restriction in cervical spine. Re-demonstrated lesions in evin and bilateral thalami. No uptake of contrast with any of these lesions.    -Connected to vEEG.     #Sympathetic hyperactivity, improved  -Precedex infusion   -Scheduled Tylenol 1000 mg Q6h x24 hrs  -Start Bromocriptine 10 mg Q8h  -Switched scheduled Oxycodone 5 mg Q4h to prn only.   -Arctic sun as needed for normothermia  -on 7/16, stopped Ritalin 5 mg 0600, 1200 for neuro stimulation        #Analgesics & sedation  RASS goal: 0 to -1   -Fentanyl  mcg Q1h PRN  -propofol     HEENT   #Biting down on ETT, clenched jaw  #Broken loose bottom teeth  #Tongue lesions, bloody  -7/15 CT dental showing  displacement of both central mandibular incisors  -Dentistry following - Patient seen by dental on 7/17 who did not find teeth in place. One tooth found my nurse in oral cavity.    - Ordered CXR and AXR to look for missing tooth. Appears to be a hyperintensity in stomach.   -PM&R consult to consider botox for jaw clenching.     CV  #HTN  #Intermittent hypotension  -Cardiac monitoring  -sBP goal < 180 mmHg   -PRN Hydralazine and Labetalol  -ordered new lactate on 7/17  -Intermittently drops pressures, which is currently likely due to propofol. Improved on 7/18. Now off prop.    -Norepinephrine as needed to maintain MAP > 65.      #Hyperlipidemia  -Rosuvastatin 20 mg  -7/7     Resp  #Acute hypoxic respiratory failure   #Ventilator associated pneumonia    #Hx Tobacco Use  #Pulmonary nodules  Oxygen/vent: AC 12/500/+5  -Maintain Peep at 8  -Further discussion this week for Trach placement if within goals of care  -Pulmonary hygiene    -Continuous pulse ox  -Maintain O2 saturations greater than 92%   -Went for trach on 7/21.     GI  Diet: TF's   -PEG placed on 7/21.    Last BM: 7/16  GI prophylaxis: Famotidine 20 mg BID  Bowel regimen: Holding bowel regimen on 7/17 due to frequent watery stools. Added fiber packets as well on 7/17. Stools better.    Renal/  #Hypernatremia, resolved  #Hyperchloremia  #Hypocalcemia  #Hypermagnesemia  #Lactic acidosis      -Daily BMP  -IV fluids: None  -Electrolyte replacement protocols  -Gave calcium gluconate on 7/17 for hypocalcemia.     #Urinary retention   -Continue Doxazosin 2 mg daily   -Mcdaniel replaced on 7/16 for temperature probe with arctic sun     Endo  #DM2  #Overweight   #Hyperglycemia 2/2 steroids  -Hgb A1c; 6.1  -TSH; 2.13  -Continue insulin infusion   -Monitor glucose levels     Heme  #Normocyctic anemia   -Daily CBC  -Hgb goal >7, plt goal >50k  -Transfuse to meet Hgb and plt goals     ID  #Leukocytosis  #Fevers   #Ventilator associated pneumonia  #Concern for  "sepsis  -Continue Cefepime  -Bactrim for PJP prophylaxis.   -7/15 MRSA negative - Stopped Vancomycin on .   -7/15 BC with NGTD  -7/15 Sputum growing streptococcus anginosus, staphylococcus aureus, and klebsiella aerogenes   -Daily CBC  -Follow temperature curve  -Repeated respiratory and blood cultures on .      ICU Checklist  Lines/tubes/drains: PIV x3, L/PICC, [place], ETT, NGT, dorantes, rectal tube    FEN: TFs, replacement   PPX: DVT - SCDs, Lovenox; GI - Famotidine  Code: FULL CODE  Dispo: ICU - NCC      Clinically Significant Risk Factors                         # Overweight: Estimated body mass index is 29.25 kg/m  as calculated from the following:    Height as of this encounter: 1.702 m (5' 7\").    Weight as of this encounter: 84.7 kg (186 lb 11.7 oz).          The patient was seen and discussed with the NCC attending, Dr. Kelly.    Raymond Breen MD  PGY-2, Neurology    24 Hour Vital Signs Summary:  Temp: 97.9  F (36.6  C) Temp  Min: 97.9  F (36.6  C)  Max: 99.5  F (37.5  C)  Resp: 14 Resp  Min: 12  Max: 24  SpO2: 93 % SpO2  Min: 92 %  Max: 100 %  Pulse: 76 Pulse  Min: 69  Max: 98  BP: 105/59 Systolic (24hrs), Av , Min:93 , Max:113   Diastolic (24hrs), Av, Min:55, Max:65    Respiratory monitoring:   Vent Mode: CMV/AC  (Continuous Mandatory Ventilation/ Assist Control)  FiO2 (%): 30 %  Resp Rate (Set): 12 breaths/min  Tidal Volume (Set, mL): 500 mL  PEEP (cm H2O): 5 cmH2O  Resp: 14       I/O last 3 completed shifts:  In: 2318.67 [I.V.:658.67; NG/GT:760]  Out: 2965 [Urine:2965]    Current Medications:    aspirin  81 mg Oral or Feeding Tube Daily     bromocriptine  10 mg Oral Q8H     ceFEPIme  2 g Intravenous Q8H     [Held by provider] doxazosin  1 mg Oral or Feeding Tube Daily     [Held by provider] enoxaparin ANTICOAGULANT  40 mg Subcutaneous Q24H     fentaNYL  100 mcg Intravenous Pre-Op/Pre-procedure x 1 dose     heparin lock flush  5-20 mL Intracatheter Q24H     midazolam  8 mg Intravenous " "Pre-Op/Pre-procedure x 1 dose     multivitamins w/minerals  15 mL Per Feeding Tube Daily     pantoprazole  40 mg Oral or Feeding Tube BID     predniSONE  60 mg Oral Daily    Followed by     [START ON 7/26/2023] predniSONE  50 mg Oral or Feeding Tube Daily    Followed by     [START ON 8/2/2023] predniSONE  40 mg Oral or Feeding Tube Daily    Followed by     [START ON 8/9/2023] predniSONE  30 mg Oral or Feeding Tube Daily    Followed by     [START ON 8/16/2023] predniSONE  20 mg Oral or Feeding Tube Daily    Followed by     [START ON 8/23/2023] predniSONE  10 mg Oral Daily     rosuvastatin  20 mg Oral or Feeding Tube Daily     senna-docusate  1-2 tablet Oral or Feeding Tube BID     sodium chloride (PF)  3 mL Intracatheter Q8H     sulfamethoxazole-trimethoprim  1 tablet Oral Once per day on Mon Wed Fri     thiamine  100 mg Oral or Feeding Tube Daily       PRN Medications:  glucose **OR** dextrose **OR** glucagon, fentaNYL, heparin lock flush, [Held by provider] labetalol **OR** [Held by provider] hydrALAZINE, magnesium hydroxide, naloxone **OR** naloxone **OR** naloxone **OR** naloxone, ondansetron **OR** ondansetron, oxyCODONE, polyethylene glycol, sodium chloride (PF), sodium chloride (PF)    Infusions:    insulin regular Stopped (07/21/23 0800)     norepinephrine Stopped (07/17/23 1036)     Patient RECEIVING antibiotic to treat a different condition and it provides ADEQUATE COVERAGE for this surgical procedure.         No Known Allergies    Physical Examination:  Vitals: /59   Pulse 76   Temp 97.9  F (36.6  C)   Resp 14   Ht 1.702 m (5' 7\")   Wt 84.7 kg (186 lb 11.7 oz)   SpO2 93%   BMI 29.25 kg/m    General: Adult male patient, lying in bed, critically-ill  HEENT: Normocephalic, atraumatic, no icterus, teeth missing, tongue lesions, mouth bloody   Cardiac: Sinus rhythm on bedside monitor   Pulm: Unlabored, expansion symmetric, no retractions or use of accessory muscles, assisted mechanically  Abdomen: " Soft, non-distended abdomen   Extremities: Warm, no edema, appears adequately perfused  Skin: No rash or lesion observed on exposed skin  Psych: Calm   Neuro:  Mental status: Sedated, does not open eyes, does not follow commands, intubated.  Cranial nerves: PERRL, disconjugate gaze with each eye up and out, weak cough and gag with deep suction.  Motor: Normal bulk and tone. No abnormal movements. Intermittently has extensor posturing in BLUE.   Sensory: responds to noxious stimuli with extension in bilateral uppers only, does not grimace.  Coordination: ADAM, deferred.  Gait: ADAM, deferred.    Labs and Imaging:    Results for orders placed or performed during the hospital encounter of 23 (from the past 24 hour(s))   Glucose by meter   Result Value Ref Range    GLUCOSE BY METER POCT 170 (H) 70 - 99 mg/dL   Glucose by meter   Result Value Ref Range    GLUCOSE BY METER POCT 101 (H) 70 - 99 mg/dL   Glucose by meter   Result Value Ref Range    GLUCOSE BY METER POCT 139 (H) 70 - 99 mg/dL   EEG Video 12-26 hr Unmonitored    Narrative    EEG Video 12-26 hr Unmonitored Result    VIDEO EEG DATE: 23  VIDEO EEG [id number]  VIDEO EEG DAY#: 1  VIDEO EEG SOURCE FILE DURATION: 13 hoi 57 min    PATIENT INFORMATION: Michael Stewart is a 53 year old male who presents   with encephalopathy. Video EEG is being done to evaluate for seizures.     MEDICATIONS:  Thiamine   These medications and doses were derived from the medical record at the   time of this procedure.    TECHNICAL SUMMARY: This is a video-EEG monitoring study. EEG was recorded   from 23 scalp electrodes placed according to the 10-20 international   system. Additional electrodes were utilized for referencing, artifact   detection, and recording from other cerebral regions. Qualified   technicians attached EEG electrodes, set up the study, monitored and   reviewed  EEG recordings, and disconnected EEG electrodes when appropriate. Video   was continuously  recorded. Video was reviewed for clinical correlation and   to assist with EEG interpretations.    BACKGROUND: There is continuous generalized polymorphic delta/theta   slowing. There is no clear parieto-occipital rhythm or well formed sleep   architecture. EEG is reactive with stimulation.     ACTIVATION: Patient is not able to answer questions or follow tasks as   directed. However, when stimulated EEG was reactive.     EPILEPTIFORM DISCHARGES: No epileptiform discharges were noted in this   record.     ICTAL: Patient had no electro-clinical seizure or subclinical seizure or   paroxsymal events.     IMPRESSION:  Video EEG Day #1 is abnormal due to the presences of   continuous generalized delta/theta slowing. These VEEG findings are   consistent with a severe encephalopathy. No epileptiform discharges,   electrographic seizures, or paroxsymal events were seen in this record.   Clinical correlation is advised.    Tessa Arora MD   Epilepsy Staff      Glucose by meter   Result Value Ref Range    GLUCOSE BY METER POCT 143 (H) 70 - 99 mg/dL   Glucose by meter   Result Value Ref Range    GLUCOSE BY METER POCT 136 (H) 70 - 99 mg/dL   Glucose by meter   Result Value Ref Range    GLUCOSE BY METER POCT 110 (H) 70 - 99 mg/dL   CBC with platelets   Result Value Ref Range    WBC Count 19.1 (H) 4.0 - 11.0 10e3/uL    RBC Count 4.12 (L) 4.40 - 5.90 10e6/uL    Hemoglobin 12.6 (L) 13.3 - 17.7 g/dL    Hematocrit 37.8 (L) 40.0 - 53.0 %    MCV 92 78 - 100 fL    MCH 30.6 26.5 - 33.0 pg    MCHC 33.3 31.5 - 36.5 g/dL    RDW 12.8 10.0 - 15.0 %    Platelet Count 371 150 - 450 10e3/uL   Basic metabolic panel   Result Value Ref Range    Sodium 137 136 - 145 mmol/L    Potassium 4.1 3.4 - 5.3 mmol/L    Chloride 103 98 - 107 mmol/L    Carbon Dioxide (CO2) 26 22 - 29 mmol/L    Anion Gap 8 7 - 15 mmol/L    Urea Nitrogen 25.3 (H) 6.0 - 20.0 mg/dL    Creatinine 0.46 (L) 0.67 - 1.17 mg/dL    Calcium 8.1 (L) 8.6 - 10.0 mg/dL    Glucose 126 (H) 70  - 99 mg/dL    GFR Estimate >90 >60 mL/min/1.73m2   Magnesium   Result Value Ref Range    Magnesium 2.2 1.7 - 2.3 mg/dL   Phosphorus   Result Value Ref Range    Phosphorus 2.7 2.5 - 4.5 mg/dL   Glucose by meter   Result Value Ref Range    GLUCOSE BY METER POCT 114 (H) 70 - 99 mg/dL   Glucose by meter   Result Value Ref Range    GLUCOSE BY METER POCT 133 (H) 70 - 99 mg/dL   Glucose by meter   Result Value Ref Range    GLUCOSE BY METER POCT 141 (H) 70 - 99 mg/dL   Glucose by meter   Result Value Ref Range    GLUCOSE BY METER POCT 80 70 - 99 mg/dL   Glucose by meter   Result Value Ref Range    GLUCOSE BY METER POCT 113 (H) 70 - 99 mg/dL   Glucose by meter   Result Value Ref Range    GLUCOSE BY METER POCT 118 (H) 70 - 99 mg/dL   Glucose by meter   Result Value Ref Range    GLUCOSE BY METER POCT 125 (H) 70 - 99 mg/dL   Glucose by meter   Result Value Ref Range    GLUCOSE BY METER POCT 112 (H) 70 - 99 mg/dL       All relevant imaging and laboratory values personally reviewed.

## 2023-07-21 NOTE — ANESTHESIA CARE TRANSFER NOTE
Patient: Michael Stewart    Procedure: Procedure(s):  Tracheostomy,  bronchoscopy  Endoscopic insertion tube gastrostomy       Diagnosis: Respiratory failure (H) [J96.90]  Diagnosis Additional Information: No value filed.    Anesthesia Type:   General     Note:    Oropharynx: endotracheal tube in place and ventilatory support  Level of Consciousness: iatrogenic sedation    Level of Supplemental Oxygen (L/min / FiO2): FiO2 40%  Independent Airway: airway patency not satisfactory and stable  Dentition: dentition unchanged  Vital Signs Stable: post-procedure vital signs reviewed and stable  Report to RN Given: handoff report given  Patient transferred to: ICU  Comments: Tx to 4A with full monitors, O2 10L per ambu, VSS.  To 4204, placed on mechanical ventilator, propofol infusion.  VSS, report to RN.  ICU Handoff: Call for PAUSE to initiate/utilize ICU HANDOFF, Identified Patient, Identified Responsible Provider, Reviewed the Pertinent Medical History, Discussed Surgical Course, Reviewed Intra-OP Anesthesia Management and Issues during Anesthesia, Set Expectations for Post Procedure Period and Allowed Opportunity for Questions and Acknowledgement of Understanding      Vitals:  Vitals Value Taken Time   /59 07/21/23 1705   Temp 36.6  C (97.88  F) 07/21/23 1706   Pulse 82 07/21/23 1706   Resp 13 07/21/23 1706   SpO2 92 % 07/21/23 1706   Vitals shown include unvalidated device data.    Electronically Signed By: CORRINA Dalal CRNA  July 21, 2023  5:06 PM

## 2023-07-21 NOTE — OP NOTE
Operative Note   Otolaryngology - Head and Neck Surgery       DATE OF OPERATION:   July 21, 2023    PREOPERATIVE DIAGNOSIS:   Respiratory failure     POSTOPERATIVE DIAGNOSIS:   Same    NAME OF OPERATION:   1. Tracheotomy  2. Flexible bronchoscopy    ANESTHESIA  Type: general   ETT: 7.5 ett    SURGEON:   Shawna Maxwell MD    RESIDENT SURGEON(S):   Kasron Palencia MD     INDICATIONS FOR PROCEDURE:   The patient is a 53 year old male with history of respiratory failure. The patient comes in for tracheotomy placement. Risks, benefits and alternatives were discussed. The patient wishes to proceed with surgery and has signed an informed consent.     FINDINGS:   1. 6 proximal XLT placed easily      DESCRIPTION OF PROCEDURE:   The patient was brought into the operating room and placed supine on the operating room table. A time-out was performed and anesthesia was induced. The patient was ventilated through his prior 7.5 ETT.    A shoulder roll was placed to provide neck extension. Then a 2cm horizontal neck incision between the cricoid and sternal notch was outlined. Then 5cc of 1% lidocaine with 1:100,000 epinephrine was injected. Then a horizontal incision was made through the skin down through the subcutaneous tissue. Then the midline raphe was identified. Then retractors were used to lateralize the strap muscles. The cricoid was then identified. The fascial plane between the thyroid isthmus and the anterior tracheal wall was identified. The thyroid isthmus was split with the use of a bovie and bipolar. Hemostasis was confirmed. Then the trachea came into view. A cricoid hook was used to provide superior retraction. Then, an incision was made between the 1st and 2nd tracheal rings with an 11 blade. Then the incision was extended with a mets scissors. The ETT came into view this was slowly retracted until the distal tip was just above the incision. Then a 6.0 proximal xlt trach was placed and CO2 was confirmed. The trach was  secured with 2.0 prolene stitches and a trach tie.     Then a flexible bronchoscopy was performed with a 2.0 bronchoscope through the trach. This proved good positioning and clear trachea down to rocío. The bronchoscope was then passed into the right and left mainstem bronchus. Here some secretions  were suctioned out.                                   COMPLICATIONS:   None.  .   ESTIMATED BLOOD LOSS:   Less than 10cc    DISPOSITION:   PACU.    SPECIMENS:  * No specimens in log *       PHOTODOCUMENTATION:

## 2023-07-22 ENCOUNTER — APPOINTMENT (OUTPATIENT)
Dept: GENERAL RADIOLOGY | Facility: CLINIC | Age: 53
End: 2023-07-22
Attending: NURSE PRACTITIONER
Payer: COMMERCIAL

## 2023-07-22 ENCOUNTER — APPOINTMENT (OUTPATIENT)
Dept: NEUROLOGY | Facility: CLINIC | Age: 53
End: 2023-07-22
Payer: COMMERCIAL

## 2023-07-22 LAB
ANION GAP SERPL CALCULATED.3IONS-SCNC: 11 MMOL/L (ref 7–15)
BACTERIA CSF CULT: NORMAL
BUN SERPL-MCNC: 26.1 MG/DL (ref 6–20)
CALCIUM SERPL-MCNC: 8.2 MG/DL (ref 8.6–10)
CHLORIDE SERPL-SCNC: 101 MMOL/L (ref 98–107)
CREAT SERPL-MCNC: 0.75 MG/DL (ref 0.67–1.17)
DEPRECATED HCO3 PLAS-SCNC: 24 MMOL/L (ref 22–29)
ERYTHROCYTE [DISTWIDTH] IN BLOOD BY AUTOMATED COUNT: 13.2 % (ref 10–15)
GFR SERPL CREATININE-BSD FRML MDRD: >90 ML/MIN/1.73M2
GLUCOSE BLDC GLUCOMTR-MCNC: 161 MG/DL (ref 70–99)
GLUCOSE BLDC GLUCOMTR-MCNC: 170 MG/DL (ref 70–99)
GLUCOSE BLDC GLUCOMTR-MCNC: 177 MG/DL (ref 70–99)
GLUCOSE BLDC GLUCOMTR-MCNC: 220 MG/DL (ref 70–99)
GLUCOSE BLDC GLUCOMTR-MCNC: 227 MG/DL (ref 70–99)
GLUCOSE BLDC GLUCOMTR-MCNC: 229 MG/DL (ref 70–99)
GLUCOSE SERPL-MCNC: 166 MG/DL (ref 70–99)
HCT VFR BLD AUTO: 36.1 % (ref 40–53)
HGB BLD-MCNC: 12 G/DL (ref 13.3–17.7)
MAGNESIUM SERPL-MCNC: 2.2 MG/DL (ref 1.7–2.3)
MCH RBC QN AUTO: 31 PG (ref 26.5–33)
MCHC RBC AUTO-ENTMCNC: 33.2 G/DL (ref 31.5–36.5)
MCV RBC AUTO: 93 FL (ref 78–100)
PHOSPHATE SERPL-MCNC: 3.2 MG/DL (ref 2.5–4.5)
PLATELET # BLD AUTO: 342 10E3/UL (ref 150–450)
POTASSIUM SERPL-SCNC: 4.2 MMOL/L (ref 3.4–5.3)
RBC # BLD AUTO: 3.87 10E6/UL (ref 4.4–5.9)
SODIUM SERPL-SCNC: 136 MMOL/L (ref 136–145)
WBC # BLD AUTO: 21.3 10E3/UL (ref 4–11)

## 2023-07-22 PROCEDURE — 250N000013 HC RX MED GY IP 250 OP 250 PS 637: Performed by: NURSE PRACTITIONER

## 2023-07-22 PROCEDURE — 94003 VENT MGMT INPAT SUBQ DAY: CPT

## 2023-07-22 PROCEDURE — 999N000157 HC STATISTIC RCP TIME EA 10 MIN

## 2023-07-22 PROCEDURE — 74018 RADEX ABDOMEN 1 VIEW: CPT

## 2023-07-22 PROCEDURE — 85027 COMPLETE CBC AUTOMATED: CPT | Performed by: NURSE PRACTITIONER

## 2023-07-22 PROCEDURE — 250N000012 HC RX MED GY IP 250 OP 636 PS 637: Performed by: NURSE PRACTITIONER

## 2023-07-22 PROCEDURE — 250N000011 HC RX IP 250 OP 636

## 2023-07-22 PROCEDURE — 71045 X-RAY EXAM CHEST 1 VIEW: CPT

## 2023-07-22 PROCEDURE — 250N000009 HC RX 250

## 2023-07-22 PROCEDURE — 200N000002 HC R&B ICU UMMC

## 2023-07-22 PROCEDURE — 95720 EEG PHY/QHP EA INCR W/VEEG: CPT | Performed by: PSYCHIATRY & NEUROLOGY

## 2023-07-22 PROCEDURE — 80048 BASIC METABOLIC PNL TOTAL CA: CPT | Performed by: NURSE PRACTITIONER

## 2023-07-22 PROCEDURE — 84100 ASSAY OF PHOSPHORUS: CPT | Performed by: NURSE PRACTITIONER

## 2023-07-22 PROCEDURE — 250N000013 HC RX MED GY IP 250 OP 250 PS 637: Performed by: STUDENT IN AN ORGANIZED HEALTH CARE EDUCATION/TRAINING PROGRAM

## 2023-07-22 PROCEDURE — 99291 CRITICAL CARE FIRST HOUR: CPT | Mod: 25 | Performed by: PSYCHIATRY & NEUROLOGY

## 2023-07-22 PROCEDURE — 250N000009 HC RX 250: Performed by: PSYCHIATRY & NEUROLOGY

## 2023-07-22 PROCEDURE — 74018 RADEX ABDOMEN 1 VIEW: CPT | Mod: 26 | Performed by: RADIOLOGY

## 2023-07-22 PROCEDURE — 95714 VEEG EA 12-26 HR UNMNTR: CPT

## 2023-07-22 PROCEDURE — 71045 X-RAY EXAM CHEST 1 VIEW: CPT | Mod: 26 | Performed by: RADIOLOGY

## 2023-07-22 PROCEDURE — 250N000013 HC RX MED GY IP 250 OP 250 PS 637: Performed by: PSYCHIATRY & NEUROLOGY

## 2023-07-22 PROCEDURE — 83735 ASSAY OF MAGNESIUM: CPT | Performed by: NURSE PRACTITIONER

## 2023-07-22 PROCEDURE — 258N000003 HC RX IP 258 OP 636

## 2023-07-22 PROCEDURE — 250N000013 HC RX MED GY IP 250 OP 250 PS 637

## 2023-07-22 PROCEDURE — 999N000253 HC STATISTIC WEANING TRIALS

## 2023-07-22 RX ORDER — TRANEXAMIC ACID 100 MG/ML
INJECTION, SOLUTION INTRAVENOUS ONCE
Status: COMPLETED | OUTPATIENT
Start: 2023-07-22 | End: 2023-07-22

## 2023-07-22 RX ORDER — NICOTINE POLACRILEX 4 MG
15-30 LOZENGE BUCCAL
Status: DISCONTINUED | OUTPATIENT
Start: 2023-07-22 | End: 2023-07-26 | Stop reason: HOSPADM

## 2023-07-22 RX ORDER — DEXTROSE MONOHYDRATE 25 G/50ML
25-50 INJECTION, SOLUTION INTRAVENOUS
Status: DISCONTINUED | OUTPATIENT
Start: 2023-07-22 | End: 2023-07-26 | Stop reason: HOSPADM

## 2023-07-22 RX ORDER — BROMOCRIPTINE MESYLATE 2.5 MG/1
5 TABLET ORAL EVERY 8 HOURS
Status: DISCONTINUED | OUTPATIENT
Start: 2023-07-22 | End: 2023-07-23

## 2023-07-22 RX ORDER — OXYMETAZOLINE HYDROCHLORIDE 0.05 G/100ML
2 SPRAY NASAL 3 TIMES DAILY PRN
Status: DISCONTINUED | OUTPATIENT
Start: 2023-07-22 | End: 2023-07-26 | Stop reason: HOSPADM

## 2023-07-22 RX ORDER — MODAFINIL 100 MG/1
100 TABLET ORAL DAILY
Status: DISCONTINUED | OUTPATIENT
Start: 2023-07-22 | End: 2023-07-26 | Stop reason: HOSPADM

## 2023-07-22 RX ADMIN — SILVER NITRATE APPLICATORS: 25; 75 STICK TOPICAL at 17:18

## 2023-07-22 RX ADMIN — CEFEPIME HYDROCHLORIDE 2 G: 2 INJECTION, POWDER, FOR SOLUTION INTRAVENOUS at 00:52

## 2023-07-22 RX ADMIN — BROMOCRIPTINE MESYLATE 5 MG: 2.5 TABLET ORAL at 16:11

## 2023-07-22 RX ADMIN — ROSUVASTATIN CALCIUM 20 MG: 20 TABLET, FILM COATED ORAL at 08:10

## 2023-07-22 RX ADMIN — Medication 40 MG: at 20:45

## 2023-07-22 RX ADMIN — CEFEPIME HYDROCHLORIDE 2 G: 2 INJECTION, POWDER, FOR SOLUTION INTRAVENOUS at 16:10

## 2023-07-22 RX ADMIN — ASPIRIN 81 MG CHEWABLE TABLET 81 MG: 81 TABLET CHEWABLE at 08:10

## 2023-07-22 RX ADMIN — Medication 40 MG: at 11:13

## 2023-07-22 RX ADMIN — TRANEXAMIC ACID 1000 MG: 1 INJECTION, SOLUTION INTRAVENOUS at 17:18

## 2023-07-22 RX ADMIN — BROMOCRIPTINE MESYLATE 10 MG: 2.5 TABLET ORAL at 08:10

## 2023-07-22 RX ADMIN — THIAMINE HCL TAB 100 MG 100 MG: 100 TAB at 08:10

## 2023-07-22 RX ADMIN — PREDNISONE 60 MG: 20 TABLET ORAL at 08:10

## 2023-07-22 RX ADMIN — MODAFINIL 100 MG: 100 TABLET ORAL at 09:20

## 2023-07-22 RX ADMIN — CEFEPIME HYDROCHLORIDE 2 G: 2 INJECTION, POWDER, FOR SOLUTION INTRAVENOUS at 08:35

## 2023-07-22 RX ADMIN — OXYMETAZOLINE HYDROCHLORIDE 2 SPRAY: 0.05 SPRAY NASAL at 14:10

## 2023-07-22 RX ADMIN — Medication 15 ML: at 08:10

## 2023-07-22 ASSESSMENT — ACTIVITIES OF DAILY LIVING (ADL)
ADLS_ACUITY_SCORE: 36

## 2023-07-22 NOTE — PLAN OF CARE
Cardiac: Sinus rhythm to sinus tachy. Bp stable. Cuff pressures. Afebrile.   Neuro: Q 4hr neuro's. Unchanged. Remains unresponsive. Withdraws/ Postures with BUE stimulus. Pupils conjugate reactive. EEG monitoring continued.  Respiratory: CPAP/PS tolerated well. 8/5 on P/S. Thick bloody secretions through inline suction with trach. #6 shyle XLT In place. 6 shyle out of stock in hospital and [facility]. #5 XLT at bedside.   GI: Loose stools. TF @ goal 60 with 100ml flushes Q4hr. Hold bowel meds.   : External cath in place. Several unmeasured voids.   Skin: ENT changed trach dressing X2. Continue to monitor and utilize gauze packing with afrin spray.    Sig Events: Heavy bleeding/oozing from trach site. Neuro unchanged. Awaiting placement. Tolerating pressure support.     Mckay Moore RN SICU Neuro-ICU

## 2023-07-22 NOTE — PROGRESS NOTES
Brief ENT progress note  7/22/24    Bleeding around trach stoma on AM rounds. No changes in vent settings. Returned to bedside to place new surgicel around trach. 2 strips of surgicel were placed (one underneath the superior border of the faceplate and one under the inferior border).     - recommend Afrin applied to surgicel TID prn for additional bleeding  - ENT to remove surgicel strips in 1-2 days  - please contact ENT  on call if worsened oozing or changes in ventilatory requirements associated with oozing    Tarah Jacob MD PGY3

## 2023-07-22 NOTE — PROGRESS NOTES
"ENT DAILY TRACHEOSTOMY PROGRESS NOTE  7/22/2023    Subjective: No vent issues overnight. Some thin blood around trach this AM.    Objective:  /79   Pulse 113   Temp 98.4  F (36.9  C) (Axillary)   Resp 18   Ht 1.702 m (5' 7\")   Wt 84.7 kg (186 lb 11.7 oz)   SpO2 94%   BMI 29.25 kg/m     General: No acute distress   HEENT: Shiley trach in place and secured with twill ties. No evidence of peristoma skin breakdown. Thin bright red blood oozing from stoma. Ties with appropriate tightness, skin inspected for breakdown under ties and there is no sign of irritation. Cuff with appropriate pressure. Surgicel removed from around trach with slight increase in bright red blood oozing.   Pulmonary: Breathing non-labored on vent.    Assessment/Plan: Mr. Stewart is a 53 year old male POD #1 s/p tracheostomy for respiratory failure. Some bright red blood oozing around trach so plan to place surgicel at bedside this AM.    - twill ties and sutures to remain in place until POD5  - OR surgicel removed on rounds, 2 new surgicel strips to be placed at bedside this AM; ENT to remove in 2-3 days    Trach Tube Instructions:  1) Contact ENT on-call with any questions or concerns  2) The first changing of trach tube, sponge, and or ties will be performed by ENT between post-op day 3 and 7. Afterwards, other hospital staff can manage these items as needed. Prior to first trach change please do not manipulate the trach ties or cut any sutures, or manipulate the dressing.   3) Perform regular suctioning per orders  4) RN should change disposable inner canulas every shift and or clean it with a brush  5) Keep trach tube obturator taped to wall behind the head of the bed  6) Keep extra unopened trach tube of same size and one size smaller at bedside at all times   7) Minimize the amount of air in the cuff needed to adequately apply positive pressure ventilate. If positive pressure ventilation is not need then the cuff should be down. "     Patient and plan discussed with Dr. Maxwell.    Tarah Jacob MD   Otolaryngology-Head & Neck Surgery PGY3  Please contact ENT on call with questions

## 2023-07-22 NOTE — PROGRESS NOTES
Neurocritical Care Progress Note    Reason for critical care admission: Thalamic stroke   Admitting Team: VISHAL  Date of Service:  07/22/2023  Date of Admission:  7/6/2023  Hospital Day: 17    Assessment/Plan  Michael Stewart is a 52 year old male w/ PMHx past methamphetamine abuse and tobacco use who presents on 7/6/2023 as a transfer from [facility] after the discovery of bilateral thalamic strokes as well as Strep A pharyngitis. No acute interventions were performed. However, upon presentation to [facility], he was noted to have increased somnolence, bilateral ophthalmoplegia w/ nystagmus, and atypical breathing raising concern for alternate etiologies beyond stroke. Diagnostic possibilities include post-streptococcal acute demyelinating encephalomyelitis, other autoimmune processes, or viral meningitis.     24 hour events: No acute events overnight. Now has trach and PEG.     Neuro  #Diffusion restriction of bilateral thalami with involvement of posterior limb and left internal capsule, diffusion restriction of evin, diffusion restriction of cervical spine with FLAIR changes, unclear etiology  #Encephalopathy  -ASA 81mg daily  -Neurochecks Q4h  -sBP goal < 180 mmHg   -HOB > 30   -PT/OT/SLP when appropriate  -Multiple conversations have been held with the assistance of interpretation services, and which the current clinical picture and suspected poor prognosis for this patient have been communicated.  On the morning of 7/17, the family expressed understanding of the current situation.  However, they requested transfer to [facility] if possible.  Family was advised that this would only be possible if there was a physician at [place] who would be willing to accept the patient as a transfer.  Palliative care team was involved in the case, given the challenging goals of care conversations that are ongoing. [facility] declined transfer on 7/21. Discussed with neurologist at [place], who expressed that their  institution would not be able to offer additional workup or management options compared to the current management plan.   -Started modafinil on 7/22 for neurostimulation.     #CSF with possible inflammatory or viral encephalitic process  -7/8 MR brain with no additional enhancing areas   -7/8 LP with opening pressure: 16 cm H2O, 63 nucleated cells, protein of 42, glucose of 64, additional labs ordered on CSF  -Cytology abnormal for LP on 7/8.  On repeat LP on 7/11, with the primary intent being to achieve fresh cells to low flow cytometry to be conducted.   -So far during hospital course, patient CSF has not been consistent with results that would be expected in acute ischemic stroke.  Rather, CSF suggesting underlying subacute to chronic inflammatory or viral encephalitic process.   -Oligoclonal bands returned positive on 7/12   -Initiated trial of 1g methylprednisolone daily for 7 days to cover for possible autoimmune or inflammatory process that would be consistent with workup thus far.  -Initiated steroid taper on 7/19.    -Initiated bactrim prophylaxis as well.  -Added on Printer autoimmune/paraneoplastic panel per discussion in neuroradiology conference on 7/18.   -In process of repeating workup, with MRI C-spine, MRI brain, and LP, vEEG.    -CSF basic studies were WNL. Interestingly, there is no longer a pleocytosis. Sent Printer autoimmune encephalitis panel. Added demyelinating disease panel as well and NMO antibodies.    -MRI completed 7/21. Now showing areas of possible T2 signal changes and diffusion restriction in cervical spine. Re-demonstrated lesions in evin and bilateral thalami. No uptake of contrast with any of these lesions.    -Connected to vEEG.     #Sympathetic hyperactivity, improved  -Precedex infusion   -Scheduled Tylenol 1000 mg Q6h x24 hrs  -Going down on Bromocriptine to 5 mg Q8h  -Oxycodone 5 mg Q4h to prn only.   -Arctic sun as needed for normothermia  -on 7/16, stopped Ritalin 5 mg 0600,  1200 for neuro stimulation        #Analgesics & sedation  RASS goal: 0 to -1   -Fentanyl  mcg Q1h PRN  -propofol     HEENT   #Biting down on ETT, clenched jaw  #Broken loose bottom teeth  #Tongue lesions, bloody  -7/15 CT dental showing displacement of both central mandibular incisors  -Dentistry following - Patient seen by dental on 7/17 who did not find teeth in place. One tooth found my nurse in oral cavity.    - Ordered CXR and AXR to look for missing tooth. Appears to be a hyperintensity in stomach.    -Obtain new abdominal XR to assess status of foreign body in GI tract (7/22).   -PM&R consult to consider botox for jaw clenching.     CV  #HTN  #Intermittent hypotension  -Cardiac monitoring  -sBP goal < 180 mmHg   -PRN Hydralazine and Labetalol  -ordered new lactate on 7/17  -Intermittently drops pressures, which is currently likely due to propofol. Improved on 7/18. Now off prop.    -Norepinephrine as needed to maintain MAP > 65.      #Hyperlipidemia  -Rosuvastatin 20 mg  -7/7     Resp  #Acute hypoxic respiratory failure   #Ventilator associated pneumonia    #Hx Tobacco Use  #Pulmonary nodules  Oxygen/vent: AC 12/500/+5  -Maintain Peep at 8  -Further discussion this week for Trach placement if within goals of care  -Pulmonary hygiene    -Continuous pulse ox  -Maintain O2 saturations greater than 92%   -Went for trach on 7/21.   -Will obtain new CXR post-trach on 7/22.     GI  Diet: TF's   -PEG placed on 7/21.    Last BM: 7/16  GI prophylaxis: Famotidine 20 mg BID  Bowel regimen: Holding bowel regimen on 7/17 due to frequent watery stools. Added fiber packets as well on 7/17. Stools better.    Renal/  #Hypernatremia, resolved  #Hyperchloremia  #Hypocalcemia  #Hypermagnesemia  #Lactic acidosis      -Daily BMP  -IV fluids: None  -Electrolyte replacement protocols  -Gave calcium gluconate on 7/17 for hypocalcemia.     #Urinary retention   -Continue Doxazosin 2 mg daily   -Mcdaniel replaced on 7/16 for  "temperature probe with arctic sun     Endo  #DM2  #Overweight   #Hyperglycemia 2/2 steroids  -Hgb A1c; 6.1  -TSH; 2.13  -Switched from insulin infusion to SQI on .   -Monitor glucose levels     Heme  #Normocyctic anemia   -Daily CBC  -Hgb goal >7, plt goal >50k  -Transfuse to meet Hgb and plt goals     ID  #Leukocytosis  #Fevers   #Ventilator associated pneumonia  #Concern for sepsis  -Continue Cefepime  -Bactrim for PJP prophylaxis.   -7/15 MRSA negative - Stopped Vancomycin on .   -7/15 BC with NGTD  -7/15 Sputum growing streptococcus anginosus, staphylococcus aureus, and klebsiella aerogenes   -Daily CBC  -Follow temperature curve  -Repeated respiratory and blood cultures on .      ICU Checklist  Lines/tubes/drains: PIV x3, L/PICC, [place], ETT, NGT  FEN: TFs, replacement   PPX: DVT - SCDs, Lovenox; GI - Famotidine  Code: FULL CODE  Dispo: ICU - NCC      Clinically Significant Risk Factors                         # Overweight: Estimated body mass index is 29.25 kg/m  as calculated from the following:    Height as of this encounter: 1.702 m (5' 7\").    Weight as of this encounter: 84.7 kg (186 lb 11.7 oz).          The patient was seen and discussed with the NCC attending, Dr. Kelly.    Raymond Breen MD  PGY-2, Neurology    24 Hour Vital Signs Summary:  Temp: 98.9  F (37.2  C) Temp  Min: 97.3  F (36.3  C)  Max: 99  F (37.2  C)  Resp: 23 Resp  Min: 12  Max: 28  SpO2: 95 % SpO2  Min: 92 %  Max: 100 %  Pulse: 91 Pulse  Min: 59  Max: 113  BP: 122/76 Systolic (24hrs), Av , Min:89 , Max:149   Diastolic (24hrs), Av, Min:55, Max:81    Respiratory monitoring:   Vent Mode: CPAP/PS  (Continuous positive airway pressure with Pressure Support)  FiO2 (%): 30 %  Resp Rate (Set): 12 breaths/min  Tidal Volume (Set, mL): 500 mL  PEEP (cm H2O): 5 cmH2O  Pressure Support (cm H2O): 8 cmH2O  Resp: 23       I/O last 3 completed shifts:  In: 2300 [I.V.:1235; NG/GT:285]  Out: 1725 [Urine:1700; Blood:25]    Current " "Medications:    aspirin  81 mg Oral or Feeding Tube Daily     bromocriptine  5 mg Oral Q8H     ceFEPIme  2 g Intravenous Q8H     [Held by provider] doxazosin  1 mg Oral or Feeding Tube Daily     [Held by provider] enoxaparin ANTICOAGULANT  40 mg Subcutaneous Q24H     heparin lock flush  5-20 mL Intracatheter Q24H     insulin aspart  1-12 Units Subcutaneous Q4H     modafinil  100 mg Oral or Feeding Tube Daily     multivitamins w/minerals  15 mL Per Feeding Tube Daily     pantoprazole  40 mg Oral or Feeding Tube BID     predniSONE  60 mg Oral Daily    Followed by     [START ON 7/26/2023] predniSONE  50 mg Oral or Feeding Tube Daily    Followed by     [START ON 8/2/2023] predniSONE  40 mg Oral or Feeding Tube Daily    Followed by     [START ON 8/9/2023] predniSONE  30 mg Oral or Feeding Tube Daily    Followed by     [START ON 8/16/2023] predniSONE  20 mg Oral or Feeding Tube Daily    Followed by     [START ON 8/23/2023] predniSONE  10 mg Oral Daily     rosuvastatin  20 mg Oral or Feeding Tube Daily     senna-docusate  1-2 tablet Oral or Feeding Tube BID     sodium chloride (PF)  3 mL Intracatheter Q8H     sulfamethoxazole-trimethoprim  1 tablet Oral Once per day on Mon Wed Fri     thiamine  100 mg Oral or Feeding Tube Daily       PRN Medications:  glucose **OR** dextrose **OR** glucagon, fentaNYL, heparin lock flush, [Held by provider] labetalol **OR** [Held by provider] hydrALAZINE, magnesium hydroxide, naloxone **OR** naloxone **OR** naloxone **OR** naloxone, ondansetron **OR** ondansetron, oxyCODONE, polyethylene glycol, sodium chloride (PF), sodium chloride (PF)    Infusions:    norepinephrine Stopped (07/17/23 1036)       No Known Allergies    Physical Examination:  Vitals: /76   Pulse 91   Temp 98.9  F (37.2  C) (Axillary)   Resp 23   Ht 1.702 m (5' 7\")   Wt 84.7 kg (186 lb 11.7 oz)   SpO2 95%   BMI 29.25 kg/m    General: Adult male patient, lying in bed, critically-ill  HEENT: Normocephalic, " atraumatic, no icterus, teeth missing, tongue lesions, mouth bloody   Cardiac: Sinus rhythm on bedside monitor   Pulm: Unlabored, expansion symmetric, no retractions or use of accessory muscles, assisted mechanically  Abdomen: Soft, non-distended abdomen   Extremities: Warm, no edema, appears adequately perfused  Skin: No rash or lesion observed on exposed skin  Psych: Calm   Neuro:  Mental status: Sedated, does not open eyes, does not follow commands, intubated.  Cranial nerves: PERRL, disconjugate gaze with each eye up and out, weak cough and gag with deep suction.  Motor: Normal bulk and tone. No abnormal movements. Intermittently has extensor posturing in BLUE.   Sensory: responds to noxious stimuli with extension in bilateral uppers only, does not grimace.  Coordination: ADAM, deferred.  Gait: ADAM, deferred.    Labs and Imaging:    Results for orders placed or performed during the hospital encounter of 07/06/23 (from the past 24 hour(s))   Glucose by meter   Result Value Ref Range    GLUCOSE BY METER POCT 118 (H) 70 - 99 mg/dL   Glucose by meter   Result Value Ref Range    GLUCOSE BY METER POCT 125 (H) 70 - 99 mg/dL   Glucose by meter   Result Value Ref Range    GLUCOSE BY METER POCT 112 (H) 70 - 99 mg/dL   Glucose by meter   Result Value Ref Range    GLUCOSE BY METER POCT 153 (H) 70 - 99 mg/dL   Glucose by meter   Result Value Ref Range    GLUCOSE BY METER POCT 177 (H) 70 - 99 mg/dL   CBC with platelets   Result Value Ref Range    WBC Count 21.3 (H) 4.0 - 11.0 10e3/uL    RBC Count 3.87 (L) 4.40 - 5.90 10e6/uL    Hemoglobin 12.0 (L) 13.3 - 17.7 g/dL    Hematocrit 36.1 (L) 40.0 - 53.0 %    MCV 93 78 - 100 fL    MCH 31.0 26.5 - 33.0 pg    MCHC 33.2 31.5 - 36.5 g/dL    RDW 13.2 10.0 - 15.0 %    Platelet Count 342 150 - 450 10e3/uL   Basic metabolic panel   Result Value Ref Range    Sodium 136 136 - 145 mmol/L    Potassium 4.2 3.4 - 5.3 mmol/L    Chloride 101 98 - 107 mmol/L    Carbon Dioxide (CO2) 24 22 - 29 mmol/L     Anion Gap 11 7 - 15 mmol/L    Urea Nitrogen 26.1 (H) 6.0 - 20.0 mg/dL    Creatinine 0.75 0.67 - 1.17 mg/dL    Calcium 8.2 (L) 8.6 - 10.0 mg/dL    Glucose 166 (H) 70 - 99 mg/dL    GFR Estimate >90 >60 mL/min/1.73m2   Magnesium   Result Value Ref Range    Magnesium 2.2 1.7 - 2.3 mg/dL   Phosphorus   Result Value Ref Range    Phosphorus 3.2 2.5 - 4.5 mg/dL   Glucose by meter   Result Value Ref Range    GLUCOSE BY METER POCT 161 (H) 70 - 99 mg/dL   Glucose by meter   Result Value Ref Range    GLUCOSE BY METER POCT 170 (H) 70 - 99 mg/dL       All relevant imaging and laboratory values personally reviewed.

## 2023-07-22 NOTE — INTERIM SUMMARY
"NCC Interim Summary    Michael Stewart is a 52 year old male w/ PMHx past methamphetamine abuse and tobacco use who presented on 7/6/2023 as a transfer from [facility] after the discovery of diffusion restriction in the BL thalami in the setting of recent Strep A pharyngitis infection. After arrival, patient became increasingly somnolent, was found to have bilateral ophthalmoplegia w/ nystagmus, and atypical breathing. His condition worsened over the following 48 hours and patient eventually required intubation due to coma. Diagnostic possibilities include post-streptococcal acute demyelinating encephalomyelitis, other autoimmune processes such as autoimmune encephalitis, or viral meningitis -- however, the specific etiology of this patient's coma is not currently known. Please refer to data as listed below for a summary of the patient's workup completed to date:    Relevant Neuro-imaging - Radiologic Impressions:    MR BRAIN W/O CONTRAST (7/6):  \"1. Bilateral thalamic infarcts (left greater than right) with likely involvement of the posterior limb of the left internal capsule which corresponds with the patient's recent symptoms of right-sided weakness and incoordination.  2. Otherwise, normal brain MRI.\"    MRV BRAIN W/O & W CONTRAST (7/6):  \"No evidence of dural venous sinus or deep vein thrombosis.\"    MR BRAIN W/O & W CONTRAST (7/8):  \"1. No definite evidence of meningitis.   2. Unchanged bilateral thalamic infarctions.\"    MR BRAIN W/O CONTRAST (7/14):  \"Evolution of bilateral thalamic infarcts. Brainstem infarcts are more apparent.\"    CTA HEAD NECK W CONTRAST (7/15):  \"1. Head CTA demonstrates a possible aneurysm of what appears to be the right callosomarginal artery versus a dilated adjacent vein.  2. Neck CTA demonstrates no stenosis of the major cervical arteries.  3. Stable infarcts in the bilateral thalamus, please see same day head CT 7/15/2023 for further details.\"    MR BRAIN W/O CONTRAST " "(7/20):  \"Multifocal areas of reduced diffusion in the thalami and brainstem. When compared to the exam on 7/6/2023, these areas do not produce an expected pattern of evolving signal characteristics. Given the appearance of these lesions and their presence in both the brainstem and basal ganglia, an autoimmune encephalomyelitis is favored as an etiology over cerebral infarctions or infections.\"     MR CERVICAL SPINE W/O & W CONTRAST (7/20)\"  \"Multiple areas of increased T2 signal which demonstrate mildly reduced diffusion. No definite enhancement associated with these lesions. Differential would include areas of demyelination which could be secondary to a process such as acute disseminated encephalomyelitis (ADEM) versus an infectious or inflammatory process cannot exclude toxic etiology.\"    Video EEG Reports:     Summary of Day # 1 of VEEG Monitoring (7/7):  \"This is an abnormal EEG due to the presence of severe generalized slowing of the background activities. Findings are consistent with severe diffuse encephalopathy of which the etiology is non-specific.  No epileptiform activities or seizures were observed.\"    Summary of Day # 2 of VEEG Monitoring (7/8):  \"This is an abnormal EEG due to the presence of severe generalized slowing of the background activities. Findings are consistent with severe diffuse encephalopathy of which the etiology is non-specific.  No epileptiform activities or seizures were observed.\"    Summary of Day # 3 of VEEG Monitoring (7/9):  \"This is an abnormal EEG due to the presence of severe generalized slowing of the background activities. Findings are consistent with severe diffuse encephalopathy of which the etiology is non-specific.  No epileptiform activities or seizures were observed.\"    Video EEG Day #1 (7/20) is \"abnormal due to the presences of continuous generalized delta/theta slowing. These VEEG findings are consistent with a severe encephalopathy. No epileptiform discharges, " "electrographic seizures, or paroxsymal events were seen in this record. Clinical correlation is advised.\"    Video EEG Day #2 (7/21) is \"abnormal due to the presences of generalized delta/theta slowing. These VEEG findings are consistent with a moderate encephalopathy. He is less encephalopathic on this day compared to day 1.  No epileptiform discharges, electrographic seizures, or paroxsymal events were seen in this record. Clinical correlation is advised.\"    CSF Studies:       7/7 7/8 7/11 7/19   OPENING PRESSURE 36 16  19   GLUCOSE 80 64 68 87   PROTEIN 57 42.2 38.2 32.1   NUCLEATED CELLS 33 63 92 1   DIFFERENTIAL (% lymphocytes) 84 82 93    RBCs 48 9 4 147   CYTOLOGY  Lymphocytosis  Lymphocytosis   FLOW CYTOMETRY   Polytypic B cells    AEROBIC CULTURE no growth no growth no growth no growth   ANAEROBIC CULTURE no growth  1+ staph epi no growth   FUNGAL CULTURE no growth no growth  no growth     VDRL CSF (7/7) - NON REACTIVE  HSV TYPES 1 AND 2 QUALITATIVE PCR CSF (7/7) - NOT DETECTED  MENINGITIS/ENCEPHALITIS PANEL (7/7) - NEGATIVE  CRYPTOCOCCAL ANTIGEN (7/7) - NEGATIVE  WEST NILE VIRUS RNA PCR (7/8) - NOT DETECTED  LYME DNA PCR (7/8) - NOT DETECTED  OLIGOCLONAL BANDS (7/8) - 3   ENC2 (Encephalopathy, Autoimmune/Paraneoplastic Evaluation) - IN PROCESS    Other Relevant Laboratory Studies:     LISAIUS NON-INVASIVE PATHOGEN BLOOD TEST (7/13): + FOR KLEBSIELLA AEROGENES   THYROGLOBULIN ANTIBODY (7/12): <20  THYROID PEROXIDASE ANTIBODY (7/12): <10  BLOOD CULTURE (7/6): NO GROWTH  GROUP A STREPTOCOCCUS PCR (7/6): NEGATIVE  GROUP A STREP ANTIGEN (7/5): POSITIVE  INFLUENZA A/B PCR (7/5): NEGATIVE  RSV PCR (7/5): NEGATIVE  SARS COV2 PCR (7/5): NEGATIVE     Signed,     Raymond Breen MD  PGY2, Neurology  [facility] Service                 "

## 2023-07-22 NOTE — PLAN OF CARE
Major Shift Events: Neuro unchanged. Flexion to pain. Vitals WNL. Vent settings unchanged. Trach with bloody thick drainage around stoma. Tolerating well. Tube feeding remains at goal with 100 q4h FWF. No BM. Has not voided on own since Mcdaniel pulled yesterday at 1800. Bladder scan for 985 and straight cath for 350.     Plan: Continue with current POC    For vital signs and complete assessments, please see documentation flowsheets.

## 2023-07-22 NOTE — PROGRESS NOTES
Brief ENT note    ENT was paged to bedside for continued oozing from trach.  On examination there was continued oozing coming from the trach with congealed blood at the entry of this trach stoma surrounding the trach.  Unfortunately clot was not amenable to removal with either forceps or suction.  Bloodsoaked Surgicel was removed.  TXA and Afrin soaked half inch strip gauze was packed underneath the faceplate.  Oozing slowed down no did not cease this completely.    Due to present clot, would expect the dressing to continue to be bloodsoaked.    - Would recommend applying Afrin to the strip gauze q4h by giving it a good 2 seconds squeeze soaking the entire gauze.

## 2023-07-22 NOTE — PLAN OF CARE
D/I/A:   Shift Events  Writer assumed cares [id number]  Went to OR for Trach and PEG placement returned to unit around 1700  Tube feeds restarted per orders  Brynn removed  Dorantes removed    Neuro- unchanged.   CV- sinus rhythm HR 80s-90s.   Pulm- lungs coarse to clear with suctioning. Suctioning small to moderate amounts of thick/cloudy/white secretions from trach. Trach placed in OR  GI- NG removed per discussion with NCC MD after PEG placed. Rectal pouch removed during movement from OR -not replaced d/t minimal output, small loose BM this shift. Tube feeds restarted at goal rate of 60cc/hr with 100cc free water q4hrs.  - dorantes removed at 1800. Good urine output 75-100cc/hr prior to dorantes removal  Gtts- insulin gtt off   Activity- bedrest with frequent turns.    Social- family at bedside, updated about POC, questions answered.     See flow sheets for further details and assessments.    P: continue to monitor, notify MD of significant changes.      Goal Outcome Evaluation:      Plan of Care Reviewed With: patient, spouse, child    Overall Patient Progress: no changeOverall Patient Progress: no change    Outcome Evaluation: Trach and PEG placed in OR. no changes in neuro status

## 2023-07-23 ENCOUNTER — APPOINTMENT (OUTPATIENT)
Dept: NEUROLOGY | Facility: CLINIC | Age: 53
End: 2023-07-23
Payer: COMMERCIAL

## 2023-07-23 LAB
ALLEN'S TEST: YES
ANION GAP SERPL CALCULATED.3IONS-SCNC: 9 MMOL/L (ref 7–15)
BACTERIA BLD CULT: NO GROWTH
BACTERIA BLD CULT: NO GROWTH
BASE EXCESS BLDA CALC-SCNC: 4.3 MMOL/L (ref -9–1.8)
BUN SERPL-MCNC: 38.5 MG/DL (ref 6–20)
CALCIUM SERPL-MCNC: 7.8 MG/DL (ref 8.6–10)
CHLORIDE SERPL-SCNC: 105 MMOL/L (ref 98–107)
CREAT SERPL-MCNC: 1 MG/DL (ref 0.67–1.17)
DEPRECATED HCO3 PLAS-SCNC: 25 MMOL/L (ref 22–29)
ERYTHROCYTE [DISTWIDTH] IN BLOOD BY AUTOMATED COUNT: 13.8 % (ref 10–15)
GFR SERPL CREATININE-BSD FRML MDRD: >90 ML/MIN/1.73M2
GLUCOSE BLDC GLUCOMTR-MCNC: 151 MG/DL (ref 70–99)
GLUCOSE BLDC GLUCOMTR-MCNC: 168 MG/DL (ref 70–99)
GLUCOSE BLDC GLUCOMTR-MCNC: 180 MG/DL (ref 70–99)
GLUCOSE BLDC GLUCOMTR-MCNC: 190 MG/DL (ref 70–99)
GLUCOSE BLDC GLUCOMTR-MCNC: 193 MG/DL (ref 70–99)
GLUCOSE BLDC GLUCOMTR-MCNC: 194 MG/DL (ref 70–99)
GLUCOSE SERPL-MCNC: 168 MG/DL (ref 70–99)
HCO3 BLD-SCNC: 27 MMOL/L (ref 21–28)
HCT VFR BLD AUTO: 30.7 % (ref 40–53)
HGB BLD-MCNC: 10 G/DL (ref 13.3–17.7)
MAGNESIUM SERPL-MCNC: 2.8 MG/DL (ref 1.7–2.3)
MCH RBC QN AUTO: 30.8 PG (ref 26.5–33)
MCHC RBC AUTO-ENTMCNC: 32.6 G/DL (ref 31.5–36.5)
MCV RBC AUTO: 95 FL (ref 78–100)
O2/TOTAL GAS SETTING VFR VENT: 40 %
OXYHGB MFR BLD: 96 % (ref 92–100)
PCO2 BLD: 33 MM HG (ref 35–45)
PH BLD: 7.52 [PH] (ref 7.35–7.45)
PHOSPHATE SERPL-MCNC: 3.1 MG/DL (ref 2.5–4.5)
PLATELET # BLD AUTO: 298 10E3/UL (ref 150–450)
PO2 BLD: 78 MM HG (ref 80–105)
POTASSIUM SERPL-SCNC: 4.4 MMOL/L (ref 3.4–5.3)
RBC # BLD AUTO: 3.25 10E6/UL (ref 4.4–5.9)
SODIUM SERPL-SCNC: 139 MMOL/L (ref 136–145)
WBC # BLD AUTO: 16.5 10E3/UL (ref 4–11)

## 2023-07-23 PROCEDURE — 250N000013 HC RX MED GY IP 250 OP 250 PS 637: Performed by: NURSE PRACTITIONER

## 2023-07-23 PROCEDURE — 250N000011 HC RX IP 250 OP 636: Mod: JZ | Performed by: NURSE PRACTITIONER

## 2023-07-23 PROCEDURE — 80048 BASIC METABOLIC PNL TOTAL CA: CPT | Performed by: NURSE PRACTITIONER

## 2023-07-23 PROCEDURE — 250N000013 HC RX MED GY IP 250 OP 250 PS 637: Performed by: STUDENT IN AN ORGANIZED HEALTH CARE EDUCATION/TRAINING PROGRAM

## 2023-07-23 PROCEDURE — 85014 HEMATOCRIT: CPT | Performed by: NURSE PRACTITIONER

## 2023-07-23 PROCEDURE — 250N000013 HC RX MED GY IP 250 OP 250 PS 637: Performed by: PSYCHIATRY & NEUROLOGY

## 2023-07-23 PROCEDURE — 84100 ASSAY OF PHOSPHORUS: CPT | Performed by: NURSE PRACTITIONER

## 2023-07-23 PROCEDURE — 95711 VEEG 2-12 HR UNMONITORED: CPT

## 2023-07-23 PROCEDURE — 82805 BLOOD GASES W/O2 SATURATION: CPT | Performed by: NURSE PRACTITIONER

## 2023-07-23 PROCEDURE — 200N000002 HC R&B ICU UMMC

## 2023-07-23 PROCEDURE — 250N000013 HC RX MED GY IP 250 OP 250 PS 637

## 2023-07-23 PROCEDURE — 250N000012 HC RX MED GY IP 250 OP 636 PS 637: Performed by: NURSE PRACTITIONER

## 2023-07-23 PROCEDURE — 99292 CRITICAL CARE ADDL 30 MIN: CPT | Mod: 25 | Performed by: NURSE PRACTITIONER

## 2023-07-23 PROCEDURE — 94003 VENT MGMT INPAT SUBQ DAY: CPT

## 2023-07-23 PROCEDURE — 999N000157 HC STATISTIC RCP TIME EA 10 MIN

## 2023-07-23 PROCEDURE — 250N000011 HC RX IP 250 OP 636: Mod: JZ | Performed by: STUDENT IN AN ORGANIZED HEALTH CARE EDUCATION/TRAINING PROGRAM

## 2023-07-23 PROCEDURE — 95718 EEG PHYS/QHP 2-12 HR W/VEEG: CPT | Performed by: PSYCHIATRY & NEUROLOGY

## 2023-07-23 PROCEDURE — 99291 CRITICAL CARE FIRST HOUR: CPT | Mod: 25 | Performed by: NURSE PRACTITIONER

## 2023-07-23 PROCEDURE — 36600 WITHDRAWAL OF ARTERIAL BLOOD: CPT

## 2023-07-23 PROCEDURE — 83735 ASSAY OF MAGNESIUM: CPT | Performed by: NURSE PRACTITIONER

## 2023-07-23 RX ORDER — CALCIUM GLUCONATE 20 MG/ML
2 INJECTION, SOLUTION INTRAVENOUS ONCE
Status: COMPLETED | OUTPATIENT
Start: 2023-07-23 | End: 2023-07-23

## 2023-07-23 RX ORDER — ACETAMINOPHEN 325 MG/1
650 TABLET ORAL EVERY 4 HOURS PRN
Status: DISCONTINUED | OUTPATIENT
Start: 2023-07-23 | End: 2023-07-24

## 2023-07-23 RX ADMIN — THIAMINE HCL TAB 100 MG 100 MG: 100 TAB at 07:57

## 2023-07-23 RX ADMIN — MODAFINIL 100 MG: 100 TABLET ORAL at 07:56

## 2023-07-23 RX ADMIN — Medication 15 ML: at 07:56

## 2023-07-23 RX ADMIN — BROMOCRIPTINE MESYLATE 5 MG: 2.5 TABLET ORAL at 07:57

## 2023-07-23 RX ADMIN — ASPIRIN 81 MG CHEWABLE TABLET 81 MG: 81 TABLET CHEWABLE at 07:56

## 2023-07-23 RX ADMIN — PREDNISONE 60 MG: 20 TABLET ORAL at 07:56

## 2023-07-23 RX ADMIN — Medication 40 MG: at 21:05

## 2023-07-23 RX ADMIN — ENOXAPARIN SODIUM 40 MG: 40 INJECTION SUBCUTANEOUS at 09:25

## 2023-07-23 RX ADMIN — CALCIUM GLUCONATE 2 G: 20 INJECTION, SOLUTION INTRAVENOUS at 09:25

## 2023-07-23 RX ADMIN — Medication 40 MG: at 07:57

## 2023-07-23 RX ADMIN — ROSUVASTATIN CALCIUM 20 MG: 20 TABLET, FILM COATED ORAL at 07:56

## 2023-07-23 RX ADMIN — BROMOCRIPTINE MESYLATE 5 MG: 2.5 TABLET ORAL at 00:22

## 2023-07-23 ASSESSMENT — ACTIVITIES OF DAILY LIVING (ADL)
ADLS_ACUITY_SCORE: 36

## 2023-07-23 NOTE — PROGRESS NOTES
"ENT DAILY TRACHEOSTOMY PROGRESS NOTE  7/23/2023    Subjective: Yesterday had some bleeding from the trach s/p surgicel placement but bleeding was ongoing so trach stoma was packed with 1/2 inch TXA and Afrin soaked strip gauze. No further reports of bleeding.    Objective:  /76   Pulse 97   Temp 99.8  F (37.7  C)   Resp 23   Ht 1.702 m (5' 7\")   Wt 82.6 kg (182 lb 1.6 oz)   SpO2 93%   BMI 28.52 kg/m     General: No acute distress   HEENT: Shiley trach in place and secured with twill ties. No evidence of peristoma skin breakdown. Strip gauze in place with some clot around dressing, no active bright red blood. Ties with appropriate tightness, skin inspected for breakdown under ties and there is no sign of irritation. Cuff with appropriate pressure.    Pulmonary: Breathing non-labored on vent.    Assessment/Plan: Mr. Stewart is a 53 year old male POD #1 s/p tracheostomy for respiratory failure. Some bright red blood oozing around trach so plan to place surgicel at bedside this AM.    - twill ties and sutures to remain in place until POD5  - 1/2 inch gauze packing to remain in place for total of about 48 hours. ENT to remove.    Trach Tube Instructions:  1) Contact ENT on-call with any questions or concerns  2) The first changing of trach tube, sponge, and or ties will be performed by ENT between post-op day 3 and 7. Afterwards, other hospital staff can manage these items as needed. Prior to first trach change please do not manipulate the trach ties or cut any sutures, or manipulate the dressing.   3) Perform regular suctioning per orders  4) RN should change disposable inner canulas every shift and or clean it with a brush  5) Keep trach tube obturator taped to wall behind the head of the bed  6) Keep extra unopened trach tube of same size and one size smaller at bedside at all times   7) Minimize the amount of air in the cuff needed to adequately apply positive pressure ventilate. If positive pressure " ventilation is not need then the cuff should be down.     Patient and plan discussed with Dr. Maxwell.    Tarah Jacob MD   Otolaryngology-Head & Neck Surgery PGY3  Please contact ENT on call with questions

## 2023-07-23 NOTE — PROGRESS NOTES
Neurocritical Care Progress Note    Reason for critical care admission: Thalamic stroke   Admitting Team: VISHAL  Date of Service:  07/23/2023  Date of Admission:  7/6/2023  Hospital Day: 18    Assessment/Plan  Michael Stewart is a 52 year old male w/ PMHx past methamphetamine abuse and tobacco use who presents on 7/6/2023 as a transfer from [facility] after the discovery of bilateral thalamic strokes as well as Strep A pharyngitis. No acute interventions were performed. However, upon presentation to [facility], he was noted to have increased somnolence, bilateral ophthalmoplegia w/ nystagmus, and atypical breathing raising concern for alternate etiologies beyond stroke. Diagnostic possibilities include post-streptococcal acute demyelinating encephalomyelitis, other autoimmune processes, or viral meningitis.    24 hour events: No acute events overnight. Now has trach and PEG.     Neuro  #diffusion restriction of bilateral thalami with involvement of posterior limb and left internal capsule, diffusion restriction of evin, diffusion restriction of cervical spine with FLAIR changes, unclear etiology  #encephalopathy  #CSF with possible inflammatory or viral encephalitic process  -for labs, diagnostic tests and imaging refer to Dr. Breen's interim summary 07/22  -ASA 81mg daily  -neurochecks q4hrs  -sBP goal < 180 mmHg   -HOB > 30   -PT/OT/SLP when appropriate  -started modafinil on 7/22 for neurostimulation   -discontinue vEEG    #Sympathetic hyperactivity, improved  -bromocriptine to 5 mg qhrs > stop     #Analgesics & sedation  RASS goal: 0 to -1   -as needed tylenol     HEENT   #s/p trach 07/21 by ENT  #Broken loose bottom teeth  -7/15 CT dental showing displacement of both central mandibular incisors  -dentistry signed off  -Patient seen by dental on 7/17 who did not find teeth in place. One tooth found my nurse in oral cavity.   -tooth is progressing to the RUQ, hopefully will pass through GI  tract    CV  #HTN  #Intermittent hypotension  #Hyperlipidemia,   -cardiac monitoring  -sBP goal < 180 mmHg   -PRN Hydralazine and Labetalol  -Rosuvastatin 20 mg    Resp  #Acute hypoxic respiratory failure   #Ventilator associated pneumonia, treated    #Hx tobacco Use  #Pulmonary nodules  Oxygen/vent: PS 6/5/30%  -Pulmonary hygiene    -Continuous pulse ox  -Maintain O2 saturations greater than 92%   -pCXR 07/23: mild atelectasis     GI  #MARIA T  Diet: TF at goal  Last BM: 7/23  GI prophylaxis: Famotidine 20 mg BID  Bowel regimen: Holding bowel regimen   -PEG placed on 7/21    Renal/  #Hypocalcemia  #urinary retention, resolved  -Daily BMP  -IV fluids: None  -Electrolyte replacement protocols  -2 g IV calcium gluconate    Endo  #DM2  #Overweight   -Hgb A1c; 6.1  -TSH; 2.13  -high sliding scale insulin   -Monitor glucose levels     Heme  #acute blood loss anemia   -Daily CBC  -Hgb goal >7, plt goal >50k  -Transfuse to meet Hgb and plt goals     ID  #Leukocytosis  #Ventilator associated pneumonia  -Bactrim for PJP prophylaxis  -7/15 MRSA negative - Stopped Vancomycin on 7/17  -7/15 BC with NGTD  -7/15 Sputum growing streptococcus anginosus, staphylococcus aureus, and klebsiella aerogenes   -Daily CBC  -Follow temperature curve  -Repeated respiratory and blood cultures on 7/18     ICU Checklist  Lines/tubes/drains: PIV x3, L/PICC, trach, PEG  FEN: TFs, replacement  PPX: DVT - SCDs, Lovenox unheld; GI - famotidine  Code: FULL CODE  Dispo: ICU - NCC    TIME SPENT ON THIS ENCOUNTER   I spent 52 minutes of critical care time on the unit/floor managing the care of Michael Stewart excluding time performing procedures. Upon evaluation, this patient had a high probability of imminent or life-threatening deterioration due to acute metabolic encephalopathy, which required my direct attention, intervention, and personal management. Greater than 50% of my time was spent at the bedside counseling the patient and/or coordinating  care including chart review, history, exam, documentation, and further activities per this note. I have personally reviewed the following data/imaging over the past 24 hours.     The patient was seen and discussed with the [facility] attending, Dr. Mark Kelly .    24 Hour Vital Signs Summary:  Temp: 99.7  F (37.6  C) Temp  Min: 98.9  F (37.2  C)  Max: 99.9  F (37.7  C)  Resp: 22 Resp  Min: 14  Max: 31  SpO2: 95 % SpO2  Min: 88 %  Max: 99 %  Pulse: 93 Pulse  Min: 88  Max: 117  BP: 128/76 Systolic (24hrs), Av , Min:118 , Max:164   Diastolic (24hrs), Av, Min:63, Max:98    Respiratory monitoring:   Vent Mode: CPAP/PS  (Continuous positive airway pressure with Pressure Support)  FiO2 (%): 30 %  Resp Rate (Set): 12 breaths/min  Tidal Volume (Set, mL): 30 mL  PEEP (cm H2O): 5 cmH2O  Pressure Support (cm H2O): 8 cmH2O  Resp: 22     I/O last 3 completed shifts:  In: 2260 [I.V.:100; NG/GT:720]  Out: 1250 [Urine:1250]    Current Medications:    aspirin  81 mg Oral or Feeding Tube Daily     bromocriptine  5 mg Oral Q8H     [Held by provider] doxazosin  1 mg Oral or Feeding Tube Daily     [Held by provider] enoxaparin ANTICOAGULANT  40 mg Subcutaneous Q24H     heparin lock flush  5-20 mL Intracatheter Q24H     insulin aspart  1-12 Units Subcutaneous Q4H     modafinil  100 mg Oral or Feeding Tube Daily     multivitamins w/minerals  15 mL Per Feeding Tube Daily     pantoprazole  40 mg Oral or Feeding Tube BID     predniSONE  60 mg Oral Daily    Followed by     [START ON 2023] predniSONE  50 mg Oral or Feeding Tube Daily    Followed by     [START ON 2023] predniSONE  40 mg Oral or Feeding Tube Daily    Followed by     [START ON 2023] predniSONE  30 mg Oral or Feeding Tube Daily    Followed by     [START ON 2023] predniSONE  20 mg Oral or Feeding Tube Daily    Followed by     [START ON 2023] predniSONE  10 mg Oral Daily     rosuvastatin  20 mg Oral or Feeding Tube Daily     senna-docusate  1-2 tablet Oral  "or Feeding Tube BID     sodium chloride (PF)  3 mL Intracatheter Q8H     sulfamethoxazole-trimethoprim  1 tablet Oral Once per day on Mon Wed Fri     thiamine  100 mg Oral or Feeding Tube Daily       PRN Medications:  glucose **OR** dextrose **OR** glucagon, fentaNYL, heparin lock flush, [Held by provider] labetalol **OR** [Held by provider] hydrALAZINE, magnesium hydroxide, naloxone **OR** naloxone **OR** naloxone **OR** naloxone, ondansetron **OR** ondansetron, oxyCODONE, oxymetazoline, polyethylene glycol, sodium chloride (PF), sodium chloride (PF)    Infusions:    norepinephrine Stopped (07/17/23 1036)     No Known Allergies    Physical Examination:  Vitals: /76   Pulse 93   Temp 99.7  F (37.6  C) (Axillary)   Resp 22   Ht 1.702 m (5' 7\")   Wt 82.6 kg (182 lb 1.6 oz)   SpO2 95%   BMI 28.52 kg/m    General: Adult male patient, lying in bed, critically-ill  HEENT: Normocephalic, atraumatic, no icterus, oral cavity/oropharynx pink and moist, bleeding from trach noted  Cardiac: RRR per bedside monitor  Pulm: unlabored, expansion symmetric, no retractions or use of accessory muscles  Abdomen: soft, non-distended, bowel sounds present  Extremities: warm, no edema, distal pulses +2, well perfused  Skin: no rash or lesion  Psych: calm and cooperative  Neuro:  Mental status: obtunded, unresponsive, unable to follow commands, unable to assess mentation or language in either english or Salvadorean.  Cranial nerves: PERRL, face symmetric, tongue midline,  Motor: Normal bulk and tone. No abnormal movements. 2/5 strength in 4/4 extremities.   Sensory: Intact to nox stim x 4 extremities  Coordination: deferred  Gait: ADAM, deferred    Labs and Imaging:    Results for orders placed or performed during the hospital encounter of 07/06/23 (from the past 24 hour(s))   Glucose by meter   Result Value Ref Range    GLUCOSE BY METER POCT 170 (H) 70 - 99 mg/dL   XR Chest Port 1 View    Narrative    Exam: XR CHEST PORT 1 VIEW, " 7/22/2023 9:59 AM    Indication: post trach    Comparison: Chest radiograph 7/18/2023.    Findings:   Chest AP supine. Left upper extremity PICC overlying the right atrium.  Tracheostomy tube now demonstrated with tip overlying the mid trachea.  Endotracheal tube and enteric tube removed. Cardiomegaly. Contours  within normal limits. No acute pleural or parenchymal abnormality.  Mixed interstitial and airspace opacities largely cleared. There may  be some minor left basal/retrocardiac atelectasis. Upper abdomen  unremarkable.      Impression    Impression:   1.  Tracheostomy tube with tip overlying the mid trachea.  2.  Stable left upper extremity PICC.  3.  Minor left basal/retrocardiac atelectasis.    DENNY SILVERIO MD         SYSTEM ID:  [id number]   XR Abdomen Port 1 View    Narrative    Exam: XR ABDOMEN PORT 1 VIEW, 7/22/2023 9:59 AM    Indication: presumed tooth in stomach on earlier imaging. Seeing  progression in digestive tract    Comparison: Abdominal imaging 7/19/2023.    Findings:   Supine abdominal radiographs. Epigastric region has been included on  corresponding chest radiograph same date and time. The radiodensity in  this region on prior abdominal radiograph is now demonstrated in the  right upper quadrant, possibly postpyloric in small bowel versus in  the overlying colon. Mild gaseous distention of stomach. Percutaneous  gastrostomy tube in place. No small bowel obstruction. Degenerative  changes.      Impression    Impression: The radiodensity previously overlying the stomach is now  demonstrated overlying the right upper quadrant, nonspecific in  appearance as discussed. Radiographic follow-up recommended to  reevaluate progression.    DENNY SILVERIO MD         SYSTEM ID:  [id number]   Glucose by meter   Result Value Ref Range    GLUCOSE BY METER POCT 220 (H) 70 - 99 mg/dL   Glucose by meter   Result Value Ref Range    GLUCOSE BY METER POCT 227 (H) 70 - 99 mg/dL   Glucose by meter   Result  Value Ref Range    GLUCOSE BY METER POCT 229 (H) 70 - 99 mg/dL   Glucose by meter   Result Value Ref Range    GLUCOSE BY METER POCT 193 (H) 70 - 99 mg/dL   CBC with platelets   Result Value Ref Range    WBC Count 16.5 (H) 4.0 - 11.0 10e3/uL    RBC Count 3.25 (L) 4.40 - 5.90 10e6/uL    Hemoglobin 10.0 (L) 13.3 - 17.7 g/dL    Hematocrit 30.7 (L) 40.0 - 53.0 %    MCV 95 78 - 100 fL    MCH 30.8 26.5 - 33.0 pg    MCHC 32.6 31.5 - 36.5 g/dL    RDW 13.8 10.0 - 15.0 %    Platelet Count 298 150 - 450 10e3/uL   Basic metabolic panel   Result Value Ref Range    Sodium 139 136 - 145 mmol/L    Potassium 4.4 3.4 - 5.3 mmol/L    Chloride 105 98 - 107 mmol/L    Carbon Dioxide (CO2) 25 22 - 29 mmol/L    Anion Gap 9 7 - 15 mmol/L    Urea Nitrogen 38.5 (H) 6.0 - 20.0 mg/dL    Creatinine 1.00 0.67 - 1.17 mg/dL    Calcium 7.8 (L) 8.6 - 10.0 mg/dL    Glucose 168 (H) 70 - 99 mg/dL    GFR Estimate >90 >60 mL/min/1.73m2   Magnesium   Result Value Ref Range    Magnesium 2.8 (H) 1.7 - 2.3 mg/dL   Phosphorus   Result Value Ref Range    Phosphorus 3.1 2.5 - 4.5 mg/dL   Glucose by meter   Result Value Ref Range    GLUCOSE BY METER POCT 168 (H) 70 - 99 mg/dL     All relevant imaging and laboratory values personally reviewed.

## 2023-07-23 NOTE — PLAN OF CARE
Major Shift Events: Neuro unchanged. Vitals WNL. TMAX 99.9. FiO2 increased to 40%. Having increased respiratory secretions. Tube feeding remains at goal with 100 q4h FWF. Voiding adequately. Couple watery BM's.     Plan: Continue with current POC    For vital signs and complete assessments, please see documentation flowsheets.

## 2023-07-23 NOTE — PLAN OF CARE
Cardiac: Sinus rhythm to sinus tachy. Bp stable. Cuff pressures. Afebrile.   Neuro: Q 4hr neuro's. Unchanged. Remains unresponsive. Withdraws/ Postures with BUE stimulus. Pupils conjugate reactive. EEG monitoring continued.  Respiratory: CPAP/PS tolerated well. Changed to 6/5 on P/S. Thick bloody secretions through inline suction with trach. #6 shyle XLT In place. ABG sent. No changes made.  GI: TF @ goal 60 with 100ml flushes Q4hr. Hold bowel meds.   : External cath in place. Collecting urine effectively.  Skin: Trach dressing intact. Not to be removed or trach ties changed until POD #5 per ENT.     Sig Events: Neuro unchanged. Awaiting placement. Tolerating pressure support.      Mckay Moore RN SICU Neuro-ICU

## 2023-07-24 LAB
ANION GAP SERPL CALCULATED.3IONS-SCNC: 10 MMOL/L (ref 7–15)
APTT PPP: 25 SECONDS (ref 22–38)
BACTERIA CSF CULT: NO GROWTH
BASE EXCESS BLDV CALC-SCNC: 5.4 MMOL/L (ref -7.7–1.9)
BUN SERPL-MCNC: 42.4 MG/DL (ref 6–20)
CALCIUM SERPL-MCNC: 8 MG/DL (ref 8.6–10)
CHLORIDE SERPL-SCNC: 109 MMOL/L (ref 98–107)
CREAT SERPL-MCNC: 0.98 MG/DL (ref 0.67–1.17)
DEPRECATED HCO3 PLAS-SCNC: 25 MMOL/L (ref 22–29)
ERYTHROCYTE [DISTWIDTH] IN BLOOD BY AUTOMATED COUNT: 14 % (ref 10–15)
FIBRINOGEN PPP-MCNC: 656 MG/DL (ref 170–490)
GFR SERPL CREATININE-BSD FRML MDRD: >90 ML/MIN/1.73M2
GLUCOSE BLDC GLUCOMTR-MCNC: 156 MG/DL (ref 70–99)
GLUCOSE BLDC GLUCOMTR-MCNC: 161 MG/DL (ref 70–99)
GLUCOSE BLDC GLUCOMTR-MCNC: 162 MG/DL (ref 70–99)
GLUCOSE BLDC GLUCOMTR-MCNC: 174 MG/DL (ref 70–99)
GLUCOSE BLDC GLUCOMTR-MCNC: 197 MG/DL (ref 70–99)
GLUCOSE BLDC GLUCOMTR-MCNC: 208 MG/DL (ref 70–99)
GLUCOSE SERPL-MCNC: 183 MG/DL (ref 70–99)
GRAM STAIN RESULT: NORMAL
GRAM STAIN RESULT: NORMAL
HCO3 BLDV-SCNC: 29 MMOL/L (ref 21–28)
HCT VFR BLD AUTO: 28.6 % (ref 40–53)
HGB BLD-MCNC: 9.1 G/DL (ref 13.3–17.7)
INR PPP: 1.12 (ref 0.85–1.15)
MAGNESIUM SERPL-MCNC: 2.9 MG/DL (ref 1.7–2.3)
MCH RBC QN AUTO: 30.6 PG (ref 26.5–33)
MCHC RBC AUTO-ENTMCNC: 31.8 G/DL (ref 31.5–36.5)
MCV RBC AUTO: 96 FL (ref 78–100)
O2/TOTAL GAS SETTING VFR VENT: 40 %
OXYHGB MFR BLDV: 71 % (ref 70–75)
PCO2 BLDV: 37 MM HG (ref 40–50)
PH BLDV: 7.5 [PH] (ref 7.32–7.43)
PHOSPHATE SERPL-MCNC: 3.5 MG/DL (ref 2.5–4.5)
PLATELET # BLD AUTO: 294 10E3/UL (ref 150–450)
PO2 BLDV: 38 MM HG (ref 25–47)
POTASSIUM SERPL-SCNC: 4.3 MMOL/L (ref 3.4–5.3)
RBC # BLD AUTO: 2.97 10E6/UL (ref 4.4–5.9)
SODIUM SERPL-SCNC: 144 MMOL/L (ref 136–145)
WBC # BLD AUTO: 14.3 10E3/UL (ref 4–11)

## 2023-07-24 PROCEDURE — 250N000013 HC RX MED GY IP 250 OP 250 PS 637: Performed by: PSYCHIATRY & NEUROLOGY

## 2023-07-24 PROCEDURE — 82805 BLOOD GASES W/O2 SATURATION: CPT | Performed by: NURSE PRACTITIONER

## 2023-07-24 PROCEDURE — 250N000012 HC RX MED GY IP 250 OP 636 PS 637: Performed by: NURSE PRACTITIONER

## 2023-07-24 PROCEDURE — 85610 PROTHROMBIN TIME: CPT | Performed by: NURSE PRACTITIONER

## 2023-07-24 PROCEDURE — 250N000013 HC RX MED GY IP 250 OP 250 PS 637: Performed by: NURSE PRACTITIONER

## 2023-07-24 PROCEDURE — 99291 CRITICAL CARE FIRST HOUR: CPT | Mod: GC | Performed by: PSYCHIATRY & NEUROLOGY

## 2023-07-24 PROCEDURE — 250N000011 HC RX IP 250 OP 636: Mod: JZ

## 2023-07-24 PROCEDURE — 84100 ASSAY OF PHOSPHORUS: CPT | Performed by: NURSE PRACTITIONER

## 2023-07-24 PROCEDURE — 250N000013 HC RX MED GY IP 250 OP 250 PS 637

## 2023-07-24 PROCEDURE — 85018 HEMOGLOBIN: CPT | Performed by: NURSE PRACTITIONER

## 2023-07-24 PROCEDURE — 85384 FIBRINOGEN ACTIVITY: CPT | Performed by: NURSE PRACTITIONER

## 2023-07-24 PROCEDURE — 250N000013 HC RX MED GY IP 250 OP 250 PS 637: Performed by: STUDENT IN AN ORGANIZED HEALTH CARE EDUCATION/TRAINING PROGRAM

## 2023-07-24 PROCEDURE — 85730 THROMBOPLASTIN TIME PARTIAL: CPT | Performed by: NURSE PRACTITIONER

## 2023-07-24 PROCEDURE — 83735 ASSAY OF MAGNESIUM: CPT | Performed by: NURSE PRACTITIONER

## 2023-07-24 PROCEDURE — 82310 ASSAY OF CALCIUM: CPT | Performed by: NURSE PRACTITIONER

## 2023-07-24 PROCEDURE — 94003 VENT MGMT INPAT SUBQ DAY: CPT

## 2023-07-24 PROCEDURE — 200N000002 HC R&B ICU UMMC

## 2023-07-24 PROCEDURE — G0463 HOSPITAL OUTPT CLINIC VISIT: HCPCS

## 2023-07-24 PROCEDURE — 250N000011 HC RX IP 250 OP 636: Mod: JZ | Performed by: STUDENT IN AN ORGANIZED HEALTH CARE EDUCATION/TRAINING PROGRAM

## 2023-07-24 PROCEDURE — 999N000157 HC STATISTIC RCP TIME EA 10 MIN

## 2023-07-24 RX ORDER — PREDNISONE 20 MG/1
20 TABLET ORAL DAILY
Status: DISCONTINUED | OUTPATIENT
Start: 2023-08-16 | End: 2023-07-26 | Stop reason: HOSPADM

## 2023-07-24 RX ORDER — SULFAMETHOXAZOLE/TRIMETHOPRIM 800-160 MG
1 TABLET ORAL
Status: DISCONTINUED | OUTPATIENT
Start: 2023-07-26 | End: 2023-07-26 | Stop reason: HOSPADM

## 2023-07-24 RX ORDER — PREDNISONE 20 MG/1
40 TABLET ORAL DAILY
Status: DISCONTINUED | OUTPATIENT
Start: 2023-08-02 | End: 2023-07-26 | Stop reason: HOSPADM

## 2023-07-24 RX ORDER — PREDNISONE 10 MG/1
10 TABLET ORAL DAILY
Status: DISCONTINUED | OUTPATIENT
Start: 2023-08-23 | End: 2023-07-26 | Stop reason: HOSPADM

## 2023-07-24 RX ORDER — ACETAMINOPHEN 325 MG/1
650 TABLET ORAL EVERY 4 HOURS PRN
Status: DISCONTINUED | OUTPATIENT
Start: 2023-07-24 | End: 2023-07-26 | Stop reason: HOSPADM

## 2023-07-24 RX ORDER — PREDNISONE 20 MG/1
60 TABLET ORAL DAILY
Status: COMPLETED | OUTPATIENT
Start: 2023-07-25 | End: 2023-07-25

## 2023-07-24 RX ADMIN — PREDNISONE 60 MG: 20 TABLET ORAL at 07:39

## 2023-07-24 RX ADMIN — ENOXAPARIN SODIUM 40 MG: 40 INJECTION SUBCUTANEOUS at 07:43

## 2023-07-24 RX ADMIN — Medication 15 ML: at 07:39

## 2023-07-24 RX ADMIN — THIAMINE HCL TAB 100 MG 100 MG: 100 TAB at 07:39

## 2023-07-24 RX ADMIN — ROSUVASTATIN CALCIUM 20 MG: 20 TABLET, FILM COATED ORAL at 07:40

## 2023-07-24 RX ADMIN — SULFAMETHOXAZOLE AND TRIMETHOPRIM 1 TABLET: 800; 160 TABLET ORAL at 07:43

## 2023-07-24 RX ADMIN — Medication 40 MG: at 20:44

## 2023-07-24 RX ADMIN — ASPIRIN 81 MG CHEWABLE TABLET 81 MG: 81 TABLET CHEWABLE at 07:40

## 2023-07-24 RX ADMIN — Medication 40 MG: at 07:39

## 2023-07-24 RX ADMIN — ACETAMINOPHEN 650 MG: 325 TABLET, FILM COATED ORAL at 17:01

## 2023-07-24 RX ADMIN — MODAFINIL 100 MG: 100 TABLET ORAL at 07:40

## 2023-07-24 ASSESSMENT — ACTIVITIES OF DAILY LIVING (ADL)
ADLS_ACUITY_SCORE: 32
ADLS_ACUITY_SCORE: 36
ADLS_ACUITY_SCORE: 32

## 2023-07-24 NOTE — PROGRESS NOTES
Neurocritical Care Progress Note    Reason for critical care admission: Thalamic stroke   Admitting Team: VISHAL  Date of Service:  07/24/2023  Date of Admission:  7/6/2023  Hospital Day: 19    Assessment/Plan  Michael Stewart is a 52 year old male w/ PMHx past methamphetamine abuse and tobacco use who presents on 7/6/2023 as a transfer from [facility] after the discovery of bilateral thalamic strokes as well as Strep A pharyngitis. No acute interventions were performed. However, upon presentation to [facility], he was noted to have increased somnolence, bilateral ophthalmoplegia w/ nystagmus, and atypical breathing raising concern for alternate etiologies beyond stroke. Diagnostic possibilities include post-streptococcal acute demyelinating encephalomyelitis, other autoimmune processes, or viral meningitis.    24 hour events: No acute events overnight. Has trach and PEG. Tolerating pressure support    Neuro  #diffusion restriction of bilateral thalami with involvement of posterior limb and left internal capsule, diffusion restriction of evin, diffusion restriction of cervical spine with FLAIR changes, unclear etiology  #encephalopathy  #CSF with possible inflammatory or viral encephalitic process  -for labs, diagnostic tests and imaging refer to Dr. Breen's interim summary 07/22  - Discontinue ASA 81mg daily (Bleeding from trach site and anemia and DWI changes are not due to stroke)  -Continue Prednisone taper  -neurochecks q4hrs  -sBP goal < 180 mmHg   -HOB > 30   -PT/OT/SLP when appropriate  -started modafinil on 7/22 for neurostimulation   -discontinued vEEG 7/23    #Sympathetic hyperactivity, improved  - Stopped bromocriptine to 5 mg qhrs 7/23     #Analgesics & sedation  RASS goal: 0 to -1   -as needed tylenol     HEENT   #s/p trach 07/21 by ENT  #Broken loose bottom teeth  -7/15 CT dental showing displacement of both central mandibular incisors  -dentistry signed off  -Patient seen by dental on 7/17 who  did not find teeth in place. One tooth found my nurse in oral cavity.   -tooth is progressing to the RUQ, hopefully will pass through GI tract    CV  #HTN  #Intermittent hypotension  #Hyperlipidemia,   -cardiac monitoring  -sBP goal < 180 mmHg   -PRN Hydralazine and Labetalol  -Rosuvastatin 20 mg    Resp  #Acute hypoxic respiratory failure   #Ventilator associated pneumonia, treated    #Hx tobacco Use  #Pulmonary nodules  Oxygen/vent: PS 6/5/30%  -Pulmonary hygiene    -Continuous pulse ox  -Maintain O2 saturations greater than 92%   -pCXR 07/23: mild atelectasis   -VBG today and daily VBGs     GI  #MARIA T  Diet: TF at goal  Last BM: 7/23  GI prophylaxis: Famotidine 20 mg BID  Bowel regimen: Holding bowel regimen   -PEG placed on 7/21    Renal/  #Hypocalcemia  #urinary retention, resolved  -Daily BMP  -IV fluids: None  -Electrolyte replacement protocols      Endo  #DM2  #Overweight   -Hgb A1c; 6.1  -TSH; 2.13  -high sliding scale insulin   -Monitor glucose levels     Heme  #acute blood loss anemia   -Daily CBC  -PT/INR/Fibrinogen activity  -Afrin/Oxymetazoline spray  -Hgb goal >7, plt goal >50k  -Transfuse to meet Hgb and plt goals     ID  #Leukocytosis  #Ventilator associated pneumonia  -Bactrim for PJP prophylaxis  -7/15 MRSA negative - Stopped Vancomycin on 7/17  -7/15 BC with NGTD  -7/15 Sputum growing streptococcus anginosus, staphylococcus aureus, and klebsiella aerogenes   -Daily CBC  -Follow temperature curve  -Repeated respiratory and blood cultures on 7/18     ICU Checklist  Lines/tubes/drains: PIV x3, L/PICC, trach, PEG  FEN: TFs, replacement  PPX: DVT - SCDs, Lovenox unheld; GI - famotidine  Code: FULL CODE  Dispo: ICU - NCC, LTAC    TIME SPENT ON THIS ENCOUNTER   I spent 52 minutes of critical care time on the unit/floor managing the care of Michael Stewart excluding time performing procedures. Upon evaluation, this patient had a high probability of imminent or life-threatening deterioration due to  acute metabolic encephalopathy, which required my direct attention, intervention, and personal management. Greater than 50% of my time was spent at the bedside counseling the patient and/or coordinating care including chart review, history, exam, documentation, and further activities per this note. I have personally reviewed the following data/imaging over the past 24 hours.     The patient was seen and discussed with the [facility] attending, Dr. Mark Kelly .      JOSE Kapoor MS  Neurology PGY2    24 Hour Vital Signs Summary:  Temp: 99  F (37.2  C) Temp  Min: 99  F (37.2  C)  Max: 99.9  F (37.7  C)  Resp: 22 Resp  Min: 12  Max: 29  SpO2: 96 % SpO2  Min: 91 %  Max: 97 %  Pulse: 103 Pulse  Min: 83  Max: 106  BP: 129/70 Systolic (24hrs), Av , Min:108 , Max:159   Diastolic (24hrs), Av, Min:63, Max:122    Respiratory monitoring:   Vent Mode: CPAP/PS  (Continuous positive airway pressure with Pressure Support)  FiO2 (%): 40 %  PEEP (cm H2O): 5 cmH2O  Pressure Support (cm H2O): 6 cmH2O  Resp: 22     I/O last 3 completed shifts:  In: 2320 [I.V.:100; NG/GT:780]  Out: 2300 [Urine:2300]    Current Medications:   aspirin  81 mg Oral or Feeding Tube Daily    enoxaparin ANTICOAGULANT  40 mg Subcutaneous Q24H    heparin lock flush  5-20 mL Intracatheter Q24H    insulin aspart  1-12 Units Subcutaneous Q4H    modafinil  100 mg Oral or Feeding Tube Daily    multivitamins w/minerals  15 mL Per Feeding Tube Daily    pantoprazole  40 mg Oral or Feeding Tube BID    predniSONE  60 mg Oral Daily    Followed by    [START ON 2023] predniSONE  50 mg Oral or Feeding Tube Daily    Followed by    [START ON 2023] predniSONE  40 mg Oral or Feeding Tube Daily    Followed by    [START ON 2023] predniSONE  30 mg Oral or Feeding Tube Daily    Followed by    [START ON 2023] predniSONE  20 mg Oral or Feeding Tube Daily    Followed by    [START ON 2023] predniSONE  10 mg Oral Daily    rosuvastatin  20 mg Oral or Feeding  "Tube Daily    senna-docusate  1-2 tablet Oral or Feeding Tube BID    sodium chloride (PF)  3 mL Intracatheter Q8H    sulfamethoxazole-trimethoprim  1 tablet Oral Once per day on Mon Wed Fri    thiamine  100 mg Oral or Feeding Tube Daily       PRN Medications:  acetaminophen, glucose **OR** dextrose **OR** glucagon, heparin lock flush, [Held by provider] labetalol **OR** [Held by provider] hydrALAZINE, magnesium hydroxide, naloxone **OR** naloxone **OR** naloxone **OR** naloxone, ondansetron **OR** ondansetron, oxymetazoline, polyethylene glycol, sodium chloride (PF)    Infusions:    No Known Allergies    Physical Examination:  Vitals: /70   Pulse 103   Temp 99  F (37.2  C) (Axillary)   Resp 22   Ht 1.702 m (5' 7\")   Wt 82.6 kg (182 lb 1.6 oz)   SpO2 96%   BMI 28.52 kg/m    General: Adult male patient, lying in bed, critically-ill  HEENT: Normocephalic, atraumatic, no icterus, oral cavity/oropharynx pink and moist, bleeding from trach noted  Cardiac: RRR per bedside monitor  Pulm: unlabored, expansion symmetric, no retractions or use of accessory muscles  Abdomen: soft, non-distended, bowel sounds present  Extremities: warm, no edema, distal pulses +2, well perfused  Skin: no rash or lesion  Psych: calm and cooperative  Neuro:  Mental status: obtunded, unresponsive, unable to follow commands, unable to assess mentation or language in either english or Tristanian.  Cranial nerves: PERRL, face symmetric, tongue midline,  Motor: Normal bulk and tone. No abnormal movements. 2/5 strength in 4/4 extremities.   Sensory: Intact to nox stim in upper  extremities, no response in lower extremities  Coordination: deferred  Gait: ADAM, deferred    Labs and Imaging:    Results for orders placed or performed during the hospital encounter of 07/06/23 (from the past 24 hour(s))   Glucose by meter   Result Value Ref Range    GLUCOSE BY METER POCT 180 (H) 70 - 99 mg/dL   EEG Video 2-12 HRS Ummonitored    Narrative    EEG Video " 2-12 HRS Ummonitored Result    VIDEO EEG DATE: 2023  VIDEO EEG [id number]  VIDEO EEG DAY#: 4  VIDEO EEG SOURCE FILE DURATION: 04 hours and 28 minutes    PATIENT INFORMATION: Michael Stewart is a 53 year old male who presents   with encephalopathy. Video EEG is being done to evaluate for seizures.      MEDICATIONS:  Thiamine   These medications and doses were derived from the medical record at the   time of this procedure.     TECHNICAL SUMMARY: This is a video-EEG monitoring study. EEG was recorded   from 23 scalp electrodes placed according to the 10-20 international   system. Additional electrodes were utilized for referencing, artifact   detection, and recording from other cerebral regions. Qualified   technicians attached EEG electrodes, set up the study, monitored and   reviewed  EEG recordings, and disconnected EEG electrodes when appropriate. Video   was continuously recorded. Video was reviewed for clinical correlation and   to assist with EEG interpretations.     BACKGROUND: There is continuous generalized polymorphic delta/theta   slowing. There is poorly formed parieto-occipital rhythm or 7-8 hz and in   sleep there is rare sleep architecture. EEG is reactive with stimulation.      ACTIVATION: EEG was reactive.      EPILEPTIFORM DISCHARGES: No epileptiform discharges were noted in this   record.      ICTAL: Patient had no electro-clinical seizure or subclinical seizure or   paroxsymal events.      IMPRESSION:  Video EEG Day #4 is abnormal due to the presences of   generalized delta/theta slowing. These Video EEG findings are consistent   with a moderate encephalopathy.  No epileptiform discharges,   electrographic seizures, or paroxsymal events were seen in this record.   Clinical correlation is advised.    SUMMARY OF FOUR DAYS OF VIDEO EEG: Four days of Video EEG is abnormal due   to presences of generalized delta/theta slowing. These Video EEG findings   are consistent with a moderate to severe  encephalopathy.  No epileptiform   discharges, electrographic seizures, or paroxsymal events were seen in   this record     Tessa Arroa MD   Epilepsy Staff             Blood gas arterial with oxyhemoglobin   Result Value Ref Range    pH Arterial 7.52 (H) 7.35 - 7.45    pCO2 Arterial 33 (L) 35 - 45 mm Hg    pO2 Arterial 78 (L) 80 - 105 mm Hg    Bicarbonate Arterial 27 21 - 28 mmol/L    Oxyhemoglobin Arterial 96 92 - 100 %    Base Excess/Deficit (+/-) 4.3 (H) -9.0 - 1.8 mmol/L    FIO2 40     Vlad's Test Yes    Glucose by meter   Result Value Ref Range    GLUCOSE BY METER POCT 190 (H) 70 - 99 mg/dL   Glucose by meter   Result Value Ref Range    GLUCOSE BY METER POCT 194 (H) 70 - 99 mg/dL   Glucose by meter   Result Value Ref Range    GLUCOSE BY METER POCT 151 (H) 70 - 99 mg/dL   Glucose by meter   Result Value Ref Range    GLUCOSE BY METER POCT 162 (H) 70 - 99 mg/dL   CBC with platelets   Result Value Ref Range    WBC Count 14.3 (H) 4.0 - 11.0 10e3/uL    RBC Count 2.97 (L) 4.40 - 5.90 10e6/uL    Hemoglobin 9.1 (L) 13.3 - 17.7 g/dL    Hematocrit 28.6 (L) 40.0 - 53.0 %    MCV 96 78 - 100 fL    MCH 30.6 26.5 - 33.0 pg    MCHC 31.8 31.5 - 36.5 g/dL    RDW 14.0 10.0 - 15.0 %    Platelet Count 294 150 - 450 10e3/uL   Basic metabolic panel   Result Value Ref Range    Sodium 144 136 - 145 mmol/L    Potassium 4.3 3.4 - 5.3 mmol/L    Chloride 109 (H) 98 - 107 mmol/L    Carbon Dioxide (CO2) 25 22 - 29 mmol/L    Anion Gap 10 7 - 15 mmol/L    Urea Nitrogen 42.4 (H) 6.0 - 20.0 mg/dL    Creatinine 0.98 0.67 - 1.17 mg/dL    Calcium 8.0 (L) 8.6 - 10.0 mg/dL    Glucose 183 (H) 70 - 99 mg/dL    GFR Estimate >90 >60 mL/min/1.73m2   Magnesium   Result Value Ref Range    Magnesium 2.9 (H) 1.7 - 2.3 mg/dL   Phosphorus   Result Value Ref Range    Phosphorus 3.5 2.5 - 4.5 mg/dL   Glucose by meter   Result Value Ref Range    GLUCOSE BY METER POCT 156 (H) 70 - 99 mg/dL   Glucose by meter   Result Value Ref Range    GLUCOSE BY METER POCT 161  (H) 70 - 99 mg/dL     All relevant imaging and laboratory values personally reviewed.

## 2023-07-24 NOTE — PLAN OF CARE
Major shift events: Tmax 100.9, unresponsive to tylenol. Trach site increasingly odorous, neuro crit notified of flies flying around trach. Trach site with minimal new bloody drainage. Neuros unchanged. Continue with pressure support 6/5.     Plan: awaiting LTACH placement.

## 2023-07-24 NOTE — PROGRESS NOTES
"ENT DAILY TRACHEOSTOMY PROGRESS NOTE  7/2/2023    Subjective: Trach stoma packed with 1/2 inch TXA and Afrin-soaked strip gauze yesterday. Had some oozing overnight. Stable on pressure support 40%/PEEP 5. Some oozing around PEG as well. No active bleeding or other concerns per nursing.    Objective:  /70   Pulse 103   Temp 99.7  F (37.6  C) (Axillary)   Resp 22   Ht 1.702 m (5' 7\")   Wt 82.6 kg (182 lb 1.6 oz)   SpO2 96%   BMI 28.52 kg/m     General: No acute distress   HEENT: 6-0 proximal XLT Shiley trach in place and secured with twill ties. No evidence of peristoma skin breakdown. Strip gauze has migrated out of stoma and is situated on top of trach faceplate with some clot around dressing, foul smell, no active bright red blood. Strip gauze removed and some clot removed from under faceplate. No active bleeding. Afrin-soaked drain sponge placed under faceplate. Ties with appropriate tightness, skin inspected for breakdown under ties and there is no sign of irritation. Cuff with appropriate pressure.    Pulmonary: Breathing non-labored on pressure support.    Assessment/Plan: Mr. Stewart is a 53 year old male POD #2 s/p tracheostomy for respiratory failure. No active bleeding this morning but some persistent oozing that warranted removal of strip gauze and placement of Afrin-soaked drain sponge.    - Twill ties and sutures to remain in place until POD5  - Keep Afrin-soaked drain sponge in place  - Please order XLT trach supplies.    Trach Tube Instructions:  1) Contact ENT on-call with any questions or concerns  2) The first changing of trach tube, sponge, and or ties will be performed by ENT between post-op day 3 and 7. Afterwards, other hospital staff can manage these items as needed. Prior to first trach change please do not manipulate the trach ties or cut any sutures, or manipulate the dressing.   3) Perform regular suctioning per orders  4) RN should change disposable inner canulas every shift " and or clean it with a brush  5) Keep trach tube obturator taped to wall behind the head of the bed  6) Keep extra unopened trach tube of same size and one size smaller at bedside at all times   7) Minimize the amount of air in the cuff needed to adequately apply positive pressure ventilate. If positive pressure ventilation is not need then the cuff should be down.     Patient and plan discussed with Dr. Maxwell.    Cha Baeza MD  Otolaryngology-Head & Neck Surgery PGY-2  Please contact ENT with questions by dialing [phone] and entering job [phone] when prompted.

## 2023-07-24 NOTE — PROGRESS NOTES
Physician Attestation  Michael was seen and evaluated by me on 07/24/23.  He was in critical condition as the result of persistent encephalopathy with dwi changes (b/l thalamus, medullopontine, c spine), ventilatory associated pneumonia. 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  His condition is now Guarded.     Significant changes in status since my last evaluation include: remains afebrile. Bleeding around trach. Neurological exam unchanged.     I have reviewed changes in critical data from the last 24 hours, including medications, laboratory results, vital signs and radiograph results.      The acute issues managed by me today include: ongoing encephalopathy with dwi changes (b/l thalamus, medullopontine, c spine), ventilatory associated pneumonia hypoxic respiratory failure. Etiology of encephalopathy is unclear. Suspecting auto immune vs post infectiuous process process    Supportive interventions provided and/or ordered by me include supportive care, q4h neurocheck, cont modafinil. cont prednisone taper,  follow autoimmune encephalopathy panel, demyelinating panel. Will discontinue aspirin as the dwi changes are not stroke. Tolerating  pressure support trial. Wean vent as able. Cont PJP prophylaxis with Bactrim. ENT following post trach. Ready for LTACH     I agree with the resident/fellow's assessment and plan of care.      Total Critical Care time spent by me, excluding procedures, was 57 minutes     Mark Kelly MD

## 2023-07-24 NOTE — PROGRESS NOTES
[facility]  WO Nurse Inpatient Assessment     Consulted for: Lip      Patient History (according to provider note(s):      Michael Stewart is a 52 year old male w/ PMHx past methamphetamine abuse and tobacco use who presents on 7/6/2023 as a transfer from [facility] after the discovery of bilateral thalamic strokes as well as Strep A pharyngitis. No acute interventions were performed. However, upon presentation to [facility], he was noted to have increased somnolence, bilateral ophthalmoplegia w/ nystagmus, and atypical breathing raising concern for alternate etiologies beyond stroke. Diagnostic possibilities include post-streptococcal acute demyelinating encephalomyelitis, other autoimmune processes, or viral meningitis.       Assessment:      Areas visualized during today's visit: Face and neck    Wound location: tongue        7/16  Last photo: 7/16  Wound due to: Trauma  Wound history/plan of care: Pt clenching ETT and loose teeth causing trauma. Dental consult has been made.   7/24: Pt had trach placed 7/21, followed by ENT. Tongue is improving though pt still clenching and unable to get clear view of entire tongue.   Noted bleeding around trach stoma but no open areas at this time.   Wound base: 100 %  mucosa     Palpation of the wound bed: normal      Drainage: scant     Description of drainage: serous     Measurements (length x width x depth, in cm): See photo  Periwound skin: Intact      Color: normal and consistent with surrounding tissue      Temperature: normal   Odor: none  Pain: unable to assess due to  sedation , none  Pain interventions prior to dressing change: slow and gentle cares   Treatment goal: Protection  STATUS: improving  Supplies ordered: discussed with RN      Treatment Plan:     Tongue wound(s): Daily    Do not use anything other than bite block or RT approved equipment to offload tongue.   per unit routine oral cares and ETT  positioning per unit routine. Ensure to disengage tongue from bite block with each tube reposition. Ensure tongue is not stuck between teeth and bite block after each position change. Lubricate lips with Vaseline/ mouth moisturizer every shift.     Orders: Written    RECOMMEND PRIMARY TEAM ORDER: None, at this time  Education provided: plan of care and wound progress  Discussed plan of care with: Nurse  [facility] nurse follow-up plan: signing off  Notify WOC if wound(s) deteriorate.  Nursing to notify the Provider(s) and re-consult the WO Nurse if new skin concern.    DATA:     Current support surface: Standard  Low air loss (NINA pump, Isolibrium, Pulsate, skin guard, etc)  Containment of urine/stool: Indwelling catheter and Internal fecal management  BMI: Body mass index is 28.52 kg/m .   Active diet order: Orders Placed This Encounter      NPO per Anesthesia Guidelines for Procedure/Surgery Except for: Meds     Output: I/O last 3 completed shifts:  In: 2320 [I.V.:100; NG/GT:780]  Out: 2300 [Urine:2300]     Labs:   Recent Labs   Lab 07/24/23  0433   HGB 9.1*   WBC 14.3*       Pressure injury risk assessment:   Sensory Perception: 1-->completely limited  Moisture: 3-->occasionally moist  Activity: 1-->bedfast  Mobility: 1-->completely immobile  Nutrition: 3-->adequate  Friction and Shear: 1-->problem  Jacques Score: 10    Tresa Gaitan RN CWOCN  Pager no longer is use, please contact through [employer] group: [facility] Nurse [place]  Dept. Office Number: [phone]

## 2023-07-24 NOTE — PLAN OF CARE
Major Shift Events:  No significant events during the night    Neuro: Unchanged. Withdraws/ postures with BUE. Pupils equal and reactive. Coughs to suction  CV: SR to ST. BP stable. Tmax 99.9 axillary  Respiratory: Remains on PS overnight, 6/5. Frequent suctioning thick secretions. #6 shiley XLT in place  GI: TF @ goal of 60mL/hr with 100mL flushes Q4h. No BM overnight  : External cath in place, adequate output  Skin: Trach dressing oozing/bloody. ENT removed some packing this morning, changed dressing, and will continue to manage dressing changes. RN can apply drops of afrin PRN to gauze if needed. PEG site oozing/bleeding as well. Dressings changed during the night    Plan: Continue with POC    For vital signs and complete assessments, please see documentation flowsheets.

## 2023-07-25 LAB
ANION GAP SERPL CALCULATED.3IONS-SCNC: 9 MMOL/L (ref 7–15)
AQP4 H2O CHANNEL IGG SERPL QL IF: NORMAL
BACTERIA CSF CULT: ABNORMAL
BACTERIA CSF CULT: ABNORMAL
BASE EXCESS BLDV CALC-SCNC: 4.8 MMOL/L (ref -7.7–1.9)
BUN SERPL-MCNC: 49.8 MG/DL (ref 6–20)
CALCIUM SERPL-MCNC: 8.1 MG/DL (ref 8.6–10)
CHLORIDE SERPL-SCNC: 111 MMOL/L (ref 98–107)
CREAT SERPL-MCNC: 1.13 MG/DL (ref 0.67–1.17)
DEPRECATED HCO3 PLAS-SCNC: 27 MMOL/L (ref 22–29)
ERYTHROCYTE [DISTWIDTH] IN BLOOD BY AUTOMATED COUNT: 14.3 % (ref 10–15)
GFR SERPL CREATININE-BSD FRML MDRD: 78 ML/MIN/1.73M2
GLUCOSE BLDC GLUCOMTR-MCNC: 149 MG/DL (ref 70–99)
GLUCOSE BLDC GLUCOMTR-MCNC: 154 MG/DL (ref 70–99)
GLUCOSE BLDC GLUCOMTR-MCNC: 172 MG/DL (ref 70–99)
GLUCOSE BLDC GLUCOMTR-MCNC: 189 MG/DL (ref 70–99)
GLUCOSE BLDC GLUCOMTR-MCNC: 194 MG/DL (ref 70–99)
GLUCOSE BLDC GLUCOMTR-MCNC: 224 MG/DL (ref 70–99)
GLUCOSE SERPL-MCNC: 167 MG/DL (ref 70–99)
HCO3 BLDV-SCNC: 30 MMOL/L (ref 21–28)
HCT VFR BLD AUTO: 28.2 % (ref 40–53)
HGB BLD-MCNC: 8.8 G/DL (ref 13.3–17.7)
MAGNESIUM SERPL-MCNC: 3.4 MG/DL (ref 1.7–2.3)
MCH RBC QN AUTO: 30.6 PG (ref 26.5–33)
MCHC RBC AUTO-ENTMCNC: 31.2 G/DL (ref 31.5–36.5)
MCV RBC AUTO: 98 FL (ref 78–100)
O2/TOTAL GAS SETTING VFR VENT: 50 %
OXYHGB MFR BLDV: 79 % (ref 70–75)
PCO2 BLDV: 45 MM HG (ref 40–50)
PH BLDV: 7.43 [PH] (ref 7.32–7.43)
PHOSPHATE SERPL-MCNC: 4.5 MG/DL (ref 2.5–4.5)
PLATELET # BLD AUTO: 321 10E3/UL (ref 150–450)
PO2 BLDV: 47 MM HG (ref 25–47)
POTASSIUM SERPL-SCNC: 4.2 MMOL/L (ref 3.4–5.3)
RBC # BLD AUTO: 2.88 10E6/UL (ref 4.4–5.9)
SODIUM SERPL-SCNC: 147 MMOL/L (ref 136–145)
WBC # BLD AUTO: 13.1 10E3/UL (ref 4–11)

## 2023-07-25 PROCEDURE — 250N000013 HC RX MED GY IP 250 OP 250 PS 637

## 2023-07-25 PROCEDURE — 83735 ASSAY OF MAGNESIUM: CPT | Performed by: NURSE PRACTITIONER

## 2023-07-25 PROCEDURE — 250N000011 HC RX IP 250 OP 636: Mod: JZ | Performed by: STUDENT IN AN ORGANIZED HEALTH CARE EDUCATION/TRAINING PROGRAM

## 2023-07-25 PROCEDURE — 250N000011 HC RX IP 250 OP 636: Performed by: PSYCHIATRY & NEUROLOGY

## 2023-07-25 PROCEDURE — 250N000012 HC RX MED GY IP 250 OP 636 PS 637

## 2023-07-25 PROCEDURE — 250N000013 HC RX MED GY IP 250 OP 250 PS 637: Performed by: NURSE PRACTITIONER

## 2023-07-25 PROCEDURE — 250N000011 HC RX IP 250 OP 636: Mod: JZ

## 2023-07-25 PROCEDURE — 250N000009 HC RX 250: Performed by: PSYCHIATRY & NEUROLOGY

## 2023-07-25 PROCEDURE — 84100 ASSAY OF PHOSPHORUS: CPT | Performed by: NURSE PRACTITIONER

## 2023-07-25 PROCEDURE — 85027 COMPLETE CBC AUTOMATED: CPT | Performed by: NURSE PRACTITIONER

## 2023-07-25 PROCEDURE — 250N000013 HC RX MED GY IP 250 OP 250 PS 637: Performed by: PSYCHIATRY & NEUROLOGY

## 2023-07-25 PROCEDURE — 200N000002 HC R&B ICU UMMC

## 2023-07-25 PROCEDURE — 82805 BLOOD GASES W/O2 SATURATION: CPT | Performed by: NURSE PRACTITIONER

## 2023-07-25 PROCEDURE — 999N000157 HC STATISTIC RCP TIME EA 10 MIN

## 2023-07-25 PROCEDURE — 80048 BASIC METABOLIC PNL TOTAL CA: CPT | Performed by: NURSE PRACTITIONER

## 2023-07-25 PROCEDURE — 94003 VENT MGMT INPAT SUBQ DAY: CPT

## 2023-07-25 RX ORDER — HYDRALAZINE HYDROCHLORIDE 20 MG/ML
10 INJECTION INTRAMUSCULAR; INTRAVENOUS EVERY 30 MIN PRN
Status: DISCONTINUED | OUTPATIENT
Start: 2023-07-25 | End: 2023-07-25

## 2023-07-25 RX ORDER — CALCIUM GLUCONATE 20 MG/ML
1 INJECTION, SOLUTION INTRAVENOUS ONCE
Status: COMPLETED | OUTPATIENT
Start: 2023-07-25 | End: 2023-07-25

## 2023-07-25 RX ORDER — LABETALOL HYDROCHLORIDE 5 MG/ML
20 INJECTION, SOLUTION INTRAVENOUS EVERY 30 MIN PRN
Status: DISCONTINUED | OUTPATIENT
Start: 2023-07-25 | End: 2023-07-25

## 2023-07-25 RX ADMIN — THIAMINE HCL TAB 100 MG 100 MG: 100 TAB at 09:07

## 2023-07-25 RX ADMIN — SENNOSIDES AND DOCUSATE SODIUM 1 TABLET: 50; 8.6 TABLET ORAL at 20:23

## 2023-07-25 RX ADMIN — ROSUVASTATIN CALCIUM 20 MG: 20 TABLET, FILM COATED ORAL at 09:07

## 2023-07-25 RX ADMIN — MODAFINIL 100 MG: 100 TABLET ORAL at 09:07

## 2023-07-25 RX ADMIN — OXYMETAZOLINE HYDROCHLORIDE 2 SPRAY: 0.05 SPRAY NASAL at 20:36

## 2023-07-25 RX ADMIN — ACETAMINOPHEN 650 MG: 325 TABLET, FILM COATED ORAL at 00:32

## 2023-07-25 RX ADMIN — Medication 40 MG: at 09:11

## 2023-07-25 RX ADMIN — PREDNISONE 60 MG: 20 TABLET ORAL at 09:07

## 2023-07-25 RX ADMIN — Medication 5 ML: at 20:23

## 2023-07-25 RX ADMIN — Medication 15 ML: at 09:06

## 2023-07-25 RX ADMIN — SENNOSIDES AND DOCUSATE SODIUM 1 TABLET: 50; 8.6 TABLET ORAL at 09:06

## 2023-07-25 RX ADMIN — Medication 40 MG: at 20:25

## 2023-07-25 RX ADMIN — ENOXAPARIN SODIUM 40 MG: 40 INJECTION SUBCUTANEOUS at 09:11

## 2023-07-25 RX ADMIN — CALCIUM GLUCONATE 1 G: 20 INJECTION, SOLUTION INTRAVENOUS at 06:48

## 2023-07-25 RX ADMIN — ACETAMINOPHEN 650 MG: 325 TABLET, FILM COATED ORAL at 09:06

## 2023-07-25 ASSESSMENT — ACTIVITIES OF DAILY LIVING (ADL)
ADLS_ACUITY_SCORE: 32
ADLS_ACUITY_SCORE: 36
ADLS_ACUITY_SCORE: 32
ADLS_ACUITY_SCORE: 36
ADLS_ACUITY_SCORE: 32
ADLS_ACUITY_SCORE: 36
ADLS_ACUITY_SCORE: 32
ADLS_ACUITY_SCORE: 36

## 2023-07-25 NOTE — PROGRESS NOTES
Major Shift Events:  No acute events overnight.   Neuro status remains unchanged. Cooling methods and tylenol used for fever. ENT changed trach dressing and did not notice any bugs. PS 6/5 with copious secretions. One BM. Purwick in place. No new skin deficits. Calcium replaced.     Plan: LTACH    For vital signs and complete assessments, please see documentation flowsheets.   Olya Thomas RN on 7/25/2023 at 6:06 AM

## 2023-07-25 NOTE — PLAN OF CARE
Major Shift Events:  pt remains obtunded and sporadically withdraws from pain on only the LUE. Pupils equal, brisk, and appear conjugate on exam. No flies noted on tracheostomy but there is a strong and unique scent coming from the site. No ectopy noted today. Afebrile. Tolerated pressure support from 0759 to 0940 but repeated episodes of apnea resulted in restoration of full mechanical ventilation settings. ENT did not change trach dressing, which is saturated in dried blood and oozing. ENT paged twice and called once. Plan to exchange trach tomorrow.   Plan: if pt tolerates trach exchange tomorrow, then will discharge to [facility]  For vital signs and complete assessments, please see documentation flowsheets.

## 2023-07-25 NOTE — PROGRESS NOTES
Care Management Follow Up    Length of Stay (days): 19    Expected Discharge Date:  7/25/23     Concerns to be Addressed:  Discharge planning     Patient plan of care discussed at interdisciplinary rounds: Yes    Anticipated Discharge Disposition:  [facility], [place]     Anticipated Discharge Services:  Vent weaning  Anticipated Discharge DME:  None    Education Provided on the Discharge Plan: Yes   Patient/Family in Agreement with the Plan:  Yes    Additional Information:  Team reported pt is medically ready to be transfer to [facility].  Pt has been accepted by both facilities, [place] and [facility].    RNCC provided update to pt mom and dtr.  Pt family preference is [place].    Bedside RN reported, ENT team is planning to do pt 1st trach dressing change tomorrow morning, 7/26 .  RNCC provided update to [place] liaison.  [place] Liaison agreed to review pt chart tomorrow morning and check bed availability. Tentative stretcher with vent transport arranged for tomorrow, 7/26 at 3:45 with [facility] # [phone].  RNCC will cont to follow plan of care.    Plan to transfer pt to [place] tomorrow , 7/26 after trach dressing change and remain stable.  Tentative stretcher transport arranged with [facility] # [phone] for 7/26 at 3:45  time.      Addendum: 3:45  Neuro ICU team reported, they called [place] provider to have provider to provider report.  [place] provider informed our provider that they want to see pt Hemoglobin and temperature stable for 48 hrs.  The team feels pt is stable and can go after the trach dressing change tomorrow and cleared by ENT.  [facility] has accepted pt and will check bed available tomorrow morning.  RNCC and Neuro ICU provider provided update to pt dtr. Kelly.  Pt dtr is in agreement for [facility] transfer if bed is available.   RNCC will follow up with [facility] , Sharifa Reed # [phone] tomorrow morning, if pt remain stable.  RNCC called [facility] transport  and provided update about the drop of location.      Plan to transfer pt to [facility] tomorrow , 7/26 after trach dressing change, remain stable and bed is available.  Tentative stretcher transport arranged with [facility] # [phone] for 7/26 at 3:45  time      Arely Cheatham RN, PHN, BSN  4A and 4E/ ICU  Care Coordinator  Phone: [phone]  Pager: [phone]    To contact the weekend [facility] (0800 - 1630) Saturday and Sunday   Units: 4A, 4C, 4E, 5A and 5B- Pager [phone]   Units: 6A, 6B- Pager [phone]   Unit : 6C, 6D- pager [phone]   Units: 7A, 7B, 7C, 7D, and 5C- Pager [phone]

## 2023-07-25 NOTE — PROGRESS NOTES
Brief ENT Note  7/24/23    Intvl/Subj: I was called to examine the trach as the dressing was saturated and there was some concern of bugs at the trach site.    Obj:  Resp - vented.  HEENT - 6-0 proximal XLT cuffed Shiley in place. Split gauze dressing saturated with old dark red blood, no new bright red blood. Dressing was removed and re-placed with a new split gauze. A small amount of afrin was applied to the sponge. There were no flies or bugs at the trach site.    A&P:Mr. Stewart is a 53 year old male POD #2 s/p tracheostomy for respiratory failure. No active bleeding this morning but some persistent oozing that warranted removal of strip gauze and placement of Afrin-soaked drain sponge. Drain sponge replaced this evening with a small amount of new blood but no active oozing.    - Twill ties and sutures to remain in place until POD5  - New split gauze in place. If bright red blood noted, apply additional afrin to split gauze.  - Continue POC    Tarah Jacob MD   Otolaryngology-Head & Neck Surgery PGY3  Please contact ENT on call with questions

## 2023-07-25 NOTE — DISCHARGE SUMMARY
[facility]  NEUROLOGY DISCHARGE SUMMARY    Patient Name:  Michael Stewart  MRN:  [id number]      :  1970      Date of Admission:  2023  Date of Discharge:  2023  Admitting Physician:  Montrell Borrego MD  Discharge Physician:  Jese Iqbal   Primary Care Provider:   No Ref-Primary, Physician  Discharge Disposition:   Discharged to rehabilitation facility    Admission Diagnoses:  Thalamic stroke      Discharge Diagnoses:    Brain lesion    Brief History of Illness:   Michael Stewart is a 52 year old male w/ PMHx past methamphetamine abuse and tobacco use who presents on 2023 as a transfer from [facility] after the discovery of bilateral thalamic strokes as well as Strep A pharyngitis. No acute interventions were performed. However, upon presentation to [facility], he was noted to have increased somnolence, bilateral ophthalmoplegia w/ nystagmus, and atypical breathing raising concern for alternate etiologies beyond his known strokes.     Hospital Course:  Michael Stewart is a 52 year old male w/ PMHx past methamphetamine abuse and tobacco use who presented on 2023 as a transfer from [facility] after the discovery of diffusion restriction in the BL thalami in the setting of recent Strep A pharyngitis infection.     The patient initially presented to the [facility] with 3d of new-onset HA and body aches that was at the time attributed to Strep A pharyngitis as his CTH had returned unremarkable. However, on , he then developed difficulty ambulating with R hemibody weakness and diplopia with MRI Brain on  revealing restricted diffusion of the bilateral thalami (L>R) which were labeled as strokes. However, acute bilateral thalamic strokes is an atypical stroke pattern, especially in a patient who does not appear to be a florid vasculopath from his history and imaging and who had a negative CT Perfusion and MRV Head.     After  "arrival, patient became increasingly somnolent, was found to have bilateral ophthalmoplegia w/ nystagmus, and atypical breathing. His condition worsened over the following 48 hours and patient eventually required intubation due to coma. MR brain repeated on 7/20 revealing diffusion restriction of bilateral thalami with involvement of posterior limb and left internal capsule, diffusion restriction of evin, diffusion restriction of cervical spine with FLAIR changes, unclear etiology. Diagnostic possibilities include post-streptococcal acute demyelinating encephalomyelitis, other autoimmune processes such as autoimmune encephalitis, or viral meningitis -- however, the specific etiology of this patient's coma is not currently known.     At family's request patient was trached with PEG placement on 7/21. Aspirin 81 mg was started early on in hospital coarse when it was believed patient had a stroke. Given bleeding post trach it was discontinued and does not need to be resumed. Patient is currently on a Prednisone taper with a stop date of 8/29. He has been treated for VAP with sputum growing streptococcus anginosus, staphylococcus aureus, and klebsiella aerogenes with Cefepime. He is also receiving Bactrim for PJP prophylaxis with a stop date of 8/14.    Pertinent Investigations:  MR BRAIN W/O CONTRAST (7/6):  \"1. Bilateral thalamic infarcts (left greater than right) with likely involvement of the posterior limb of the left internal capsule which corresponds with the patient's recent symptoms of right-sided weakness and incoordination.  2. Otherwise, normal brain MRI.\"     MRV BRAIN W/O & W CONTRAST (7/6):  \"No evidence of dural venous sinus or deep vein thrombosis.\"     MR BRAIN W/O & W CONTRAST (7/8):  \"1. No definite evidence of meningitis.   2. Unchanged bilateral thalamic infarctions.\"     MR BRAIN W/O CONTRAST (7/14):  \"Evolution of bilateral thalamic infarcts. Brainstem infarcts are more apparent.\"     CTA HEAD " "NECK W CONTRAST (7/15):  \"1. Head CTA demonstrates a possible aneurysm of what appears to be the right callosomarginal artery versus a dilated adjacent vein.  2. Neck CTA demonstrates no stenosis of the major cervical arteries.  3. Stable infarcts in the bilateral thalamus, please see same day head CT 7/15/2023 for further details.\"     MR BRAIN W/O CONTRAST (7/20):  \"Multifocal areas of reduced diffusion in the thalami and brainstem. When compared to the exam on 7/6/2023, these areas do not produce an expected pattern of evolving signal characteristics. Given the appearance of these lesions and their presence in both the brainstem and basal ganglia, an autoimmune encephalomyelitis is favored as an etiology over cerebral infarctions or infections.\"      MR CERVICAL SPINE W/O & W CONTRAST (7/20)\"  \"Multiple areas of increased T2 signal which demonstrate mildly reduced diffusion. No definite enhancement associated with these lesions. Differential would include areas of demyelination which could be secondary to a process such as acute disseminated encephalomyelitis (ADEM) versus an infectious or inflammatory process cannot exclude toxic etiology.\"     Video EEG Reports:      Summary of Day # 1 of VEEG Monitoring (7/7):  \"This is an abnormal EEG due to the presence of severe generalized slowing of the background activities. Findings are consistent with severe diffuse encephalopathy of which the etiology is non-specific.  No epileptiform activities or seizures were observed.\"     Summary of Day # 2 of VEEG Monitoring (7/8):  \"This is an abnormal EEG due to the presence of severe generalized slowing of the background activities. Findings are consistent with severe diffuse encephalopathy of which the etiology is non-specific.  No epileptiform activities or seizures were observed.\"     Summary of Day # 3 of VEEG Monitoring (7/9):  \"This is an abnormal EEG due to the presence of severe generalized slowing of the background " "activities. Findings are consistent with severe diffuse encephalopathy of which the etiology is non-specific.  No epileptiform activities or seizures were observed.\"     Video EEG Day #1 (7/20) is \"abnormal due to the presences of continuous generalized delta/theta slowing. These VEEG findings are consistent with a severe encephalopathy. No epileptiform discharges, electrographic seizures, or paroxsymal events were seen in this record. Clinical correlation is advised.\"     Video EEG Day #2 (7/21) is \"abnormal due to the presences of generalized delta/theta slowing. These VEEG findings are consistent with a moderate encephalopathy. He is less encephalopathic on this day compared to day 1.  No epileptiform discharges, electrographic seizures, or paroxsymal events were seen in this record. Clinical correlation is advised.\"     CSF Studies:         7/7 7/8 7/11 7/19   OPENING PRESSURE 36 16   19   GLUCOSE 80 64 68 87   PROTEIN 57 42.2 38.2 32.1   NUCLEATED CELLS 33 63 92 1   DIFFERENTIAL (% lymphocytes) 84 82 93     RBCs 48 9 4 147   CYTOLOGY   Lymphocytosis   Lymphocytosis   FLOW CYTOMETRY     Polytypic B cells     AEROBIC CULTURE no growth no growth no growth no growth   ANAEROBIC CULTURE no growth   1+ staph epi no growth   FUNGAL CULTURE no growth no growth   no growth      VDRL CSF (7/7) - NON REACTIVE  HSV TYPES 1 AND 2 QUALITATIVE PCR CSF (7/7) - NOT DETECTED  MENINGITIS/ENCEPHALITIS PANEL (7/7) - NEGATIVE  CRYPTOCOCCAL ANTIGEN (7/7) - NEGATIVE  WEST NILE VIRUS RNA PCR (7/8) - NOT DETECTED  LYME DNA PCR (7/8) - NOT DETECTED  OLIGOCLONAL BANDS (7/8) - 3   ENC2 (Encephalopathy, Autoimmune/Paraneoplastic Evaluation) - IN PROCESS     Other Relevant Laboratory Studies:      KARIUS NON-INVASIVE PATHOGEN BLOOD TEST (7/13): + FOR KLEBSIELLA AEROGENES   THYROGLOBULIN ANTIBODY (7/12): <20  THYROID PEROXIDASE ANTIBODY (7/12): <10  BLOOD CULTURE (7/6): NO GROWTH  GROUP A STREPTOCOCCUS PCR (7/6): NEGATIVE  GROUP A STREP " "ANTIGEN (7/5): POSITIVE  INFLUENZA A/B PCR (7/5): NEGATIVE  RSV PCR (7/5): NEGATIVE  SARS COV2 PCR (7/5): NEGATIVE     Consultations:    Otolaryngology/ENT  Surgical ICU  Palliative   PM&R  Dental  Social work  Neurology    Recommendations and Follow-up:  Continue Prednisone taper with a stop date of 8/29.    Continue Bactrim for PJP prophylaxis with a stop date of 8/14.  Can follow up with General Neurology in 6 months.    Discharge physical examination:   BP (!) 144/84   Pulse 91   Temp 99.8  F (37.7  C) (Axillary)   Resp 18   Ht 1.702 m (5' 7\")   Wt 81.9 kg (180 lb 8.9 oz)   SpO2 96%   BMI 28.28 kg/m    General: Adult male patient, lying in bed  HEENT: Normocephalic, atraumatic, no icterus, scleral edema, teeth missing, tongue lesions, minimal bleeding from trach  Cardiac: Sinus rhythm on bedside monitor   Pulm: Unlabored, expansion symmetric, no retractions or use of accessory muscles, assisted mechanically  Abdomen: Soft, non-distended abdomen   Extremities: Warm, generalized 3+ edema, appears adequately perfused  Skin: No rash or lesion observed on exposed skin  Psych: Calm   Neuro:  Mental status: Unresponsive, does not open eyes, does not follow commands, intubated.  Cranial nerves: PERRL, conjugate gaze, weak cough and gag with deep suction.  Motor: Normal bulk and tone. No abnormal movements. 1/5 strength in LUE with extension to noxious stimuli. 0/5 strength to LLE, RUE, RLE with no withdraw to noxious stimuli.   Sensory: responds to noxious stimuli in LUE only, does not grimace.  Coordination: ADAM, deferred.  Gait: ADAM, deferred.      Discharge Medications:  Current Discharge Medication List        START taking these medications    Details   enoxaparin ANTICOAGULANT (LOVENOX) 40 MG/0.4ML syringe Inject 0.4 mLs (40 mg) Subcutaneous every 24 hours    Associated Diagnoses: Brain lesion      insulin aspart (NOVOLOG PEN) 100 UNIT/ML pen Inject 1-12 Units Subcutaneous every 4 hours  Qty: 15 mL    " Associated Diagnoses: Brain lesion      magnesium hydroxide (MILK OF MAGNESIA) 400 MG/5ML suspension 30 mLs by Oral or Feeding Tube route daily as needed for constipation    Associated Diagnoses: Brain lesion      modafinil (PROVIGIL) 100 MG tablet 1 tablet (100 mg) by Oral or Feeding Tube route daily    Associated Diagnoses: Brain lesion      Multiple Vitamins-Minerals (MULTIVITAMINS W/MINERALS) liquid 15 mLs by Per Feeding Tube route daily    Associated Diagnoses: Brain lesion      ondansetron (ZOFRAN ODT) 4 MG ODT tab Take 1 tablet (4 mg) by mouth every 6 hours as needed for nausea or vomiting    Associated Diagnoses: Brain lesion      oxymetazoline (AFRIN) 0.05 % nasal spray Apply 2 sprays topically 3 times daily as needed for other (Trach peristomal bleeding)    Associated Diagnoses: Brain lesion      pantoprazole (PROTONIX) 2 mg/mL SUSP suspension 20 mLs (40 mg) by Oral or Feeding Tube route 2 times daily    Associated Diagnoses: Brain lesion      polyethylene glycol (MIRALAX) 17 g packet 17 g by Oral or Feeding Tube route daily as needed for constipation    Associated Diagnoses: Brain lesion      !! predniSONE (DELTASONE) 10 MG tablet 3 tablets (30 mg) by Oral or Feeding Tube route daily    Associated Diagnoses: Brain lesion      !! predniSONE (DELTASONE) 10 MG tablet 1 tablet (10 mg) by Oral or Feeding Tube route daily    Associated Diagnoses: Brain lesion      !! predniSONE (DELTASONE) 20 MG tablet 2 tablets (40 mg) by Oral or Feeding Tube route daily    Associated Diagnoses: Brain lesion      !! predniSONE (DELTASONE) 20 MG tablet 1 tablet (20 mg) by Oral or Feeding Tube route daily    Associated Diagnoses: Brain lesion      !! predniSONE (DELTASONE) 50 MG tablet 1 tablet (50 mg) by Oral or Feeding Tube route daily    Associated Diagnoses: Brain lesion      rosuvastatin (CRESTOR) 20 MG tablet 1 tablet (20 mg) by Oral or Feeding Tube route daily    Associated Diagnoses: Brain lesion      senna-docusate  (SENOKOT-S/PERICOLACE) 8.6-50 MG tablet 1-2 tablets by Oral or Feeding Tube route 2 times daily    Associated Diagnoses: Brain lesion      sulfamethoxazole-trimethoprim (BACTRIM DS) 800-160 MG tablet 1 tablet by Oral or Feeding Tube route three times a week    Associated Diagnoses: Brain lesion      thiamine (B-1) 100 MG tablet 1 tablet (100 mg) by Oral or Feeding Tube route daily    Associated Diagnoses: Brain lesion       !! - Potential duplicate medications found. Please discuss with provider.        CONTINUE these medications which have NOT CHANGED    Details   acetaminophen (TYLENOL) 325 MG tablet Take 325-650 mg by mouth every 8 hours as needed for mild pain           STOP taking these medications       ibuprofen (ADVIL/MOTRIN) 200 MG tablet Comments:   Reason for Stopping:         Naproxen Sodium (FLANAX PAIN RELIEF PO) Comments:   Reason for Stopping:               Discharge follow up and instructions:     General info for SNF    Length of Stay Estimate: Long Term Care  Condition at Discharge: Stable  Level of care:skilled   Rehabilitation Potential: Fair  Admission H&P remains valid and up-to-date: Yes  Recent Chemotherapy: N/A  Use Nursing Home Standing Orders: Yes     Mantoux instructions    Give two-step Mantoux (PPD) Per Facility Policy Yes     Follow Up and recommended labs and tests    Follow up with General Neurology in 6 months if able. No labs or tests required for this visit.     Reason for your hospital stay    Brain lesion, coma     Activity - Up with assistive device     Full Code     Physical Therapy Adult Consult    Evaluate and treat as clinically indicated.    Reason:  Brain lesion, coma     Occupational Therapy Adult Consult    Evaluate and treat as clinically indicated.    Reason:  Brain lesion, coma     Invasive Ventilator     Diet    Follow this diet upon discharge: Orders Placed This Encounter      Adult Formula Drip Feeding: Continuous Osmolite 1.5; Nasogastric tube; Goal Rate: 7/21:  Once PEG placed/verified for use per MD, initiate @ goal; mL/hr; Do not advance tube feeding rate unless K+ is = or > 3.0, Mg++ is = or > 1.5, and Phos is = or...      NPO for Medical/Clinical Reasons Except for: Meds, NPO but receiving Tube Feeding       Patient seen and discussed with Dr. Rasheed HOUSER CNP  NeuroCritical Care Nurse Practitioner  Pager: [phone]  Ascom: [phone] available M- [phone]

## 2023-07-25 NOTE — PROGRESS NOTES
Neurocritical Care Progress Note    Reason for critical care admission: Thalamic stroke   Admitting Team: VISHAL  Date of Service:  07/25/2023  Date of Admission:  7/6/2023  Hospital Day: 20    Assessment/Plan  Michael Stewart is a 52 year old male w/ PMHx past methamphetamine abuse and tobacco use who presents on 7/6/2023 as a transfer from [facility] after the discovery of bilateral thalamic strokes as well as Strep A pharyngitis. No acute interventions were performed. However, upon presentation to [facility], he was noted to have increased somnolence, bilateral ophthalmoplegia w/ nystagmus, and atypical breathing raising concern for alternate etiologies beyond stroke. Diagnostic possibilities include post-streptococcal acute demyelinating encephalomyelitis, other autoimmune processes, or viral meningitis.     24 hour events: Continues to tolerated PS 6/5. Needs help with managing secretions. Requiring suctioning. Fever > 101 last evening, reduced with Tylenol.    Neuro  #Diffusion restriction of bilateral thalami with involvement of posterior limb and left internal capsule, diffusion restriction of evin, diffusion restriction of cervical spine with FLAIR changes, unclear etiology  #Encephalopathy  #CSF with possible inflammatory or viral encephalitic process  -For labs, diagnostic tests and imaging refer to Dr. Breen's interim summary 07/22  -ASA 81 mg stopped on 7/24 d/t Bleeding from trach site, anemia, and DWI changes are not due to stroke  -Continue Prednisone taper  -Neurochecks q4hrs  -sBP goal < 180 mmHg   -HOB > 30   -PT/OT/SLP when appropriate  -Continue Modafinil on 7/22 for neurostimulation      #Analgesics & sedation  RASS goal: 0 to -1   -Tylenol 650 mg Q4h PRN     HEENT   #Broken loose bottom teeth  -7/15 CT dental showing displacement of both central mandibular incisors  -Patient seen by dental on 7/17 who did not find teeth in place. One tooth found by nurse in oral cavity. 2nd tooth noted  on abdominal XR imaging from 7/22.  -Dentistry signed off     CV  #HTN  -Cardiac monitoring  -sBP goal < 180 mmHg   -PRN Hydralazine and Labetalol  #Hyperlipidemia,   -Rosuvastatin 20 mg     Resp  #Acute hypoxic respiratory failure   #Hx tobacco Use  #Pulmonary nodules  -S/p Trach 7/21 by ENT - The first changing of trach tube, sponge, and ties will be performed by ENT tomorrow 7/26  Oxygen/vent: PS 6/5/30%  -Continue on pressure support as tolerated, wean to trach dome if able   -Pulmonary hygiene    -Continuous pulse ox  -Maintain O2 saturations greater than 92%      GI  Diet: TF's @ goal (60)  -PEG placed on 7/21  Last BM: 7/25  GI prophylaxis: Pantoprazole 40 mg daily   Bowel regimen: Senna-doc BID PRN and Miralax daily PRN     Renal/  #Hypernatremia  #Hyperchloremia  #Elevated BUN   #Hypocalcemia  #Hypermagnesemia  -Increase  Q2h    -Daily BMP  -IV fluids: None  -Electrolyte replacement protocols    Endo  #DM2  #Overweight   -Hgb A1c; 6.1  -TSH; 2.13  -High sliding scale insulin   -Monitor glucose levels     Heme  #Acute blood loss anemia post trach placement, stable, oozing   -Afrin/Oxymetazoline spray TID PRN to trach dressing  -Daily CBC  -Hgb goal >7, plt goal >50k  -Transfuse to meet Hgb and plt goals     ID  #Leukocytosis, likely steroid induced    -Bactrim for PJP prophylaxis  -Fever overnight 7/24 at 2000 -> will repeat cultures if fevers again  -7/18 BCs x2 with NGTD  -7/18 Sputum growing staphylococcus aureus, and klebsiella aerogenes (treated)  -7/15 MRSA negative - Vancomycin stopped on 7/17  -7/15 UA negative   -Daily CBC  -Follow temperature curve     ICU Checklist  Lines/tubes/drains: PIV x2, L/PICC, trach, PEG  FEN: TFs, replacement  PPX: DVT - SCDs, Lovenox; GI - Pantoprazole.  Code: Full code   Dispo: LTAC      Clinically Significant Risk Factors         # Hypernatremia: Highest Na = 147 mmol/L in last 2 days, will monitor as appropriate                 # Overweight: Estimated  "body mass index is 28.52 kg/m  as calculated from the following:    Height as of this encounter: 1.702 m (5' 7\").    Weight as of this encounter: 82.6 kg (182 lb 1.6 oz).          TIME SPENT ON THIS ENCOUNTER   I spent 52 minutes of critical care time on the unit/floor managing the care of Michael Stewart excluding time performing procedures. Upon evaluation, this patient had a high probability of imminent or life-threatening deterioration due to acute metabolic encephalopathy, which required my direct attention, intervention, and personal management. Greater than 50% of my time was spent at the bedside counseling the patient and/or coordinating care including chart review, history, exam, documentation, and further activities per this note. I have personally reviewed the following data/imaging over the past 24 hours.     The patient was seen and discussed with the NCC attending, Dr. Iqbal.    Lavonne HOUSER [facility]  Neurocritical care nurse practitioner  Pager: [phone]  Ascom: *[phone] [place] [facility] [place]700 [place] 1700     24 Hour Vital Signs Summary:  Temp: 98.8  F (37.1  C) Temp  Min: 98.8  F (37.1  C)  Max: 101.4  F (38.6  C)  Resp: 18 Resp  Min: 18  Max: 32  SpO2: 99 % SpO2  Min: 87 %  Max: 99 %  Pulse: 92 Pulse  Min: 83  Max: 115  BP: (!) 140/84 Systolic (24hrs), Av , Min:118 , Max:197   Diastolic (24hrs), Av, Min:61, Max:98    Respiratory monitoring:   Vent Mode: CPAP/PS  (Continuous positive airway pressure with Pressure Support)  FiO2 (%): 50 %  PEEP (cm H2O): 5 cmH2O  Pressure Support (cm H2O): 6 cmH2O  Resp: 18      I/O last 3 completed shifts:  In: 2390 [I.V.:165; NG/GT:845]  Out: 3000 [Urine:3000]    Current Medications:   enoxaparin ANTICOAGULANT  40 mg Subcutaneous Q24H    heparin lock flush  5-20 mL Intracatheter Q24H    insulin aspart  1-12 Units Subcutaneous Q4H    modafinil  100 mg Oral or Feeding Tube Daily    multivitamins w/minerals  15 mL Per Feeding Tube Daily    pantoprazole  40 mg " "Oral or Feeding Tube BID    predniSONE  60 mg Oral or Feeding Tube Daily    Followed by    [START ON 7/26/2023] predniSONE  50 mg Oral or Feeding Tube Daily    Followed by    [START ON 8/2/2023] predniSONE  40 mg Oral or Feeding Tube Daily    Followed by    [START ON 8/9/2023] predniSONE  30 mg Oral or Feeding Tube Daily    Followed by    [START ON 8/16/2023] predniSONE  20 mg Oral or Feeding Tube Daily    Followed by    [START ON 8/23/2023] predniSONE  10 mg Oral or Feeding Tube Daily    rosuvastatin  20 mg Oral or Feeding Tube Daily    senna-docusate  1-2 tablet Oral or Feeding Tube BID    sodium chloride (PF)  3 mL Intracatheter Q8H    [START ON 7/26/2023] sulfamethoxazole-trimethoprim  1 tablet Oral or Feeding Tube Once per day on Mon Wed Fri    thiamine  100 mg Oral or Feeding Tube Daily       PRN Medications:  acetaminophen, glucose **OR** dextrose **OR** glucagon, heparin lock flush, hydrALAZINE, labetalol, magnesium hydroxide, naloxone **OR** naloxone **OR** naloxone **OR** naloxone, ondansetron **OR** ondansetron, oxymetazoline, polyethylene glycol, sodium chloride (PF)    Infusions:  None    No Known Allergies    Physical Examination:  Vitals: BP (!) 140/84   Pulse 92   Temp 98.8  F (37.1  C) (Axillary)   Resp 18   Ht 1.702 m (5' 7\")   Wt 82.6 kg (182 lb 1.6 oz)   SpO2 99%   BMI 28.52 kg/m    General: Adult male patient, lying in bed  HEENT: Normocephalic, atraumatic, no icterus, scleral edema, teeth missing, tongue lesions, minimal bleeding from trach  Cardiac: Sinus rhythm on bedside monitor   Pulm: Unlabored, expansion symmetric, no retractions or use of accessory muscles, assisted mechanically  Abdomen: Soft, non-distended abdomen   Extremities: Warm, generalized 3+ edema, appears adequately perfused  Skin: No rash or lesion observed on exposed skin  Psych: Calm   Neuro:  Mental status: Unresponsive, does not open eyes, does not follow commands, intubated.  Cranial nerves: PERRL, conjugate gaze, " weak cough and gag with deep suction.  Motor: Normal bulk and tone. No abnormal movements. 1/5 strength in LUE with extension to noxious stimuli. 0/5 strength to LLE, RUE, RLE with no withdraw to noxious stimuli.   Sensory: responds to noxious stimuli in LUE only, does not grimace.  Coordination: ADAM, deferred.  Gait: ADAM, deferred.    Labs and Imaging:    Results for orders placed or performed during the hospital encounter of 07/06/23 (from the past 24 hour(s))   Glucose by meter   Result Value Ref Range    GLUCOSE BY METER POCT 197 (H) 70 - 99 mg/dL   Glucose by meter   Result Value Ref Range    GLUCOSE BY METER POCT 208 (H) 70 - 99 mg/dL   Glucose by meter   Result Value Ref Range    GLUCOSE BY METER POCT 174 (H) 70 - 99 mg/dL   Glucose by meter   Result Value Ref Range    GLUCOSE BY METER POCT 154 (H) 70 - 99 mg/dL   Glucose by meter   Result Value Ref Range    GLUCOSE BY METER POCT 149 (H) 70 - 99 mg/dL   CBC with platelets   Result Value Ref Range    WBC Count 13.1 (H) 4.0 - 11.0 10e3/uL    RBC Count 2.88 (L) 4.40 - 5.90 10e6/uL    Hemoglobin 8.8 (L) 13.3 - 17.7 g/dL    Hematocrit 28.2 (L) 40.0 - 53.0 %    MCV 98 78 - 100 fL    MCH 30.6 26.5 - 33.0 pg    MCHC 31.2 (L) 31.5 - 36.5 g/dL    RDW 14.3 10.0 - 15.0 %    Platelet Count 321 150 - 450 10e3/uL   Basic metabolic panel   Result Value Ref Range    Sodium 147 (H) 136 - 145 mmol/L    Potassium 4.2 3.4 - 5.3 mmol/L    Chloride 111 (H) 98 - 107 mmol/L    Carbon Dioxide (CO2) 27 22 - 29 mmol/L    Anion Gap 9 7 - 15 mmol/L    Urea Nitrogen 49.8 (H) 6.0 - 20.0 mg/dL    Creatinine 1.13 0.67 - 1.17 mg/dL    Calcium 8.1 (L) 8.6 - 10.0 mg/dL    Glucose 167 (H) 70 - 99 mg/dL    GFR Estimate 78 >60 mL/min/1.73m2   Magnesium   Result Value Ref Range    Magnesium 3.4 (H) 1.7 - 2.3 mg/dL   Phosphorus   Result Value Ref Range    Phosphorus 4.5 2.5 - 4.5 mg/dL   Blood gas venous with oxyhemoglobin   Result Value Ref Range    pH Venous 7.43 7.32 - 7.43    pCO2 Venous 45 40 -  50 mm Hg    pO2 Venous 47 25 - 47 mm Hg    Bicarbonate Venous 30 (H) 21 - 28 mmol/L    FIO2 50     Oxyhemoglobin Venous 79 (H) 70 - 75 %    Base Excess/Deficit (+/-) 4.8 (H) -7.7 - 1.9 mmol/L   Glucose by meter   Result Value Ref Range    GLUCOSE BY METER POCT 172 (H) 70 - 99 mg/dL       All relevant imaging and laboratory values personally reviewed.

## 2023-07-26 VITALS
OXYGEN SATURATION: 97 % | SYSTOLIC BLOOD PRESSURE: 125 MMHG | TEMPERATURE: 99.8 F | HEIGHT: 67 IN | DIASTOLIC BLOOD PRESSURE: 92 MMHG | HEART RATE: 86 BPM | BODY MASS INDEX: 28.34 KG/M2 | WEIGHT: 180.56 LBS | RESPIRATION RATE: 19 BRPM

## 2023-07-26 LAB
ANION GAP SERPL CALCULATED.3IONS-SCNC: 11 MMOL/L (ref 7–15)
BASE EXCESS BLDV CALC-SCNC: 4.7 MMOL/L (ref -7.7–1.9)
BUN SERPL-MCNC: 40.4 MG/DL (ref 6–20)
CALCIUM SERPL-MCNC: 8.2 MG/DL (ref 8.6–10)
CHLORIDE SERPL-SCNC: 114 MMOL/L (ref 98–107)
CREAT SERPL-MCNC: 0.99 MG/DL (ref 0.67–1.17)
DEPRECATED HCO3 PLAS-SCNC: 25 MMOL/L (ref 22–29)
ERYTHROCYTE [DISTWIDTH] IN BLOOD BY AUTOMATED COUNT: 14.4 % (ref 10–15)
GFR SERPL CREATININE-BSD FRML MDRD: >90 ML/MIN/1.73M2
GLUCOSE BLDC GLUCOMTR-MCNC: 128 MG/DL (ref 70–99)
GLUCOSE BLDC GLUCOMTR-MCNC: 129 MG/DL (ref 70–99)
GLUCOSE BLDC GLUCOMTR-MCNC: 165 MG/DL (ref 70–99)
GLUCOSE BLDC GLUCOMTR-MCNC: 194 MG/DL (ref 70–99)
GLUCOSE SERPL-MCNC: 191 MG/DL (ref 70–99)
HCO3 BLDV-SCNC: 28 MMOL/L (ref 21–28)
HCT VFR BLD AUTO: 27 % (ref 40–53)
HGB BLD-MCNC: 8.3 G/DL (ref 13.3–17.7)
MAGNESIUM SERPL-MCNC: 3 MG/DL (ref 1.7–2.3)
MAYO MISC RESULT: NORMAL
MCH RBC QN AUTO: 30.9 PG (ref 26.5–33)
MCHC RBC AUTO-ENTMCNC: 30.7 G/DL (ref 31.5–36.5)
MCV RBC AUTO: 100 FL (ref 78–100)
O2/TOTAL GAS SETTING VFR VENT: 40 %
OXYHGB MFR BLDV: 94 % (ref 70–75)
PCO2 BLDV: 34 MM HG (ref 40–50)
PH BLDV: 7.52 [PH] (ref 7.32–7.43)
PHOSPHATE SERPL-MCNC: 3.6 MG/DL (ref 2.5–4.5)
PLATELET # BLD AUTO: 311 10E3/UL (ref 150–450)
PO2 BLDV: 70 MM HG (ref 25–47)
POTASSIUM SERPL-SCNC: 4.4 MMOL/L (ref 3.4–5.3)
RBC # BLD AUTO: 2.69 10E6/UL (ref 4.4–5.9)
SODIUM SERPL-SCNC: 150 MMOL/L (ref 136–145)
WBC # BLD AUTO: 9.9 10E3/UL (ref 4–11)

## 2023-07-26 PROCEDURE — 82805 BLOOD GASES W/O2 SATURATION: CPT | Performed by: NURSE PRACTITIONER

## 2023-07-26 PROCEDURE — 250N000013 HC RX MED GY IP 250 OP 250 PS 637

## 2023-07-26 PROCEDURE — 250N000013 HC RX MED GY IP 250 OP 250 PS 637: Performed by: PSYCHIATRY & NEUROLOGY

## 2023-07-26 PROCEDURE — 84100 ASSAY OF PHOSPHORUS: CPT | Performed by: NURSE PRACTITIONER

## 2023-07-26 PROCEDURE — 0B21XFZ CHANGE TRACHEOSTOMY DEVICE IN TRACHEA, EXTERNAL APPROACH: ICD-10-PCS | Performed by: OTOLARYNGOLOGY

## 2023-07-26 PROCEDURE — 36415 COLL VENOUS BLD VENIPUNCTURE: CPT | Performed by: NURSE PRACTITIONER

## 2023-07-26 PROCEDURE — 83735 ASSAY OF MAGNESIUM: CPT | Performed by: NURSE PRACTITIONER

## 2023-07-26 PROCEDURE — 85027 COMPLETE CBC AUTOMATED: CPT | Performed by: NURSE PRACTITIONER

## 2023-07-26 PROCEDURE — 250N000012 HC RX MED GY IP 250 OP 636 PS 637

## 2023-07-26 PROCEDURE — 250N000013 HC RX MED GY IP 250 OP 250 PS 637: Performed by: NURSE PRACTITIONER

## 2023-07-26 PROCEDURE — 80048 BASIC METABOLIC PNL TOTAL CA: CPT | Performed by: NURSE PRACTITIONER

## 2023-07-26 PROCEDURE — 999N000157 HC STATISTIC RCP TIME EA 10 MIN

## 2023-07-26 PROCEDURE — 250N000011 HC RX IP 250 OP 636: Mod: JZ | Performed by: STUDENT IN AN ORGANIZED HEALTH CARE EDUCATION/TRAINING PROGRAM

## 2023-07-26 RX ORDER — POLYETHYLENE GLYCOL 3350 17 G/17G
17 POWDER, FOR SOLUTION ORAL DAILY PRN
DISCHARGE
Start: 2023-07-26 | End: 2024-01-11

## 2023-07-26 RX ORDER — LIDOCAINE HYDROCHLORIDE 20 MG/ML
40 INJECTION, SOLUTION INFILTRATION; PERINEURAL ONCE
Status: COMPLETED | OUTPATIENT
Start: 2023-07-26 | End: 2023-07-26

## 2023-07-26 RX ORDER — OXYMETAZOLINE HYDROCHLORIDE 0.05 G/100ML
2 SPRAY NASAL 3 TIMES DAILY PRN
DISCHARGE
Start: 2023-07-26 | End: 2024-01-11

## 2023-07-26 RX ORDER — PREDNISONE 10 MG/1
30 TABLET ORAL DAILY
DISCHARGE
Start: 2023-08-09 | End: 2024-01-11

## 2023-07-26 RX ORDER — ONDANSETRON 4 MG/1
4 TABLET, ORALLY DISINTEGRATING ORAL EVERY 6 HOURS PRN
DISCHARGE
Start: 2023-07-26 | End: 2024-01-11

## 2023-07-26 RX ORDER — ROSUVASTATIN CALCIUM 20 MG/1
20 TABLET, COATED ORAL DAILY
DISCHARGE
Start: 2023-07-27 | End: 2024-01-11

## 2023-07-26 RX ORDER — PREDNISONE 20 MG/1
40 TABLET ORAL DAILY
DISCHARGE
Start: 2023-08-02 | End: 2024-01-11

## 2023-07-26 RX ORDER — AMOXICILLIN 250 MG
1-2 CAPSULE ORAL 2 TIMES DAILY
DISCHARGE
Start: 2023-07-26 | End: 2024-01-11

## 2023-07-26 RX ORDER — MODAFINIL 100 MG/1
100 TABLET ORAL DAILY
Status: SHIPPED | DISCHARGE
Start: 2023-07-27 | End: 2023-12-26

## 2023-07-26 RX ORDER — PREDNISONE 50 MG/1
50 TABLET ORAL DAILY
DISCHARGE
Start: 2023-07-27 | End: 2024-01-11

## 2023-07-26 RX ORDER — SULFAMETHOXAZOLE/TRIMETHOPRIM 800-160 MG
1 TABLET ORAL
DISCHARGE
Start: 2023-07-28 | End: 2024-01-11

## 2023-07-26 RX ORDER — LANOLIN ALCOHOL/MO/W.PET/CERES
100 CREAM (GRAM) TOPICAL DAILY
DISCHARGE
Start: 2023-07-27 | End: 2024-01-11

## 2023-07-26 RX ORDER — GUAIFENESIN 600 MG/1
15 TABLET, EXTENDED RELEASE ORAL DAILY
DISCHARGE
Start: 2023-07-27 | End: 2024-01-11

## 2023-07-26 RX ORDER — ENOXAPARIN SODIUM 100 MG/ML
40 INJECTION SUBCUTANEOUS EVERY 24 HOURS
DISCHARGE
Start: 2023-07-27 | End: 2024-01-11

## 2023-07-26 RX ORDER — PREDNISONE 10 MG/1
10 TABLET ORAL DAILY
DISCHARGE
Start: 2023-08-23 | End: 2024-01-11

## 2023-07-26 RX ORDER — PREDNISONE 20 MG/1
20 TABLET ORAL DAILY
DISCHARGE
Start: 2023-08-16 | End: 2024-01-11

## 2023-07-26 RX ADMIN — ENOXAPARIN SODIUM 40 MG: 40 INJECTION SUBCUTANEOUS at 08:57

## 2023-07-26 RX ADMIN — SULFAMETHOXAZOLE AND TRIMETHOPRIM 1 TABLET: 800; 160 TABLET ORAL at 09:00

## 2023-07-26 RX ADMIN — MODAFINIL 100 MG: 100 TABLET ORAL at 08:57

## 2023-07-26 RX ADMIN — SENNOSIDES AND DOCUSATE SODIUM 1 TABLET: 50; 8.6 TABLET ORAL at 08:57

## 2023-07-26 RX ADMIN — THIAMINE HCL TAB 100 MG 100 MG: 100 TAB at 08:57

## 2023-07-26 RX ADMIN — PREDNISONE 50 MG: 20 TABLET ORAL at 08:57

## 2023-07-26 RX ADMIN — Medication 15 ML: at 08:57

## 2023-07-26 RX ADMIN — Medication 40 MG: at 09:00

## 2023-07-26 RX ADMIN — ROSUVASTATIN CALCIUM 20 MG: 20 TABLET, FILM COATED ORAL at 08:57

## 2023-07-26 ASSESSMENT — ACTIVITIES OF DAILY LIVING (ADL)
ADLS_ACUITY_SCORE: 32

## 2023-07-26 NOTE — PROGRESS NOTES
"ENT DAILY TRACHEOSTOMY PROGRESS NOTE  7/26/2023    Subjective: Remains on vent settings 5/40. Tm 99.4.     Objective:  BP (!) 144/79   Pulse 105   Temp 100.3  F (37.9  C) (Oral)   Resp 28   Ht 1.702 m (5' 7\")   Wt 81.9 kg (180 lb 8.9 oz)   SpO2 94%   BMI 28.28 kg/m     General: No acute distress   HEENT: 6-0 proximal XLT Shiley trach in place and secured with twill ties and sutured. Trach change performed today, see note below. No evidence of peristoma skin breakdown. No active bleeding. Cuff with appropriate pressure.    Pulmonary: On Vent    Otolaryngology Trach Change Note  Type of trach removed: 6-0 PXLT cuffed shiley  Type of trach placed: 6-0 PXLT cuffed shiley  Procedure note: Patient was suctioned via trach and secretions were removed.  Patient was appropriately positioned flat and supine. Trach ties/sutures were removed while holding trach in place. Ventilations were then paused by RT. Trach was removed without difficulty. Stoma appeared patent without obstruction or secretions. 4x4 was used to wipe away secretions. New trach was inserted with obturator without difficulty or resistance. Obturator was removed and inner cannula locked in place. Ventilations were resumed by RT and confirmed good air flow with no cuff leak. Correct positioning of trach was confirmed by easily passing a suction through the trach and obvious air movement was noted with good oxygen saturations.  Trach ties were placed and secured. Drain sponge was placed. Patient tolerated the trach change well and without complication.         Assessment/Plan: Mr. Stewart is a 53 year old male with history of bilateral thalamic strokes, strep A pharyngitis, encephalopathy. prolonged intubation & unable to wean from vent now s/p tracheostomy on 7/21/23. First trach change performed by ENT on 7/26/2023.     - Patient will need additional 6-0 PXLT cuffed shiley as well as one size smaller at bedside and to discharge to [facility] safely.  - " Odorous secretions and thick/tan white secretions from tracheostomy; these are not due to the tracheostomy; favor working up for pulmonary source and treating per primary team. There is NO sign of infection within the stoma. There are no flies around stoma on todays exam.  - Ok to replace drain sponges PRN as needed. Can spray afrin on sponges to help with any bloody secretions. Avoid deep suctioning as it can avoid the posterior tracheal wall and can cause bleeding. There was no bleeding from the stoma on todays trach change.  - Trach tract established; first trach change performed by ENT 7/26/23. Any future trach changes can be performed by trained medical staff.     Trach Tube Instructions:  - Contact ENT on-call with any questions or concerns  - Perform regular suctioning per orders  - RN should change disposable inner canulas every shift and or clean it with a brush  - Keep trach tube obturator taped to wall behind the head of the bed  - Keep extra unopened trach tube of same size and one size smaller at bedside at all time  - Minimize the amount of air in the cuff needed to adequately apply positive pressure ventilate. If positive pressure ventilation is not need then the cuff should be down.     Patient and plan discussed with Dr. Fulton.    Carlos Leo MD  Otolaryngology Head and Neck Surgery Resident, PGY-4  Please page on call Otolaryngology resident with questions.

## 2023-07-26 NOTE — PLAN OF CARE
Discharged to: [facility] at 1600  Belongings: With family, no valuables with patient per family  AVS (After Visit Summary) discussed with: Sent to [facility] by care coordinator    Goal Outcome Evaluation:      Plan of Care Reviewed With: patient, child    Overall Patient Progress: improvingOverall Patient Progress: improving    Outcome Evaluation: Transfer to LTACH today

## 2023-07-26 NOTE — PLAN OF CARE
[facility]      Patient Name: Michael Stewart MRN# [id number]   Age: 53 year old YOB: 1970   Date of Admission:7/6/2023      ICU End of Shift Summary. Please see flowsheets for vital signs and detailed assessment data.    Changes this shift: No acute events noted overnight. Bilateral upper extremities withdrawing from noxious stimuli. Tmax: 100.3 degrees (oral) Fahrenheit--cold packs applied to groin and axilla. ENT verbalized intent to perform trach exchange at or around 06:30-07:30. Condom cath and PrimoFit in-place for urinary incontinence management. Patient with one bowel movement overnight without appreciable dentition in stool. Unable to assess oral cavity due to clenching. Neurocritical care notified of alkalotic venous blood gas results--orders pending, per NCC.     Plan: Continue with current plan of care, anticipating potential discharge to LTAC, i.e., [facility] or [facility], per social work documentation.         Jese Pro RN  Critical Care Flex Team

## 2023-07-26 NOTE — PROGRESS NOTES
Care Management Follow Up    Length of Stay (days): 20    Expected Discharge Date:  7/26/2023     Concerns to be Addressed: other (see comments) (family concerned)   none  Patient plan of care discussed at interdisciplinary rounds: Yes    Anticipated Discharge Disposition: LTACH     Anticipated Discharge Services: Transportation Services  Anticipated Discharge DME:      Patient/family educated on Medicare website which has current facility and service quality ratings:  NA  Education Provided on the Discharge Plan: Yes  Patient/Family in Agreement with the Plan: yes    Referrals Placed by CM/SW:  None  Private pay costs discussed: Not applicable    Additional Information:    Updated received from [facility] reece Skaggs  Bed confirmed #[id number]  Bedside Nurse [phone]  Admin [phone]  Provider updated for hand off report.     Laura from [facility] reached out stating there was a miscommunication yesterday and they don't need a 48 hours hold. Updated information during round. Team states patient is already set for [facility] at this time. Updated Laura on final discharge plan for patient is already set and confirmed with family.       Care Management Discharge Note    Discharge Date:         Discharge Disposition: [facility]    Discharge Services: Transportation Services    Discharge DME:      Discharge Transportation: other (see comments) (EMS)    Private pay costs discussed: Not applicable    Does the patient's insurance plan have a 3 day qualifying hospital stay waiver?  No    PAS Confirmation Code:  NA  Patient/family educated on Medicare website which has current facility and service quality ratings:      Education Provided on the Discharge Plan: Yes  Persons Notified of Discharge Plans: neuro provider , team and patient.  Patient/Family in Agreement with the Plan: yes    Handoff Referral Completed:     Additional Information:    Met with family at bedside and updated on final discharge plan. Family agreed with  the plan. Message left for bedside nurse.    Provided to [facility] bedside nurse the phone number of patient's Nurse at [facility] for report.    Rosibel Thomas RN, PHN, BSN   Float Nurse Care Coordinator  Covering for Unit .4A/4E  Phone [phone]

## 2023-07-27 ENCOUNTER — APPOINTMENT (OUTPATIENT)
Dept: INTERPRETER SERVICES | Facility: CLINIC | Age: 53
End: 2023-07-27
Payer: COMMERCIAL

## 2023-07-27 LAB
AQP4 H2O CHANNEL IGG SERPL QL: NEGATIVE
IMMUNOLOGIST REVIEW: NORMAL
MOG IGG1 SERPL QL FC: NEGATIVE

## 2023-07-30 ENCOUNTER — HEALTH MAINTENANCE LETTER (OUTPATIENT)
Age: 53
End: 2023-07-30

## 2023-08-02 LAB — BACTERIA CSF CULT: NORMAL

## 2023-08-05 LAB
BACTERIA CSF CULT: NO GROWTH
BACTERIA SPT CULT: NO GROWTH

## 2023-08-16 LAB — BACTERIA CSF CULT: NO GROWTH

## 2023-12-19 ENCOUNTER — APPOINTMENT (OUTPATIENT)
Dept: INTERPRETER SERVICES | Facility: CLINIC | Age: 53
End: 2023-12-19
Payer: COMMERCIAL

## 2023-12-26 ENCOUNTER — APPOINTMENT (OUTPATIENT)
Dept: CT IMAGING | Facility: CLINIC | Age: 53
End: 2023-12-26
Attending: EMERGENCY MEDICINE
Payer: COMMERCIAL

## 2023-12-26 ENCOUNTER — HOSPITAL ENCOUNTER (EMERGENCY)
Facility: CLINIC | Age: 53
Discharge: HOME | End: 2023-12-26
Attending: EMERGENCY MEDICINE | Admitting: EMERGENCY MEDICINE
Payer: COMMERCIAL

## 2023-12-26 ENCOUNTER — APPOINTMENT (OUTPATIENT)
Dept: MRI IMAGING | Facility: CLINIC | Age: 53
End: 2023-12-26
Attending: EMERGENCY MEDICINE
Payer: COMMERCIAL

## 2023-12-26 VITALS
HEART RATE: 62 BPM | HEIGHT: 67 IN | RESPIRATION RATE: 19 BRPM | SYSTOLIC BLOOD PRESSURE: 131 MMHG | WEIGHT: 149.8 LBS | OXYGEN SATURATION: 97 % | TEMPERATURE: 97.7 F | BODY MASS INDEX: 23.51 KG/M2 | DIASTOLIC BLOOD PRESSURE: 95 MMHG

## 2023-12-26 DIAGNOSIS — R42 VERTIGO: ICD-10-CM

## 2023-12-26 LAB
ANION GAP SERPL CALCULATED.3IONS-SCNC: 8 MMOL/L (ref 7–15)
APTT PPP: 26 SECONDS (ref 22–38)
BASOPHILS # BLD AUTO: 0.1 10E3/UL (ref 0–0.2)
BASOPHILS NFR BLD AUTO: 1 %
BUN SERPL-MCNC: 9.1 MG/DL (ref 6–20)
CALCIUM SERPL-MCNC: 9.4 MG/DL (ref 8.6–10)
CHLORIDE SERPL-SCNC: 104 MMOL/L (ref 98–107)
CREAT SERPL-MCNC: 0.57 MG/DL (ref 0.67–1.17)
DEPRECATED HCO3 PLAS-SCNC: 26 MMOL/L (ref 22–29)
EGFRCR SERPLBLD CKD-EPI 2021: >90 ML/MIN/1.73M2
EOSINOPHIL # BLD AUTO: 0.2 10E3/UL (ref 0–0.7)
EOSINOPHIL NFR BLD AUTO: 2 %
ERYTHROCYTE [DISTWIDTH] IN BLOOD BY AUTOMATED COUNT: 13.4 % (ref 10–15)
GLUCOSE BLDC GLUCOMTR-MCNC: 101 MG/DL (ref 70–99)
GLUCOSE SERPL-MCNC: 108 MG/DL (ref 70–99)
HCT VFR BLD AUTO: 43.4 % (ref 40–53)
HGB BLD-MCNC: 14.4 G/DL (ref 13.3–17.7)
IMM GRANULOCYTES # BLD: 0 10E3/UL
IMM GRANULOCYTES NFR BLD: 0 %
INR PPP: 0.86 (ref 0.85–1.15)
LYMPHOCYTES # BLD AUTO: 2.7 10E3/UL (ref 0.8–5.3)
LYMPHOCYTES NFR BLD AUTO: 30 %
MCH RBC QN AUTO: 29.6 PG (ref 26.5–33)
MCHC RBC AUTO-ENTMCNC: 33.2 G/DL (ref 31.5–36.5)
MCV RBC AUTO: 89 FL (ref 78–100)
MONOCYTES # BLD AUTO: 0.8 10E3/UL (ref 0–1.3)
MONOCYTES NFR BLD AUTO: 9 %
NEUTROPHILS # BLD AUTO: 5.2 10E3/UL (ref 1.6–8.3)
NEUTROPHILS NFR BLD AUTO: 58 %
NRBC # BLD AUTO: 0 10E3/UL
NRBC BLD AUTO-RTO: 0 /100
PLATELET # BLD AUTO: 307 10E3/UL (ref 150–450)
POTASSIUM SERPL-SCNC: 3.7 MMOL/L (ref 3.4–5.3)
RBC # BLD AUTO: 4.87 10E6/UL (ref 4.4–5.9)
SODIUM SERPL-SCNC: 138 MMOL/L (ref 135–145)
TROPONIN T SERPL HS-MCNC: 9 NG/L
WBC # BLD AUTO: 9 10E3/UL (ref 4–11)

## 2023-12-26 PROCEDURE — 93010 ELECTROCARDIOGRAM REPORT: CPT | Performed by: EMERGENCY MEDICINE

## 2023-12-26 PROCEDURE — 85004 AUTOMATED DIFF WBC COUNT: CPT | Performed by: EMERGENCY MEDICINE

## 2023-12-26 PROCEDURE — 36415 COLL VENOUS BLD VENIPUNCTURE: CPT | Performed by: EMERGENCY MEDICINE

## 2023-12-26 PROCEDURE — 84484 ASSAY OF TROPONIN QUANT: CPT | Performed by: EMERGENCY MEDICINE

## 2023-12-26 PROCEDURE — 70544 MR ANGIOGRAPHY HEAD W/O DYE: CPT | Mod: XU

## 2023-12-26 PROCEDURE — 99285 EMERGENCY DEPT VISIT HI MDM: CPT | Mod: 25

## 2023-12-26 PROCEDURE — 82962 GLUCOSE BLOOD TEST: CPT

## 2023-12-26 PROCEDURE — 255N000002 HC RX 255 OP 636: Mod: JZ | Performed by: EMERGENCY MEDICINE

## 2023-12-26 PROCEDURE — 70553 MRI BRAIN STEM W/O & W/DYE: CPT

## 2023-12-26 PROCEDURE — 85730 THROMBOPLASTIN TIME PARTIAL: CPT | Performed by: EMERGENCY MEDICINE

## 2023-12-26 PROCEDURE — 99285 EMERGENCY DEPT VISIT HI MDM: CPT | Mod: 25 | Performed by: EMERGENCY MEDICINE

## 2023-12-26 PROCEDURE — 70549 MR ANGIOGRAPH NECK W/O&W/DYE: CPT

## 2023-12-26 PROCEDURE — 93005 ELECTROCARDIOGRAM TRACING: CPT

## 2023-12-26 PROCEDURE — A9585 GADOBUTROL INJECTION: HCPCS | Mod: JZ | Performed by: EMERGENCY MEDICINE

## 2023-12-26 PROCEDURE — 80048 BASIC METABOLIC PNL TOTAL CA: CPT | Performed by: EMERGENCY MEDICINE

## 2023-12-26 PROCEDURE — 85610 PROTHROMBIN TIME: CPT | Performed by: EMERGENCY MEDICINE

## 2023-12-26 RX ORDER — BROMOCRIPTINE MESYLATE 2.5 MG/1
2.5 TABLET ORAL 2 TIMES DAILY
Qty: 60 TABLET | Refills: 0 | Status: SHIPPED | OUTPATIENT
Start: 2023-12-26 | End: 2024-01-11

## 2023-12-26 RX ORDER — TAMSULOSIN HYDROCHLORIDE 0.4 MG/1
0.4 CAPSULE ORAL DAILY
Qty: 30 CAPSULE | Refills: 0 | Status: SHIPPED | OUTPATIENT
Start: 2023-12-26 | End: 2024-01-11

## 2023-12-26 RX ORDER — ATORVASTATIN CALCIUM 40 MG/1
40 TABLET, FILM COATED ORAL DAILY
Qty: 30 TABLET | Refills: 0 | Status: SHIPPED | OUTPATIENT
Start: 2023-12-26 | End: 2024-01-11

## 2023-12-26 RX ORDER — MODAFINIL 100 MG/1
100 TABLET ORAL EVERY MORNING
Qty: 30 TABLET | Refills: 0 | Status: SHIPPED | OUTPATIENT
Start: 2023-12-26 | End: 2024-01-11

## 2023-12-26 RX ORDER — IOPAMIDOL 755 MG/ML
100 INJECTION, SOLUTION INTRAVASCULAR ONCE
Status: DISCONTINUED | OUTPATIENT
Start: 2023-12-26 | End: 2023-12-27 | Stop reason: HOSPADM

## 2023-12-26 RX ORDER — GADOBUTROL 604.72 MG/ML
0.1 INJECTION INTRAVENOUS ONCE
Status: COMPLETED | OUTPATIENT
Start: 2023-12-26 | End: 2023-12-26

## 2023-12-26 RX ORDER — PANTOPRAZOLE SODIUM 20 MG/1
20 TABLET, DELAYED RELEASE ORAL DAILY
Qty: 30 TABLET | Refills: 0 | Status: SHIPPED | OUTPATIENT
Start: 2023-12-26 | End: 2024-01-11

## 2023-12-26 RX ORDER — CARVEDILOL 6.25 MG/1
6.25 TABLET ORAL 2 TIMES DAILY WITH MEALS
Qty: 60 TABLET | Refills: 0 | Status: SHIPPED | OUTPATIENT
Start: 2023-12-26

## 2023-12-26 RX ORDER — SERTRALINE HYDROCHLORIDE 25 MG/1
25 TABLET, FILM COATED ORAL DAILY
Qty: 30 TABLET | Refills: 0 | Status: SHIPPED | OUTPATIENT
Start: 2023-12-26 | End: 2024-01-11

## 2023-12-26 RX ORDER — LANOLIN ALCOHOL/MO/W.PET/CERES
3 CREAM (GRAM) TOPICAL
Qty: 30 TABLET | Refills: 0 | Status: SHIPPED | OUTPATIENT
Start: 2023-12-26 | End: 2024-01-11

## 2023-12-26 RX ADMIN — GADOBUTROL 6.79 ML: 604.72 INJECTION INTRAVENOUS at 20:38

## 2023-12-26 ASSESSMENT — ACTIVITIES OF DAILY LIVING (ADL)
ADLS_ACUITY_SCORE: 35

## 2023-12-26 NOTE — ED TRIAGE NOTES
Triage Assessment (Adult)       Row Name 12/26/23 2070          Triage Assessment    Airway WDL WDL        Respiratory WDL    Respiratory WDL WDL        Skin Circulation/Temperature WDL    Skin Circulation/Temperature WDL WDL        Cardiac WDL    Cardiac WDL WDL        Peripheral/Neurovascular WDL    Peripheral Neurovascular WDL WDL        Cognitive/Neuro/Behavioral WDL    Cognitive/Neuro/Behavioral WDL WDL

## 2023-12-26 NOTE — ED PROVIDER NOTES
[facility] EMERGENCY DEPARTMENT ([facility])    12/26/23      ED PROVIDER NOTE   ED 1   4:52 PM   History     Chief Complaint   Patient presents with    Dizziness    Stroke Symptoms     The history is provided by the patient, medical records and a relative.     Michael Stewart is a 53 year old male with prior history of bilateral thalamic strokes on 7/6/2023 complicated by acute respiratory failure requiring intubation and ICU stay, residual right sided hemiparesis who presents to the Emergency Department with an episode of sudden onset of dizziness. At around 2-3 PM today he developed episode of rapid room spinning. Symptoms lasted 5-10 minutes prior to resolution.  Patient is mostly back at his baseline at this time.  Family states that ever since his CVA in July 2023 he has had chronic right sided weakness and paresthesias. [facility] had [facility] after his hospitalization for his CVA.  Patient only just returned home from the rehab facility 3 weeks ago. Patient was supposed to have follow-up appointment for medication refills but missed this.  He has a make up appointment scheduled in the somewhat distant future.  However he has been out of all of his medications, and last took his medications on 12/12/23.       Past Medical History  No past medical history on file.  Past Surgical History:   Procedure Laterality Date    ENDOSCOPIC INSERTION TUBE GASTROSTOMY  7/21/2023    Procedure: Endoscopic insertion tube gastrostomy;  Surgeon: William Garcia MD;  Location: [facility] OR    PICC DOUBLE LUMEN PLACEMENT Left 07/15/2023    Left basilic vein 48cm total 1cm external.    TRACHEOSTOMY N/A 7/21/2023    Procedure: Tracheostomy,  bronchoscopy;  Surgeon: Shawna Maxwell MD;  Location: [facility] OR     atorvastatin (LIPITOR) 40 MG tablet  bromocriptine (PARLODEL) 2.5 MG tablet  carvedilol (COREG) 6.25 MG tablet  melatonin 3 MG tablet  modafinil (PROVIGIL) 100 MG tablet  pantoprazole  "(PROTONIX) 20 MG EC tablet  sertraline (ZOLOFT) 25 MG tablet  tamsulosin (FLOMAX) 0.4 MG capsule  acetaminophen (TYLENOL) 325 MG tablet  enoxaparin ANTICOAGULANT (LOVENOX) 40 MG/0.4ML syringe  insulin aspart (NOVOLOG PEN) 100 UNIT/ML pen  magnesium hydroxide (MILK OF MAGNESIA) 400 MG/5ML suspension  Multiple Vitamins-Minerals (MULTIVITAMINS W/MINERALS) liquid  ondansetron (ZOFRAN ODT) 4 MG ODT tab  oxymetazoline (AFRIN) 0.05 % nasal spray  polyethylene glycol (MIRALAX) 17 g packet  predniSONE (DELTASONE) 10 MG tablet  predniSONE (DELTASONE) 10 MG tablet  predniSONE (DELTASONE) 20 MG tablet  predniSONE (DELTASONE) 20 MG tablet  predniSONE (DELTASONE) 50 MG tablet  rosuvastatin (CRESTOR) 20 MG tablet  senna-docusate (SENOKOT-S/PERICOLACE) 8.6-50 MG tablet  sulfamethoxazole-trimethoprim (BACTRIM DS) 800-160 MG tablet  thiamine (B-1) 100 MG tablet      No Known Allergies  Family History  No family history on file.  Social History   Social History     Tobacco Use    Smoking status: Never    Smokeless tobacco: Never   Substance Use Topics    Alcohol use: Yes     Comment: rarely    Drug use: Not Currently         A medically appropriate review of systems was performed with pertinent positives and negatives noted in the HPI, and all other systems negative.    Physical Exam   BP: (!) 156/127  Pulse: 76  Temp: 97.7  F (36.5  C)  Resp: 16  Height: 170.2 cm (5' 7\")  Weight: 67.9 kg (149 lb 12.8 oz)  SpO2: 96 %    Physical Exam  Vitals and nursing note reviewed.   Constitutional:       General: He is not in acute distress.     Appearance: He is well-developed. He is not ill-appearing, toxic-appearing or diaphoretic.   HENT:      Head: Normocephalic and atraumatic.      Mouth/Throat:      Lips: Pink.      Mouth: Mucous membranes are moist.      Pharynx: Oropharynx is clear. No oropharyngeal exudate.   Eyes:      General: Lids are normal. No visual field deficit or scleral icterus.     Extraocular Movements: Extraocular movements " intact.      Right eye: No nystagmus.      Left eye: No nystagmus.      Conjunctiva/sclera: Conjunctivae normal.      Pupils: Pupils are equal, round, and reactive to light.   Neck:      Thyroid: No thyromegaly.      Vascular: No JVD.      Trachea: No tracheal deviation.      Comments: Tracheostomy scar noted  Cardiovascular:      Rate and Rhythm: Normal rate and regular rhythm.      Pulses: Normal pulses.      Heart sounds: Normal heart sounds. No murmur heard.     No friction rub. No gallop.   Pulmonary:      Effort: Pulmonary effort is normal. No respiratory distress.      Breath sounds: Normal breath sounds.   Abdominal:      General: Bowel sounds are normal. There is no distension.      Palpations: Abdomen is soft. There is no mass.      Tenderness: There is no abdominal tenderness. There is no guarding or rebound.   Musculoskeletal:         General: No tenderness. Normal range of motion.      Cervical back: Normal range of motion and neck supple. No erythema or rigidity.      Right lower leg: No edema.      Left lower leg: No edema.   Lymphadenopathy:      Cervical: No cervical adenopathy.   Skin:     General: Skin is warm and dry.      Capillary Refill: Capillary refill takes less than 2 seconds.      Coloration: Skin is not pale.      Findings: No erythema or rash.   Neurological:      Mental Status: He is alert and oriented to person, place, and time.      Cranial Nerves: No cranial nerve deficit, dysarthria or facial asymmetry.      Sensory: No sensory deficit.      Motor: Motor function is intact.      Coordination: Finger-Nose-Finger Test abnormal and Heel to Shin Test abnormal. Impaired rapid alternating movements.      Comments: Uncoordinated with right upper and lower extremity, this is apparently baseline since July.  No aphasia noted.   Psychiatric:         Mood and Affect: Mood and affect normal.         Speech: Speech normal.         Behavior: Behavior normal.           ED Course     ED Course as  of 12/28/23 0922   Tue Dec 26, 2023   1658 Given prior history of CVA, plan is to activate tier 1 stroke code   1702 Case discussed with stroke neuro   1708 Stroke code de-escalated at this time per stroke neuro recommendations.      Procedures            EKG Interpretation:      Interpreted by Remigio Zapata MD    Symptoms at time of EKG: vertigo   Rhythm: normal sinus   Rate: 69  Ectopy: none  Conduction: normal  ST Segments/ T Waves: No ST-T wave changes  Q Waves: none  Comparison to prior: Unchanged    Clinical Impression: normal EKG        Results for orders placed or performed during the hospital encounter of 12/26/23   MR Brain w/o & w Contrast     Status: None    Narrative    EXAM: MRA BRAIN ([place]) W/O CONTRAST, MRA NECK (CAROTIDS) W/O and W CONTRAST, MR BRAIN W/O and W CONTRAST  LOCATION: [facility]  DATE: 12/26/2023    INDICATION: Dizziness, evaluate for stroke or other process  COMPARISON: Brain MRI 07/20/2023.  CONTRAST: 6.7 ml Gadavist  TECHNIQUE:   1) Routine multiplanar multisequence head MRI without and with intravenous contrast.  2) 3D time-of-flight head MRA without intravenous contrast.  3) Neck MRA without and with IV contrast. Stenosis measurements made according to NASCET criteria unless otherwise specified.    FINDINGS:  HEAD MRI:  INTRACRANIAL CONTENTS: No acute or subacute infarct. No acute hemorrhage or mass effect. Small arachnoid cyst within the right anterior middle cranial fossa. There is mild T2/FLAIR hyperintense signal within the left lateral thalamus. This represents   significant improvement from the previous brain MRI where there was large region of the expansile T2/FLAIR hyperintense signal. T2 hyperintensity within the right anterior thalamus and brainstem have resolved. Normal ventricles and sulci. Normal position   of the cerebellar tonsils. There is likely faint enhancement associated with T2 hyperintensity  left lateral thalamus. Otherwise no pathologic enhancement.    SELLA: No abnormality accounting for technique.    OSSEOUS STRUCTURES/SOFT TISSUES: Normal marrow signal. The major intracranial vascular flow voids are maintained.     ORBITS: No abnormality accounting for technique.     SINUSES/MASTOIDS: Complete opacification of the left sphenoid sinus, similar to prior. The paranasal sinuses are otherwise clear. No middle ear or mastoid effusion.     HEAD MRA:   ANTERIOR CIRCULATION: No stenosis/occlusion, aneurysm, or high flow vascular malformation. Standard [place] anatomy.    POSTERIOR CIRCULATION: No stenosis/occlusion, aneurysm, or high flow vascular malformation. Balanced vertebral arteries supply a normal basilar artery.     NECK MRA:   RIGHT CAROTID: No measurable stenosis or dissection.    LEFT CAROTID: No measurable stenosis or dissection.    VERTEBRAL ARTERIES: No focal stenosis or dissection. Dominant left and smaller right vertebral arteries.    AORTIC ARCH: Classic aortic arch anatomy with no significant stenosis at the origin of the great vessels.      Impression    IMPRESSION:  HEAD MRI:   1.  No acute infarct, mass, or hemorrhage.  2.  Marked interval improvement of expansile T2/FLAIR hyperintense signal within the thalami and brainstem compared to 07/20/2023. There is mild residual T2/FLAIR hyperintensity within the left lateral thalamus with faint enhancement.  3.  Persistent complete opacification left sphenoid sinus, correlate for sinusitis.    HEAD MRA:   1.  Normal MRA [place].    NECK MRA:  1.  Normal neck MRA.   MRA Angiogram Head w/o Contrast     Status: None    Narrative    EXAM: MRA BRAIN ([place]) W/O CONTRAST, MRA NECK (CAROTIDS) W/O and W CONTRAST, MR BRAIN W/O and W CONTRAST  LOCATION: [facility]  DATE: 12/26/2023    INDICATION: Dizziness, evaluate for stroke or other process  COMPARISON: Brain MRI  07/20/2023.  CONTRAST: 6.7 ml Gadavist  TECHNIQUE:   1) Routine multiplanar multisequence head MRI without and with intravenous contrast.  2) 3D time-of-flight head MRA without intravenous contrast.  3) Neck MRA without and with IV contrast. Stenosis measurements made according to NASCET criteria unless otherwise specified.    FINDINGS:  HEAD MRI:  INTRACRANIAL CONTENTS: No acute or subacute infarct. No acute hemorrhage or mass effect. Small arachnoid cyst within the right anterior middle cranial fossa. There is mild T2/FLAIR hyperintense signal within the left lateral thalamus. This represents   significant improvement from the previous brain MRI where there was large region of the expansile T2/FLAIR hyperintense signal. T2 hyperintensity within the right anterior thalamus and brainstem have resolved. Normal ventricles and sulci. Normal position   of the cerebellar tonsils. There is likely faint enhancement associated with T2 hyperintensity left lateral thalamus. Otherwise no pathologic enhancement.    SELLA: No abnormality accounting for technique.    OSSEOUS STRUCTURES/SOFT TISSUES: Normal marrow signal. The major intracranial vascular flow voids are maintained.     ORBITS: No abnormality accounting for technique.     SINUSES/MASTOIDS: Complete opacification of the left sphenoid sinus, similar to prior. The paranasal sinuses are otherwise clear. No middle ear or mastoid effusion.     HEAD MRA:   ANTERIOR CIRCULATION: No stenosis/occlusion, aneurysm, or high flow vascular malformation. Standard [place] anatomy.    POSTERIOR CIRCULATION: No stenosis/occlusion, aneurysm, or high flow vascular malformation. Balanced vertebral arteries supply a normal basilar artery.     NECK MRA:   RIGHT CAROTID: No measurable stenosis or dissection.    LEFT CAROTID: No measurable stenosis or dissection.    VERTEBRAL ARTERIES: No focal stenosis or dissection. Dominant left and smaller right vertebral arteries.    AORTIC  ARCH: Classic aortic arch anatomy with no significant stenosis at the origin of the great vessels.      Impression    IMPRESSION:  HEAD MRI:   1.  No acute infarct, mass, or hemorrhage.  2.  Marked interval improvement of expansile T2/FLAIR hyperintense signal within the thalami and brainstem compared to 07/20/2023. There is mild residual T2/FLAIR hyperintensity within the left lateral thalamus with faint enhancement.  3.  Persistent complete opacification left sphenoid sinus, correlate for sinusitis.    HEAD MRA:   1.  Normal MRA [place].    NECK MRA:  1.  Normal neck MRA.   MRA Angiogram Neck w/o & w Contrast     Status: None    Narrative    EXAM: MRA BRAIN ([place]) W/O CONTRAST, MRA NECK (CAROTIDS) W/O and W CONTRAST, MR BRAIN W/O and W CONTRAST  LOCATION: [facility]  DATE: 12/26/2023    INDICATION: Dizziness, evaluate for stroke or other process  COMPARISON: Brain MRI 07/20/2023.  CONTRAST: 6.7 ml Gadavist  TECHNIQUE:   1) Routine multiplanar multisequence head MRI without and with intravenous contrast.  2) 3D time-of-flight head MRA without intravenous contrast.  3) Neck MRA without and with IV contrast. Stenosis measurements made according to NASCET criteria unless otherwise specified.    FINDINGS:  HEAD MRI:  INTRACRANIAL CONTENTS: No acute or subacute infarct. No acute hemorrhage or mass effect. Small arachnoid cyst within the right anterior middle cranial fossa. There is mild T2/FLAIR hyperintense signal within the left lateral thalamus. This represents   significant improvement from the previous brain MRI where there was large region of the expansile T2/FLAIR hyperintense signal. T2 hyperintensity within the right anterior thalamus and brainstem have resolved. Normal ventricles and sulci. Normal position   of the cerebellar tonsils. There is likely faint enhancement associated with T2 hyperintensity left lateral thalamus. Otherwise no  pathologic enhancement.    SELLA: No abnormality accounting for technique.    OSSEOUS STRUCTURES/SOFT TISSUES: Normal marrow signal. The major intracranial vascular flow voids are maintained.     ORBITS: No abnormality accounting for technique.     SINUSES/MASTOIDS: Complete opacification of the left sphenoid sinus, similar to prior. The paranasal sinuses are otherwise clear. No middle ear or mastoid effusion.     HEAD MRA:   ANTERIOR CIRCULATION: No stenosis/occlusion, aneurysm, or high flow vascular malformation. Standard [place] anatomy.    POSTERIOR CIRCULATION: No stenosis/occlusion, aneurysm, or high flow vascular malformation. Balanced vertebral arteries supply a normal basilar artery.     NECK MRA:   RIGHT CAROTID: No measurable stenosis or dissection.    LEFT CAROTID: No measurable stenosis or dissection.    VERTEBRAL ARTERIES: No focal stenosis or dissection. Dominant left and smaller right vertebral arteries.    AORTIC ARCH: Classic aortic arch anatomy with no significant stenosis at the origin of the great vessels.      Impression    IMPRESSION:  HEAD MRI:   1.  No acute infarct, mass, or hemorrhage.  2.  Marked interval improvement of expansile T2/FLAIR hyperintense signal within the thalami and brainstem compared to 07/20/2023. There is mild residual T2/FLAIR hyperintensity within the left lateral thalamus with faint enhancement.  3.  Persistent complete opacification left sphenoid sinus, correlate for sinusitis.    HEAD MRA:   1.  Normal MRA [place].    NECK MRA:  1.  Normal neck MRA.   [place] Draw *Canceled*     Status: None ()    Narrative    The following orders were created for panel order [place] Draw.  Procedure                               Abnormality         Status                     ---------                               -----------         ------                       Please view results for these tests on the individual orders.   Basic metabolic panel     Status:  Abnormal   Result Value Ref Range    Sodium 138 135 - 145 mmol/L    Potassium 3.7 3.4 - 5.3 mmol/L    Chloride 104 98 - 107 mmol/L    Carbon Dioxide (CO2) 26 22 - 29 mmol/L    Anion Gap 8 7 - 15 mmol/L    Urea Nitrogen 9.1 6.0 - 20.0 mg/dL    Creatinine 0.57 (L) 0.67 - 1.17 mg/dL    GFR Estimate >90 >60 mL/min/1.73m2    Calcium 9.4 8.6 - 10.0 mg/dL    Glucose 108 (H) 70 - 99 mg/dL   INR     Status: Normal   Result Value Ref Range    INR 0.86 0.85 - 1.15   Partial thromboplastin time     Status: Normal   Result Value Ref Range    aPTT 26 22 - 38 Seconds   Troponin T, High Sensitivity     Status: Normal   Result Value Ref Range    Troponin T, High Sensitivity 9 <=22 ng/L   CBC with platelets and differential     Status: None   Result Value Ref Range    WBC Count 9.0 4.0 - 11.0 10e3/uL    RBC Count 4.87 4.40 - 5.90 10e6/uL    Hemoglobin 14.4 13.3 - 17.7 g/dL    Hematocrit 43.4 40.0 - 53.0 %    MCV 89 78 - 100 fL    MCH 29.6 26.5 - 33.0 pg    MCHC 33.2 31.5 - 36.5 g/dL    RDW 13.4 10.0 - 15.0 %    Platelet Count 307 150 - 450 10e3/uL    % Neutrophils 58 %    % Lymphocytes 30 %    % Monocytes 9 %    % Eosinophils 2 %    % Basophils 1 %    % Immature Granulocytes 0 %    NRBCs per 100 WBC 0 <1 /100    Absolute Neutrophils 5.2 1.6 - 8.3 10e3/uL    Absolute Lymphocytes 2.7 0.8 - 5.3 10e3/uL    Absolute Monocytes 0.8 0.0 - 1.3 10e3/uL    Absolute Eosinophils 0.2 0.0 - 0.7 10e3/uL    Absolute Basophils 0.1 0.0 - 0.2 10e3/uL    Absolute Immature Granulocytes 0.0 <=0.4 10e3/uL    Absolute NRBCs 0.0 10e3/uL   Glucose by meter     Status: Abnormal   Result Value Ref Range    GLUCOSE BY METER POCT 101 (H) 70 - 99 mg/dL   EKG 12-lead, tracing only     Status: None   Result Value Ref Range    Systolic Blood Pressure  mmHg    Diastolic Blood Pressure  mmHg    Ventricular Rate 69 BPM    Atrial Rate 69 BPM    IA Interval 166 ms    QRS Duration 92 ms     ms    QTc 437 ms    P Axis 7 degrees    R AXIS -14 degrees    T Axis 16  degrees    Interpretation ECG       Sinus rhythm  Normal ECG  Unconfirmed report - interpretation of this ECG is computer generated - see medical record for final interpretation  Confirmed by - EMERGENCY ROOM, PHYSICIAN ([id number]),  BRITT HUITRON ([id number]) on 12/27/2023 6:41:45 AM     CBC with Platelets & Differential     Status: None    Narrative    The following orders were created for panel order CBC with Platelets & Differential.  Procedure                               Abnormality         Status                     ---------                               -----------         ------                     CBC with platelets and d...[[id number]]                      Final result                 Please view results for these tests on the individual orders.      Medications   gadobutrol (GADAVIST) injection 6.79 mL (6.79 mLs Intravenous $Given 12/26/23 2038)                  Assessments & Plan (with Medical Decision Making)     This patient presented to the emergency department with acute onset of vertigo which after further history has resolved.  I did review multiple old records.  Given patient's bilateral thalamic strokes that he had in July, I did activate a stroke code and spoke with stroke neurology.  Stroke code was de-escalated as symptoms had gone away and patient was not a thrombolytic candidate.  They recommended MRI/MRA so CT was held and patient was sent MRI.  This demonstrated no acute stroke or acute intracranial process.  Case was discussed again with stroke neurology.  Is unclear what this was today, perhaps a episode of peripheral vertigo.  No evidence of any stroke, bleed or other acute process so we are comfortable discharging the patient.  Patient has not had any of his medications since being discharged from his rehab facility, so these prescriptions were sent to the patient's pharmacy and he was discharged with family in good condition.    I have reviewed the nursing notes.    I have reviewed  the findings, diagnosis, plan and need for follow up with the patient.    Discharge Medication List as of 12/26/2023 10:34 PM        START taking these medications    Details   atorvastatin (LIPITOR) 40 MG tablet Take 1 tablet (40 mg) by mouth daily, Disp-30 tablet, R-0, E-Prescribe      bromocriptine (PARLODEL) 2.5 MG tablet Take 1 tablet (2.5 mg) by mouth 2 times daily, Disp-60 tablet, R-0, E-Prescribe      carvedilol (COREG) 6.25 MG tablet Take 1 tablet (6.25 mg) by mouth 2 times daily (with meals), Disp-60 tablet, R-0, E-Prescribe      melatonin 3 MG tablet Take 1 tablet (3 mg) by mouth nightly as needed for sleep, Disp-30 tablet, R-0, E-Prescribe      pantoprazole (PROTONIX) 20 MG EC tablet Take 1 tablet (20 mg) by mouth daily, Disp-30 tablet, R-0, E-Prescribe      sertraline (ZOLOFT) 25 MG tablet Take 1 tablet (25 mg) by mouth daily, Disp-30 tablet, R-0, E-Prescribe      tamsulosin (FLOMAX) 0.4 MG capsule Take 1 capsule (0.4 mg) by mouth daily, Disp-30 capsule, R-0, E-Prescribe             Final diagnoses:   Vertigo       I, Evangelina Flores, am serving as a trained medical scribe to document services personally performed by Remigio Zapata MD based on the provider's statements to me on December 26, 2023.  This document has been checked and approved by the attending provider.    IRemigio MD, was physically present and have reviewed and verified the accuracy of this note documented by Evangelina Flores, medical scribe.      Remigio Zapata MD     12/26/2023   [facility] EMERGENCY DEPARTMENT     Remigio Zapata MD  12/28/23 0928

## 2023-12-26 NOTE — CONSULTS
"  [facility]    Stroke Telephone Note    I was called by Remigio Zapata on 12/26/23 regarding patient Michael Stewart. The patient is a 53 year old male with methamphetamine and tobacco use, prior bilateral thalamic lesions/brainstem lesions suspected to be encephalitis (07/2023) vs ADEM vs infectious meningitis. Patient discharged from rehab about 3 weeks ago.  Today comes in after 15-20 min of dizziness between 2-3 pm. Exam back to baseline now (has baseline R sided dysmetria). Stroke code was initially activated but cancelled as patient back to baseline.  Further more reports that patient doesnot have acces to meds including prednisone since discharge from rehab.    Vitals  BP: (!) 149/97   Pulse: 76   Resp: 16   Temp: 97.7  F (36.5  C)   Weight: 67.9 kg (149 lb 12.8 oz)      Impression  Transient Dizziness    Recommendations  Recommend MRI brain and MRA head and neck w wo contrast    My recommendations are based on the information provided over the phone by Michael Stewart's in-person providers. They are not intended to replace the clinical judgment of his in-person providers. I was not requested to personally see or examine the patient at this time.     The Stroke Staff is Dr. Johnson.    Zena Coleman MD  Vascular Neurology Fellow    To page me or covering stroke neurology team member, click here: [email or web address]  Choose \"On Call\" tab at top, then select \"NEUROLOGY/ALL SITES\" from middle drop-down box, press Enter, then look for \"stroke\" or \"telestroke\" for your site.    "

## 2023-12-27 LAB
ATRIAL RATE - MUSE: 69 BPM
DIASTOLIC BLOOD PRESSURE - MUSE: NORMAL MMHG
INTERPRETATION ECG - MUSE: NORMAL
P AXIS - MUSE: 7 DEGREES
PR INTERVAL - MUSE: 166 MS
QRS DURATION - MUSE: 92 MS
QT - MUSE: 408 MS
QTC - MUSE: 437 MS
R AXIS - MUSE: -14 DEGREES
SYSTOLIC BLOOD PRESSURE - MUSE: NORMAL MMHG
T AXIS - MUSE: 16 DEGREES
VENTRICULAR RATE- MUSE: 69 BPM

## 2023-12-27 NOTE — DISCHARGE INSTRUCTIONS
Follow-up with your primary clinic this week for reevaluation and for further medication prescribing.    Medications have been sent to your pharmacy.    Return to the emergency department for any problems.

## 2023-12-27 NOTE — PROGRESS NOTES
Brief note:    - MRI Brain reviewed and has no acute ischemic stroke.  - No further stroke work up recommended.    Discussed with Dr. Johnson.    Rudy Lopez MD  Vascular Neurology fellow

## 2024-01-11 ENCOUNTER — OFFICE VISIT (OUTPATIENT)
Dept: FAMILY MEDICINE | Facility: CLINIC | Age: 54
End: 2024-01-11
Payer: COMMERCIAL

## 2024-01-11 VITALS
TEMPERATURE: 98.4 F | RESPIRATION RATE: 18 BRPM | OXYGEN SATURATION: 96 % | SYSTOLIC BLOOD PRESSURE: 112 MMHG | HEART RATE: 73 BPM | WEIGHT: 155.6 LBS | BODY MASS INDEX: 25.92 KG/M2 | DIASTOLIC BLOOD PRESSURE: 76 MMHG | HEIGHT: 65 IN

## 2024-01-11 DIAGNOSIS — Z09 HOSPITAL DISCHARGE FOLLOW-UP: Primary | ICD-10-CM

## 2024-01-11 DIAGNOSIS — Z12.11 SCREEN FOR COLON CANCER: ICD-10-CM

## 2024-01-11 DIAGNOSIS — Z86.73 HX OF ISCHEMIC VERTEBROBASILAR ARTERY THALAMIC STROKE: ICD-10-CM

## 2024-01-11 DIAGNOSIS — R42 VERTIGO: ICD-10-CM

## 2024-01-11 PROBLEM — F15.11 HISTORY OF METHAMPHETAMINE ABUSE (H): Status: ACTIVE | Noted: 2023-07-26

## 2024-01-11 PROCEDURE — 99204 OFFICE O/P NEW MOD 45 MIN: CPT | Performed by: PHYSICIAN ASSISTANT

## 2024-01-11 RX ORDER — MECLIZINE HYDROCHLORIDE 25 MG/1
25 TABLET ORAL 3 TIMES DAILY PRN
Qty: 30 TABLET | Refills: 1 | Status: SHIPPED | OUTPATIENT
Start: 2024-01-11

## 2024-01-11 RX ORDER — BROMOCRIPTINE MESYLATE 2.5 MG/1
2.5 TABLET ORAL 2 TIMES DAILY
Qty: 60 TABLET | Refills: 0 | Status: SHIPPED | OUTPATIENT
Start: 2024-01-11 | End: 2024-08-26

## 2024-01-11 RX ORDER — ATORVASTATIN CALCIUM 40 MG/1
40 TABLET, FILM COATED ORAL DAILY
Qty: 30 TABLET | Refills: 0 | Status: SHIPPED | OUTPATIENT
Start: 2024-01-11

## 2024-01-11 ASSESSMENT — PAIN SCALES - GENERAL: PAINLEVEL: NO PAIN (0)

## 2024-01-11 NOTE — PROGRESS NOTES
"  Assessment & Plan     (Z09) Hospital discharge follow-up  (primary encounter diagnosis)  Comment:   Plan: back to baseline, handicapped parking application completed today based on residual CVA deficits on R side and use of walker to ambulate    (R42) Vertigo  Comment:   Plan: meclizine (ANTIVERT) 25 MG tablet        Etiology discussed today. Treatment options, medication side effects and expected course of recovery reviewed. If any worsening of symptoms, please contact the clinical team sooner, otherwise follow up as needed.  Next step would be vestibular PT    (Z12.11) Screen for colon cancer  Comment:   Plan: Colonoscopy Screening  Referral            (Z86.73) Hx of ischemic vertebrobasilar artery thalamic stroke  Comment:   Plan: bromocriptine (PARLODEL) 2.5 MG tablet,         atorvastatin (LIPITOR) 40 MG tablet                   MED REC REQUIRED  Post Medication Reconciliation Status:  Discharge medications reconciled and changed, see notes/orders  BMI:   Estimated body mass index is 25.89 kg/m  as calculated from the following:    Height as of this encounter: 1.651 m (5' 5\").    Weight as of this encounter: 70.6 kg (155 lb 9.6 oz).       See Patient Instructions    Raymond Adrian PA-C  [facility]    Dewayne Rodriguez is a 53 year old, presenting for the following health issues:  Hospital F/U      1/11/2024     7:49 AM   Additional Questions   Roomed by Diana FAY   Accompanied by Daughter       HPI       ED/UC Followup:    Facility:  [facility] Emergency Department   Date of visit: 12/26/2023  Reason for visit: Vertigo  Current Status: No longer having dizziness    Hospital Follow-up Visit:    Summary of hospitalization:  [facility] discharge summary reviewed  Diagnostic Tests/Treatments reviewed.  Follow up needed: none  Other Healthcare Providers Involved in Patient s Care:         None  Update since discharge: improved.         Plan of " care communicated with patient and family     ED course 12/26/23    Assessments & Plan (with Medical Decision Making)      This patient presented to the emergency department with acute onset of vertigo which after further history has resolved.  I did review multiple old records.  Given patient's bilateral thalamic strokes that he had in July, I did activate a stroke code and spoke with stroke neurology.  Stroke code was de-escalated as symptoms had gone away and patient was not a thrombolytic candidate.  They recommended MRI/MRA so CT was held and patient was sent MRI.  This demonstrated no acute stroke or acute intracranial process.  Case was discussed again with stroke neurology.  Is unclear what this was today, perhaps a episode of peripheral vertigo.  No evidence of any stroke, bleed or other acute process so we are comfortable discharging the patient.  Patient has not had any of his medications since being discharged from his rehab facility, so these prescriptions were sent to the patient's pharmacy and he was discharged with family in good condition.    TODAY: Since hospital discharge patient remains back at his baseline, no further dizziness episodes.                  Review of Systems         Objective    There were no vitals taken for this visit.  There is no height or weight on file to calculate BMI.  Physical Exam   GENERAL: healthy, alert and no distress  NECK: no adenopathy, no asymmetry, masses, or scars and thyroid normal to palpation  RESP: lungs clear to auscultation - no rales, rhonchi or wheezes  CV: regular rate and rhythm, normal S1 S2, no S3 or S4, no murmur, click or rub, no peripheral edema and peripheral pulses strong  ABDOMEN: soft, nontender, no hepatosplenomegaly, no masses and bowel sounds normal  MS: no gross musculoskeletal defects noted, no edema  NEURO:    Coordination: Finger-Nose-Finger Test abnormal and Heel to Shin Test abnormal. Impaired rapid alternating movements.       Comments: Uncoordinated with right upper and lower extremity, this is apparently baseline since July.  No aphasia noted.

## 2024-01-11 NOTE — LETTER
January 11, 2024      Michael Stewart  [address]  [place] [address]        To Whom It May Concern:    Michael Stewart  was seen on 1/11/24.  He is unable to perform at work at this time due to complications from stroke and hospitalization on 7/26/23.      Sincerely,        Raymond Adrian PA-C

## 2024-02-15 ENCOUNTER — APPOINTMENT (OUTPATIENT)
Dept: INTERPRETER SERVICES | Facility: CLINIC | Age: 54
End: 2024-02-15
Payer: COMMERCIAL

## 2024-06-12 ENCOUNTER — OFFICE VISIT (OUTPATIENT)
Dept: FAMILY MEDICINE | Facility: CLINIC | Age: 54
End: 2024-06-12
Payer: COMMERCIAL

## 2024-06-12 DIAGNOSIS — I63.81 THALAMIC STROKE (H): Primary | ICD-10-CM

## 2024-06-12 PROCEDURE — 99214 OFFICE O/P EST MOD 30 MIN: CPT

## 2024-06-12 RX ORDER — ATORVASTATIN CALCIUM 40 MG/1
40 TABLET, FILM COATED ORAL DAILY
Qty: 90 TABLET | Refills: 1 | Status: SHIPPED | OUTPATIENT
Start: 2024-06-12

## 2024-06-12 RX ORDER — ASPIRIN 81 MG/1
81 TABLET ORAL DAILY
Qty: 90 TABLET | Refills: 1 | Status: SHIPPED | OUTPATIENT
Start: 2024-06-12 | End: 2024-08-26

## 2024-06-12 RX ORDER — CARVEDILOL 6.25 MG/1
6.25 TABLET ORAL 2 TIMES DAILY WITH MEALS
Qty: 60 TABLET | Refills: 3 | Status: SHIPPED | OUTPATIENT
Start: 2024-06-12

## 2024-06-12 NOTE — PROGRESS NOTES
Assessment & Plan     Thalamic stroke (H)  - aspirin 81 MG EC tablet; Take 1 tablet (81 mg) by mouth daily  - atorvastatin (LIPITOR) 40 MG tablet; Take 1 tablet (40 mg) by mouth daily  - carvedilol (COREG) 6.25 MG tablet; Take 1 tablet (6.25 mg) by mouth 2 times daily (with meals)  - Adult Neurology [facility] Referral; Future  Patient is following up on a thalamic stroke that he had in July 2023.  His hospital stay at the [facility] was complicated by somnolence and diplopia with acute hypoxic respiratory failure and was on a ventilator.  Patient did also have a tracheostomy as well.    Since this thalamic stroke, He reports continued issues with diminished sensation on the right side and right-sided headache that is located in the frontal temporal area.  He has also discontinued his medications due to reports of not understanding why he needed to take them.  Symptoms of diplopia have improved.  Headaches have been persistent, constant, and therapy since his stroke.  No aggravating or relieving factors noted.  Patient has not tried anything for his headaches.  During his hospital stay,  he did not start aspirin due to bleeding from the tracheostomy site.  However he no longer has an active tracheostomy.  Family has questions on whether the sensation issues will improve, and how to address the headaches. I would defer to neurology on this.    On exam, deficits include diminished sensation, ataxia noted on the right arm, a right facial droop when he smiles, not being oriented to time.  Strength appears to be intact.    We did discuss the importance of taking cholesterol medicine for reducing future risk of stroke.  I am going to restart his aspirin since he no longer has a tracheostomy.  Unclear to me if he should be on the higher dose of antiplatelet though.  I will also restart his carvedilol which we reviewed may be helpful for reducing blood pressure and contractility.  Beta-blocker may also be useful for  trying for prevention of headache.    He no longer uses any tobacco or methamphetamine products.      Dewayne Rodriguez is a 53 year old, presenting for the following health issues:  No chief complaint on file.  Following up after stroke in July 2023. Has been unable to work since then. Not taking any medications.    Patient has had a continued issue with generalized right-sided tingling and diminished sensation on the cheek, arm, leg, and whole side of body.  HPI           Objective    There were no vitals taken for this visit.  There is no height or weight on file to calculate BMI.  Physical Exam   GENERAL: alert and no distress  NEURO: Oriented to person, place.  Not oriented to date.  Slight right-sided facial droop noticed for cranial nerve VII.  Remainder of cranial nerves II through XII normal.  Iminished sensation noted globally on the right side.  Ataxia noted with right arm movement.  Normal strength and tone, mentation intact and speech normal            Signed Electronically by: Kalpesh Henderson NP

## 2024-07-16 ENCOUNTER — MYC REFILL (OUTPATIENT)
Dept: FAMILY MEDICINE | Facility: CLINIC | Age: 54
End: 2024-07-16
Payer: COMMERCIAL

## 2024-07-16 DIAGNOSIS — I63.81 THALAMIC STROKE (H): ICD-10-CM

## 2024-07-16 RX ORDER — CARVEDILOL 6.25 MG/1
6.25 TABLET ORAL 2 TIMES DAILY WITH MEALS
Qty: 60 TABLET | Refills: 3 | OUTPATIENT
Start: 2024-07-16

## 2024-07-16 RX ORDER — ASPIRIN 81 MG/1
81 TABLET ORAL DAILY
Qty: 90 TABLET | Refills: 1 | OUTPATIENT
Start: 2024-07-16

## 2024-07-16 RX ORDER — ATORVASTATIN CALCIUM 40 MG/1
40 TABLET, FILM COATED ORAL DAILY
Qty: 90 TABLET | Refills: 1 | OUTPATIENT
Start: 2024-07-16

## 2024-08-26 ENCOUNTER — OFFICE VISIT (OUTPATIENT)
Dept: NEUROLOGY | Facility: CLINIC | Age: 54
End: 2024-08-26
Payer: COMMERCIAL

## 2024-08-26 ENCOUNTER — LAB (OUTPATIENT)
Dept: LAB | Facility: CLINIC | Age: 54
End: 2024-08-26
Payer: COMMERCIAL

## 2024-08-26 VITALS
HEART RATE: 58 BPM | WEIGHT: 166 LBS | DIASTOLIC BLOOD PRESSURE: 77 MMHG | BODY MASS INDEX: 27.62 KG/M2 | SYSTOLIC BLOOD PRESSURE: 118 MMHG | OXYGEN SATURATION: 96 %

## 2024-08-26 DIAGNOSIS — E78.5 HYPERLIPIDEMIA LDL GOAL <100: ICD-10-CM

## 2024-08-26 DIAGNOSIS — G04.01 BICKERSTAFF BRAINSTEM ENCEPHALITIS: Primary | ICD-10-CM

## 2024-08-26 DIAGNOSIS — G47.30 SLEEP APNEA, UNSPECIFIED TYPE: ICD-10-CM

## 2024-08-26 DIAGNOSIS — G04.01 BICKERSTAFF BRAINSTEM ENCEPHALITIS: ICD-10-CM

## 2024-08-26 DIAGNOSIS — G24.9 DYSTONIA: ICD-10-CM

## 2024-08-26 LAB
CHOLEST SERPL-MCNC: 173 MG/DL
FASTING STATUS PATIENT QL REPORTED: YES
HDLC SERPL-MCNC: 45 MG/DL
LDLC SERPL CALC-MCNC: 92 MG/DL
NONHDLC SERPL-MCNC: 128 MG/DL
TRIGL SERPL-MCNC: 180 MG/DL

## 2024-08-26 PROCEDURE — 83516 IMMUNOASSAY NONANTIBODY: CPT | Mod: 59

## 2024-08-26 PROCEDURE — 84425 ASSAY OF VITAMIN B-1: CPT

## 2024-08-26 PROCEDURE — 80061 LIPID PANEL: CPT

## 2024-08-26 NOTE — PATIENT INSTRUCTIONS
Thank you for visiting with us today. My recommendations are as follows:    MRI Brain with and without contrast  Labs: Vit B1 level, Gq1b antibody level and Lipid panel  EMG/Nerve conduction study  Sleep clinic referral  Physical and Occupational therapy referral  Physical Medicine and Rehab referral

## 2024-08-26 NOTE — PROGRESS NOTES
__________________________________________________________    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    Relevant Orders    MR Brain w/o & w Contrast    Ganglioside Antibodies IgG and IgM    EMG    Vitamin B1 whole blood    Physical Therapy  Referral    Occupational Therapy  Referral    Hyperlipidemia LDL goal <100    Relevant Orders    Lipid panel reflex to direct LDL Fasting    Sleep apnea, unspecified type    Relevant Orders    Adult Sleep Eval & Management Referral    Dystonia    Relevant Orders    Adult Physical Medicine and Rehab  Referral       Plan:   - MRI Brain  - Gq1b  - EMG   - Discontinue Aspirin  - Lipid panel  - Vitamin B1  - Sleep clinic referral  - PT/OT referrals  - PM&R referral    Return to Clinic ***     Stroke Education provided.  He will call us with any questions.  For any acute neurologic deficits he was advised to  go directly to the hospital rather than call the clinic.    {2021 E&M time (Optional):330938}    Montrell Borrego MD    Vascular Neurology    __________________________________________________________    Chief Complaint: Patient presents with:  Stroke: Thalamic stroke,  ref by STEPHON TAMAYO      History of Present Illness: Michael Stewart is a 54 year old Romansh-speaking male presenting to the stroke clinic today as hospital follow up. Past medical history significant for methamphetamine and tobacco use. He is accompanied by his daughter today who would like to translate.     On 7/5/23, he presented to ED with 3 days of worsening right sided headaches. CT Head was unremarkable, he was diagnosed with Strep A pharyngitis and he received IV Penicillin G before discharge. He presented to [facility] the following day with persistent headache and  gait disturbances. Around midnight, he attempted to use the restroom but required help from his family to walk to the bathroom. His family checked on him again in the morning and still felt his gait was off. He was also complaining of some weakness in his R arm/leg and diplopia. Basic labs, UA, COVID-19 screen and urine drug screen were all unremarkable.     He was transferred to [facility] when he was noted to be febrile (102.3) and hypertensive (188/135). He appeared somnolent with abnormal breathing patterns and he was found to have dysconjugate gaze, inability to abduct eyes on either side (R>L), nystagmus, dysarthria and right sided drift. MRI Brain showed bilateral thalamic hyperintensities and MRV Head showed no evidence of CVST. Subacute presentation and imaging were initially concerning for possible infection, autoimmune process such as ADEM, etc. He was placed on CPAP due to CO2 retention and then ultimately intubated & mechanically ventilated. He was started on empiric CNS coverage antibiotics, IV Thiamine, Aspirin and Statin.     LP performed 7/6: Opening pressure was 36, WBC 33, Protein 57 and negative meningitis/encephalitis panel. Video EEG showed no evidence of seizures. Repeat LP (to assess ICP and obtain oligoclonal bands) on 7/8 w/ opening pressure 16 showed WBC 63, Protein 42 and normal glucose. Cytology showed lymphocytosis. LP repeated again on 7/11 to obtain fresh cells for flow cytometry and that showed no clear evidence of malignancy. Oligoclonal bands returned positive on 7/12 and he was started on IV Solumedrol 1g daily x 7 days for possible autoimmune process. Repeat MRI Brain 7/14 showed extensive thalamic and brainstem FLAIR hyperintensities. Repeat LP was performed on 7/19 to send CSF Encephalopathy panel - WBC 1, Protein 32. CSF Encephalopathy panel x 2 were unremarkable.     Hospitalization further complicated by ventilator associated pneumonia, respiratory cultures positive for  strep anginosus, staph aureus and klebsiella aerogenes and he was started on Cefepime. He was also started on Bromocriptine for sympathetic hyperactivity. He completed IV Solumedrol on 7/19 and was started on Prednisone taper 60 mg with expected stop date of 8/29/2023. He underwent tracheostomy/PEG tube placement on 7/21. Aspirin was discontinued due to low suspicion for stroke. He was discharged to [facility] on 7/26.     Interval history:  He stayed in LTAC for about a month. Then he was discharged to [facility] and he stayed there for about 3 months. He recovered remarkably well during his stay in rehab and had trach/PEG removed on discharge.     He now lives at home with his friends and the daughter helps him with his appointments. He walks without any assistive devices at home, uses a walker while walking outside. He continues to have residual right sided weakness and numbness, no other symptoms. He tries to physically active, he is not currently working with PT/OT. Denies any falls. Denies any trouble with speaking or swallowing. Denies double vision. Denies bowel or bladder incontinence.     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 He is not working now due to right sided weakness. Daughter is going to help him apply for social security and disability tomorrow.     Snores at night, sleeps well. Feels refreshed after a night's sleep. Does not nap    He has not followed with neurology since    Sleep  Mood      Right lower facial droop   5-/5 right finger extensors  5-/5 right knee flexion  Decreased sensation on the right  Right leg dystonic    Modified Hill Scale Score: 2-Slight disability; unable to carry out all previous activities, but able to look after own affairs     Stroke Evaluation Summarized:    MRI/Head CT ***   Intracranial Vasculature ***   Cervical Vasculature ***     Echocardiogram ***   EKG/Telemetry ***   Other Testing Not Applicable ***     LDL   @[id number](ldl)@   A1C  @[id number](a1c)@       Review of Systems:  A comprehensive 10-point review of systems was performed and was negative except as stated above.     Past Medical History  No past medical history on file.    Past Surgical History  Past Surgical History:   Procedure Laterality Date    ENDOSCOPIC INSERTION TUBE GASTROSTOMY  7/21/2023    PICC DOUBLE LUMEN PLACEMENT Left 07/15/2023    TRACHEOSTOMY N/A 7/21/2023       Social History  Social History     Tobacco Use    Smoking status: Never    Smokeless tobacco: Never   Vaping Use    Vaping status: Never Used   Substance Use Topics    Alcohol use: Yes     Comment: rarely    Drug use: Not Currently       Family History  No family history on file.    Current Medications  Current Outpatient Medications   Medication Sig Dispense Refill    atorvastatin (LIPITOR) 40 MG tablet Take 1 tablet (40 mg) by mouth daily 30 tablet 0    carvedilol (COREG) 6.25 MG tablet Take 1 tablet (6.25 mg) by mouth 2 times daily (with meals) 60 tablet 0    atorvastatin (LIPITOR) 40 MG tablet Take 1 tablet (40 mg) by mouth daily 90 tablet 1    carvedilol (COREG) 6.25 MG tablet Take 1 tablet (6.25 mg) by mouth 2 times daily (with meals) 60 tablet 3    meclizine (ANTIVERT) 25 MG tablet Take 1 tablet (25 mg) by mouth 3 times daily as needed for dizziness (for vertigo if sx return) 30 tablet 1     No current facility-administered medications for this visit.       Allergies:   No Known Allergies    Vitals:  /77   Pulse 58   Wt 75.3 kg (166 lb)   SpO2 96%   BMI 27.62 kg/m      Physical exam:  General:  Healthy appearing, well-built and well-nourished    HEENT:  normocephalic, atraumatic  Cardio:  regular rate and rhythm  Pulmonary: breathing well on room air, no respiratory distress    NEUROLOGICAL EXAM  Mental Status: AAO x 3. Answers appropriately, follows complex commands. Attention/concentration intact. Recent/remote memory intact, Recall 3/3.    Speech: Naming, fluency, comprehension and repetition intact. Patient can read and write. No obvious aphasia or dysarthria  Cranial Nerves: Pupils equal and reactive to light bilaterally. Visual fields full to confrontation, EOM intact, facial sensation intact, facial movements symmetric, hearing grossly normal, palate elevates symmetrically, uvula in midline, trapezius/SCM intact, tongue in midline and movements symmetric  Motor: Normal bulk and tone, no abnormal movements.   Strength: No drift in all extremities. 5/5 throughout  Reflexes equal and symmetric. No pathological reflexes  Sensory: Light touch, pinprick, vibration, temperature and proprioception intact  Coordination: Finger to nose & Heel to shin intact bilaterally  Gait: Normal base, stride length, arm swing and turn. Can do tip-toe, heel and tandem walking. Romberg's negative    PHQ-2 Score:       1/11/2024     7:34 AM   PHQ-2 ( 1999 [employer])   Q1: Little interest or pleasure in doing things 0   Q2: Feeling down, depressed or hopeless 0   PHQ-2 Score 0   Q1: Little interest or pleasure in doing things Not at all   Q2: Feeling down, depressed or hopeless Not at all   PHQ-2 Score 0     PHQ-9 score:         No data to display                Neuroimaging: as per HPI. I personally reviewed those images    Labs:  Coagulation studies:  Recent Labs   Lab Test 12/26/23  1701 07/24/23  0857 07/06/23  1039   INR 0.86 1.12 0.91        Lipid panel:  Recent Labs   Lab Test 07/07/23  0144   CHOL 278*   HDL 51   *   TRIG 203*       HbA1C:  Recent Labs   Lab Test 07/07/23  0144   A1C 6.1*                    Substance Use Topics    Alcohol use: Yes     Comment: rarely    Drug use: Not Currently       Family History  No family history on file.    Current Medications  Current Outpatient Medications   Medication Sig Dispense Refill    atorvastatin (LIPITOR) 40 MG tablet Take 1 tablet (40 mg) by mouth daily 30 tablet 0    carvedilol (COREG) 6.25 MG tablet Take 1 tablet (6.25 mg) by mouth 2 times daily (with meals) 60 tablet 0    atorvastatin (LIPITOR) 40 MG tablet Take 1 tablet (40 mg) by mouth daily 90 tablet 1    carvedilol (COREG) 6.25 MG tablet Take 1 tablet (6.25 mg) by mouth 2 times daily (with meals) 60 tablet 3    meclizine (ANTIVERT) 25 MG tablet Take 1 tablet (25 mg) by mouth 3 times daily as needed for dizziness (for vertigo if sx return) 30 tablet 1     No current facility-administered medications for this visit.       Allergies:   No Known Allergies    Vitals:  /77   Pulse 58   Wt 75.3 kg (166 lb)   SpO2 96%   BMI 27.62 kg/m      Physical exam:  General:  Healthy appearing, well-built and well-nourished    HEENT:  normocephalic, atraumatic  Cardio:  regular rate and rhythm  Pulmonary: breathing well on room air, no respiratory distress    NEUROLOGICAL EXAM  Mental Status: AAO x 3. Answers appropriately, follows complex commands.   Speech: Naming, fluency, comprehension and repetition intact. No obvious aphasia or dysarthria  Cranial Nerves: Pupils equal and reactive to light bilaterally. Visual fields full to confrontation, EOM intact, facial sensation intact, right facial droop   Motor: Right leg dystonic  Strength 5/5 throughout except 5-/5 right finger extensors and right knee flexion  Reflexes 2+ equal and symmetric. No pathological reflexes  Sensory: Decreased light touch and pinprick sensation on the right  Coordination: Finger to nose & Heel to shin intact bilaterally  Gait: Deferred    PHQ-2 Score:       1/11/2024     7:34 AM   PHQ-2 ( 1999 [employer])   Q1: Little interest or pleasure in doing  things 0   Q2: Feeling down, depressed or hopeless 0   PHQ-2 Score 0   Q1: Little interest or pleasure in doing things Not at all   Q2: Feeling down, depressed or hopeless Not at all   PHQ-2 Score 0     PHQ-9 score:         No data to display                Neuroimaging: as per HPI. I personally reviewed those images    Labs:  Coagulation studies:  Recent Labs   Lab Test 12/26/23  1701 07/24/23  0857 07/06/23  1039   INR 0.86 1.12 0.91        Lipid panel:  Recent Labs   Lab Test 07/07/23  0144   CHOL 278*   HDL 51   *   TRIG 203*       HbA1C:  Recent Labs   Lab Test 07/07/23  0144   A1C 6.1*

## 2024-08-26 NOTE — LETTER
8/26/2024      Michael Stewart  [address]   [address]  [place] [id number]      Dear Colleague,    Thank you for referring your patient, Michael Stewart, to the [facility] NEUROLOGY [facility]. Please see a copy of my visit note below.      __________________________________________________________    [facility]   [phone]  __________________________________________________________    Assessment:   Problem List Items Addressed This Visit       Bickerstaff brainstem encephalitis - Primary    Relevant Orders    MR Brain w/o & w Contrast    Ganglioside Antibodies IgG and IgM    EMG    Vitamin B1 whole blood    Physical Therapy  Referral    Occupational Therapy  Referral    Hyperlipidemia LDL goal <100    Relevant Orders    Lipid panel reflex to direct LDL Fasting    Sleep apnea, unspecified type    Relevant Orders    Adult Sleep Eval & Management Referral    Dystonia    Relevant Orders    Adult Physical Medicine and Rehab  Referral     Michael Stewart is a 54 year old Kyrgyz-speaking male with history of methamphetamine and tobacco use presenting to the neurology clinic for hospital follow up. He was admitted to [facility] in July 2023 with headache, right sided weakness and gait disturbances following recent Strep A pharyngitis. MRI Brain showed gradually worsening extensive thalamic and brainstem FLAIR hyperintensities. CSF showed leukocytosis and normal protein. He received IV Solumedrol for 7 days followed by prolonged oral prednisone taper. Hospitalization was complicated by trach/PEG placement and was discharged to LTAC.     Etiology of presentation and imaging findings thought to be post-streptococcal ADEM vs other autoimmune process. I agree with the diagnosis overall, Bickerstaff brainstem encephalitis can also be considered and hence I will obtain Gq1b antibody level and EMG/NCS for further evaluation. He recovered remarkably  well during his stay in LTAC. He now lives at home with his friends, walks independently, trach/PEG removed, has mild residual right sided weakness and his daughter helps with some errands/appointments. Repeat MRI brain 12/2023 showed near complete resolution of FLAIR hyperintensities with small residual lesion in left thalamus and faint enhancement. I will refer him to PM&R and PT/OT for right foot dystonia evaluation and treatment.     Plan:   - Repeat MRI Brain wwo contrast   - Obtain EMG/NCS to look for any evidence of demyelinating neuropathy  - Labs: Gq1b antibody level, Vit B1, Lipid panel  - Discontinue Aspirin  - Sleep clinic referral for sleep apnea evaluation  - PM&R referral for right foot dystonia  - PT/OT referrals    Return to Clinic in 3 months     Stroke Education provided.  He will call us with any questions.  For any acute neurologic deficits he was advised to  go directly to the hospital rather than call the clinic.    I spent a total of 90 minutes on the day of the visit.   Time spent by me today doing chart review, history and exam, documentation and further activities per the note    Montrell Borrego MD    Vascular Neurology    __________________________________________________________    Chief Complaint: Patient presents with:  Stroke: Thalamic stroke,  ref by STEPHON TAMAYO    History of Present Illness: Michael Stewart is a 54 year old French-speaking male presenting to the stroke clinic today as hospital follow up. Past medical history significant for methamphetamine and tobacco use. He is accompanied by his daughter today who would like to translate.     On 7/5/23, he presented to ED with 3 days of worsening right sided headaches. CT Head was unremarkable, he was diagnosed with Strep A pharyngitis and he received IV Penicillin G before discharge. He presented to [facility] the following day with persistent headache and gait disturbances. Around midnight, he  attempted to use the restroom but required help from his family to walk to the bathroom. His family checked on him again in the morning and still felt his gait was off. He was also complaining of some weakness in his R arm/leg and diplopia. Basic labs, UA, COVID-19 screen and urine drug screen were all unremarkable.     He was transferred to [facility] when he was noted to be febrile (102.3) and hypertensive (188/135). He appeared somnolent with abnormal breathing patterns. Exam remarkable for have dysconjugate gaze, inability to abduct eyes on either side (R>L), nystagmus, dysarthria and right sided drift. MRI Brain showed bilateral thalamic hyperintensities and MRV Head showed no evidence of CVST. Subacute presentation and imaging were initially concerning for possible infection, autoimmune process such as ADEM, etc. He was placed on CPAP due to CO2 retention and then ultimately intubated & mechanically ventilated. He was started on empiric CNS coverage antibiotics, IV Thiamine, Aspirin and Statin.     LP performed 7/6: Opening pressure was 36, WBC 33, Protein 57 and negative meningitis/encephalitis panel. Video EEG showed no evidence of seizures. Repeat LP (to assess ICP and obtain oligoclonal bands) on 7/8 w/ opening pressure 16 showed WBC 63, Protein 42 and normal glucose. Cytology showed lymphocytosis. LP repeated again on 7/11 to obtain fresh cells for flow cytometry and that showed no clear evidence of malignancy. Oligoclonal bands returned positive on 7/12 and he was started on IV Solumedrol 1g daily x 7 days for possible autoimmune process. Repeat MRI Brain 7/14 showed extensive thalamic and brainstem FLAIR hyperintensities. Repeat LP was performed on 7/19 to send CSF Encephalopathy panel - WBC 1, Protein 32. CSF Encephalopathy panel x 2 were unremarkable.     Hospitalization further complicated by ventilator associated pneumonia, respiratory cultures positive for strep anginosus, staph aureus and  klebsiella aerogenes and he was started on Cefepime. He was also started on Bromocriptine for sympathetic hyperactivity. He completed IV Solumedrol on 7/19 and was started on Prednisone taper 60 mg with expected stop date of 8/29/2023. He underwent tracheostomy/PEG tube placement on 7/21. Aspirin was discontinued due to low suspicion for stroke. He was discharged to LTAC on 7/26.     Interval history:  He stayed in LTAC for about a month. Then he was discharged to [facility] and he stayed there for about 3 months. He recovered remarkably well during his stay in rehab and had trach/PEG removed on discharge.     He now lives at home with his friends and the daughter helps him with his appointments. He walks without any assistive devices at home, uses a walker while walking outside. He continues to have residual right sided weakness and numbness, no other symptoms. He tries to stay physically active, he is not currently working with PT/OT. Denies any falls. Denies any trouble with speaking or swallowing. Denies double vision. Denies bowel or bladder incontinence. He does snore at night but reports having a good night's sleep and feels refreshed in the morning.     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 He is not working now due to right sided weakness. Daughter is going to help him apply for social security and disability tomorrow.     Modified Miguel Scale Score: 2-Slight disability; unable to carry out all previous activities, but able to look after own affairs     MRI/Head CT MRI BRAIN WWO CONTRAST 7/6/2023:  1. Bilateral thalamic infarcts (left greater than right) with likely involvement of the posterior limb of the left internal capsule which corresponds with the patient's recent symptoms of right-sided weakness and incoordination.  2. Otherwise, normal brain MRI.    MRI BRAIN WWO CONTRAST 7/8/2023:  1. No definite evidence of meningitis.   2. Unchanged bilateral thalamic  infarctions.    MRI BRAIN WWO CONTRAST 7/20/2023:  Multifocal areas of reduced diffusion in the thalami and  brainstem. When compared to the exam on 7/6/2023, these areas do not produce an expected pattern of evolving signal characteristics. Given the appearance of these lesions and their presence in both the  brainstem and basal ganglia, an autoimmune encephalomyelitis is favored as an etiology over cerebral infarctions or infections.     MRI BRAIN WWO CONTRAST 12/26/2023:  HEAD MRI:   1.  No acute infarct, mass, or hemorrhage.  2.  Marked interval improvement of expansile T2/FLAIR hyperintense signal within the thalami and brainstem compared to 07/20/2023. There is mild residual T2/FLAIR hyperintensity within the left lateral thalamus with faint enhancement.  3.  Persistent complete opacification left sphenoid sinus, correlate for sinusitis.   Vessel Imaging 7/6/2023:   Neck CTA: The bilateral common carotid, bilateral cervical internal carotid and bilateral vertebral arteries are patent without stenosis.   Head CTA: The basilar, bilateral intracranial distal internal carotid, bilateral anterior cerebral, bilateral middle cerebral and bilateral posterior cerebral arteries are patent and unremarkable.    MRV WWO CONTRAST 7/6/2023:  No evidence of dural venous sinus or deep vein thrombosis.     7/15/2023:  1. Head CTA demonstrates a possible aneurysm of what appears to be the right callosomarginal artery versus a dilated adjacent vein.  2. Neck CTA demonstrates no stenosis of the major cervical arteries.  3. Stable infarcts in the bilateral thalamus, please see same day head CT 7/15/2023 for further details.    MRA HEAD/NECK 12/26/2023:  Normal MRA head/neck     Review of Systems:  A comprehensive 10-point review of systems was performed and was negative except as stated above.     Past Medical History  No past medical history on file.    Past Surgical History  Past Surgical History:   Procedure Laterality Date      ENDOSCOPIC INSERTION TUBE GASTROSTOMY  7/21/2023     PICC DOUBLE LUMEN PLACEMENT Left 07/15/2023     TRACHEOSTOMY N/A 7/21/2023       Social History  Social History     Tobacco Use     Smoking status: Never     Smokeless tobacco: Never   Vaping Use     Vaping status: Never Used   Substance Use Topics     Alcohol use: Yes     Comment: rarely     Drug use: Not Currently       Family History  No family history on file.    Current Medications  Current Outpatient Medications   Medication Sig Dispense Refill     atorvastatin (LIPITOR) 40 MG tablet Take 1 tablet (40 mg) by mouth daily 30 tablet 0     carvedilol (COREG) 6.25 MG tablet Take 1 tablet (6.25 mg) by mouth 2 times daily (with meals) 60 tablet 0     atorvastatin (LIPITOR) 40 MG tablet Take 1 tablet (40 mg) by mouth daily 90 tablet 1     carvedilol (COREG) 6.25 MG tablet Take 1 tablet (6.25 mg) by mouth 2 times daily (with meals) 60 tablet 3     meclizine (ANTIVERT) 25 MG tablet Take 1 tablet (25 mg) by mouth 3 times daily as needed for dizziness (for vertigo if sx return) 30 tablet 1     No current facility-administered medications for this visit.       Allergies:   No Known Allergies    Vitals:  /77   Pulse 58   Wt 75.3 kg (166 lb)   SpO2 96%   BMI 27.62 kg/m      Physical exam:  General:  Healthy appearing, well-built and well-nourished    HEENT:  normocephalic, atraumatic  Cardio:  regular rate and rhythm  Pulmonary: breathing well on room air, no respiratory distress    NEUROLOGICAL EXAM  Mental Status: AAO x 3. Answers appropriately, follows complex commands.   Speech: Naming, fluency, comprehension and repetition intact. No obvious aphasia or dysarthria  Cranial Nerves: Pupils equal and reactive to light bilaterally. Visual fields full to confrontation, EOM intact, facial sensation intact, right facial droop   Motor: Right leg dystonic  Strength 5/5 throughout except 5-/5 right finger extensors and right knee flexion  Reflexes 2+ equal and  symmetric. No pathological reflexes  Sensory: Decreased light touch and pinprick sensation on the right  Coordination: Finger to nose & Heel to shin intact bilaterally  Gait: Deferred    PHQ-2 Score:       1/11/2024     7:34 AM   PHQ-2 ( 1999 [employer])   Q1: Little interest or pleasure in doing things 0   Q2: Feeling down, depressed or hopeless 0   PHQ-2 Score 0   Q1: Little interest or pleasure in doing things Not at all   Q2: Feeling down, depressed or hopeless Not at all   PHQ-2 Score 0     PHQ-9 score:         No data to display                Neuroimaging: as per HPI. I personally reviewed those images    Labs:  Coagulation studies:  Recent Labs   Lab Test 12/26/23  1701 07/24/23  0857 07/06/23  1039   INR 0.86 1.12 0.91        Lipid panel:  Recent Labs   Lab Test 07/07/23  0144   CHOL 278*   HDL 51   *   TRIG 203*       HbA1C:  Recent Labs   Lab Test 07/07/23  0144   A1C 6.1*                 Again, thank you for allowing me to participate in the care of your patient.        Sincerely,        MARYLU ASTUDILLO MD

## 2024-08-26 NOTE — NURSING NOTE
"Michael Stewart is a 54 year old male who presents for:  Chief Complaint   Patient presents with    Stroke     Thalamic stroke,  ref by STEPHON TAMAYO        Initial Vitals:  /77   Pulse 58   Wt 75.3 kg (166 lb)   SpO2 96%   BMI 27.62 kg/m   Estimated body mass index is 27.62 kg/m  as calculated from the following:    Height as of 1/11/24: 1.651 m (5' 5\").    Weight as of this encounter: 75.3 kg (166 lb).. Body surface area is 1.86 meters squared. BP completed using cuff size: kris Harvey    "

## 2024-08-27 ENCOUNTER — APPOINTMENT (OUTPATIENT)
Dept: INTERPRETER SERVICES | Facility: CLINIC | Age: 54
End: 2024-08-27
Payer: COMMERCIAL

## 2024-08-29 LAB
GD1B GANGL IGG SER IA-ACNC: 3 IV
GD1B GANGL IGM SER IA-ACNC: 4 IV
GM1 GANGL IGG SER IA-ACNC: 3 IV
GM1 GANGL IGM SER IA-ACNC: 8 IV
GQ1B GANGL IGG SER IA-ACNC: 3 IV
GQ1B GANGL IGM SER IA-ACNC: 4 IV
VIT B1 PYROPHOSHATE BLD-SCNC: 135 NMOL/L

## 2024-09-04 ENCOUNTER — TELEPHONE (OUTPATIENT)
Dept: NEUROLOGY | Facility: CLINIC | Age: 54
End: 2024-09-04
Payer: COMMERCIAL

## 2024-09-04 NOTE — TELEPHONE ENCOUNTER
Stroke RN Care Coordination - Initial Outreach Note     SITUATION     Michael Stewart is a 54 year old male who is receiving support for:  Clinic Care Coordination - Follow-up (Stroke RNCC Follow-Up Outreach)    BACKGROUND     Patient completed recent visit with Dr. Bray on 8/26 and the pt's complex follow up needs, RNCC was asked to assist.    ASSESSMENT     Chart reviewed and pt noted to have completed recommended labs and has appts scheduled for PT, MRI, EMG, and new sleep consult. Still needs OT and PM&R appts scheduled per recommendations.     Spoke with pt's daughter and discussed follow up care. Relayed the results of the pt's labs that they were normal/improved. Confirmed that the pt has stopped taking asa. He does not check BP at home and does not have a monitor. I told the daughter that I will check with Dr. Borrego if he would like to order one and have the pt start monitoring daily. She gave permission to schedule OT eval and PM&R. I explained that initially he should be evaluated by OP OT firs then if needed, can complete behind the wheel assessment. She voiced understanding and did not have any other questions at this time. She was okay with me sending appt information via [employer] stating that they check that often.    RNCC got OT eval and PM&R appts scheduled. Sent [employer] message to pt with appt information and provided my contact info.     PLAN     Follow-up plan:  RNCC will follow up with pt/daughter in a few weeks after completion of MRI (9/27)  to check in and answer additional questions if needed.     Raquel Rangel BS, RN, SCRN  RN Stroke Neurology Care Coordinator  [facility] Neuroscience Service Line

## 2024-09-04 NOTE — TELEPHONE ENCOUNTER
----- Message from MARYLU ASTUDILLO sent at 8/26/2024  2:07 PM CDT -----  Shimon García,     Mr. Stewart might need additional assistance in getting some of his scans and labs done. I have placed orders for MRI Brain, EMG and a few labs. Can you please follow up with him? Daughter will be the point of contact.     Also, she is going to help him apply for social security and disability tomorrow and they may need additional paperwork. PCP should be able to fill it out. I am happy to help out if needed.     Thank you  Marylu Astudillo

## 2024-09-10 ENCOUNTER — THERAPY VISIT (OUTPATIENT)
Dept: PHYSICAL THERAPY | Facility: CLINIC | Age: 54
End: 2024-09-10
Attending: STUDENT IN AN ORGANIZED HEALTH CARE EDUCATION/TRAINING PROGRAM
Payer: COMMERCIAL

## 2024-09-10 DIAGNOSIS — G04.01 BICKERSTAFF BRAINSTEM ENCEPHALITIS: Primary | ICD-10-CM

## 2024-09-10 PROCEDURE — 97161 PT EVAL LOW COMPLEX 20 MIN: CPT | Mod: GP

## 2024-09-10 NOTE — PROGRESS NOTES
PHYSICAL THERAPY EVALUATION  Type of Visit: Evaluation      Subjective   Patient is a 54 year old male with a referral diagnosis of Bickerstaff brainstem encephalitis. Onset of symptoms occurred in 7/2023 including right sided weakness, numbness/tingling, difficulty ambulating, and headache.  After which he spent 3 months in TCU and was able to discharge home. Since that time, he has not had any formal therapies. On evaluation, patient endorses that he is independently living alone and had a friend that helps with shopping tasks. He is currently having increased right shoulder pain and wanting to get back to work.         Presenting condition or subjective complaint: doctor reference  Date of onset: 08/26/24 (referral date)    Relevant medical history: Stroke   Dates & types of surgery: no surgery    Prior diagnostic imaging/testing results: MRI     Prior therapy history for the same diagnosis, illness or injury: No      Prior Level of Function  Transfers: Independent  Ambulation: Independent  ADL: Independent  IADL: Finances, Housekeeping, Laundry, Meal preparation, Medication management    Living Environment  Social support: Alone   Type of home: House   Stairs to enter the home: Yes       Ramp: No   Stairs inside the home: No       Help at home: None  Equipment owned: Walker     Employment: No    Hobbies/Interests: walkig    Patient goals for therapy: work -in a [employer]     Pain assessment:  Describes pain in R shoulder, notably around the deltoid tuberosity      Objective      Cognitive Status Examination  Orientation: Oriented to person, place and time   Level of Consciousness: Alert  Follows Commands and Answers Questions: 100% of the time  Personal Safety and Judgement: Intact  Memory: Intact    OBSERVATION: Patient ambulating without device in clinic   INTEGUMENTARY: Intact  POSTURE:  L shoulder elevated as compared to R   PALPATION: Tender to palpation at proximal attachment of biceps tendon in groove  RANGE  OF MOTION:  Painful ROM both passively and actively to R shoulder, increased ROM available passively   STRENGTH:  Reduced strength grossly to RLE/RUE\    BED MOBILITY: Independent    TRANSFERS: Independent    WHEELCHAIR MOBILITY: NA    GAIT:   Level of Weed: Independent  Assistive Device(s): None  Gait Deviations: Stance time decreased  Sho decreased  Drop foot R  Toe out RLE   Gait Distance: within clinic  Stairs: reciprocal, hand rails    BALANCE:  Decreased dynamic balance as noted by     SPECIAL TESTS  Functional Gait Assessment (FGA) TOTAL SCORE: (MAXIMUM SCORE 30): 12   10 Meter Walk Test (Comfortable)  0.57 m/s   5 Times Sit-to-Stand (5TSTS)  21.69 seconds       SENSATION:  Describes numbness over entirely of R side of body, worse in arm than leg      COORDINATION: Reduced to RLE/RUE  MUSCLE TONE: RLE tone abnormal, clonus noted at R ankle       Assessment & Plan   CLINICAL IMPRESSIONS  Medical Diagnosis: G04.01 (ICD-10-CM) - Bickerstaff brainstem encephalitis    Treatment Diagnosis: Balance deficits, movement coordination deficits   Impression/Assessment: Patient is a 54 year old male with a referral diagnosis of  Bickerstaff brainstem encephalitis. On eval, patient demonstrates deficits in balance, strength, coordination, and overall tolerance to activity, limiting his ability to participate in self care, mobility, and recreational activities. He endorses pain to R shoulder with tenderness to palpation to biceps tendon attachment. Will further assess as future sessions/with occupational therapy. He will continue to benefit from killed PT intervention to address deficits and progress goals.     Clinical Decision Making (Complexity):  Clinical Presentation: Stable/Uncomplicated  Clinical Presentation Rationale: based on medical and personal factors listed in PT evaluation  Clinical Decision Making (Complexity): Low complexity    PLAN OF CARE  Treatment Interventions:  Interventions: Gait Training,  Manual Therapy, Neuromuscular Re-education, Therapeutic Activity, Therapeutic Exercise    Long Term Goals     PT Goal 1  Goal Identifier: HEP  Goal Description: Pt will be able to demonstrate HEP activities without need for cues from provider to demonstrate understanding and independence with HEP.  Rationale: to maximize safety and independence with performance of ADLs and functional tasks;to maximize safety and independence within the home;to maximize safety and independence within the community  Target Date: 12/08/24  PT Goal 2  Goal Identifier: FGA  Goal Description: Pt will demonstrate a 4 point improvement on the FGA to demonstrate minimum clinically significant difference in score to demonstrate improved safety with functional mobility and decrease risk of falls.  Rationale: to maximize safety and independence within the home;to maximize safety and independence with performance of ADLs and functional tasks;to maximize safety and independence within the community  Target Date: 12/08/24  PT Goal 3  Goal Identifier: Gait speed  Goal Description: Pt will demonstrate a 0.12 second improvement in gait speed to demonstrate minimum clinically significant difference in score to demonstrate improved gait velocity.  Rationale: to maximize safety and independence with performance of ADLs and functional tasks;to maximize safety and independence within the home;to maximize safety and independence within the community  Target Date: 12/08/24  PT Goal 4  Goal Identifier: 5xSTS  Goal Description: Pt will demonstrate a 2.3 seconds improvement on 5xSTS to demonstrate minimum clinically significant difference in score to demonstrate improved LE strength.  Rationale: to maximize safety and independence with performance of ADLs and functional tasks;to maximize safety and independence within the home;to maximize safety and independence within the community  Target Date: 12/08/24      Frequency of Treatment: 1x/week  Duration of  Treatment: 10 weeks    Recommended Referrals to Other Professionals: Occupational Therapy  Education Assessment:   Learner/Method: Patient;Demonstration;Listening  Education Comments: Pt verbalizes/demos understanding of education.    Risks and benefits of evaluation/treatment have been explained.   Patient/Family/caregiver agrees with Plan of Care.     Evaluation Time:     PT Dilan Low Complexity Minutes (42064): 40     Signing Clinician: Rebekah Bejarano PT. DPT        [facility]                                                                                   OUTPATIENT PHYSICAL THERAPY      PLAN OF TREATMENT FOR OUTPATIENT REHABILITATION   Patient's Last Name, First Name, Michael Thomas    YOB: 1970   Provider's Name   [facility]   Medical Record No.  [id number]     Onset Date: 08/26/24 (referral date)  Start of Care Date: 09/10/24     Medical Diagnosis:  G04.01 (ICD-10-CM) - Bickerstaff brainstem encephalitis      PT Treatment Diagnosis:  Balance deficits, movement coordination deficits Plan of Treatment  Frequency/Duration: 1x/week/ 10 weeks    Certification date from 09/10/24 to 12/08/24         See note for plan of treatment details and functional goals     Rebekah Bejarano, PT                         I CERTIFY THE NEED FOR THESE SERVICES FURNISHED UNDER        THIS PLAN OF TREATMENT AND WHILE UNDER MY CARE     (Physician attestation of this document indicates review and certification of the therapy plan).              Referring Provider:  Montrell Borrego    Initial Assessment  See Epic Evaluation- Start of Care Date: 09/10/24

## 2024-09-20 ENCOUNTER — THERAPY VISIT (OUTPATIENT)
Dept: OCCUPATIONAL THERAPY | Facility: CLINIC | Age: 54
End: 2024-09-20
Attending: STUDENT IN AN ORGANIZED HEALTH CARE EDUCATION/TRAINING PROGRAM
Payer: COMMERCIAL

## 2024-09-20 DIAGNOSIS — G04.01 BICKERSTAFF BRAINSTEM ENCEPHALITIS: ICD-10-CM

## 2024-09-20 DIAGNOSIS — R27.9 LACK OF COORDINATION: Primary | ICD-10-CM

## 2024-09-20 DIAGNOSIS — Z78.9 ALTERATION IN INSTRUMENTAL ACTIVITIES OF DAILY LIVING (IADL): ICD-10-CM

## 2024-09-20 PROCEDURE — 97166 OT EVAL MOD COMPLEX 45 MIN: CPT | Mod: GO | Performed by: OCCUPATIONAL THERAPIST

## 2024-09-20 NOTE — PROGRESS NOTES
OCCUPATIONAL THERAPY EVALUATION  Type of Visit: Evaluation              Subjective      Michael Stewart is a 54 y.o. male with referral for OP occupational therapy from Montrell Borrego MD.  Patient with diagnosis of Bickerstaff brainstem encephalitis. Onset of symptoms occurred in 2023 including right sided weakness, numbness/tingling, difficulty ambulating, and headache.  After which he spent 3 months in TCU and was able to discharge home. Since that time, he has not had any formal therapies. On evaluation, patient endorses that he is independently living alone and had a friend that helps with shopping tasks. He is currently having increased right shoulder pain and wanting to get back to work.     Pt arrives on time, with daughter Kelly. Evaluation completed as ordered.     Presenting condition or subjective complaint: doctor reference  Date of onset: 24    Relevant medical history: Stroke   Dates & types of surgery: no surgery    Prior diagnostic imaging/testing results: MRI     Prior therapy history for the same diagnosis, illness or injury: No      Prior Level of Function  Transfers: Independent  Ambulation: Independent  ADL: Independent  IADL: Finances, Housekeeping, Laundry, Meal preparation, Medication management    Living Environment  Social support: Alone - living with friends  Type of sarah House   Stairs to enter the home: Yes       Ramp: No   Stairs inside the home: No       Help at home: None  Equipment owned: Walker     Employment: No    Hobbies/Interests: walkig    Patient goals for therapy: work    Pain assessment: Pain present  Location: right upper arm/Ratin/10  Location: right foot/Ratin/10     Objective   Cognitive Status Examination  Orientation: Oriented to person, place and time   Level of Consciousness: Alert  Follows Commands and Answers Questions: 100% of the time  Personal Safety and Judgement: Intact  Memory: Intact  Attention: No deficits  identified  Organization/Problem Solving: No deficits identified  Executive Function: No deficits identified    VISUAL SKILLS  Visual Acuity: No deficits identified  Visual Field: Appears normal  Visual Attention: Appears normal  Oculomotor: intact    SENSATION:   UE Sensory Exam Left UE Right UE   Light Touch Intact Intact   Proprioception Intact Intact   Sharp/Dull Discrimination Intact Intact       POSTURE: WFL  RANGE OF MOTION: UE AROM WFL  STRENGTH:   Pain: - none + mild ++ moderate +++ severe  Strength Scale: 0-5/5 Left Right   Shoulder Flexion 5 5   Shoulder Extension 5 5   Shoulder Abduction 5 5   Shoulder Adduction 5 5   Shoulder Internal Rotation 5 5   Shoulder External Rotation 5 5   Shoulder Horizontal Abduction 5 5   Shoulder Horizontal Adduction 5 5   Elbow Flexion 5 5   Elbow Extension 5 5     Pain: - none + mild ++ moderate +++ severe  Strength Scale: 0-5/5 Left Right   Wrist Flexion 5 5   Wrist Extension 5 5   Supination 5 5   Pronation 5 5    Strength (lbs) 64 (N: 101) 49.3 (N: 114)   Lateral Pinch (lbs) 20 (N: 26) 14 (N: 27)   3 Point Pinch (lbs) 16 (N: 24) 12 (N: 24)            MUSCLE TONE: WFL  COORDINATION: Left Hand, Nine Hole Peg Test (sec): 23 sec  Right Hand, Nine Hole Peg Test (sec): 129 sec  Right Hand, Box and Blocks Test (cubes transferred in 1 minute): 24 blocks  Left Hand, Box and Blocks Test (cubes transferred in 1 minute): 44 blocks  BALANCE: WFL  See PT eval.     FUNCTIONAL MOBILITY  Assistive Device(s): None  Ambulation: independent  Wheelchair: n/a    BED MOBILITY: Independent    TRANSFERS: Independent    BATHING: Independent    UPPER BODY DRESSING: Independent    LOWER BODY DRESSING: Independent    TOILETING: Independent    GROOMING: Independent - LUE only    EATING/SELF FEEDING: Independent - LUE only    ACTIVITY TOLERANCE: Pt with no c/o changes in energy or limitations with completion of daily activities.     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  The following significant findings have been identified: Impaired coordination, Impaired motor control, Impaired ROM, Impaired sensation, Impaired strength, and Pain.  These identified deficits interfere with their ability to perform self care tasks, recreational activities, household chores, driving , and meal planning and preparation as compared to previous level of function.     Clinical Decision Making (Complexity):  Assessment of Occupational Performance: 3-5 Performance Deficits  Occupational Performance Limitations: driving and community mobility and leisure activities  Clinical Decision Making (Complexity): Moderate complexity    PLAN OF CARE  Treatment Interventions:  Modalities: E-stim  Interventions: Self-Care/Home Management, Therapeutic Activity, Therapeutic Exercise, Neuromuscular Re-education    Long Term Goals   OT Goal 1  Goal Identifier: BUE HEP  Goal Description: Pt will demonstrate understanding of BUE HEP in order to increase safety and independence with ADL tasks.  Target Date: 12/19/24  OT Goal 2  Goal Identifier:  strength  Goal Description: Pt will demonstrate increased bilateral hand  strength by 15% in order to increase safety with ADL tasks.  Target Date: 12/19/24  OT Goal 3  Goal Identifier: pinch strength  Goal Description: Pt will demonstrate increased bilateral hand pinch strength by 15% in order to increase safety with ADL tasks.  Target Date:  12/19/24  OT Goal 4  Goal Identifier: fine motor coordination  Goal Description: Pt to demonstrate increased fine motor coordination as evidenced by improvement of 9 hole peg test by 15 seconds with left UE in order to increase safety and independence with ADL/IADL tasks.  Target Date: 12/19/24  OT Goal 5  Goal Identifier: gross motor coord  Goal Description: Pt to demonstrate increase of gross motor coordination as evidenced by increase in box and blocks test performance by 10 blocks, in order to increase independence and safety with ADL tasks.  Target Date: 12/19/24      Frequency of Treatment: one time per week  Duration of Treatment: up to 90 days     Recommended Referrals to Other Professionals:  none  Education Assessment: Learner/Method: Patient;Listening     Risks and benefits of evaluation/treatment have been explained.   Patient/Family/caregiver agrees with Plan of Care.     Evaluation Time:         Signing Clinician: Kirt Mendoza OT        [facility]                                                                                   OUTPATIENT OCCUPATIONAL THERAPY      PLAN OF TREATMENT FOR OUTPATIENT REHABILITATION   Patient's Last Name, First Name, TREYMichael Boles    YOB: 1970   Provider's Name   [facility]   Medical Record No.  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              Referring Provider:  Montrell Borrego    Initial Assessment  See Epic Evaluation- 09/20/24

## 2024-09-21 ENCOUNTER — HEALTH MAINTENANCE LETTER (OUTPATIENT)
Age: 54
End: 2024-09-21

## 2024-09-27 ENCOUNTER — ANCILLARY PROCEDURE (OUTPATIENT)
Dept: MRI IMAGING | Facility: CLINIC | Age: 54
End: 2024-09-27
Attending: STUDENT IN AN ORGANIZED HEALTH CARE EDUCATION/TRAINING PROGRAM
Payer: COMMERCIAL

## 2024-09-27 DIAGNOSIS — G04.01 BICKERSTAFF BRAINSTEM ENCEPHALITIS: ICD-10-CM

## 2024-09-27 PROCEDURE — 70553 MRI BRAIN STEM W/O & W/DYE: CPT | Performed by: RADIOLOGY

## 2024-09-27 PROCEDURE — T1013 SIGN LANG/ORAL INTERPRETER: HCPCS | Mod: U3 | Performed by: RADIOLOGY

## 2024-09-27 PROCEDURE — A9585 GADOBUTROL INJECTION: HCPCS | Mod: JZ | Performed by: RADIOLOGY

## 2024-09-27 RX ORDER — GADOBUTROL 604.72 MG/ML
7.5 INJECTION INTRAVENOUS ONCE
Status: COMPLETED | OUTPATIENT
Start: 2024-09-27 | End: 2024-09-27

## 2024-09-27 RX ADMIN — GADOBUTROL 7.5 ML: 604.72 INJECTION INTRAVENOUS at 07:44

## 2024-09-27 NOTE — DISCHARGE INSTRUCTIONS
MRI Contrast Discharge Instructions    The IV contrast you received today will pass out of your body in your  urine. This will happen in the next 24 hours. You will not feel this process.  Your urine will not change color.    Drink at least 4 extra glasses of water or juice today (unless your doctor  has restricted your fluids). This reduces the stress on your kidneys.  You may take your regular medicines.    If you are on dialysis: It is best to have dialysis today.    If you have a reaction: Most reactions happen right away. If you have  any new symptoms after leaving the hospital (such as hives or swelling),  call your hospital at the correct number below. Or call your family doctor.  If you have breathing distress or wheezing, call 911.    Special instructions: ***    I have read and understand the above information.    Signature:______________________________________ Date:___________    Staff:__________________________________________ Date:___________     Time:__________    [facility] Departments:    ___[place]: [phone]  ___[facility]: [phone]  ___[place]: [phone] ___[facility]: [phone]  ___[facility]: [phone]  ___[facility]: [phone]  ___[phone]  ___[facility]: [phone]  ___[phone]

## 2024-09-30 ENCOUNTER — TELEPHONE (OUTPATIENT)
Dept: NEUROLOGY | Facility: CLINIC | Age: 54
End: 2024-09-30
Payer: COMMERCIAL

## 2024-09-30 NOTE — TELEPHONE ENCOUNTER
Stroke RN Care Coordination - Unable to Reach / Voicemail Note     Stroke RN Care Coordinator Outreach:  Clinic Care Coordination - Follow-up (Stroke RNCC Outreach/) and Results (Brain MRI 9/27/24)     Outreach attempted x 1.      Left message on  daughter's  voicemail with call back information and requested return call.    Stroke RN Care Coordinator will try to reach patient's daughter again in 1-2 business days.    Raquel Rangel BS, RN, SCRN  RN Stroke Neurology Care Coordinator  [facility] Neuroscience Service Line

## 2024-09-30 NOTE — TELEPHONE ENCOUNTER
Received return call from pt's daughter. Informed her of stable MRI results and recommendations to keep follow up in December with Dr. Borrego. Confirmed additional appts like EMG, PM&R, sleep medicine, and OP therapies. I advised she can call my direct line should any other stroke related questions or concerns arise. She voiced understanding and was appreciative for the follow up.       Raquel Rangel BS, RN, SCRN  RN Stroke Neurology Care Coordinator  [facility] Neuroscience Service Line

## 2024-10-04 ENCOUNTER — THERAPY VISIT (OUTPATIENT)
Dept: OCCUPATIONAL THERAPY | Facility: CLINIC | Age: 54
End: 2024-10-04
Attending: STUDENT IN AN ORGANIZED HEALTH CARE EDUCATION/TRAINING PROGRAM
Payer: COMMERCIAL

## 2024-10-04 DIAGNOSIS — G04.01 BICKERSTAFF BRAINSTEM ENCEPHALITIS: Primary | ICD-10-CM

## 2024-10-04 DIAGNOSIS — R27.9 LACK OF COORDINATION: ICD-10-CM

## 2024-10-04 PROCEDURE — T1013 SIGN LANG/ORAL INTERPRETER: HCPCS | Mod: GT

## 2024-10-04 PROCEDURE — 97110 THERAPEUTIC EXERCISES: CPT | Mod: GO | Performed by: OCCUPATIONAL THERAPIST

## 2024-10-09 ENCOUNTER — THERAPY VISIT (OUTPATIENT)
Dept: PHYSICAL THERAPY | Facility: CLINIC | Age: 54
End: 2024-10-09
Attending: STUDENT IN AN ORGANIZED HEALTH CARE EDUCATION/TRAINING PROGRAM
Payer: COMMERCIAL

## 2024-10-09 DIAGNOSIS — G04.01 BICKERSTAFF BRAINSTEM ENCEPHALITIS: Primary | ICD-10-CM

## 2024-10-09 PROCEDURE — 97530 THERAPEUTIC ACTIVITIES: CPT | Mod: GP | Performed by: PHYSICAL THERAPIST

## 2024-10-09 PROCEDURE — T1013 SIGN LANG/ORAL INTERPRETER: HCPCS | Mod: U4

## 2024-10-09 PROCEDURE — 97112 NEUROMUSCULAR REEDUCATION: CPT | Mod: GP | Performed by: PHYSICAL THERAPIST

## 2024-10-10 ENCOUNTER — THERAPY VISIT (OUTPATIENT)
Dept: OCCUPATIONAL THERAPY | Facility: CLINIC | Age: 54
End: 2024-10-10
Attending: STUDENT IN AN ORGANIZED HEALTH CARE EDUCATION/TRAINING PROGRAM
Payer: COMMERCIAL

## 2024-10-10 DIAGNOSIS — R27.9 LACK OF COORDINATION: ICD-10-CM

## 2024-10-10 DIAGNOSIS — G04.01 BICKERSTAFF BRAINSTEM ENCEPHALITIS: Primary | ICD-10-CM

## 2024-10-10 DIAGNOSIS — Z78.9 ALTERATION IN INSTRUMENTAL ACTIVITIES OF DAILY LIVING (IADL): ICD-10-CM

## 2024-10-10 PROCEDURE — 97110 THERAPEUTIC EXERCISES: CPT | Mod: GO | Performed by: OCCUPATIONAL THERAPIST

## 2024-10-16 ENCOUNTER — THERAPY VISIT (OUTPATIENT)
Dept: PHYSICAL THERAPY | Facility: CLINIC | Age: 54
End: 2024-10-16
Attending: STUDENT IN AN ORGANIZED HEALTH CARE EDUCATION/TRAINING PROGRAM
Payer: COMMERCIAL

## 2024-10-16 DIAGNOSIS — G04.01 BICKERSTAFF BRAINSTEM ENCEPHALITIS: Primary | ICD-10-CM

## 2024-10-16 PROCEDURE — 97110 THERAPEUTIC EXERCISES: CPT | Mod: GP | Performed by: PHYSICAL THERAPIST

## 2024-10-16 PROCEDURE — 97112 NEUROMUSCULAR REEDUCATION: CPT | Mod: GP | Performed by: PHYSICAL THERAPIST

## 2024-10-16 PROCEDURE — 97530 THERAPEUTIC ACTIVITIES: CPT | Mod: GP | Performed by: PHYSICAL THERAPIST

## 2024-10-16 PROCEDURE — T1013 SIGN LANG/ORAL INTERPRETER: HCPCS | Mod: U4

## 2024-10-17 NOTE — PROGRESS NOTES
PM&R Clinic Note     Patient Name: Michael Stewart : 1970 Medical Record: [id number]     Referring Provider: Montrell Borrego MD           History of Present Illness:     Michael Stewart is a 54 year old male Angolan-speaking male who presented initially to neurology clinic for hospital follow up after admission from 23 - 23, was admitted for suspected bilateral thalamic strokes and strep A pharyngitis progressing to coma, discharged to [facility] 2023 for further care.  Per chart review, he had initially presented to [facility] with 3 days of right sided weakness, numbness/tingling, difficulty ambulating, and headache that was at the time attributed to Strep A pharyngitis.  He then developed trouble ambulating, right sided weakness, diplopia, and MRI Brain on  revealed restricted diffusion of the bilateral thalami (L>R) which were labeled as strokes, however, pattern was atypical. 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 After which he spent 3 months in TCU and was able to discharge home.      Hospitalization further complicated by ventilator associated pneumonia, respiratory cultures positive for strep anginosus, staph aureus and klebsiella aerogenes and he was started on Cefepime. He was also started on Bromocriptine for sympathetic hyperactivity. He completed IV Solumedrol on  and was started on Prednisone taper 60 mg with expected stop date of 2023. He underwent tracheostomy/PEG tube placement on . Aspirin was discontinued due to low suspicion for stroke. He was discharged to LTAC on .     Per neurology note on , patient stayed in LTAC for about a month. Then he was discharged to [facility] and he stayed there for about 3 months. He recovered remarkably well during his stay in rehab and had trach/PEG removed on discharge.      He now lives at home with his friends and the daughter helps him with his appointments.  He walks without any assistive devices at home, uses a walker while walking outside. He continues to have residual right sided weakness and numbness, no other symptoms. He tries to physically active, he is not currently working with PT/OT. Denies any falls. Denies any trouble with speaking or swallowing. Denies double vision. Denies bowel or bladder incontinence.      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 He is not working now due to right sided weakness. Daughter is going to help him apply for social security and disability    Patient was referred by Montrell Borrego MD, neurology for concerns of dystonia. Today, patient states, his primary concern is tingling pain in his upper arm, hand and toes. He reports this is new since his stroke, worsening and constant in his upper arm, entire hand and distal aspect of his toes. He feels it is worse when he moves his arm. He also endorses right sided neck pain, localizes on the lateral aspect of his neck. His family or patient does not notice any neck postures. Patient also with questions about applying for social security.     Medications: none, has not tried any medications for pain or tone     Therapies/HEP/equipments: doing outpatient formal OT and PT    Functionally:  - Overall he is functional well at home able to do most ADLs and mobility independently.              Past Medical and Surgical History:     No past medical history on file.  Past Surgical History:   Procedure Laterality Date    ENDOSCOPIC INSERTION TUBE GASTROSTOMY  7/21/2023    Procedure: Endoscopic insertion tube gastrostomy;  Surgeon: William Garcia MD;  Location: [facility] OR    PICC DOUBLE LUMEN PLACEMENT Left 07/15/2023    Left basilic vein 48cm total 1cm external.    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 No longer driving.          Family History:     No family history on file.         Medications:     Current Outpatient Medications   Medication Sig Dispense Refill    atorvastatin (LIPITOR) 40 MG tablet Take 1 tablet (40 mg) by mouth daily 90 tablet 1    atorvastatin (LIPITOR) 40 MG tablet Take 1 tablet (40 mg) by mouth daily 30 tablet 0    carvedilol (COREG) 6.25 MG tablet Take 1 tablet (6.25 mg) by mouth 2 times daily (with meals) 60 tablet 3    carvedilol (COREG) 6.25 MG tablet Take 1 tablet (6.25 mg) by mouth 2 times daily (with meals) 60 tablet 0    gabapentin (NEURONTIN) 100 MG capsule 100 mg TID x 7 days, THEN 300 mg TID. 270 capsule 2    meclizine (ANTIVERT) 25 MG tablet Take 1 tablet (25 mg) by mouth 3 times daily as needed for dizziness (for vertigo if sx return) 30 tablet 1            Allergies:     No Known Allergies           ROS:     A focused ROS is negative other than the symptoms noted above in the HPI.    Constitutional: denies any fevers, chills, any recent weight loss   Eyes: denies changes in visual acuity   Ears, Nose, Throat: denies any difficulty swallowing   Cardiovascular: denies any exertional chest pain or palpitation   Respiratory: denies dyspnea   Gastrointestinal: denies any nausea, vomiting, abdominal pain, diarrhea or constipation   Genitourinary: denies any dysuria, hematuria, frequency or urgency   Musculoskeletal: +lateral neck tightness   Neurologic: +nerve pain right upper arm, hand, toes denies any headache, changes in motor or sensory function, no loss of balance or vertigo   Psychiatric:  "denies mood changes; sleeps OK            Physical Examiniation:     VITAL SIGNS: /74 (BP Location: Right arm, Patient Position: Sitting, Cuff Size: Adult Large)   Pulse 59   Resp 16   SpO2 96%   BMI: Estimated body mass index is 27.62 kg/m  as calculated from the following:    Height as of 1/11/24: 1.651 m (5' 5\").    Weight as of 8/26/24: 75.3 kg (166 lb).    Gen: NAD, pleasant and cooperative   Cardio: regular pulse, well perfused  Pulm: non-labored breathing in room air  Abd: benign  Ext:  no edema in BLE, no tenderness in calves  Neuro/MSK: full motor strength, cranial nerves grossly intact. Provacative maneuvers for the R shoulder were negative (Speeds, Empty Can, Carranza). Notes some dystonic movements in RLE and R foot with inversion and plantar flexion with dynamic movement   Gait: minimal hip hike, inverted foot, flat foot with gait         Assessment/Plan:     # Bickerstaff brainstem encephalitis  # RLE/R foot dystonia   # Myofascial neck pain, right sided  # Neuropathic pain of the RUE and RLE    Michael Stewart is a 54 year old male [place]egalese-speaking male who presents today to PM&R clinic for initial evaluation. She initially presented to the acute hospital with right sided weakness, numbness/tingling, difficulty ambulating, and headache that was at the time attributed to Strep A pharyngitis.  He then developed trouble ambulating, right sided weakness, diplopia, and MRI Brain on 7/8 revealed restricted diffusion of the bilateral thalami (L>R) which were labeled as strokes, however, pattern was atypical. 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 After which she spent 3 months in TCU and was able to discharge home.      Today, patient reports several concerns. Overall he is functional well at home able to do most ADLs modified independently. He is no longer driving but expresses interest in returning to driving. He had questions about applying for disability. We discussed " helping him figure out what he can and cannot do and the benefits of continuing work from a health perspective. In regards to his concerns, he likely has some neuropathic component to his pain in his RUE and RLE. Provacative maneuvers for the R shoulder were negative. We will start gabapentin 100 TID and titrate up to help with his neuropathic pain. We will reach out to therapies to identify concerns about dystonia and decide whether neurotoxin is indicated. Can consider trigger point injections if conservative therapies and medications do not help his myofascial neck pain.    Patient education: In depth discussion and education was provided about the assessment and implications of each of the below recommendations for management. Patient indicated readiness to learn, all questions were answered and understanding of material presented was confirmed.    Work-up: no new today; EMG is scheduled in Nov    Therapy/equipment/braces: continue formal outpatient therapies.  Will also ask therapies to help evaluate if driving eval is indicated.     Medications: start Gabapentin 100 TID -> increase to 300 TID after 1 week, prescription sent    Interventions: will reach out to therapies to identify concerns about dystonia and decide whether neurotoxin is indicated. Can consider trigger point injections if conservative therapies and medications do not help his myofascial neck pain.     Referral / follow up with other providers:   consult placed. No other new referrals today; sleep medicine consult is scheduled in Nov.    Follow up in 3 months, earlier if needed     Discussed the patient's case with my attending, Dr. Cindy Graves MD , who agrees with the following above.     Raymond Lal,   PGY-4 Physical Medicine & Rehabilitation      Physician Attestation   I, Cindy Graves MD, saw this patient and agree with the findings and plan of care as documented in the note.      Items personally reviewed/procedural  attestation: notes in the chart and agree with the interpretation documented in the note.    Communicated the plan with the therapy team as below. Will get PA for botox for next visit.    Cindy Graves MD    **  I had planned on going through some pre-driving type assessments with him when I initially saw him, since I had heard from PT that was his main concern, however he was less concerned about it when I initially saw him. I will go inquire with him and can have him run through my pre-driving screens/assessments to get a better idea of how he would do. He may end up needing a behind-the-wheel assessment due to his RUE/RLE coordination impairments, and that is separate than what I can do for him     From Kirt Mendoza, OTR/L       **  10/23/24   Good evening ;-) I can certainly look at Michael's R upper quarter. I also agree with you about his gait. He was given an AFO, but he no longer uses it. I wanted to see what his gait looked like with, and with out it. I have asked him to bring it to his next session. I was also planning to look at tone in his LE next session. I agree that he may need some tone management.       From Olya Junior PT      **  10/23/24   Brant Vaughan, His R UE was inferiorly subluxed.  he had a sulcus.  I reduced the joint and he is moving the arm better.   He still postures  the UE into IR and abduction with gait.    Lower extremity:  5 beat clonus on R. R calf (gastroc and soleus)  is definitely increased tone, hamstring also I think.  quad glenna 1, HS glenna 2 calf glenna 3 I think   he did not bring his brace again but I think it's original purpose was to help manage that clonus.   I have asked him to bring it next session (11/6/24)     I will get a better look at the legs in gait next session

## 2024-10-21 ENCOUNTER — DOCUMENTATION ONLY (OUTPATIENT)
Dept: NEUROLOGY | Facility: CLINIC | Age: 54
End: 2024-10-21
Payer: COMMERCIAL

## 2024-10-21 NOTE — PROGRESS NOTES
Faxed Form October 21, 2024 to fax number [phone] [facility].    Right Fax confirmed at 1:08 PM    Makayla Harvey

## 2024-10-22 ENCOUNTER — OFFICE VISIT (OUTPATIENT)
Dept: PHYSICAL MEDICINE AND REHAB | Facility: CLINIC | Age: 54
End: 2024-10-22
Attending: STUDENT IN AN ORGANIZED HEALTH CARE EDUCATION/TRAINING PROGRAM
Payer: COMMERCIAL

## 2024-10-22 VITALS
HEART RATE: 59 BPM | DIASTOLIC BLOOD PRESSURE: 74 MMHG | OXYGEN SATURATION: 96 % | RESPIRATION RATE: 16 BRPM | SYSTOLIC BLOOD PRESSURE: 111 MMHG

## 2024-10-22 DIAGNOSIS — G24.9 DYSTONIA: Primary | ICD-10-CM

## 2024-10-22 DIAGNOSIS — I63.81 THALAMIC STROKE (H): ICD-10-CM

## 2024-10-22 DIAGNOSIS — G04.01 BICKERSTAFF BRAINSTEM ENCEPHALITIS: ICD-10-CM

## 2024-10-22 DIAGNOSIS — M79.2 NEUROPATHIC PAIN: ICD-10-CM

## 2024-10-22 RX ORDER — GABAPENTIN 100 MG/1
CAPSULE ORAL
Qty: 270 CAPSULE | Refills: 2 | Status: SHIPPED | OUTPATIENT
Start: 2024-10-22

## 2024-10-22 NOTE — LETTER
10/22/2024       RE: Michael Stewart  [address]   [address]  [facility] [address]     Dear Colleague,    Thank you for referring your patient, Michael Stewart, to the [facility] PHYSICAL MEDICINE AND REHABILITATION CLINIC [place] at [facility]. Please see a copy of my visit note below.           PM&R Clinic Note     Patient Name: Michael Stewart : 1970 Medical Record: [id number]     Referring Provider: Montrell Borrego MD           History of Present Illness:     Michael Stewart is a 54 year old male Faroese-speaking male who presented initially to neurology clinic for hospital follow up after admission from 23 - 23, was admitted for suspected bilateral thalamic strokes and strep A pharyngitis progressing to coma, discharged to [facility] 2023 for further care.  Per chart review, he had initially presented to [facility] with 3 days of right sided weakness, numbness/tingling, difficulty ambulating, and headache that was at the time attributed to Strep A pharyngitis.  He then developed trouble ambulating, right sided weakness, diplopia, and MRI Brain on  revealed restricted diffusion of the bilateral thalami (L>R) which were labeled as strokes, however, pattern was atypical. 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 After which he spent 3 months in TCU and was able to discharge home.      Hospitalization further complicated by ventilator associated pneumonia, respiratory cultures positive for strep anginosus, staph aureus and klebsiella aerogenes and he was started on Cefepime. He was also started on Bromocriptine for sympathetic hyperactivity. He completed IV Solumedrol on  and was started on Prednisone taper 60 mg with expected stop date of 2023. He underwent tracheostomy/PEG tube placement on . Aspirin was discontinued due to low suspicion for stroke. He was discharged to LTAC on  7/26.     Per neurology note on 8/26, patient stayed in LTAC for about a month. Then he was discharged to [facility] and he stayed there for about 3 months. He recovered remarkably well during his stay in rehab and had trach/PEG removed on discharge.      He now lives at home with his friends and the daughter helps him with his appointments. He walks without any assistive devices at home, uses a walker while walking outside. He continues to have residual right sided weakness and numbness, no other symptoms. He tries to physically active, he is not currently working with PT/OT. Denies any falls. Denies any trouble with speaking or swallowing. Denies double vision. Denies bowel or bladder incontinence.      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 He is not working now due to right sided weakness. Daughter is going to help him apply for social security and disability    Patient was referred by Montrell Borrego MD, neurology for concerns of dystonia. Today, patient states, his primary concern is tingling pain in his upper arm, hand and toes. He reports this is new since his stroke, worsening and constant in his upper arm, entire hand and distal aspect of his toes. He feels it is worse when he moves his arm. He also endorses right sided neck pain, localizes on the lateral aspect of his neck. His family or patient does not notice any neck postures. Patient also with questions about applying for social security.     Medications: none, has not tried any medications for pain or tone     Therapies/HEP/equipments: doing outpatient formal OT and PT    Functionally:  - Overall he is functional well at home able to do most ADLs and mobility independently.              Past Medical and Surgical History:     No past medical history on file.  Past Surgical History:   Procedure Laterality Date     ENDOSCOPIC INSERTION TUBE GASTROSTOMY  7/21/2023    Procedure: Endoscopic insertion tube gastrostomy;   Surgeon: William Garcia MD;  Location: [facility] OR     PICC DOUBLE LUMEN PLACEMENT Left 07/15/2023    Left basilic vein 48cm total 1cm external.     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 No longer driving.          Family History:     No family history on file.         Medications:     Current Outpatient Medications   Medication Sig Dispense Refill     atorvastatin (LIPITOR) 40 MG tablet Take 1 tablet (40 mg) by mouth daily 90 tablet 1     atorvastatin (LIPITOR) 40 MG tablet Take 1 tablet (40 mg) by mouth daily 30 tablet 0     carvedilol (COREG) 6.25 MG tablet Take 1 tablet (6.25 mg) by mouth 2 times daily (with meals) 60 tablet 3     carvedilol (COREG) 6.25 MG tablet Take 1 tablet (6.25 mg) by mouth 2 times daily (with meals) 60 tablet 0     gabapentin (NEURONTIN) 100 MG capsule 100 mg TID x 7 days, THEN 300 mg TID. 270 capsule 2     meclizine (ANTIVERT) 25 MG tablet Take 1 tablet (25 mg) by mouth 3 times daily as needed for dizziness (for vertigo if sx return) 30 tablet 1            Allergies:     No Known Allergies           ROS:     A focused ROS is negative other than the symptoms noted above in the HPI.    Constitutional: denies any fevers, chills, any recent weight loss   Eyes: denies changes in visual acuity   Ears, Nose, Throat: denies any difficulty swallowing   Cardiovascular: denies any exertional chest pain or palpitation   Respiratory:  "denies dyspnea   Gastrointestinal: denies any nausea, vomiting, abdominal pain, diarrhea or constipation   Genitourinary: denies any dysuria, hematuria, frequency or urgency   Musculoskeletal: +lateral neck tightness   Neurologic: +nerve pain right upper arm, hand, toes denies any headache, changes in motor or sensory function, no loss of balance or vertigo   Psychiatric: denies mood changes; sleeps OK            Physical Examiniation:     VITAL SIGNS: /74 (BP Location: Right arm, Patient Position: Sitting, Cuff Size: Adult Large)   Pulse 59   Resp 16   SpO2 96%   BMI: Estimated body mass index is 27.62 kg/m  as calculated from the following:    Height as of 1/11/24: 1.651 m (5' 5\").    Weight as of 8/26/24: 75.3 kg (166 lb).    Gen: NAD, pleasant and cooperative   Cardio: regular pulse, well perfused  Pulm: non-labored breathing in room air  Abd: benign  Ext:  no edema in BLE, no tenderness in calves  Neuro/MSK: full motor strength, cranial nerves grossly intact. Provacative maneuvers for the R shoulder were negative (Speeds, Empty Can, Carranza). Notes some dystonic movements in RLE and R foot with inversion and plantar flexion with dynamic movement   Gait: minimal hip hike, inverted foot, flat foot with gait         Assessment/Plan:     # Bickerstaff brainstem encephalitis  # RLE/R foot dystonia   # Myofascial neck pain, right sided  # Neuropathic pain of the RUE and RLE    Michael Stewart is a 54 year old male Tamazight-speaking male who presents today to PM&R clinic for initial evaluation. She initially presented to the acute hospital with right sided weakness, numbness/tingling, difficulty ambulating, and headache that was at the time attributed to Strep A pharyngitis.  He then developed trouble ambulating, right sided weakness, diplopia, and MRI Brain on 7/8 revealed restricted diffusion of the bilateral thalami (L>R) which were labeled as strokes, however, pattern was atypical. Patient then was " diagnosed with Bickerstaff brainstem encephalitis. After which she spent 3 months in TCU and was able to discharge home.      Today, patient reports several concerns. Overall he is functional well at home able to do most ADLs modified independently. He is no longer driving but expresses interest in returning to driving. He had questions about applying for disability. We discussed helping him figure out what he can and cannot do and the benefits of continuing work from a health perspective. In regards to his concerns, he likely has some neuropathic component to his pain in his RUE and RLE. Provacative maneuvers for the R shoulder were negative. We will start gabapentin 100 TID and titrate up to help with his neuropathic pain. We will reach out to therapies to identify concerns about dystonia and decide whether neurotoxin is indicated. Can consider trigger point injections if conservative therapies and medications do not help his myofascial neck pain.    Patient education: In depth discussion and education was provided about the assessment and implications of each of the below recommendations for management. Patient indicated readiness to learn, all questions were answered and understanding of material presented was confirmed.    Work-up: no new today; EMG is scheduled in Nov    Therapy/equipment/braces: continue formal outpatient therapies.  Will also ask therapies to help evaluate if driving eval is indicated.     Medications: start Gabapentin 100 TID -> increase to 300 TID after 1 week, prescription sent    Interventions: will reach out to therapies to identify concerns about dystonia and decide whether neurotoxin is indicated. Can consider trigger point injections if conservative therapies and medications do not help his myofascial neck pain.     Referral / follow up with other providers:   consult placed. No other new referrals today; sleep medicine consult is scheduled in Nov.    Follow up in 3  months, earlier if needed     Discussed the patient's case with my attending, Dr. Cindy Graves MD , who agrees with the following above.     Raymond Lal, DO  PGY-4 Physical Medicine & Rehabilitation      Physician Attestation   I, Cindy Graves MD, saw this patient and agree with the findings and plan of care as documented in the note.      Items personally reviewed/procedural attestation: notes in the chart and agree with the interpretation documented in the note.    Communicated the plan with the therapy team as below. Will get PA for botox for next visit.    Cindy Graves MD    **  I had planned on going through some pre-driving type assessments with him when I initially saw him, since I had heard from PT that was his main concern, however he was less concerned about it when I initially saw him. I will go inquire with him and can have him run through my pre-driving screens/assessments to get a better idea of how he would do. He may end up needing a behind-the-wheel assessment due to his RUE/RLE coordination impairments, and that is separate than what I can do for him     From Kirt Mendoza, OTR/L       **  10/23/24   Good evening ;-) I can certainly look at Michael's R upper quarter. I also agree with you about his gait. He was given an AFO, but he no longer uses it. I wanted to see what his gait looked like with, and with out it. I have asked him to bring it to his next session. I was also planning to look at tone in his LE next session. I agree that he may need some tone management.       From Olya Junior PT      **  10/23/24   Brant Vaughan, His R UE was inferiorly subluxed.  he had a sulcus.  I reduced the joint and he is moving the arm better.   He still postures  the UE into IR and abduction with gait.    Lower extremity:  5 beat clonus on R. R calf (gastroc and soleus)  is definitely increased tone, hamstring also I think.  quad glenna 1, HS glenna 2 calf glenna 3 I think   he did not bring  his brace again but I think it's original purpose was to help manage that clonus.   I have asked him to bring it next session (11/6/24)     I will get a better look at the legs in gait next session      Again, thank you for allowing me to participate in the care of your patient.      Sincerely,    Cindy Graves MD

## 2024-10-22 NOTE — NURSING NOTE
Chief Complaint   Patient presents with    New Patient     Here for consult, confirmed with patient     Alejandra Lazar

## 2024-10-23 ENCOUNTER — PATIENT OUTREACH (OUTPATIENT)
Dept: CARE COORDINATION | Facility: CLINIC | Age: 54
End: 2024-10-23
Payer: COMMERCIAL

## 2024-10-23 ENCOUNTER — THERAPY VISIT (OUTPATIENT)
Dept: PHYSICAL THERAPY | Facility: CLINIC | Age: 54
End: 2024-10-23
Attending: STUDENT IN AN ORGANIZED HEALTH CARE EDUCATION/TRAINING PROGRAM
Payer: COMMERCIAL

## 2024-10-23 DIAGNOSIS — G04.01 BICKERSTAFF BRAINSTEM ENCEPHALITIS: Primary | ICD-10-CM

## 2024-10-23 PROCEDURE — 97530 THERAPEUTIC ACTIVITIES: CPT | Mod: GP | Performed by: PHYSICAL THERAPIST

## 2024-10-23 PROCEDURE — 97110 THERAPEUTIC EXERCISES: CPT | Mod: GP | Performed by: PHYSICAL THERAPIST

## 2024-10-23 NOTE — PROGRESS NOTES
Social Work Intervention  [facility]    Data/Intervention:    Patient Name:  Michael Stewart  /Age:  1970 (54 year old)    Visit Type: telephone  Referral Source: Dr Yesy Graves  Reason for Referral:  discuss applying for social security disability with dtr    Collaborated With:    -Pt's dtr Melina (consent on file) phone call with     Psychosocial Information/Concerns:  Contacted Pt's dtr per request of Dr Graves to discuss how to apply for SSDI. Pt has worked many years and last job was for 10 years with a [employer] company. After his stroke he was no longer able to work. Melina his dtr went to the soc sec office in [facility] about 3 months ago and they said they would contact him to set up an appt but they never did. She recently apply for SNAP and Cash assistance for him- about a month ago and they haven't received any info about approval for that yet.   Pt lives in his own home and his dtrs are paying his bills and helping him. It's burdensome for them so they want to get SSDI in place.     Intervention/Education/Resources Provided:  Discussed the SSDI process and information she will need to provide to soc sec. Encouraged her to use the main phone # to start the application. She will get an appt date/time and paperwork in the mail. Discussed that if he is denied, they could consider contacting an  to help with the appeal but to be aware of the fees they will charge if they sign with anyone.   Will ask Dr Graves if she can complete the Medical Opinion form that the [facility] will want for approving any cash assistance/food support. Melina would like a copy mailed to her and one faxed to the [facility].  Also discussed the CADI waiver if he needs services at home. Discussed that they are providing the services and they could possibly be paid caregivers. He needs the disability designation before we can get a CADI assessment so will await SSDI determination.     Assessment/Plan:  Melina  plans to start the SSDI application by phone. She will contact me if any questions and down the road if they wish to have a CADI assessment for services at home.     Provided patient/family with contact information and availability.    JEANNETTE Finley, [facility]    [facility] [address] [address]    [phone]

## 2024-10-25 ENCOUNTER — THERAPY VISIT (OUTPATIENT)
Dept: OCCUPATIONAL THERAPY | Facility: CLINIC | Age: 54
End: 2024-10-25
Attending: STUDENT IN AN ORGANIZED HEALTH CARE EDUCATION/TRAINING PROGRAM
Payer: COMMERCIAL

## 2024-10-25 DIAGNOSIS — R27.9 LACK OF COORDINATION: ICD-10-CM

## 2024-10-25 DIAGNOSIS — Z78.9 ALTERATION IN INSTRUMENTAL ACTIVITIES OF DAILY LIVING (IADL): ICD-10-CM

## 2024-10-25 DIAGNOSIS — G04.01 BICKERSTAFF BRAINSTEM ENCEPHALITIS: Primary | ICD-10-CM

## 2024-10-25 PROCEDURE — 97110 THERAPEUTIC EXERCISES: CPT | Mod: GO | Performed by: OCCUPATIONAL THERAPIST

## 2024-10-25 PROCEDURE — 97112 NEUROMUSCULAR REEDUCATION: CPT | Mod: GO | Performed by: OCCUPATIONAL THERAPIST

## 2024-11-06 ENCOUNTER — THERAPY VISIT (OUTPATIENT)
Dept: PHYSICAL THERAPY | Facility: CLINIC | Age: 54
End: 2024-11-06
Attending: STUDENT IN AN ORGANIZED HEALTH CARE EDUCATION/TRAINING PROGRAM
Payer: COMMERCIAL

## 2024-11-06 ENCOUNTER — THERAPY VISIT (OUTPATIENT)
Dept: OCCUPATIONAL THERAPY | Facility: CLINIC | Age: 54
End: 2024-11-06
Attending: STUDENT IN AN ORGANIZED HEALTH CARE EDUCATION/TRAINING PROGRAM
Payer: COMMERCIAL

## 2024-11-06 DIAGNOSIS — G04.01 BICKERSTAFF BRAINSTEM ENCEPHALITIS: Primary | ICD-10-CM

## 2024-11-06 DIAGNOSIS — R27.9 LACK OF COORDINATION: ICD-10-CM

## 2024-11-06 DIAGNOSIS — Z78.9 ALTERATION IN INSTRUMENTAL ACTIVITIES OF DAILY LIVING (IADL): ICD-10-CM

## 2024-11-06 PROCEDURE — 97112 NEUROMUSCULAR REEDUCATION: CPT | Mod: GO | Performed by: OCCUPATIONAL THERAPIST

## 2024-11-06 PROCEDURE — 97116 GAIT TRAINING THERAPY: CPT | Mod: GP | Performed by: PHYSICAL THERAPIST

## 2024-11-06 PROCEDURE — 97110 THERAPEUTIC EXERCISES: CPT | Mod: GO | Performed by: OCCUPATIONAL THERAPIST

## 2024-11-13 ENCOUNTER — THERAPY VISIT (OUTPATIENT)
Dept: OCCUPATIONAL THERAPY | Facility: CLINIC | Age: 54
End: 2024-11-13
Attending: STUDENT IN AN ORGANIZED HEALTH CARE EDUCATION/TRAINING PROGRAM
Payer: COMMERCIAL

## 2024-11-13 ENCOUNTER — THERAPY VISIT (OUTPATIENT)
Dept: PHYSICAL THERAPY | Facility: CLINIC | Age: 54
End: 2024-11-13
Attending: STUDENT IN AN ORGANIZED HEALTH CARE EDUCATION/TRAINING PROGRAM
Payer: COMMERCIAL

## 2024-11-13 ENCOUNTER — OFFICE VISIT (OUTPATIENT)
Dept: NEUROLOGY | Facility: CLINIC | Age: 54
End: 2024-11-13
Attending: STUDENT IN AN ORGANIZED HEALTH CARE EDUCATION/TRAINING PROGRAM
Payer: COMMERCIAL

## 2024-11-13 DIAGNOSIS — G04.01 BICKERSTAFF BRAINSTEM ENCEPHALITIS: Primary | ICD-10-CM

## 2024-11-13 DIAGNOSIS — R27.9 LACK OF COORDINATION: ICD-10-CM

## 2024-11-13 DIAGNOSIS — G04.01 BICKERSTAFF BRAINSTEM ENCEPHALITIS: ICD-10-CM

## 2024-11-13 PROCEDURE — 97110 THERAPEUTIC EXERCISES: CPT | Mod: GO | Performed by: OCCUPATIONAL THERAPIST

## 2024-11-13 PROCEDURE — 97116 GAIT TRAINING THERAPY: CPT | Mod: GP | Performed by: PHYSICAL THERAPIST

## 2024-11-13 PROCEDURE — 97530 THERAPEUTIC ACTIVITIES: CPT | Mod: GO | Performed by: OCCUPATIONAL THERAPIST

## 2024-11-13 NOTE — LETTER
11/13/2024       RE: Michael Stewart  [address]   [address]  [facility] [address]     Dear Colleague,    Thank you for referring your patient, Michael Stewart, to the [facility] EMG CLINIC [facility]. Please see a copy of my visit note below.    See procedure note      Again, thank you for allowing me to participate in the care of your patient.      Sincerely,    Branden Tijerina MD

## 2024-11-13 NOTE — PROCEDURES
[facility]  Electrodiagnostic Laboratory                 Department of Neurology                                                                                                         Test Date:  2024    Patient: Michael Stewart  : 1970 Physician: Branden Tijerina MD   Sex: Male AGE: 54 year Ref Phys: Montrell Borrego MD   ID#: [id number]   Technician: John Beach     History and Examination:  Michael Stewart is a 54 year old man with a recent hospital stay characterized as due to cerebral infarction or Bickerstaff's encephalitis. Examination is notable for distal weakness of the right upper and lower limbs. He is referred to evaluate for possible polyneuropathy or facial neuropathy.    Techniques:  Motor conduction studies were done with surface recording electrodes. Sensory conduction studies were performed with surface electrodes, unless indicated otherwise by (n), designating the use of subdermal recording electrodes. Temperature was monitored and recorded throughout the study. Upper extremities were maintained at a temperature of 32 degrees Centigrade or higher.  EMG was done with a concentric needle electrode.     Results:  All nerve conduction studies (as indicated in the following tables) were within normal limits.All F Wave latencies were within normal limits. Needle electromyography of selected muscles of the distal right upper and lower limbs was normal.     Interpretation:  This is a normal study. In particular, there is no electrodiagnostic evidence of polyneuropathy or of a neurogenic disorder affecting the muscles of the distal right upper and lower limbs.       _____________________________  Branden Tijerina MD  Board Certified in Clinical Neurophysiology and Neuromuscular Medicine        Nerve Conduction Studies  Motor Sites      Latency Amplitude Neg. Amp Diff Segment Distance Velocity Neg. Dur Neg Area Diff Temperature Comment   Site (ms) Norm (mV) Norm  (%)  cm m/s Norm (ms) (%) ( C)    Left Facial - Nasalis Motor   Mastoid 3.4 - 2.5 -      5.4  32    Right Facial - Nasalis Motor   Mastoid 3.1 - 2.5 -      6.7  32    Right Fibular (EDB) Motor   Ankle 4.3  < 6.0 5.7  > 2.5  Ankle-EDB 8   5.1  31    Bel Fib Head 10.1 - 5.5 - -4 Bel Fib Head-Ankle 27.5 47  > 38 4.8 -4 31.1    Pop Fossa 12.0 - 5.5 - 0 Pop Fossa-Bel Fib Head 9.5 50  > 38 4.9 1 31.1    Right Median (APB) Motor   Wrist 3.9  < 4.4 11.7  > 5.0  Wrist-APB 8   5.7  32    Elbow 7.5 - 11.5  > 5.0 -2 Elbow-Wrist 20.5 57  > 48 5.9 0 32.1    Right Tibial (AHB) Motor   Ankle 3.1  < 6.5 9.7  > 5.0  Ankle-AHB 8   4.6  31    Knee 10.8 - 6.5 - -33 Knee-Ankle 39 51  > 38 4.7 -23 31    Right Ulnar (ADM) Motor   Wrist 3.5  < 3.5 11.3  > 5.0  Wrist-ADM 8   5.3  32.2    Bel Elbow 6.4 - 10.8 - -4 Bel Elbow-Wrist 18.5 64  > 48 5.6 -2 32.2    Abv Elbow 8.1 - 10.0 - -7 Abv Elbow-Bel Elbow 9.5 56  > 48 5.9 2 32.2      F-Wave Sites      Min F-Lat Max-Min F-Lat Mean F-Lat   Site (ms) (ms) (ms)   Right Tibial F-Wave   Ankle 45.5 2.0 -   Right Ulnar F-Wave   Wrist 25.6 2.0 26.5     Sensory Sites      Onset Lat Peak Lat Neg Peak - Start Amp Amp (P-P) Segment Distance Velocity Temperature Comment   Site ms (ms) ( V) Norm ( V)  cm m/s Norm ( C)    Right Median Sensory   Wrist-Dig II 2.6 3.4 36  > 10 43 Wrist-Dig II 14 54  > 48 32.1    Right Superficial Fibular Sensory   Lower Leg-Ankle 2.2 3.0 19  > 3 22 Lower Leg-Ankle 12.5 57  > 38 31.1    Left Sural Sensory   Calf-Lat Mall 2.6 3.3 22  > 5 23 Calf-Lat Mall 14 54  > 38 31.1    Right Sural Sensory   Calf-Lat Mall 2.4 3.2 25  > 5 25 Calf-Lat Mall 14 58  > 38 31.2    Right Ulnar Sensory   Wrist-Dig V 2.1 2.9 31  > 8 46 Wrist-Dig V 12.5 60  > 48 32.1        Electromyography     Side Muscle Ins Act Fibs/PSW Fasc HF Amp Dur Poly Recrt Int Pat   Left FDI Nml None Nml 0 Nml Nml 0 Nml Nml   Left Pronator Teres Nml None Nml 0 Nml Nml 0 Nml Nml   Left EDC Nml None Nml 0 Nml Nml 0 Nml Nml   Left  Tib Anterior Nml None Nml 0 Nml Nml 0 Nml Nml   Left Gastroc MH Nml None Nml 0 Nml Nml 0 Nml Nml         NCS Waveforms:    Motor                    Sensory                  F-Wave

## 2024-11-20 ENCOUNTER — THERAPY VISIT (OUTPATIENT)
Dept: OCCUPATIONAL THERAPY | Facility: CLINIC | Age: 54
End: 2024-11-20
Attending: STUDENT IN AN ORGANIZED HEALTH CARE EDUCATION/TRAINING PROGRAM
Payer: COMMERCIAL

## 2024-11-20 ENCOUNTER — THERAPY VISIT (OUTPATIENT)
Dept: PHYSICAL THERAPY | Facility: CLINIC | Age: 54
End: 2024-11-20
Attending: STUDENT IN AN ORGANIZED HEALTH CARE EDUCATION/TRAINING PROGRAM
Payer: COMMERCIAL

## 2024-11-20 DIAGNOSIS — G04.01 BICKERSTAFF BRAINSTEM ENCEPHALITIS: Primary | ICD-10-CM

## 2024-11-20 DIAGNOSIS — R27.9 LACK OF COORDINATION: ICD-10-CM

## 2024-11-20 PROCEDURE — 97116 GAIT TRAINING THERAPY: CPT | Mod: GP | Performed by: PHYSICAL THERAPIST

## 2024-11-20 PROCEDURE — 97110 THERAPEUTIC EXERCISES: CPT | Mod: GO | Performed by: OCCUPATIONAL THERAPIST

## 2024-11-20 PROCEDURE — 97112 NEUROMUSCULAR REEDUCATION: CPT | Mod: GO | Performed by: OCCUPATIONAL THERAPIST

## 2024-11-27 ENCOUNTER — THERAPY VISIT (OUTPATIENT)
Dept: PHYSICAL THERAPY | Facility: CLINIC | Age: 54
End: 2024-11-27
Attending: STUDENT IN AN ORGANIZED HEALTH CARE EDUCATION/TRAINING PROGRAM
Payer: COMMERCIAL

## 2024-11-27 ENCOUNTER — THERAPY VISIT (OUTPATIENT)
Dept: OCCUPATIONAL THERAPY | Facility: CLINIC | Age: 54
End: 2024-11-27
Attending: STUDENT IN AN ORGANIZED HEALTH CARE EDUCATION/TRAINING PROGRAM
Payer: COMMERCIAL

## 2024-11-27 DIAGNOSIS — G04.01 BICKERSTAFF BRAINSTEM ENCEPHALITIS: Primary | ICD-10-CM

## 2024-11-27 DIAGNOSIS — R27.9 LACK OF COORDINATION: ICD-10-CM

## 2024-11-27 PROCEDURE — T1013 SIGN LANG/ORAL INTERPRETER: HCPCS | Mod: GT

## 2024-11-27 PROCEDURE — 97110 THERAPEUTIC EXERCISES: CPT | Mod: GO | Performed by: OCCUPATIONAL THERAPIST

## 2024-11-27 PROCEDURE — 97112 NEUROMUSCULAR REEDUCATION: CPT | Mod: GO | Performed by: OCCUPATIONAL THERAPIST

## 2024-12-04 ENCOUNTER — THERAPY VISIT (OUTPATIENT)
Dept: PHYSICAL THERAPY | Facility: CLINIC | Age: 54
End: 2024-12-04
Attending: STUDENT IN AN ORGANIZED HEALTH CARE EDUCATION/TRAINING PROGRAM
Payer: COMMERCIAL

## 2024-12-04 ENCOUNTER — THERAPY VISIT (OUTPATIENT)
Dept: OCCUPATIONAL THERAPY | Facility: CLINIC | Age: 54
End: 2024-12-04
Attending: STUDENT IN AN ORGANIZED HEALTH CARE EDUCATION/TRAINING PROGRAM
Payer: COMMERCIAL

## 2024-12-04 DIAGNOSIS — G04.01 BICKERSTAFF BRAINSTEM ENCEPHALITIS: Primary | ICD-10-CM

## 2024-12-04 DIAGNOSIS — R27.9 LACK OF COORDINATION: ICD-10-CM

## 2024-12-04 PROCEDURE — 97112 NEUROMUSCULAR REEDUCATION: CPT | Mod: GP | Performed by: PHYSICAL THERAPIST

## 2024-12-04 PROCEDURE — 97750 PHYSICAL PERFORMANCE TEST: CPT | Mod: GP | Performed by: PHYSICAL THERAPIST

## 2024-12-04 PROCEDURE — 97110 THERAPEUTIC EXERCISES: CPT | Mod: GO | Performed by: OCCUPATIONAL THERAPIST

## 2024-12-04 PROCEDURE — 97112 NEUROMUSCULAR REEDUCATION: CPT | Mod: GO | Performed by: OCCUPATIONAL THERAPIST

## 2024-12-04 NOTE — PROGRESS NOTES
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   PT Tx Diagnosis Balance deficits, movement coordination deficits   Progress Note/Certification   Start of Care Date 09/10/24   Onset of illness/injury or Date of Surgery 08/26/24   Therapy Frequency 1x/week decreasing as indicated   Predicted Duration 90 days   Certification date from 12/08/24   Certification date to 03/08/25   Progress Note Completed Date 09/10/24       Present Yes    Language Yi    ID or First/Last Name Marie  [facility] video  interpretor   PT Goal 1   Goal Identifier HEP   Goal Description Pt will be able to demonstrate HEP activities without need for cues from provider to demonstrate understanding and independence with HEP.   Goal Progress 12/4/24 does exercies daily.  he is ready for more chanllenging ones.   Target Date 03/08/25   PT Goal 2   Goal Identifier FGA   Goal Description Pt will demonstrate a 4 point improvement on the FGA to demonstrate minimum clinically significant difference in score to demonstrate improved safety with functional mobility and decrease risk of falls.   Rationale to maximize safety and independence within the home;to maximize safety and independence with performance of ADLs and functional tasks;to maximize safety and independence within the community   Goal Progress 12/4/24 16/30  (4 point improvement) 9/10/24  12/30   Target Date 03/08/25   PT Goal 3   Goal Identifier Gait speed   Goal Description UPDATE: Initial goal met. Pt will now demo gait speed of better than .88m/s  or a .12 secnd improvement in gait speed. (Pt will demonstrate a 0.12 second improvement in gait speed to demonstrate minimum clinically significant difference in  score to demonstrate improved gait velocity.)   Rationale to maximize safety and independence with performance of ADLs and functional tasks;to maximize safety and independence within the home;to maximize safety and independence within the community   Goal Progress 12/4/24 with AFO 7.87 sec  =.76m/s   Initial goal met. 9/10/24  0.57m/s with out AFO   Target Date 03/08/25   PT Goal 4   Goal Identifier 5xSTS   Goal Description UPDATE: Initial goal met: Pt will now demonstrate a 5xsts  time of less than 14.3 sec to demonstrate improved LE power and decreased fall risk. (Pt will demonstrate a 2.3 seconds improvement on 5xSTS to demonstrate minimum clinically significant difference in score to demonstrate improved LE strength.)   Rationale to maximize safety and independence with performance of ADLs and functional tasks;to maximize safety and independence within the home;to maximize safety and independence within the community   Goal Progress 11/27 5xSTS- 17 sec  9/10/24  21.69sec   Target Date 03/08/25   Subjective Report   Subjective Report Did well last session muscles were tired thigh and calf after kicking the ball. He notes that he feels more secure when taking a shower.   Neuromuscular Re-education   Neuromuscular re-ed of mvmt, balance, coord, kinesthetic sense, posture, proprioception minutes (19235) 15   Neuro Re-ed 1 Nerve Mobility Sciatic Position 5   Neuro Re-ed 1 - Details still going well at home   PTRx Neuro Re-ed 1 Romberg Eyes Closed Pillow   PTRx Neuro Re-ed 1 - Details 2 x 30 sec on airex   PTRx Neuro Re-ed 2 Romberg with Head Pitch   PTRx Neuro Re-ed 2 - Details cues to have eyes open. 30 sec indep on firm ground moved to airex x 10 sec x 2 reps   PTRx Neuro Re-ed 3 Romberg with Head Turns   PTRx Neuro Re-ed 3 - Details cues to have eyes open. 30 sec indep on firm ground moved to airex x 10 sec x 2 reps   Skilled Intervention educated to use his phone to time these and keep working on them at home    Patient Response/Progress pt verbalized understaning   Eval/Assessments   Assessments Physical Performance Test/Measures   Physical Performance Test/measures   Physical Performance Test/Measurement, Minutes (30736) 25   Physical Performance Test/Measurement Details administered FGA and 10M walk test see attached flow sheet   Skilled Intervention scored 4 points better on FGA today and improved significantly in gait speed.  5x STS measured last session with significant improvement also   Education   Learner/Method Patient;Listening;Demonstration   Plan   Home program PTRX,   Updates to plan of care continue 1x / week x 4 more weeks decreasing fx as indicated   Plan for next session continue with [employer], advance strength work for home   Total Session Time   Timed Code Treatment Minutes 40   Total Treatment Time (sum of timed and untimed services) 40         [facility]                                                                                   OUTPATIENT PHYSICAL THERAPY    PLAN OF TREATMENT FOR OUTPATIENT REHABILITATION   Patient's Last Name, First Name, Michael Thomas    YOB: 1970   Provider's Name   [facility]   Medical Record No.  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              Referring  Provider:  Montrell Borrego    Initial Assessment  See Epic Evaluation- Start of Care Date: 09/10/24            PLAN  Continue therapy per current plan of care.    Beginning/End Dates of Progress Note Reporting Period:  09/10/24 to 12/04/2024    Referring Provider:  Montrell Borrego

## 2024-12-09 ENCOUNTER — OFFICE VISIT (OUTPATIENT)
Dept: NEUROLOGY | Facility: CLINIC | Age: 54
End: 2024-12-09
Payer: COMMERCIAL

## 2024-12-09 VITALS — SYSTOLIC BLOOD PRESSURE: 113 MMHG | OXYGEN SATURATION: 96 % | HEART RATE: 69 BPM | DIASTOLIC BLOOD PRESSURE: 70 MMHG

## 2024-12-09 DIAGNOSIS — G04.01 ADEM (ACUTE DISSEMINATED ENCEPHALOMYELITIS) (POSTINFECTIOUS): Primary | ICD-10-CM

## 2024-12-09 NOTE — LETTER
12/9/2024      Michael Stewart  [address]   [address]  [place] [id number]      Dear Colleague,    Thank you for referring your patient, Michael Stewart, to the [facility] NEUROLOGY [facility]. Please see a copy of my visit note below.      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 He was [facility] in July 2023 with headache, right sided weakness and gait disturbances following recent Strep A pharyngitis. MRI Brain showed gradually worsening extensive thalamic and brainstem FLAIR hyperintensities. CSF showed leukocytosis and normal protein. He received IV Solumedrol for 7 days followed by prolonged oral prednisone taper. Hospitalization was complicated by trach/PEG placement and was discharged to LTAC. 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 No evidence to suggest alternate autoimmune process such as Bickerstaff brainstem encephalitis vs other, hence there is no need for longterm immunosuppression.     He recovered remarkably well during his stay in LTAC and trach/PEG removed. Repeat MRI brain 12/2023 & 9/2024 showed near complete resolution of FLAIR hyperintensities with small residual lesion in left thalamus and faint enhancement. He now lives at home with his friends, walks without need for assistive devices, independent with ADLs and his daughter helps with some errands/appointments. He is doing well and recovering as anticipated.     Plan:   - Continue PT/OT and PM&R follow up  - Sleep clinic follow up for sleep  apnea evaluation, daughter will call to schedule    Return to Clinic as needed    Stroke Education provided.  He will call us with any questions.  For any acute neurologic deficits he was advised to  go directly to the hospital rather than call the clinic.    I spent a total of 32 minutes on the day of the visit.   Time spent by me today doing chart review, history and exam, documentation and further activities per the note    Montrell Borrego MD    Vascular Neurology    __________________________________________________________    Chief Complaint: Patient presents with:  RECHECK: Right arm and leg are still bothering him from a function standpoint - dealing with the numbness    History of Present Illness: Michael Stewart is a 54 year old Mohawk-speaking male presenting to the stroke clinic today as hospital follow up. Past medical history significant for methamphetamine and tobacco use. He is accompanied by his daughter today who would like to translate.     On 7/5/23, he presented to ED with 3 days of worsening right sided headaches. CT Head was unremarkable, he was diagnosed with Strep A pharyngitis and he received IV Penicillin G before discharge. He presented to [facility] the following day with persistent headache and gait disturbances. Around midnight, he attempted to use the restroom but required help from his family to walk to the bathroom. His family checked on him again in the morning and still felt his gait was off. He was also complaining of some weakness in his R arm/leg and diplopia. Basic labs, UA, COVID-19 screen and urine drug screen were all unremarkable.     He was transferred to [facility] when he was noted to be febrile (102.3) and hypertensive (188/135). He appeared somnolent with abnormal breathing patterns. Exam remarkable for have dysconjugate gaze, inability to abduct eyes on either side (R>L), nystagmus, dysarthria and right sided drift. MRI Brain showed  bilateral thalamic hyperintensities and MRV Head showed no evidence of CVST. Subacute presentation and imaging were initially concerning for possible infection, autoimmune process such as ADEM, etc. He was placed on CPAP due to CO2 retention and then ultimately intubated & mechanically ventilated. He was started on empiric CNS coverage antibiotics, IV Thiamine, Aspirin and Statin.     LP performed 7/6: Opening pressure was 36, WBC 33, Protein 57 and negative meningitis/encephalitis panel. Video EEG showed no evidence of seizures. Repeat LP (to assess ICP and obtain oligoclonal bands) on 7/8 w/ opening pressure 16 showed WBC 63, Protein 42 and normal glucose. Cytology showed lymphocytosis. LP repeated again on 7/11 to obtain fresh cells for flow cytometry and that showed no clear evidence of malignancy. Oligoclonal bands returned positive on 7/12 and he was started on IV Solumedrol 1g daily x 7 days for possible autoimmune process. Repeat MRI Brain 7/14 showed extensive thalamic and brainstem FLAIR hyperintensities. Repeat LP was performed on 7/19 to send CSF Encephalopathy panel - WBC 1, Protein 32. CSF Encephalopathy panel x 2 were unremarkable.     Hospitalization further complicated by ventilator associated pneumonia, respiratory cultures positive for strep anginosus, staph aureus and klebsiella aerogenes and he was started on Cefepime. He was also started on Bromocriptine for sympathetic hyperactivity. He completed IV Solumedrol on 7/19 and was started on Prednisone taper 60 mg with expected stop date of 8/29/2023. He underwent tracheostomy/PEG tube placement on 7/21. Aspirin was discontinued due to low suspicion for stroke. He was discharged to LTAC on 7/26.     Clinic visit 8/26/2024 -   He stayed in LTAC for about a month. Then he was discharged to [facility] and he stayed there for about 3 months. He recovered remarkably well during his stay in rehab and had trach/PEG removed on discharge. 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 He is not working now due to right sided weakness. Daughter is going to help him apply for social security and disability tomorrow.     He now lives at home with his friends and the daughter helps him with his appointments. He walks without any assistive devices at home, uses a walker while walking outside. He continues to have residual right sided weakness and numbness, no other symptoms. He tries to stay physically active, he is not currently working with PT/OT. Denies any falls. Denies any trouble with speaking or swallowing. Denies double vision. Denies bowel or bladder incontinence. He does snore at night but reports having a good night's sleep and feels refreshed in the morning.     Interval history:   Repeat MRI Brain showed unchanged left thalamic FLAIR hyperintensity with faint enhancement. EMG/NCS normal. Vit B1 level normal. Gq1b antibodies negative. He followed with PM&R for right foot dystonia.     Today, he states he has been doing better. He is working with PT/OT once a week and his right sided weakness has gotten better. He lives with his friends, walks without any need for assistive devices and is independent with ADLs. He met with PM&R who recommended following with PT/OT and did not think Botox was necessary at this time.     Modified Miguel Scale Score: 2-Slight disability; unable to carry out all previous activities, but able to look after own affairs     MRI/Head CT MRI BRAIN WWO CONTRAST 7/6/2023:  1. Bilateral thalamic infarcts (left greater than right) with likely involvement of the posterior limb of the left internal capsule which corresponds with the patient's recent symptoms of right-sided weakness and incoordination.  2. Otherwise, normal brain MRI.    MRI BRAIN WWO CONTRAST 7/8/2023:  1. No definite evidence of meningitis.   2. Unchanged bilateral thalamic infarctions.    MRI BRAIN WWO CONTRAST 7/20/2023:  Multifocal areas of reduced  diffusion in the thalami and  brainstem. When compared to the exam on 7/6/2023, these areas do not produce an expected pattern of evolving signal characteristics. Given the appearance of these lesions and their presence in both the  brainstem and basal ganglia, an autoimmune encephalomyelitis is favored as an etiology over cerebral infarctions or infections.     MRI BRAIN WWO CONTRAST 12/26/2023:  HEAD MRI:   1.  No acute infarct, mass, or hemorrhage.  2.  Marked interval improvement of expansile T2/FLAIR hyperintense signal within the thalami and brainstem compared to 07/20/2023. There is mild residual T2/FLAIR hyperintensity within the left lateral thalamus with faint enhancement.  3.  Persistent complete opacification left sphenoid sinus, correlate for sinusitis.    MRI BRAIN WWO CONTRAST 9/27/2024:  1.  No acute infarct, mass, or hemorrhage.  2. Unchanged mild residual T2 hyperintensity within the left lateral thalamus and brain stem with associated faint enhancement at left thalamus.  3. Persistent complete opacification left sphenoid sinus with T2 hypointense signal, which may represent inspissated fluid or fungal sinusitis.   Vessel Imaging 7/6/2023:   Neck CTA: The bilateral common carotid, bilateral cervical internal carotid and bilateral vertebral arteries are patent without stenosis.   Head CTA: The basilar, bilateral intracranial distal internal carotid, bilateral anterior cerebral, bilateral middle cerebral and bilateral posterior cerebral arteries are patent and unremarkable.    MRV WWO CONTRAST 7/6/2023:  No evidence of dural venous sinus or deep vein thrombosis.     7/15/2023:  1. Head CTA demonstrates a possible aneurysm of what appears to be the right callosomarginal artery versus a dilated adjacent vein.  2. Neck CTA demonstrates no stenosis of the major cervical arteries.  3. Stable infarcts in the bilateral thalamus, please see same day head CT 7/15/2023 for further details.    MRA HEAD/NECK  12/26/2023:  Normal MRA head/neck     Review of Systems:  A comprehensive 10-point review of systems was performed and was negative except as stated above.     Past Medical History  None    Past Surgical History  Past Surgical History:   Procedure Laterality Date     ENDOSCOPIC INSERTION TUBE GASTROSTOMY  7/21/2023     PICC DOUBLE LUMEN PLACEMENT Left 07/15/2023     TRACHEOSTOMY N/A 7/21/2023       Social History  Social History     Tobacco Use     Smoking status: Never     Smokeless tobacco: Never   Vaping Use     Vaping status: Never Used   Substance Use Topics     Alcohol use: Yes     Comment: rarely     Drug use: Not Currently       Family History  Reviewed and non-contributory    Current Medications  Current Outpatient Medications   Medication Sig Dispense Refill     gabapentin (NEURONTIN) 100 MG capsule 100 mg TID x 7 days, THEN 300 mg TID. 270 capsule 2     atorvastatin (LIPITOR) 40 MG tablet Take 1 tablet (40 mg) by mouth daily (Patient not taking: Reported on 12/9/2024) 90 tablet 1     atorvastatin (LIPITOR) 40 MG tablet Take 1 tablet (40 mg) by mouth daily (Patient not taking: Reported on 12/9/2024) 30 tablet 0     carvedilol (COREG) 6.25 MG tablet Take 1 tablet (6.25 mg) by mouth 2 times daily (with meals) (Patient not taking: Reported on 12/9/2024) 60 tablet 3     carvedilol (COREG) 6.25 MG tablet Take 1 tablet (6.25 mg) by mouth 2 times daily (with meals) (Patient not taking: Reported on 12/9/2024) 60 tablet 0     meclizine (ANTIVERT) 25 MG tablet Take 1 tablet (25 mg) by mouth 3 times daily as needed for dizziness (for vertigo if sx return) (Patient not taking: Reported on 12/9/2024) 30 tablet 1     Current Facility-Administered Medications   Medication Dose Route Frequency Provider Last Rate Last Admin     botulinum toxin type A (BOTOX) 100 units injection 400 Units  400 Units Intramuscular Q90 Days            Allergies:   No Known Allergies    Vitals:  /70   Pulse 69   SpO2 96%     Physical  exam:  General:  Healthy appearing, well-built and well-nourished    HEENT:  normocephalic, atraumatic  Cardio:  regular rate and rhythm  Pulmonary: breathing well on room air, no respiratory distress    NEUROLOGICAL EXAM  Mental Status: AAO x 3. Answers appropriately, follows complex commands.   Speech: Naming, fluency, comprehension and repetition intact. Mild spastic dysarthria  Cranial Nerves: Pupils equal and reactive to light bilaterally. Visual fields full to confrontation, EOM intact, facial sensation intact, right facial droop   Motor: Right arm dystonia (mild shoulder abduction, elbow flexion and finger extension) on activation  Right foot dystonia at rest (inversion)  Strength 5/5 throughout   Reflexes 3+ on the left, 4+ on right w/ 4-5 beat ankle clonus. Babinski +ve on the right  Sensory: Decreased light touch and pinprick sensation on the right  Coordination: Finger to nose & Heel to shin intact bilaterally  Gait: Deferred    PHQ-2 Score:       1/11/2024     7:34 AM   PHQ-2 ( 1999 [employer])   Q1: Little interest or pleasure in doing things 0    Q2: Feeling down, depressed or hopeless 0    PHQ-2 Score 0   Q1: Little interest or pleasure in doing things Not at all   Q2: Feeling down, depressed or hopeless Not at all   PHQ-2 Score 0       Patient-reported     PHQ-9 score:         No data to display                Neuroimaging: as per HPI. I personally reviewed those images    Labs:  Coagulation studies:  Recent Labs   Lab Test 12/26/23  1701 07/24/23  0857 07/06/23  1039   INR 0.86 1.12 0.91        Lipid panel:  Recent Labs   Lab Test 08/26/24  1356 07/07/23  0144   CHOL 173 278*   HDL 45 51   LDL 92 186*   TRIG 180* 203*       HbA1C:  Recent Labs   Lab Test 07/07/23  0144   A1C 6.1*                 Again, thank you for allowing me to participate in the care of your patient.        Sincerely,        MARYLU ASTUDILLO MD

## 2025-01-16 ENCOUNTER — THERAPY VISIT (OUTPATIENT)
Dept: PHYSICAL THERAPY | Facility: CLINIC | Age: 55
End: 2025-01-16
Attending: STUDENT IN AN ORGANIZED HEALTH CARE EDUCATION/TRAINING PROGRAM
Payer: COMMERCIAL

## 2025-01-16 DIAGNOSIS — G04.01 BICKERSTAFF BRAINSTEM ENCEPHALITIS: Primary | ICD-10-CM

## 2025-01-16 PROCEDURE — 97112 NEUROMUSCULAR REEDUCATION: CPT | Mod: GP | Performed by: PHYSICAL THERAPIST

## 2025-01-28 ENCOUNTER — THERAPY VISIT (OUTPATIENT)
Dept: PHYSICAL THERAPY | Facility: CLINIC | Age: 55
End: 2025-01-28
Attending: STUDENT IN AN ORGANIZED HEALTH CARE EDUCATION/TRAINING PROGRAM
Payer: COMMERCIAL

## 2025-01-28 DIAGNOSIS — G04.01 BICKERSTAFF BRAINSTEM ENCEPHALITIS: Primary | ICD-10-CM

## 2025-01-28 PROCEDURE — 97112 NEUROMUSCULAR REEDUCATION: CPT | Mod: GP

## 2025-02-04 ENCOUNTER — OFFICE VISIT (OUTPATIENT)
Dept: PHYSICAL MEDICINE AND REHAB | Facility: CLINIC | Age: 55
End: 2025-02-04
Payer: COMMERCIAL

## 2025-02-04 VITALS — DIASTOLIC BLOOD PRESSURE: 74 MMHG | HEART RATE: 80 BPM | OXYGEN SATURATION: 92 % | SYSTOLIC BLOOD PRESSURE: 107 MMHG

## 2025-02-04 DIAGNOSIS — M79.2 NEUROPATHIC PAIN: ICD-10-CM

## 2025-02-04 DIAGNOSIS — I63.81 THALAMIC STROKE (H): Primary | ICD-10-CM

## 2025-02-04 DIAGNOSIS — G04.01 BICKERSTAFF BRAINSTEM ENCEPHALITIS: ICD-10-CM

## 2025-02-04 PROCEDURE — 99214 OFFICE O/P EST MOD 30 MIN: CPT | Mod: GC | Performed by: PHYSICAL MEDICINE & REHABILITATION

## 2025-02-04 RX ORDER — GABAPENTIN 600 MG/1
600 TABLET ORAL 3 TIMES DAILY
Qty: 270 TABLET | Refills: 3 | Status: SHIPPED | OUTPATIENT
Start: 2025-02-04 | End: 2026-01-30

## 2025-02-04 NOTE — PROGRESS NOTES
PM&R Clinic Note     Patient Name: Michael Stewart : 1970 Medical Record: [id number]            History of Present Illness:     Michael Stewart is a 54 year old male French-speaking male who presented to clinic today with his daughter for follow up regarding his rehab needs.     Background from the consult note on 10/22/24; referral from Dr. Montrell Borrego  Per chart review, he had initially presented to [facility] 23 with 3 days of right sided weakness, numbness/tingling, difficulty ambulating, and headache that was at the time attributed to Strep A pharyngitis.  He then developed trouble ambulating, right sided weakness, diplopia, and MRI Brain on  revealed restricted diffusion of the bilateral thalami (L>R) which were labeled as strokes, however, pattern was atypical. 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     Hospitalization further complicated by ventilator associated pneumonia, respiratory cultures positive for strep anginosus, staph aureus and klebsiella aerogenes and he was started on Cefepime. He was also started on Bromocriptine for sympathetic hyperactivity. He completed IV Solumedrol on  and was started on Prednisone taper 60 mg with expected stop date of 2023. He underwent tracheostomy/PEG tube placement on 23. He was discharged to LTAC on .     Per neurology note on , patient stayed in LTAC for about a month. Then he was discharged to [facility] and he stayed there for about 3 months. He recovered remarkably well during his stay in rehab and had trach/PEG removed on discharge.      He now lives at home with his friends and the daughter helps him with his appointments. He walks without any assistive devices at home, uses a walker while walking outside. He continues to have residual right sided weakness and numbness, no other symptoms. He tries to physically active, he is not currently working with PT/OT.  Denies any falls. Denies any trouble with speaking or swallowing. Denies double vision. Denies bowel or bladder incontinence.      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 He is not working now due to right sided weakness. Daughter is going to help him apply for social security and disability    Patient was referred by Montrell Borrego MD, neurology for concerns of dystonia.    Interval history   He was last seen in PM&R clinic 10/11/24    He underwent EMG/NCS of the facial nerve, RUE, and RLE with F-waves on 11/13/24, which was normal.    He had follow up with vascular neurology 12/09 who recommended sleep evaluation and continued therapies, but otherwise state he is recovering well. Taking gabapentin 300mg TID, which he thinks has been helping. Denies sedation from gabapentin or other side effects. Reports numbness/tingling throughout right side of the body.    Reports muscle tightness of the RUE and RLE which contributes to the pain as well as continued neuropathic pain of the RUE and RLE. Reports he is able to continue to do his ADLs despite pain, but is very bothersome.    Mobility,  Able to walk distances, but painful with walking. Able to ambulate distances. Able to climb stairs and ambulate across uneven terrain.     ADLs,  He is living with friends. His daughter helps him with his medical appointments, but he otherwise does not require any assistance with his ADLs.    He is driving again, which he reports is going well. Denies difficulties with using pedals or steering wheel.    Orthosis,  Uses RLE AFO, which he feels is helpful and is fitting well.    Therapies,  Has been participating in PT/OT. Reports that he is no longer planning on going to therapy as he does not feel that they are helping him. Does his home exercise program daily.    He was referred to OT initially for return to driving assessments, however per message from OT he was less interested in and instead focus on RUE  pain.    Vocational,  Waiting for appointment (>3 months out) for social security disability. Still not able to work.    Patient expresses a desire to eventually return to work, but states that he does not believe he would be able to perform his prior work duties at present. He identifies his right hand function and in particular pain & sensory loss as the primary barriers to returning to work.    Medications,  Takes gabapentin 300mg TID           Past Medical and Surgical History:     No past medical history on file.  Past Surgical History:   Procedure Laterality Date    ENDOSCOPIC INSERTION TUBE GASTROSTOMY  7/21/2023    Procedure: Endoscopic insertion tube gastrostomy;  Surgeon: William Garcia MD;  Location: [facility] OR    PICC DOUBLE LUMEN PLACEMENT Left 07/15/2023    Left basilic vein 48cm total 1cm external.    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 No longer driving.          Family History:     No family history on file.         Medications:     Current Outpatient Medications   Medication Sig Dispense Refill    gabapentin (NEURONTIN) 100 MG capsule 100 mg TID x 7 days, THEN 300 mg TID. 270 capsule 2    gabapentin (NEURONTIN) 600 MG tablet Take 1 tablet (600 mg) by mouth 3 times daily. 270 tablet 3    atorvastatin (LIPITOR) 40 MG tablet Take 1 tablet (40 mg) by mouth daily (Patient not taking: Reported on 12/9/2024) 90 tablet 1    atorvastatin  "(LIPITOR) 40 MG tablet Take 1 tablet (40 mg) by mouth daily (Patient not taking: Reported on 12/9/2024) 30 tablet 0    carvedilol (COREG) 6.25 MG tablet Take 1 tablet (6.25 mg) by mouth 2 times daily (with meals) (Patient not taking: Reported on 12/9/2024) 60 tablet 3    carvedilol (COREG) 6.25 MG tablet Take 1 tablet (6.25 mg) by mouth 2 times daily (with meals) (Patient not taking: Reported on 12/9/2024) 60 tablet 0    meclizine (ANTIVERT) 25 MG tablet Take 1 tablet (25 mg) by mouth 3 times daily as needed for dizziness (for vertigo if sx return) (Patient not taking: Reported on 12/9/2024) 30 tablet 1            Allergies:     No Known Allergies           ROS:     A focused ROS is negative other than the symptoms noted above in the HPI.         Physical Examiniation:     VITAL SIGNS: /74 (BP Location: Right arm, Patient Position: Sitting, Cuff Size: Adult Regular)   Pulse 80   SpO2 92%   BMI: Estimated body mass index is 27.62 kg/m  as calculated from the following:    Height as of 1/11/24: 1.651 m (5' 5\").    Weight as of 8/26/24: 75.3 kg (166 lb).    Gen: NAD, pleasant and cooperative   Cardio: regular pulse, well perfused  Pulm: non-labored breathing in room air  Abd: benign  Ext:  no edema in BLE, no tenderness in calves  Neuro/MSK: 5/5 strength in the BUE and BLE, diminished sensation to touch along entire right half of body. No apparent dystonic movements on exam today. No objective spasticity on exam today, but some generalized hypertonicity/rigidity of the RUE and RLE noted           Assessment/Plan:     # Bickerstaff brainstem encephalitis  # RLE/R foot dystonia   # Myofascial neck pain, right sided  # Neuropathic pain of the RUE and RLE    Michael Stewart is a 54 year old male Macedonian-speaking male who presents today to PM&R clinic for follow up. He initially presented to the acute hospital with right sided weakness, numbness/tingling, difficulty ambulating, and headache that was at the time " attributed to Strep A pharyngitis.  He then developed trouble ambulating, right sided weakness, diplopia, and MRI Brain on 7/8 revealed restricted diffusion of the bilateral thalami (L>R) which were labeled as strokes, however, pattern was atypical. 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 After which she spent 3 months in TCU and was able to discharge home.      Today, patient reports his primary concern in ongoing full right body pain with some numbness and tingling particularly of the extremities. Overall he is functional well at home able to do most ADLs modified independently. He has returned to driving and denies any challenges. He awaiting an appointment with the social security office to apply for disability, however appointments are booked out >3 months. He wishes to be able to return to working, but does not believe he would be able to do so as he is at present. He identifies his pain as well as his impaired sensation in the RUE in particular as barriers to returning to work.    Patient education: In depth discussion and education was provided about the assessment and implications of each of the below recommendations for management. Patient indicated readiness to learn, all questions were answered and understanding of material presented was confirmed.    Vocational: Discussed possible return to work (not yet ready), and how PM&R could provide assistance with return to work in form of documentation for workplace accommodations. Provided him with a letter today stating that due to his residual deficits resulting in acute disability s/p encephalitis he is medically not able to perform his typical workplace duties at this time.    Therapy/equipment/braces: continue formal outpatient therapies for now, but planning to stop. Does his HEP daily.    Medications: Has had some improvement with gabapentin, but still having quite a bit of neuropathic pain. Will increase to 600mg TID starting  today. May also consider adding baclofen pending his response to increased dose as right sided dystonia/muscle tightness may be contributing to his pain.    Interventions: No objective spasticity on exam, but does have some non-specific increased RUE & RLE tone, possibly 2/2 dystonia. Does no currently seem like he would benefit from toxin injections, but can re-evaluate in the future. Can consider trigger point injections if conservative therapies and medications do not help his myofascial neck pain.    Referral / follow up with other providers: None    Follow up in 2 weeks by phone to review response to medication, may consider increasing or added baclofen as an additional agent at that time.    Discussed the patient's case with my attending, Dr. Cindy Graves MD , who agrees with the following above.     Satish Almodovar MD  Resident Physician, PGY-3  Physical Medicine & Rehabilitation  [facility]    Physician Attestation   I, Cindy Graves MD, saw this patient and agree with the findings and plan of care as documented in the note.      Items personally reviewed/procedural attestation: notes in the chart.  Discussed his active symptoms, current function and the plan as outlined above.     Cindy Graves MD

## 2025-02-04 NOTE — LETTER
February 4, 2025      Michael Stewart  [address]  [place] [address]          To whom it may concern,    Michael Stewart is a patient under my care for his rehabilitation needs. At present he is unable to work due to chronic effects of his ongoing medical conditions, which prevent him from performing his typical work duties. He requires additional assistance from family for his daily activities, such as attending medical appointments.    Please offer Mr. Stewart any services or accommodations that may be pertinent in the setting of his new, acute disability.     Please do not hesitate to reach out with any questions or concerns.    Sincerely,    Satish Almodovar MD    The above has been discussed with Dr. Cindy Graves MD, who is in agreement.    [facility] Department of PM&R   show

## 2025-02-04 NOTE — LETTER
2025       RE: Michael Stewart  [address]   [address]  [place] [address]     Dear Colleague,    Thank you for referring your patient, Michael Stewart, to the [facility] PHYSICAL MEDICINE AND REHABILITATION CLINIC [place] [facility]. Please see a copy of my visit note below.           PM&R Clinic Note     Patient Name: Michael Stewart : 1970 Medical Record: [id number]            History of Present Illness:     Michael Stewart is a 54 year old male Nicaraguan-speaking male who presented to clinic today with his daughter for follow up regarding his rehab needs.     Background from the consult note on 10/22/24; referral from Dr. Montrell Borrego  Per chart review, he had initially presented to [facility] 23 with 3 days of right sided weakness, numbness/tingling, difficulty ambulating, and headache that was at the time attributed to Strep A pharyngitis.  He then developed trouble ambulating, right sided weakness, diplopia, and MRI Brain on  revealed restricted diffusion of the bilateral thalami (L>R) which were labeled as strokes, however, pattern was atypical. 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     Hospitalization further complicated by ventilator associated pneumonia, respiratory cultures positive for strep anginosus, staph aureus and klebsiella aerogenes and he was started on Cefepime. He was also started on Bromocriptine for sympathetic hyperactivity. He completed IV Solumedrol on  and was started on Prednisone taper 60 mg with expected stop date of 2023. He underwent tracheostomy/PEG tube placement on 23. He was discharged to LTAC on .     Per neurology note on , patient stayed in LTAC for about a month. Then he was discharged to [facility] and he stayed there for about 3 months. He recovered remarkably well during his stay in rehab and had trach/PEG  removed on discharge.      He now lives at home with his friends and the daughter helps him with his appointments. He walks without any assistive devices at home, uses a walker while walking outside. He continues to have residual right sided weakness and numbness, no other symptoms. He tries to physically active, he is not currently working with PT/OT. Denies any falls. Denies any trouble with speaking or swallowing. Denies double vision. Denies bowel or bladder incontinence.      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 He is not working now due to right sided weakness. Daughter is going to help him apply for social security and disability    Patient was referred by Montrell Borrego MD, neurology for concerns of dystonia.    Interval history   He was last seen in PM&R clinic 10/11/24    He underwent EMG/NCS of the facial nerve, RUE, and RLE with F-waves on 11/13/24, which was normal.    He had follow up with vascular neurology 12/09 who recommended sleep evaluation and continued therapies, but otherwise state he is recovering well. Taking gabapentin 300mg TID, which he thinks has been helping. Denies sedation from gabapentin or other side effects. Reports numbness/tingling throughout right side of the body.    Reports muscle tightness of the RUE and RLE which contributes to the pain as well as continued neuropathic pain of the RUE and RLE. Reports he is able to continue to do his ADLs despite pain, but is very bothersome.    Mobility,  Able to walk distances, but painful with walking. Able to ambulate distances. Able to climb stairs and ambulate across uneven terrain.     ADLs,  He is living with friends. His daughter helps him with his medical appointments, but he otherwise does not require any assistance with his ADLs.    He is driving again, which he reports is going well. Denies difficulties with using pedals or steering wheel.    Orthosis,  Uses RLE AFO, which he feels is helpful and is  fitting well.    Therapies,  Has been participating in PT/OT. Reports that he is no longer planning on going to therapy as he does not feel that they are helping him. Does his home exercise program daily.    He was referred to OT initially for return to driving assessments, however per message from OT he was less interested in and instead focus on RUE pain.    Vocational,  Waiting for appointment (>3 months out) for social security disability. Still not able to work.    Patient expresses a desire to eventually return to work, but states that he does not believe he would be able to perform his prior work duties at present. He identifies his right hand function and in particular pain & sensory loss as the primary barriers to returning to work.    Medications,  Takes gabapentin 300mg TID           Past Medical and Surgical History:     No past medical history on file.  Past Surgical History:   Procedure Laterality Date     ENDOSCOPIC INSERTION TUBE GASTROSTOMY  7/21/2023    Procedure: Endoscopic insertion tube gastrostomy;  Surgeon: William Garcia MD;  Location: [facility] OR     PICC DOUBLE LUMEN PLACEMENT Left 07/15/2023    Left basilic vein 48cm total 1cm external.     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 No longer driving.          Family History:     No family history on file.         Medications:     Current Outpatient Medications   Medication Sig  "Dispense Refill     gabapentin (NEURONTIN) 100 MG capsule 100 mg TID x 7 days, THEN 300 mg TID. 270 capsule 2     gabapentin (NEURONTIN) 600 MG tablet Take 1 tablet (600 mg) by mouth 3 times daily. 270 tablet 3     atorvastatin (LIPITOR) 40 MG tablet Take 1 tablet (40 mg) by mouth daily (Patient not taking: Reported on 12/9/2024) 90 tablet 1     atorvastatin (LIPITOR) 40 MG tablet Take 1 tablet (40 mg) by mouth daily (Patient not taking: Reported on 12/9/2024) 30 tablet 0     carvedilol (COREG) 6.25 MG tablet Take 1 tablet (6.25 mg) by mouth 2 times daily (with meals) (Patient not taking: Reported on 12/9/2024) 60 tablet 3     carvedilol (COREG) 6.25 MG tablet Take 1 tablet (6.25 mg) by mouth 2 times daily (with meals) (Patient not taking: Reported on 12/9/2024) 60 tablet 0     meclizine (ANTIVERT) 25 MG tablet Take 1 tablet (25 mg) by mouth 3 times daily as needed for dizziness (for vertigo if sx return) (Patient not taking: Reported on 12/9/2024) 30 tablet 1            Allergies:     No Known Allergies           ROS:     A focused ROS is negative other than the symptoms noted above in the HPI.         Physical Examiniation:     VITAL SIGNS: /74 (BP Location: Right arm, Patient Position: Sitting, Cuff Size: Adult Regular)   Pulse 80   SpO2 92%   BMI: Estimated body mass index is 27.62 kg/m  as calculated from the following:    Height as of 1/11/24: 1.651 m (5' 5\").    Weight as of 8/26/24: 75.3 kg (166 lb).    Gen: NAD, pleasant and cooperative   Cardio: regular pulse, well perfused  Pulm: non-labored breathing in room air  Abd: benign  Ext:  no edema in BLE, no tenderness in calves  Neuro/MSK: 5/5 strength in the BUE and BLE, diminished sensation to touch along entire right half of body. No apparent dystonic movements on exam today. No objective spasticity on exam today, but some generalized hypertonicity/rigidity of the RUE and RLE noted           Assessment/Plan:     # Bickerstaff brainstem " encephalitis  # RLE/R foot dystonia   # Myofascial neck pain, right sided  # Neuropathic pain of the RUE and RLE    Michael Stewart is a 54 year old male Mozambican-speaking male who presents today to PM&R clinic for follow up. He initially presented to the acute hospital with right sided weakness, numbness/tingling, difficulty ambulating, and headache that was at the time attributed to Strep A pharyngitis.  He then developed trouble ambulating, right sided weakness, diplopia, and MRI Brain on 7/8 revealed restricted diffusion of the bilateral thalami (L>R) which were labeled as strokes, however, pattern was atypical. 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 After which she spent 3 months in TCU and was able to discharge home.      Today, patient reports his primary concern in ongoing full right body pain with some numbness and tingling particularly of the extremities. Overall he is functional well at home able to do most ADLs modified independently. He has returned to driving and denies any challenges. He awaiting an appointment with the social security office to apply for disability, however appointments are booked out >3 months. He wishes to be able to return to working, but does not believe he would be able to do so as he is at present. He identifies his pain as well as his impaired sensation in the RUE in particular as barriers to returning to work.    Patient education: In depth discussion and education was provided about the assessment and implications of each of the below recommendations for management. Patient indicated readiness to learn, all questions were answered and understanding of material presented was confirmed.    Vocational: Discussed possible return to work (not yet ready), and how PM&R could provide assistance with return to work in form of documentation for workplace accommodations. Provided him with a letter today stating that due to his residual deficits resulting in  acute disability s/p encephalitis he is medically not able to perform his typical workplace duties at this time.    Therapy/equipment/braces: continue formal outpatient therapies for now, but planning to stop. Does his HEP daily.    Medications: Has had some improvement with gabapentin, but still having quite a bit of neuropathic pain. Will increase to 600mg TID starting today. May also consider adding baclofen pending his response to increased dose as right sided dystonia/muscle tightness may be contributing to his pain.    Interventions: No objective spasticity on exam, but does have some non-specific increased RUE & RLE tone, possibly 2/2 dystonia. Does no currently seem like he would benefit from toxin injections, but can re-evaluate in the future. Can consider trigger point injections if conservative therapies and medications do not help his myofascial neck pain.    Referral / follow up with other providers: None    Follow up in 2 weeks by phone to review response to medication, may consider increasing or added baclofen as an additional agent at that time.    Discussed the patient's case with my attending, Dr. Cindy Graves MD , who agrees with the following above.     Satish Almodovar MD  Resident Physician, PGY-3  Physical Medicine & Rehabilitation  [facility]    Physician Attestation   I, Cindy Graves MD, saw this patient and agree with the findings and plan of care as documented in the note.      Items personally reviewed/procedural attestation: notes in the chart.  Discussed his active symptoms, current function and the plan as outlined above.     Cindy Graves MD        Again, thank you for allowing me to participate in the care of your patient.      Sincerely,    Cindy Graves MD

## 2025-02-04 NOTE — PATIENT INSTRUCTIONS
Plan:  For your nerve pain on the right side of your body, particularly in your arm and your leg we will plan to increase your gabapentin to 600mg three times per day    We would like to check in in about 2 weeks by phone to see how your pain is doing, and can adjust the medication at that time.    Please continue to do your home exercises daily, this will help with the tightness in your right arm and leg. We may consider starting a medication in the future to help with your muscle tightness    We would like to help support you eventually return to being able to work. There are resources available to help people with disabilities to return to work, please take a look at this website for additional information:   [place]://[place]/  https://[place]/?ad=3182-915270

## 2025-02-11 ENCOUNTER — THERAPY VISIT (OUTPATIENT)
Dept: PHYSICAL THERAPY | Facility: CLINIC | Age: 55
End: 2025-02-11
Attending: STUDENT IN AN ORGANIZED HEALTH CARE EDUCATION/TRAINING PROGRAM
Payer: COMMERCIAL

## 2025-02-11 DIAGNOSIS — G04.01 BICKERSTAFF BRAINSTEM ENCEPHALITIS: Primary | ICD-10-CM

## 2025-02-11 PROCEDURE — 97750 PHYSICAL PERFORMANCE TEST: CPT | Mod: GP

## 2025-02-11 NOTE — PROGRESS NOTES
DISCHARGE  Reason for Discharge: Patient meeting goals and endorsing he would like to discharge from therapy at this time. This PT in agreement. He was educated on continuation of HEP and follow up as needed.     Equipment Issued: NA    Discharge Plan: Patient to continue home program.  Follow up as needed.    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   PT Goal 1   Goal Identifier HEP   Goal Description Pt will be able to demonstrate HEP activities without need for cues from provider to demonstrate understanding and independence with HEP.   Goal Progress 12/4/24 does exercies daily.  he is ready for more chanllenging ones.   Target Date 03/08/25   Date Met 02/11/25   PT Goal 2   Goal Identifier FGA   Goal Description Pt will demonstrate a 4 point improvement on the FGA to demonstrate minimum clinically significant difference in score to demonstrate improved safety with functional mobility and decrease risk of falls.   Rationale to maximize safety and independence within the home;to maximize safety and independence with performance of ADLs and functional tasks;to maximize safety and independence within the community   Goal Progress 2/11/25: 16/30 - no improvement from previous however goal met12/4/24  16/30  (4 point improvement) 9/10/24  12/30   Target Date 03/08/25   Date Met 02/11/25   PT Goal 3   Goal Identifier Gait speed   Goal Description UPDATE: Initial goal met. Pt will now demo gait speed of better than .88m/s  or a .12 secnd improvement in gait speed. (Pt will demonstrate a 0.12 second improvement in gait speed to demonstrate minimum clinically significant difference in score to demonstrate improved gait velocity.)   Rationale to maximize safety and independence with performance of ADLs and functional tasks;to maximize safety and independence within the home;to maximize safety and independence within the community   Goal Progress 02/11/25 with AFO: 6.12s = 0.98 m/s12/4/24 with AFO 7.87 sec  =.76m/s   Initial goal met. 9/10/24  0.57m/s with out AFO   Target Date 03/08/25   Date Met 02/11/25   PT Goal 4   Goal Identifier 5xSTS   Goal Description UPDATE: Initial goal met: Pt will now demonstrate a 5xsts  time of less than 14.3 sec to demonstrate improved LE power and decreased fall risk. (Pt will demonstrate a 2.3 seconds improvement on 5xSTS to demonstrate minimum clinically significant difference in score to demonstrate improved LE strength.)   Rationale to maximize safety and independence with performance of ADLs and functional tasks;to maximize safety and independence within the home;to maximize safety and independence within the community   Goal Progress 2/11/25: 10.81s 11/27 5xSTS- 17 sec  9/10/24  21.69sec   Target Date 03/08/25   Date Met 02/11/25   Subjective Report   Subjective Report He would like today to be his last visit.   Physical Performance Test/measures   Physical Performance Test/Measurement, Minutes (44444) 20   Physical Performance Test/Measurement Details FGA, 5x STS, 10MWT   Skilled Intervention outcome measure completion, outcome measure interreptation   Patient Response/Progress patient meeting all goals set, he would like to discharge from therapy at this time.   Progress goals  met   Education   Learner/Method Patient;Listening;Demonstration   Education Comments continuation of HEP, follow up as needed   Plan   Home program PTRX,   Comments   Comments Patient meeting goals and endorsing he would like to discharge from therapy at this time. This PT in agreement. He was educated on continuation of HEP and follow up as needed.   Total Session Time   Timed Code Treatment Minutes 20   Total Treatment Time (sum of timed and untimed services) 20

## (undated) DEVICE — SU SILK 0 TIE 6X30" A306H

## (undated) DEVICE — BLADE KNIFE SURG 11 371111

## (undated) DEVICE — SOL NACL 0.9% IRRIG 1000ML BOTTLE 2F7124

## (undated) DEVICE — SU SILK 2-0 TIE 12X30" A305H

## (undated) DEVICE — LINEN TOWEL PACK X6 WHITE 5487

## (undated) DEVICE — SYR 10ML LL W/O NDL 302995

## (undated) DEVICE — Device

## (undated) DEVICE — LUBRICANT INST KIT ENDO-LUBE 220-90

## (undated) DEVICE — DRAPE SLEEVE 599

## (undated) DEVICE — ANTIFOG SOLUTION W/FOAM PAD CF-1002

## (undated) DEVICE — SU VICRYL 3-0 SH 8X18" UND J864D

## (undated) DEVICE — PREP SKIN SCRUB TRAY 4461A

## (undated) DEVICE — BNDG ABDOMINAL BINDER 9X45-62" 79-89071

## (undated) DEVICE — ENDO TUBING CO2 SMARTCAP STERILE DISP 100145CO2EXT

## (undated) DEVICE — PREP POVIDONE-IODINE 7.5% SCRUB 4OZ BOTTLE MDS093945

## (undated) DEVICE — TUBING SUCTION 10'X3/16" N510

## (undated) DEVICE — PREP POVIDONE-IODINE 10% SOLUTION 4OZ BOTTLE MDS093944

## (undated) DEVICE — LINEN TOWEL PACK X5 5464

## (undated) DEVICE — DRSG DRAIN 4X4" 7086

## (undated) DEVICE — GLOVE BIOGEL PI ULTRATOUCH SZ 6.5 41165

## (undated) DEVICE — SURGICEL HEMOSTAT 4X8" 1952

## (undated) DEVICE — KIT PEG ENDOVIVE ENFIT 20 FR PULL M00509040

## (undated) DEVICE — SU PROLENE 2-0 SHDA 36" 8523H

## (undated) DEVICE — SU SILK 3-0 TIE 12X30" A304H

## (undated) DEVICE — LIGHT HANDLE X1 31140133

## (undated) DEVICE — ENDO CAP AND TUBING STERILE FOR ENDOGATOR  100130

## (undated) DEVICE — BLADE KNIFE SURG 15 371115

## (undated) DEVICE — ENDO VALVE SUCTION BRONCH EVIS MAJ-209

## (undated) DEVICE — BAG URINARY DRAIN 4000ML LF 153509

## (undated) DEVICE — ENDO ADPT BRONCH SWIVEL Y A1002

## (undated) DEVICE — SU SILK 4-0 TIE 12X30" A303H

## (undated) DEVICE — SU SILK 2-0 SH CR 5X18" C0125

## (undated) DEVICE — SOL WATER IRRIG 1000ML BOTTLE 2F7114

## (undated) DEVICE — SPONGE KITTNER 30-101

## (undated) DEVICE — SU MONOCRYL 4-0 PS-2 18" UND Y496G

## (undated) DEVICE — ENDO VALVE BX EVIS MAJ-210

## (undated) DEVICE — CLIP HORIZON MED BLUE 002200

## (undated) DEVICE — CLIP HORIZON SM RED WIDE SLOT 001201

## (undated) DEVICE — PACK NEURO MINOR UMMC SNE32MNMU4

## (undated) DEVICE — KIT CONNECTOR FOR OLYMPUS ENDOSCOPES DEFENDO 100310

## (undated) DEVICE — SUCTION MANIFOLD NEPTUNE 2 SYS 4 PORT 0702-020-000

## (undated) RX ORDER — LIDOCAINE HYDROCHLORIDE AND EPINEPHRINE 10; 10 MG/ML; UG/ML
INJECTION, SOLUTION INFILTRATION; PERINEURAL
Status: DISPENSED
Start: 2023-07-21